# Patient Record
Sex: FEMALE | Race: WHITE | Employment: OTHER | URBAN - METROPOLITAN AREA
[De-identification: names, ages, dates, MRNs, and addresses within clinical notes are randomized per-mention and may not be internally consistent; named-entity substitution may affect disease eponyms.]

---

## 2018-06-19 ENCOUNTER — TRANSFERRED RECORDS (OUTPATIENT)
Dept: HEALTH INFORMATION MANAGEMENT | Facility: CLINIC | Age: 73
End: 2018-06-19

## 2018-06-25 ENCOUNTER — TRANSFERRED RECORDS (OUTPATIENT)
Dept: HEALTH INFORMATION MANAGEMENT | Facility: CLINIC | Age: 73
End: 2018-06-25

## 2018-06-25 ENCOUNTER — MEDICAL CORRESPONDENCE (OUTPATIENT)
Dept: HEALTH INFORMATION MANAGEMENT | Facility: CLINIC | Age: 73
End: 2018-06-25

## 2018-06-26 ENCOUNTER — MEDICAL CORRESPONDENCE (OUTPATIENT)
Dept: HEALTH INFORMATION MANAGEMENT | Facility: CLINIC | Age: 73
End: 2018-06-26

## 2018-06-26 NOTE — TELEPHONE ENCOUNTER
1.  Date of consult: 7/9/18        2.  Reason for consult: Pelvic Mass    3.  Referring provider/facility: Dr Roxanne Pascual / [facility]    4. Scheduled by: Angie / [facility]      5.  Outside records requested from: [facility]    6.  Additional Information:

## 2018-06-28 ENCOUNTER — CARE COORDINATION (OUTPATIENT)
Dept: ONCOLOGY | Facility: CLINIC | Age: 73
End: 2018-06-28

## 2018-06-28 NOTE — PROGRESS NOTES
New Patient Pre-Visit Phone Call:    1) Verify time, date & provider:    2) What to bring:  - Medication list and/or bring medication bottles  - List or notebook - write down all questions to address at visit    3) What to expect:   - Briefly walk Pt through their day  - Duration (2-4 hrs) Bring reading material. Family members welcome.  - Possible labs, EKG, CXR    4) Records/information  - PCP:   - Referring MD information:    5) Road construction  6) Use [employer]ing    Marianela Coates RN

## 2018-07-02 ENCOUNTER — ONCOLOGY VISIT (OUTPATIENT)
Dept: ONCOLOGY | Facility: CLINIC | Age: 73
End: 2018-07-02
Attending: OBSTETRICS & GYNECOLOGY
Payer: COMMERCIAL

## 2018-07-02 ENCOUNTER — RADIANT APPOINTMENT (OUTPATIENT)
Dept: CT IMAGING | Facility: CLINIC | Age: 73
End: 2018-07-02
Attending: OBSTETRICS & GYNECOLOGY
Payer: MEDICARE

## 2018-07-02 VITALS
DIASTOLIC BLOOD PRESSURE: 65 MMHG | BODY MASS INDEX: 26.05 KG/M2 | SYSTOLIC BLOOD PRESSURE: 133 MMHG | HEART RATE: 78 BPM | WEIGHT: 147 LBS | OXYGEN SATURATION: 94 % | HEIGHT: 63 IN | TEMPERATURE: 98.3 F | RESPIRATION RATE: 16 BRPM

## 2018-07-02 DIAGNOSIS — R19.00 PELVIC MASS: ICD-10-CM

## 2018-07-02 DIAGNOSIS — R19.00 PELVIC MASS: Primary | ICD-10-CM

## 2018-07-02 DIAGNOSIS — J90 PLEURAL EFFUSION: ICD-10-CM

## 2018-07-02 DIAGNOSIS — Z01.818 PREOPERATIVE EXAMINATION: ICD-10-CM

## 2018-07-02 LAB
CREAT BLD-MCNC: 0.6 MG/DL (ref 0.52–1.04)
GFR SERPL CREATININE-BSD FRML MDRD: >90 ML/MIN/1.7M2

## 2018-07-02 PROCEDURE — G0463 HOSPITAL OUTPT CLINIC VISIT: HCPCS | Mod: ZF

## 2018-07-02 PROCEDURE — 86900 BLOOD TYPING SEROLOGIC ABO: CPT | Performed by: OBSTETRICS & GYNECOLOGY

## 2018-07-02 PROCEDURE — 86901 BLOOD TYPING SEROLOGIC RH(D): CPT | Performed by: OBSTETRICS & GYNECOLOGY

## 2018-07-02 PROCEDURE — 86850 RBC ANTIBODY SCREEN: CPT | Performed by: OBSTETRICS & GYNECOLOGY

## 2018-07-02 PROCEDURE — 99205 OFFICE O/P NEW HI 60 MIN: CPT | Performed by: OBSTETRICS & GYNECOLOGY

## 2018-07-02 RX ORDER — GABAPENTIN 600 MG/1
TABLET ORAL
Refills: 5 | Status: ON HOLD | COMMUNITY
Start: 2018-06-19 | End: 2018-07-31

## 2018-07-02 RX ORDER — PHENAZOPYRIDINE HYDROCHLORIDE 200 MG/1
200 TABLET, FILM COATED ORAL ONCE
Status: CANCELLED | OUTPATIENT
Start: 2018-07-02 | End: 2018-07-02

## 2018-07-02 RX ORDER — HEPARIN SODIUM 5000 [USP'U]/.5ML
5000 INJECTION, SOLUTION INTRAVENOUS; SUBCUTANEOUS
Status: CANCELLED | OUTPATIENT
Start: 2018-07-02

## 2018-07-02 RX ORDER — INSULIN LISPRO 100 [IU]/ML
INJECTION, SOLUTION SUBCUTANEOUS
Refills: 6 | COMMUNITY
Start: 2018-05-10

## 2018-07-02 RX ORDER — MORPHINE SULFATE 15 MG/1
TABLET, FILM COATED, EXTENDED RELEASE ORAL
Refills: 0 | Status: ON HOLD | COMMUNITY
Start: 2018-06-08 | End: 2018-07-31

## 2018-07-02 RX ORDER — CEFAZOLIN SODIUM 1 G/50ML
1 INJECTION, SOLUTION INTRAVENOUS SEE ADMIN INSTRUCTIONS
Status: CANCELLED | OUTPATIENT
Start: 2018-07-02

## 2018-07-02 RX ORDER — ACETAMINOPHEN 160 MG
TABLET,DISINTEGRATING ORAL
Refills: 3 | COMMUNITY
Start: 2018-05-09

## 2018-07-02 RX ORDER — ASPIRIN 81 MG/1
81 TABLET ORAL 2 TIMES DAILY
COMMUNITY
End: 2019-02-08

## 2018-07-02 RX ORDER — LOSARTAN POTASSIUM 100 MG/1
100 TABLET ORAL AT BEDTIME
COMMUNITY
Start: 2018-06-26

## 2018-07-02 RX ORDER — CARVEDILOL 25 MG/1
25 TABLET ORAL EVERY MORNING
Refills: 1 | Status: ON HOLD | COMMUNITY
Start: 2018-05-09 | End: 2018-07-31

## 2018-07-02 RX ORDER — LEVOTHYROXINE SODIUM 75 UG/1
75 TABLET ORAL
Refills: 0 | COMMUNITY
Start: 2018-05-30 | End: 2020-03-04 | Stop reason: DRUGHIGH

## 2018-07-02 RX ORDER — INSULIN DEGLUDEC 200 U/ML
INJECTION, SOLUTION SUBCUTANEOUS
Refills: 6 | Status: ON HOLD | COMMUNITY
Start: 2018-05-09 | End: 2018-07-31

## 2018-07-02 RX ORDER — OXYCODONE AND ACETAMINOPHEN 5; 325 MG/1; MG/1
1 TABLET ORAL 2 TIMES DAILY
Refills: 0 | Status: ON HOLD | COMMUNITY
Start: 2018-06-15 | End: 2018-07-31

## 2018-07-02 RX ORDER — IOPAMIDOL 755 MG/ML
72 INJECTION, SOLUTION INTRAVASCULAR ONCE
Status: COMPLETED | OUTPATIENT
Start: 2018-07-02 | End: 2018-07-02

## 2018-07-02 RX ORDER — PRAVASTATIN SODIUM 80 MG/1
80 TABLET ORAL AT BEDTIME
COMMUNITY
Start: 2018-06-26

## 2018-07-02 RX ORDER — DULOXETIN HYDROCHLORIDE 60 MG/1
CAPSULE, DELAYED RELEASE ORAL
Refills: 1 | COMMUNITY
Start: 2018-05-10

## 2018-07-02 RX ORDER — CLOBETASOL PROPIONATE 0.5 MG/G
CREAM TOPICAL
Refills: 3 | COMMUNITY
Start: 2018-04-11 | End: 2018-07-19

## 2018-07-02 RX ADMIN — IOPAMIDOL 72 ML: 755 INJECTION, SOLUTION INTRAVASCULAR at 16:27

## 2018-07-02 ASSESSMENT — PAIN SCALES - GENERAL: PAINLEVEL: EXTREME PAIN (9)

## 2018-07-02 NOTE — NURSING NOTE
Pre Op Nurse Teaching Template    Relevant Diagnosis: Pelvic Mass     Teaching Topic: Diagnostic laparotomy, biopsies, BSO, possible omentectomy, possible tumor debulking, possible lymph node dissection     Person(s) involved in teaching :  Patient  & daughter's   Motivation Level:  Asks Questions:    Yes      Eager to Learn:     Yes     Cooperative:          Yes    Receptive (willing. Able to accept information):    Yes      Patient and those who are listed above demonstrates understanding of the following:   Reason for the appointment, diagnosis and treatment plan:   Yes   Knowledge of proper use of medications and conditions for which they are ordered (with special attention to potential side effects or drug interactions): Yes   Which situations necessitate calling provider and whom to contact: Yes         Nutritional needs and diet plan:  Yes      Pain management techniques:     Yes, Pain Scale   Diet:   Yes, [facility] Diet Instructions    Teaching Concerns addressed: Yes    Infection Prevention:  Patient and those who are listed above demonstrate understanding of the following:  Pre-Op CHG Bathing Instructions: Yes  Surgical procedure site care taught:   Yes   Signs and symptoms of infection taught: Yes       Instructional Materials Used/Given:  The [place] Before You Surgery Booklet  Showering or Bathing before Surgery Instructions  Pain Assessment Tool   Home Care after Major Abdominal or Vaginal Surgery  Map  Accommodations Brochure  Phone numbers for [facility] and [facility]  Copy of Surgical Consent    Comments:  YAZMIN Coates RN

## 2018-07-02 NOTE — MR AVS SNAPSHOT
After Visit Summary   7/2/2018    Afshan Cardona    MRN: [id number]           Patient Information     Date Of Birth          1945        Visit Information        Provider Department      7/2/2018 1:00 PM Bakari Galeas MD [facility]        Today's Diagnoses     Pelvic mass    -  1    Pleural effusion        Preoperative examination           Follow-ups after your visit        Additional Services     PAC Visit Referral (For [facility] Only)       PAC assessment with H&P. Patient has heart issues.                  Your next 10 appointments already scheduled     Jul 02, 2018  5:00 PM CDT   CT CHEST W/O & W CONTRAST with UCCT1   [facility] ([facility])    [address] [address] [phone]           Please bring any scans or X-rays taken at other hospitals, if similar tests were done. Also bring a list of your medicines, including vitamins, minerals and over-the-counter drugs. It is safest to leave personal items at home.  Be sure to tell your doctor:   If you have any allergies.   If there s any chance you are pregnant.   If you are breastfeeding.    If you have diabetes as your medication may need to be adjusted for this exam.  You will have contrast for this exam. To prepare:   Do not eat or drink for 2 hours before your exam. If you need to take medicine, you may take it with small sips of water. (We may ask you to take liquid medicine as well.)   The day before your exam, drink extra fluids at least six 8-ounce glasses (unless your doctor tells you to restrict your fluids).  Patients over 70 or patients with diabetes or kidney problems:   If you haven t had a blood test (creatinine test) within the last 30 days, the Cardiologist/Radiologist may require you to get this test prior to your exam.  Please wear loose clothing, such as a sweat suit or jogging clothes. Avoid snaps, zippers and other  metal. We may ask you to undress and put on a hospital gown.  If you have any questions, please call the Imaging Department where you will have your exam.            Jul 03, 2018  7:30 AM CDT   (Arrive by 7:15 AM)   PAC EVALUATION with SLAOME Goss   [facility] ([facility])    [address]  4th [address]  [facility] [address] [phone]            Jul 03, 2018  8:00 AM CDT   (Arrive by 7:45 AM)   PAC RN ASSESSMENT with  Pac Rn   [facility] [place] ([facility])    [address]  4th Floor  [facility] [address] [phone]            Jul 03, 2018  8:30 AM CDT   (Arrive by 8:15 AM)   PAC Anesthesia Consult with  Pac Anesthesiologist   [facility] [place] ([facility])    [address]  4th Floor  [facility] [id number] [phone]            Jul 03, 2018  9:15 AM CDT   [facility] Lab Draw with  [facility] LAB DRAW   [facility] ([facility])    [address]  [address]  [facility] [id number] [phone]            Jul 06, 2018   Procedure with Bakari Galeas MD   [facility] [place] Same Day Surgery (--)    [address] [address]   [phone]              Future tests that were ordered for you today     Open Future Orders        Priority Expected Expires Ordered    CT Chest w/o & w Contrast Routine  7/2/2019 7/2/2018    CBC with platelets differential Routine  7/2/2019 7/2/2018    CMP - Comprehensive Metabolic Panel Routine  7/2/2019 7/2/2018    ABO/Rh Type and Screen Routine  7/2/2019 7/2/2018            Who to contact     If you have questions or need follow up information about today's clinic visit or your schedule please contact [facility] directly at [phone].  Normal or non-critical lab and imaging results will be  "communicated to you by MyChart, letter or phone within 4 business days after the clinic has received the results. If you do not hear from us within 7 days, please contact the clinic through [employer] or phone. If you have a critical or abnormal lab result, we will notify you by phone as soon as possible.  Submit refill requests through [employer] or call your pharmacy and they will forward the refill request to us. Please allow 3 business days for your refill to be completed.          Additional Information About Your Visit        [employer] Information     [employer] lets you send messages to your doctor, view your test results, renew your prescriptions, schedule appointments and more. To sign up, go to [email or web address] . Click on \"Log in\" on the left side of the screen, which will take you to the Welcome page. Then click on \"Sign up Now\" on the right side of the page.     You will be asked to enter the access code listed below, as well as some personal information. Please follow the directions to create your username and password.     Your access code is: [email or web address]  Expires: 2018 10:52 AM     Your access code will  in 90 days. If you need help or a new code, please call your [place] clinic or [phone].        Care EveryWhere ID     This is your Care EveryWhere ID. This could be used by other organizations to access your [place] medical records  [id number]        Your Vitals Were     Pulse Temperature Respirations Height Pulse Oximetry BMI (Body Mass Index)    78 98.3  F (36.8  C) (Oral) 16 1.588 m (5' 2.5\") 94% 26.46 kg/m2       Blood Pressure from Last 3 Encounters:   18 133/65    Weight from Last 3 Encounters:   18 66.7 kg (147 lb)              We Performed the Following     ABO/Rh type and screen     PAC Visit Referral (For [facility] Only)     Denise-Operative Worksheet - Exploratory Laparotomy Total Abdominal Hysterectomy, bilateral Salpingo-Oophorectomy, tumor debulking, omentectomy, " possible intraperitoneal port placement       Information about OPIOIDS     PRESCRIPTION OPIOIDS: WHAT YOU NEED TO KNOW   We gave you an opioid (narcotic) pain medicine. It is important to manage your pain, but opioids are not always the best choice. You should first try all the other options your care team gave you. Take this medicine for as short a time (and as few doses) as possible.     These medicines have risks:    DO NOT drive when on new or higher doses of pain medicine. These medicines can affect your alertness and reaction times, and you could be arrested for driving under the influence (DUI). If you need to use opioids long-term, talk to your care team about driving.    DO NOT operate heave machinery    DO NOT do any other dangerous activities while taking these medicines.     DO NOT drink any alcohol while taking these medicines.      If the opioid prescribed includes acetaminophen, DO NOT take with any other medicines that contain acetaminophen. Read all labels carefully. Look for the word  acetaminophen  or  Tylenol.  Ask your pharmacist if you have questions or are unsure.    You can get addicted to pain medicines, especially if you have a history of addiction (chemical, alcohol or substance dependence). Talk to your care team about ways to reduce this risk.    Store your pills in a secure place, locked if possible. We will not replace any lost or stolen medicine. If you don t finish your medicine, please throw away (dispose) as directed by your pharmacist. The [place] Pollution Control Agency has more information about safe disposal: https://www.pca.[facility].[place].us/living-green/managing-unwanted-medications.     All opioids tend to cause constipation. Drink plenty of water and eat foods that have a lot of fiber, such as fruits, vegetables, prune juice, apple juice and high-fiber cereal. Take a laxative (Miralax, milk of magnesia, Colace, Senna) if you don t move your bowels at least every other day.           Primary Care Provider Office Phone # Fax #    Roxanne Pascual [phone]       [facility]  [address] [facility] [address]        Equal Access to Services     ANJANA KAYE : Kvng clayton rickyo Soyuri, waemilyda luqadaha, qarickta kaalmada crystal, luis damonin hayaafemi corralgillian mantillajw conteh. So [facility] [phone].    ATENCIÓN: Si habla español, tiene a roblero disposición servicios gratuitos de asistencia lingüística. [facility] [phone].    We comply with applicable federal civil rights laws and [place] laws. We do not discriminate on the basis of race, color, national origin, age, disability, sex, sexual orientation, or gender identity.            Thank you!     Thank you for choosing [facility]  for your care. Our goal is always to provide you with excellent care. Hearing back from our patients is one way we can continue to improve our services. Please take a few minutes to complete the written survey that you may receive in the mail after your visit with us. Thank you!             Your Updated Medication List - Protect others around you: Learn how to safely use, store and throw away your medicines at www.disposemymeds.org.          This list is accurate as of 7/2/18  3:47 PM.  Always use your most recent med list.                   Brand Name Dispense Instructions for use Diagnosis    aspirin 81 MG EC tablet      Take 81 mg by mouth        carvedilol 25 MG tablet    COREG     Take 25 mg by mouth 2 times daily        cephalexin 250 MG capsule    KEFLEX     TAKE ONE CAPSULE BY MOUTH ONCE DAILY        CITRA PH PO           clobetasol 0.05 % cream    TEMOVATE     APPLY TO AFFECTED AREA(S) EVERY NIGHT AT BEDTIME , APPLY FOR 1-2 WEEKS THEN 2-3 TIMES PER WEEK AS NEEDED        DULoxetine 60 MG EC capsule    CYMBALTA     TAKE ONE CAPSULE BY MOUTH AT BEDTIME        gabapentin 600 MG tablet    NEURONTIN     TAKE 1&1/2 TABLETS BY MOUTH THREE TIMES DAILY.         HUMALOG MARY KWIKPEN 100 UNIT/ML injection   Generic drug:  insulin lispro      INJECT 1 UNIT PER 10GRAMS OF CARBOHYDRATES WITH CORRECTION 0.5 UNITS PER 50 ABOVE 150 ( UP TO 30 UNITS PER DAY)        levothyroxine 75 MCG tablet    SYNTHROID/LEVOTHROID     TAKE ONE TABLET BY MOUTH ONCE DAILY (PLEASE CALL [phone] FOR AN OFFICE VISIT BEFORE NEXT REFILL.)        losartan 100 MG tablet    COZAAR          morphine 15 MG 12 hr tablet    MS CONTIN     TAKE ONE TABLET BY MOUTH TWICE A DAY *CAUTION: OPIOID. RISK OF OVERDOSE AND ADDICTION.        omeprazole 20 MG CR capsule    priLOSEC     TAKE ONE CAPSULE BY MOUTH ONCE DAILY        oxyCODONE-acetaminophen 5-325 MG per tablet    PERCOCET     Take 1 tablet by mouth 2 times daily        pravastatin 80 MG tablet    PRAVACHOL          TRESIBA FLEXTOUCH 200 UNIT/ML pen   Generic drug:  insulin degludec      INJECT 20UNITS SUBCUTANEOUSLY DAILY AT BEDTIME        vitamin D3 2000 units Caps      TAKE ONE CAPSULE BY MOUTH ONCE DAILY

## 2018-07-02 NOTE — LETTER
2018       RE: Afshan Cardona  [address] [place] [address] [address]  [place] [address]     Dear Colleague,    Thank you for referring your patient, Afshan Cardona, to the [facility]. Please see a copy of my visit note below.                            Consult Notes on Referred Patient    Date: 2018       Dr. Roxanne Psacual MD  [facility]  [address] [place] [place] [address]       RE: Afshan Cardona  : 1945  SRIKANTH: 2018    Dear Dr. Roxanne Pascual:    I had the pleasure of seeing your patient Afshan Cardona here at the Gynecologic Cancer Clinic at the [facility] on 2018.  As you know she is a very pleasant 73 year old woman with a recent diagnosis of pelvic mass.  Given these findings she was subsequently sent to the Gynecologic Cancer Clinic for new patient consultation.   , 6 prior vaginal deliveries, had a hysterectomy about 4 years ago with menopause at age 43, hysterectomy was a vaginal hysterectomy.  On hormone replacement therapy for a couple of years, noticed to have some diarrhea and loose stools all the way back in January.  Had a colonoscopy about a year ago, had about 8 polyps removed at that time.  Scheduled for another colonoscopy, which has not been done yet.  She noticed some early satiety as well as bloating.  She has otherwise normal urinary and bowel function, has gained some weight.             Past Medical History:  1.  Cardiomyopathy.   2.  Insulin-dependent diabetes since 42 years.   3.  TIA, not on anticoagulation.           Past Surgical History:  1.  Vaginal hysterectomy.   2.  Bladder lift.   3.  Arm/wrist surgery.   4.  Surgery on kneecap.           Health Maintenance:  Health Maintenance Due   Topic Date Due     PHQ-2 Q1 YR  1956     TETANUS IMMUNIZATION (SYSTEM ASSIGNED)  1962     LIPID SCREEN Q5 YR FEMALE (SYSTEM ASSIGNED)  1989     ADVANCE DIRECTIVE PLANNING Q5 YRS  1999     FALL RISK  "ASSESSMENT  2009     DEXA SCAN SCREENING (SYSTEM ASSIGNED)  2009     PNEUMOCOCCAL (1 of 2 - PCV13) 2009     No history of abnormal Pap smear.             Current Medications:     has a current medication list which includes the following prescription(s): aspirin, carvedilol, cephalexin, vitamin d3, clobetasol, duloxetine, gabapentin, humalog joel kwikpen, levothyroxine, losartan, morphine, omeprazole, oxycodone-acetaminophen, pravastatin, sodium citrate dihydrate, and tresiba flextouch.       Allergies:     [unfilled]        Social History:     Social History   Substance Use Topics     Smoking status: Former Smoker     Smokeless tobacco: Never Used     Alcohol use Yes       History   Drug Use No           Family History:   The patient's sister had breast cancer in her 40s.  Mother had colon cancer in her 60s, possible ovarian cancer, was treated with cobalt, which would be more likely cervical cancer.  Maternal onset of breast cancer in her 70s.  Brother had leukemia and  at the age of 62.           Physical Exam:     /65  Pulse 78  Temp 98.3  F (36.8  C) (Oral)  Resp 16  Ht 1.588 m (5' 2.5\")  Wt 66.7 kg (147 lb)  SpO2 94%  BMI 26.46 kg/m2  Body mass index is 26.46 kg/(m^2).    General Appearance: healthy and alert, no distress     Musculoskeletal: extremities non tender and without edema    Skin: no lesions or rashes     Neurological: normal gait, no gross defects     Psychiatric: appropriate mood and affect                               ABDOMEN:  Soft, mildly distended and nontender.  There is some pelvic mass which is fixed below the umbilicus.   PELVIC:  Normal external genitalia.  There is some fixed pelvic masses.  Rectovaginal confirms.             Assessment:    Afshan Cardona is a 73 year old woman with a new diagnosis of pelvic mass.     A total of 70 minutes was spent with the patient, 40 minutes of which were spent in counseling the patient and/or treatment " planning.    1.  Complex pelvic mass.   2.  Elevated CA-125 of 660.   3.  History of cardiomyopathy.        I had a long discussion with the patient as well as with her family.  Based on the imaging, this is very suspicious for ovarian malignancy.  She does have some mass effect on the ureter with some nonspecific dilatation of the biliary duct system without any stones or clear signs of obstruction.  Has some ascites around the liver as well as in the pelvis.  Again, with the elevated CA-125, this is, again, most likely ovarian malignancy.  We will have her see my colleagues in Anesthesia for preoperative clearance and then plan to proceed with a diagnostic laparoscopy with biopsy, possible exploratory laparotomy, possible bilateral salpingo-oophorectomy, possible cytoreduction, pelvic and periaortic lymphadenectomy, possible omentectomy on this upcoming Friday if she does get cleared.  Also discussed with her the precision medicine program as well as the [employer] single-cell program.  She will come in for that on the morning of surgery on Friday and be consented for that.  She agrees with the plan, is very appreciative of care.  All questions were answered.       Risks, benefits and alternatives to proceed discussed in detail with the patient. Risks include but are not limited to bleeding, infection, possible injury to surrounding organs including bowel, bladder, ureter, need for second procedure/surgery related to complications from first procedure, postoperative medical complications such as cardiopulmonary events, lymphedema, lymphocyst, thromboembolic events. Consent for surgery, blood transfusion signed.  Will arrange appropriate preoperative blood work, CXR, EKG. Patient also advised on need for postoperative surveillance and/or adjuvant therapy. Questions answered.          Thank you for allowing us to participate in the care of your patient.         Sincerely,    Bakari Galeas MD, MS  Assistant  Professor  Department of Obstetrics and Gynecology   Division of Gynecologic Oncology   [facility]  Phone: [phone]      CC  Patient Care Team:  Roxanne Pascual MD as PCP - General

## 2018-07-02 NOTE — PROGRESS NOTES
Consult Notes on Referred Patient    Date: 2018       Dr. Roxanne Pascual MD  [facility]  [address] [place] [address]       RE: Afshan Cardona  : 1945  SRIKANTH: 2018    Dear Dr. Roxanne Pascual:    I had the pleasure of seeing your patient Afshan Cardona here at the Gynecologic Cancer Clinic at the [facility] on 2018.  As you know she is a very pleasant 73 year old woman with a recent diagnosis of pelvic mass.  Given these findings she was subsequently sent to the Gynecologic Cancer Clinic for new patient consultation.   , 6 prior vaginal deliveries, had a hysterectomy about 4 years ago with menopause at age 43, hysterectomy was a vaginal hysterectomy.  On hormone replacement therapy for a couple of years, noticed to have some diarrhea and loose stools all the way back in January.  Had a colonoscopy about a year ago, had about 8 polyps removed at that time.  Scheduled for another colonoscopy, which has not been done yet.  She noticed some early satiety as well as bloating.  She has otherwise normal urinary and bowel function, has gained some weight.             Past Medical History:  1.  Cardiomyopathy.   2.  Insulin-dependent diabetes since 42 years.   3.  TIA, not on anticoagulation.           Past Surgical History:  1.  Vaginal hysterectomy.   2.  Bladder lift.   3.  Arm/wrist surgery.   4.  Surgery on kneecap.           Health Maintenance:  Health Maintenance Due   Topic Date Due     PHQ-2 Q1 YR  1956     TETANUS IMMUNIZATION (SYSTEM ASSIGNED)  1962     LIPID SCREEN Q5 YR FEMALE (SYSTEM ASSIGNED)  1989     ADVANCE DIRECTIVE PLANNING Q5 YRS  1999     FALL RISK ASSESSMENT  2009     DEXA SCAN SCREENING (SYSTEM ASSIGNED)  2009     PNEUMOCOCCAL (1 of 2 - PCV13) 2009     No history of abnormal Pap smear.             Current Medications:     has a current medication list which includes the  "following prescription(s): aspirin, carvedilol, cephalexin, vitamin d3, clobetasol, duloxetine, gabapentin, humalog joel kwikpen, levothyroxine, losartan, morphine, omeprazole, oxycodone-acetaminophen, pravastatin, sodium citrate dihydrate, and tresiba flextouch.       Allergies:     [unfilled]        Social History:     Social History   Substance Use Topics     Smoking status: Former Smoker     Smokeless tobacco: Never Used     Alcohol use Yes       History   Drug Use No           Family History:   The patient's sister had breast cancer in her 40s.  Mother had colon cancer in her 60s, possible ovarian cancer, was treated with cobalt, which would be more likely cervical cancer.  Maternal onset of breast cancer in her 70s.  Brother had leukemia and  at the age of 62.           Physical Exam:     /65  Pulse 78  Temp 98.3  F (36.8  C) (Oral)  Resp 16  Ht 1.588 m (5' 2.5\")  Wt 66.7 kg (147 lb)  SpO2 94%  BMI 26.46 kg/m2  Body mass index is 26.46 kg/(m^2).    General Appearance: healthy and alert, no distress     Musculoskeletal: extremities non tender and without edema    Skin: no lesions or rashes     Neurological: normal gait, no gross defects     Psychiatric: appropriate mood and affect                               ABDOMEN:  Soft, mildly distended and nontender.  There is some pelvic mass which is fixed below the umbilicus.   PELVIC:  Normal external genitalia.  There is some fixed pelvic masses.  Rectovaginal confirms.             Assessment:    Afshan Cardona is a 73 year old woman with a new diagnosis of pelvic mass.     A total of 70 minutes was spent with the patient, 40 minutes of which were spent in counseling the patient and/or treatment planning.    1.  Complex pelvic mass.   2.  Elevated CA-125 of 660.   3.  History of cardiomyopathy.        I had a long discussion with the patient as well as with her family.  Based on the imaging, this is very suspicious for ovarian malignancy.  She " does have some mass effect on the ureter with some nonspecific dilatation of the biliary duct system without any stones or clear signs of obstruction.  Has some ascites around the liver as well as in the pelvis.  Again, with the elevated CA-125, this is, again, most likely ovarian malignancy.  We will have her see my colleagues in Anesthesia for preoperative clearance and then plan to proceed with a diagnostic laparoscopy with biopsy, possible exploratory laparotomy, possible bilateral salpingo-oophorectomy, possible cytoreduction, pelvic and periaortic lymphadenectomy, possible omentectomy on this upcoming Friday if she does get cleared.  Also discussed with her the precision medicine program as well as the [employer] single-cell program.  She will come in for that on the morning of surgery on Friday and be consented for that.  She agrees with the plan, is very appreciative of care.  All questions were answered.       Risks, benefits and alternatives to proceed discussed in detail with the patient. Risks include but are not limited to bleeding, infection, possible injury to surrounding organs including bowel, bladder, ureter, need for second procedure/surgery related to complications from first procedure, postoperative medical complications such as cardiopulmonary events, lymphedema, lymphocyst, thromboembolic events. Consent for surgery, blood transfusion signed.  Will arrange appropriate preoperative blood work, CXR, EKG. Patient also advised on need for postoperative surveillance and/or adjuvant therapy. Questions answered.          Thank you for allowing us to participate in the care of your patient.         Sincerely,    Bakari Galeas MD, MS    Department of Obstetrics and Gynecology   Division of Gynecologic Oncology   [facility]  Phone: [phone]      CC  Patient Care Team:  Roxanne Pascual MD as PCP - General  ROXANNE PASCUAL

## 2018-07-02 NOTE — NURSING NOTE
"Oncology Rooming Note    July 2, 2018 12:58 PM   Afshan aCrdona is a 73 year old female who presents for:    Chief Complaint   Patient presents with     Oncology Clinic Visit     New patient; Pelvic mass     Initial Vitals: /65  Pulse 78  Temp 98.3  F (36.8  C) (Oral)  Resp 16  Ht 1.588 m (5' 2.5\")  Wt 66.7 kg (147 lb)  SpO2 94%  BMI 26.46 kg/m2 Estimated body mass index is 26.46 kg/(m^2) as calculated from the following:    Height as of this encounter: 1.588 m (5' 2.5\").    Weight as of this encounter: 66.7 kg (147 lb). Body surface area is 1.72 meters squared.  Extreme Pain (9) Comment: abdominal pain   No LMP recorded. Patient has had a hysterectomy.  Allergies reviewed: Yes  Medications reviewed: Yes    Medications: Medication refills not needed today.  Pharmacy name entered into EPIC: Data Unavailable    Clinical concerns: New patient; pelvic mass; pain at 9     6 minutes for nursing intake (face to face time)     Hillary Lyman CMA              "

## 2018-07-02 NOTE — DISCHARGE INSTRUCTIONS

## 2018-07-03 ENCOUNTER — APPOINTMENT (OUTPATIENT)
Dept: SURGERY | Facility: CLINIC | Age: 73
End: 2018-07-03
Payer: MEDICARE

## 2018-07-03 ENCOUNTER — ANESTHESIA EVENT (OUTPATIENT)
Dept: SURGERY | Facility: CLINIC | Age: 73
End: 2018-07-03
Payer: MEDICARE

## 2018-07-03 ENCOUNTER — ALLIED HEALTH/NURSE VISIT (OUTPATIENT)
Dept: SURGERY | Facility: CLINIC | Age: 73
End: 2018-07-03
Payer: MEDICARE

## 2018-07-03 ENCOUNTER — OFFICE VISIT (OUTPATIENT)
Dept: SURGERY | Facility: CLINIC | Age: 73
End: 2018-07-03
Payer: MEDICARE

## 2018-07-03 VITALS
DIASTOLIC BLOOD PRESSURE: 58 MMHG | SYSTOLIC BLOOD PRESSURE: 105 MMHG | RESPIRATION RATE: 18 BRPM | HEART RATE: 74 BPM | WEIGHT: 147.7 LBS | OXYGEN SATURATION: 96 % | BODY MASS INDEX: 27.18 KG/M2 | HEIGHT: 62 IN | TEMPERATURE: 98.1 F

## 2018-07-03 DIAGNOSIS — Z01.818 PREOPERATIVE EXAMINATION: ICD-10-CM

## 2018-07-03 DIAGNOSIS — R73.9 ELEVATED BLOOD SUGAR: ICD-10-CM

## 2018-07-03 DIAGNOSIS — R19.00 PELVIC MASS: ICD-10-CM

## 2018-07-03 DIAGNOSIS — Z01.818 PRE-OPERATIVE GENERAL PHYSICAL EXAMINATION: ICD-10-CM

## 2018-07-03 DIAGNOSIS — I25.10 CORONARY ARTERY DISEASE INVOLVING NATIVE CORONARY ARTERY OF NATIVE HEART WITHOUT ANGINA PECTORIS: Primary | ICD-10-CM

## 2018-07-03 LAB
ALBUMIN SERPL-MCNC: 3.7 G/DL (ref 3.4–5)
ALP SERPL-CCNC: 113 U/L (ref 40–150)
ALT SERPL W P-5'-P-CCNC: 27 U/L (ref 0–50)
ANION GAP SERPL CALCULATED.3IONS-SCNC: 6 MMOL/L (ref 3–14)
AST SERPL W P-5'-P-CCNC: 16 U/L (ref 0–45)
BASOPHILS # BLD AUTO: 0 10E9/L (ref 0–0.2)
BASOPHILS NFR BLD AUTO: 0.4 %
BILIRUB SERPL-MCNC: 0.4 MG/DL (ref 0.2–1.3)
BUN SERPL-MCNC: 15 MG/DL (ref 7–30)
CALCIUM SERPL-MCNC: 9 MG/DL (ref 8.5–10.1)
CHLORIDE SERPL-SCNC: 96 MMOL/L (ref 94–109)
CO2 SERPL-SCNC: 29 MMOL/L (ref 20–32)
CREAT SERPL-MCNC: 0.64 MG/DL (ref 0.52–1.04)
DIFFERENTIAL METHOD BLD: ABNORMAL
EOSINOPHIL # BLD AUTO: 0.2 10E9/L (ref 0–0.7)
EOSINOPHIL NFR BLD AUTO: 2.3 %
ERYTHROCYTE [DISTWIDTH] IN BLOOD BY AUTOMATED COUNT: 12.9 % (ref 10–15)
GFR SERPL CREATININE-BSD FRML MDRD: >90 ML/MIN/1.7M2
GLUCOSE SERPL-MCNC: 309 MG/DL (ref 70–99)
HCT VFR BLD AUTO: 32.3 % (ref 35–47)
HGB BLD-MCNC: 10.7 G/DL (ref 11.7–15.7)
IMM GRANULOCYTES # BLD: 0 10E9/L (ref 0–0.4)
IMM GRANULOCYTES NFR BLD: 0.6 %
LYMPHOCYTES # BLD AUTO: 1.7 10E9/L (ref 0.8–5.3)
LYMPHOCYTES NFR BLD AUTO: 24.5 %
MCH RBC QN AUTO: 30.2 PG (ref 26.5–33)
MCHC RBC AUTO-ENTMCNC: 33.1 G/DL (ref 31.5–36.5)
MCV RBC AUTO: 91 FL (ref 78–100)
MONOCYTES # BLD AUTO: 0.6 10E9/L (ref 0–1.3)
MONOCYTES NFR BLD AUTO: 8.2 %
NEUTROPHILS # BLD AUTO: 4.6 10E9/L (ref 1.6–8.3)
NEUTROPHILS NFR BLD AUTO: 64 %
NRBC # BLD AUTO: 0 10*3/UL
NRBC BLD AUTO-RTO: 0 /100
PLATELET # BLD AUTO: 300 10E9/L (ref 150–450)
POTASSIUM SERPL-SCNC: 4.8 MMOL/L (ref 3.4–5.3)
PROT SERPL-MCNC: 6.6 G/DL (ref 6.8–8.8)
RBC # BLD AUTO: 3.54 10E12/L (ref 3.8–5.2)
SODIUM SERPL-SCNC: 132 MMOL/L (ref 133–144)
WBC # BLD AUTO: 7.1 10E9/L (ref 4–11)

## 2018-07-03 ASSESSMENT — LIFESTYLE VARIABLES: TOBACCO_USE: 1

## 2018-07-03 NOTE — PATIENT INSTRUCTIONS
Preparing for Your Surgery      Name:  Afshan Cardona   MRN:  [id number]   :  1945   Today's Date:  7/3/2018     Arriving for surgery:  Laparoscopy   Surgery date:  2018  Arrival time:  5:30 am  Please come to:       [facility] [address]  [address] [place]  [address]    -   parking is available in front of the hospital from 5:15 am to 8:00 pm    -  Stop at the Information Desk in the lobby    -   Inform the information person that you are here for surgery. An escort to 3c will be provided. If you would not like an escort, please proceed to 3C on the 3rd floor. [phone]     What can I eat or drink?  -  You may have solid food or milk products until 8 hours prior to your surgery. 18 at 11 pm  -  You may have water, apple juice or 7up/Sprite until 2 hours prior to your surgery. Until 5:30 am arrival time     Which medicines can I take?       Stop Aspirin, vitamins and supplements one week prior to surgery.     Hold Ibuprofen and Naproxen for 24 hours prior to surgery.     -  Do NOT take these medications in the morning, the day of surgery:     Short acting insulin                                 Take 80% of usual Tresiba dose the evening before surgery (12 units)       -  Please take these medications the day of surgery:     Carvedilol      Cephalexin      Gabapentin      Levothyroxine      Morphine      Omeprazole     Pravastatin          How do I prepare myself?  -  Take two showers: one the night before surgery; and one the morning of surgery.         Use Scrubcare or Hibiclens to wash from neck down.  You may use your own shampoo and conditioner. No other hair products.   -  Do NOT use lotion, powder, deodorant, or antiperspirant the day of your surgery.  -  Do NOT wear any makeup, fingernail polish or jewelry.  - Do not bring your own medications to the hospital, except for inhalers and eye   drops.  -  Bring your ID and insurance  card.    Questions or Concerns:  -If you have questions or concerns regarding the day of surgery, please call [phone].       -For questions after surgery please call your surgeons office.           AFTER YOUR SURGERY  Breathing exercises   Breathing exercises help you recover faster. Take deep breaths and let the air out slowly. This will:     Help you wake up after surgery.    Help prevent complications like pneumonia.  Preventing complications will help you go home sooner.   We may give you a breathing device (incentive spirometer) to encourage you to breathe deeply.   Nausea and vomiting   You may feel sick to your stomach after surgery; if so, let your nurse know.    Pain control:  After surgery, you may have pain. Our goal is to help you manage your pain. Pain medicine will help you feel comfortable enough to do activities that will help you heal.  These activities may include breathing exercises, walking and physical therapy.   To help your health care team treat your pain we will ask: 1) If you have pain  2) where it is located 3) describe your pain in your words  Methods of pain control include medications given by mouth, vein or by nerve block for some surgeries.  We may give you a pain control pump that will:  1) Deliver the medicine through a tube placed in your vein  2) Control the amount of medicine you receive  3) Allow you to push a button to deliver a dose of pain medicine  Sequential Compression Device (SCD) or Pneumo Boots:  You may need to wear SCD S on your legs or feet. These are wraps connected to a machine that pumps in air and releases it. The repeated pumping helps prevent blood clots from forming.

## 2018-07-03 NOTE — ANESTHESIA PREPROCEDURE EVALUATION
Anesthesia Evaluation     . Pt has had prior anesthetic. Type: General and MAC           ROS/MED HX    ENT/Pulmonary:     (+)SINCERE risk factors hypertension, tobacco use, Past use quit 30 years ago packs/day  asthma , resolved 2 years ago post-op pneumonia: . .    Neurologic:     (+)CVA without deficitsTIA date: 19 years ago     Cardiovascular: Comment: TTE 4/2017 (outside records)  CONCLUSION:  1. Normal left ventricular size with preserved systolic function.  Ejection fraction is 60%.     2. Stage I diastolic dysfunction.     3. Mild to moderate mitral regurgitation.     4. Mild aortic and tricuspid regurgitation.     (+) Dyslipidemia, hypertension--CAD, --. Taking blood thinners : . CHF etiology: Taksumoto syndrome Last EF: 60% date: 4/18/17 . . :. . Previous cardiac testing Echodate:results:date: results: date: results:Cath date: results:          METS/Exercise Tolerance:  3 - Able to walk 1-2 blocks without stopping   Hematologic: Comments: Several clots RLE -one associated with pregnancy    (+) History of blood clots pt is not anticoagulated, History of Transfusion no previous transfusion reaction -      Musculoskeletal:   (+) arthritis, , , other musculoskeletal- hardware in right patella      GI/Hepatic:     (+) GERD Other,       Renal/Genitourinary:  - ROS Renal section negative       Endo:     (+) type I DM, Using insulin - not using insulin pump thyroid problem hypothyroidism, .      Psychiatric:     (+) psychiatric history anxiety and depression      Infectious Disease:  - neg infectious disease ROS       Malignancy:      - no malignancy   Other:    (+) No chance of pregnancy H/O Chronic Pain,H/O chronic opiod use , no other significant disability                    Physical Exam      Airway   Mallampati: I  TM distance: >3 FB    Dental   (+) loose    Cardiovascular   Rhythm and rate: regular and normal      Pulmonary    breath sounds clear to auscultation               PAC Discussion and  Assessment    ASA Classification: 3  Case is suitable for:   Anesthetic techniques and relevant risks discussed: GA  Invasive monitoring and risk discussed:   Types:   Possibility and Risk of blood transfusion discussed:   NPO instructions given:   Additional anesthetic preparation and risks discussed:   Needs early admission to pre-op area:   Other:     PAC Resident/NP Anesthesia Assessment:  73 year old female for diagnostic laparoscopy, possible biopsies, possible BSO, debulking, with Dr. Galeas, on 7/6/18, in treatment of complex pelvic mass. PAC referral  for risk assessment and optimization for anesthesia with comorbid conditions of diabetes, HTN, HLD, CAD, TIA, DVT.  Pre-operative considerations include:  1.) CV: Functional status independent. Exercise tolerance close to 4 METS. CAD (angio 2/2015 [place]) LAD 40-70% stenosis mid section; Left main -no obstruction but heavily calcified; RCA 60% distal stenosis. Hx Takutsubo cardiomyopathy 2/2015 with EF 25-30% recovered to 60% on April 2017 echo with no RWMA. Cards risks also include insulin dependent diabetes and distant smoking history. EKG today NSR.  RCRI = 11% risk of major adverse cardiac event-  Time sensitive surgery as likely cancer: discussed risk with Dr. Galeas who would like a stress test pre-op  Will arrange stress test in [facility] for pt convenience.  2.) Pulmonary: Distant smoking history Quit 1985. No pulmonary dx, sx or meds.   3.)  Heme: DVT RLE x 2, both provoked per pt (one w/ pregnancy) -last one 3 1/2 years ago. No anticoagulation. Hx blood transfusion.  Elevated risk for DVT due to previous clot and likely cancer dx.  CBC, type and screen today  4.) Neuro: Cerebral microinfarct with no residual. TIA -date unclear. Fibromyalgia. BLE neuropathy.   5.) Endo: Insulin dependent diabetes since age 29. Fluctuates with poor control (HGBa1c =8.6%) and episodes of hypoglycemia requiring ER visits (glucose as low as 30 ). On short and  long acting insulin., No oral agents. Hypothyroid on synthroid  Adjust Insulin dosing of long term as per nursing note.  HOLD short acting insulin  DOS  Take synthroid DOS  6.) GI: GERD. Vivas's esophagus on PPI (stopped it 2 weeks ago).   PONV score = 2 (2 or > antiemetic prophylaxis recommended.   Take PPI DOS    Anesthesia; Multiple past surgeries (back x 3)- last GA umbilical hernia-Talmo - no problems. Airway feasible.   Pt discussed with Dr. Chi. See her recommendations below.      **Addendum 7/5/2018 @ 1635:  Cardiac testing completed today and reviewed via Care Everywhere.  Testing + for lateral ischemia.  Patient did follow up her testing with a visit to Dr. Bill Atkins (cardiology in [place], [place]) today.  Discussed with him over the phone.  He notes that there is awareness of the lateral ischemia (in the setting of known history of CAD) and that she is stable.  He doesn't feel that she needs any further evaluation or intervention prior to the procedure that is already scheduled for tomorrow.  He recommends that her procedure be done in a setting where cardiology is available if needed which she is already scheduled at .  He reports that he has already discussed with Dr. Galeas today and given him the approval to proceed as planned. **     Jillian Atkins, DONALD, RN, APRN      Reviewed and Signed by PAC Mid-Level Provider/Resident  Mid-Level Provider/Resident: Ely Yang PA-C  Date: 7/3/18  Time: 10:16 AM    Attending Anesthesiologist Anesthesia Assessment:  I saw the patient and discussed with the CHYNA as above.  Final anesthetic plan and recommendations to be made by the attending anesthesiologist on the day of surgery.     Patient with significant cardiac history but with suspected CA and upcoming surgery scheduled for 7/6. Upon questioning, patient thinks she could do 2 flights of stairs without issue. Dr. Galeas requesting stress test on patient prior to proceeding with  surgery despite suspected CA. Will order for patient to have prior to scheduled surgery. If negative, no issues with proceeding. If positive, decision to be made about proceeding (time-sensitive surgery) vs. Delaying. If proceeding, information from stress would be useful for intra and postoperative management of this patient.     Reviewed and Signed by PAC Anesthesiologist  Anesthesiologist: CRISTIAN  Date:   Time:   Pass/Fail:   Disposition:     PAC Pharmacist Assessment:        Pharmacist:   Date:   Time:      Anesthesia Plan      History & Physical Review  History and physical reviewed and following examination; no interval change.    ASA Status:  3 .    NPO Status:  > 8 hours    Plan for General and ETT with Intravenous induction. Maintenance will be Balanced.    PONV prophylaxis:  Ondansetron (or other 5HT-3)  Additional equipment: Videolaryngoscope, 2nd IV and Arterial Line      Postoperative Care  Postoperative pain management:  IV analgesics.      Consents  Anesthetic plan, risks, benefits and alternatives discussed with:  Patient..          Anesthesia attending addendum    Prior to anesthetic I have examined the patient, reviewed the medical history, and discussed the ssessment and plan with the anesthesia and surgery teams.  73 year old female who  has a past medical history of Vivas's esophagus; Cardiomyopathy (H); Colon polyps; Fibromyalgia; Former smoker; History of DVT (deep vein thrombosis); HLD (hyperlipidemia); HTN (hypertension); Hypothyroid; and TIA (transient ischemic attack). to OR for Procedure(s):  Diagnostic Laparoscopy Possible Biopsies, Possible Exploratory Laparotomy, Possible Bilateral Salpingo-Oophorectomy, Possible Debulking, Omentectomy, Possible Pelvic And Para Aortic Lymphadenectomy - Wound Class: II-Clean Contaminated   - Wound Class: II-Clean Contaminated   - Wound Class: II-Clean Contaminated  NPO status adequate.    Hemoglobin   Date Value Ref Range Status   07/03/2018 10.7 (L)  11.7 - 15.7 g/dL Final     Potassium   Date Value Ref Range Status   07/03/2018 4.8 3.4 - 5.3 mmol/L Final       Plan for GA, standard monitors, large bore IVs, arterial line with FLOTRAC, possible blood transfusion, PONV prophylaxis.  Antibiotics per primary service.  Patient has a loose upper right canine and a chipped upper incissor. Risk of dental injuries and dislodgement of the diseased tooth discussed; increased cardiac risk also discussed with the patient and her family. They all verbalize understanding and wish to proceed with the surgery.    Marah Zaldivar MD Anesthesiology Attending  07/06/18                .

## 2018-07-03 NOTE — H&P
Pre-Operative H & P     CC:  Preoperative exam to assess for increased cardiopulmonary risk while undergoing surgery and anesthesia.    Date of Encounter: 7/3/2018  Primary Care Physician:  Roxanne Pascual  Afshan Cardona is a 73 year old female who presents for pre-operative H & P in preparation for diagnostic laparoscopy, possible biopsies, possible BSO, debulking, with Dr. Galeas, on 7/6/18,  at [facility], in treatment of complex pelvic mass. She has had vulvar irritation, bloating and pelvic pain and imaging revealed the mass.   She has difficult to manage diabetes and cardiac ds that is stable. No vaginal bleeding. She is S/p hysterectomy.    History is obtained from the patient.     Past Medical History  Past Medical History:   Diagnosis Date     Vivas's esophagus      Cardiomyopathy (H)     Taksumoto     Colon polyps      Fibromyalgia      Former smoker      History of DVT (deep vein thrombosis)      HLD (hyperlipidemia)      HTN (hypertension)      Hypothyroid      TIA (transient ischemic attack)          Past Surgical History  Past Surgical History:   Procedure Laterality Date    Umbilical hernia      EGD      Knee surgery      Arm and wrist surgery       BACK SURGERY x 3       bladder lift       HYSTERECTOMY         Hx of Blood transfusions/reactions: yes     Hx of abnormal bleeding or anti-platelet use: on 81 mg ASA    Menstrual history: No LMP recorded. Patient has had a hysterectomy.:    Steroid use in the last year: no    Personal or FH with difficulty with Anesthesia:  no    Prior to Admission Medications  Current Outpatient Prescriptions   Medication Sig Dispense Refill     aspirin 81 MG EC tablet Take 81 mg by mouth every morning        carvedilol (COREG) 25 MG tablet Take 25 mg by mouth every morning   1     cephalexin (KEFLEX) 250 MG capsule TAKE ONE CAPSULE BY MOUTH ONCE DAILY AT BEDTIME  3     Cholecalciferol (VITAMIN D3) 2000 units  CAPS TAKE ONE CAPSULE BY MOUTH ONCE DAILY IN THE MORNING  3     clobetasol (TEMOVATE) 0.05 % cream APPLY TO AFFECTED AREA(S) EVERY NIGHT AT BEDTIME , APPLY FOR 1-2 WEEKS THEN 2-3 TIMES PER WEEK AS NEEDED  3     CRANBERRY EXTRACT PO Take by mouth every morning       DULoxetine (CYMBALTA) 60 MG EC capsule TAKE ONE CAPSULE BY MOUTH AT BEDTIME  1     gabapentin (NEURONTIN) 600 MG tablet 900 MG THREE TIMES DAILY.  5     HUMALOG MARY KWIKPEN 100 UNIT/ML injection INJECT 1 UNIT PER 10GRAMS OF CARBOHYDRATES WITH CORRECTION 0.5 UNITS PER 50 ABOVE 150 ( UP TO 30 UNITS PER DAY)  6     levothyroxine (SYNTHROID/LEVOTHROID) 75 MCG tablet TAKE ONE TABLET BY MOUTH ONCE DAILY  IN THE MORNING     (PLEASE CALL [phone] FOR AN OFFICE VISIT BEFORE NEXT REFILL.)  0     losartan (COZAAR) 100 MG tablet Take 100 mg by mouth At Bedtime        methylcellulose, laxative, (CITRUCEL) POWD Take by mouth every morning       morphine (MS CONTIN) 15 MG 12 hr tablet TAKE ONE TABLET BY MOUTH TWICE A DAY *CAUTION: OPIOID. RISK OF OVERDOSE AND ADDICTION.  0     oxyCODONE-acetaminophen (PERCOCET) 5-325 MG per tablet Take 1 tablet by mouth 2 times daily  0     pravastatin (PRAVACHOL) 80 MG tablet Take 80 mg by mouth At Bedtime        TRESIBA FLEXTOUCH 200 UNIT/ML pen INJECT 16 UNITS SUBCUTANEOUSLY DAILY AT BEDTIME  6       Allergies  Allergies   Allergen Reactions     Azithromycin Diarrhea     Clonazepam      Other reaction(s): *Unknown     Furosemide      Other reaction(s): Unknown Reaction  Low sodium     Hydrochlorothiazide Other (See Comments)     Low Sodium     Lisinopril Cough     Nitrofurantoin      Other reaction(s): Other  Burning around her mouth, pt advised by neurologist to not take macrobid     Pregabalin Other (See Comments)     Other reaction(s): Dizziness  Worse, numbness/tingling/burning pain whole body, hospitalized     Sulfamethoxazole Hives     Buprenorphine Nausea and Vomiting     Severe vomiting       Social History  Social  "History     Social History     Marital status:      Number of children: 7     Social History Main Topics     Smoking status: Former Smoker     Smokeless tobacco: Never Used     Alcohol use Yes     Drug use: No     Sexual activity: Not on file       ROS/MED HX    ENT/Pulmonary:     (+)SINCERE risk factors hypertension, age  tobacco use, Past use quit 30 years ago      Neurologic:     (+)CVA without deficitsTIA date: 19 years ago     Cardiovascular:     (+) Dyslipidemia, hypertension--CAD  Taking blood thinners : on ASA.   CHF etiology: Taksumoto syndrome Last EF: 60% date: 4/18/17  Echo and :Cath date: results see belos          METS/Exercise Tolerance:  3 - Able to walk 1-2 blocks without stopping   Hematologic: Comments: Several clots RLE -one associated with pregnancy    (+) History of blood clots pt is not anticoagulated, History of Transfusion no previous transfusion reaction -      Musculoskeletal:   (+) arthritis, , , other musculoskeletal- hardware in right patella      GI/Hepatic:     (+) GERD Other,       Renal/Genitourinary:  - ROS Renal section negative       Endo:     (+) type I DM, Using insulin - not using insulin pump thyroid problem hypothyroidism, .      Psychiatric:     (+) psychiatric history anxiety and depression      Infectious Disease:  - neg infectious disease ROS       Malignancy:      - no malignancy   Other:    (+) No chance of pregnancy H/O Chronic Pain,H/O chronic opiod use , no other significant disability          The complete review of systems is negative other than noted in the HPI or here.   Temp: 98.1  F (36.7  C) Temp src: Oral BP: 105/58 Pulse: 74   Resp: 18 SpO2: 96 %         147 lbs 11.2 oz  5' 2\"   Body mass index is 27.01 kg/(m^2).       Physical Exam  Constitutional: Awake, alert, cooperative, no apparent distress, and appears stated age.  Eyes: Pupils equal, round and reactive to light, extra ocular muscles intact, sclera clear, conjunctiva normal.  HENT: Normocephalic, " oral pharynx with moist mucus membranes, front loose tooth. No goiter appreciated.   Respiratory: Clear to auscultation bilaterally, no crackles or wheezing.  Cardiovascular: Regular rate and rhythm, normal S1 and S2, and no murmur noted.  Carotids +2, no bruits. No LE edema. Palpable pulses to radial and  PT arteries.   GI: Normal bowel sounds, soft, non-distended,mildly tender BLQ,  no masses palpated, no hepatosplenomegaly.    Lymph/Hematologic: No cervical lymphadenopathy and no supraclavicular lymphadenopathy.  Genitourinary:  deferred  Skin: Warm and dry.  No rashes at anticipated surgical site.   Musculoskeletal: Full ROM of neck. There is no redness, warmth, or swelling of the joints.     Neurologic: Awake, alert, oriented to name, place and time. Cranial nerves II-XII are grossly intact. Gait is normal.   Neuropsychiatric: Calm, cooperative. Normal affect.     Labs: (personally reviewed)  Lab Results   Component Value Date    WBC 7.1 07/03/2018     Lab Results   Component Value Date    RBC 3.54 07/03/2018     Lab Results   Component Value Date    HGB 10.7 07/03/2018     Lab Results   Component Value Date    HCT 32.3 07/03/2018     Lab Results   Component Value Date    MCV 91 07/03/2018     Lab Results   Component Value Date    MCH 30.2 07/03/2018     Lab Results   Component Value Date    MCHC 33.1 07/03/2018     Lab Results   Component Value Date    RDW 12.9 07/03/2018     Lab Results   Component Value Date     07/03/2018     Last Basic Metabolic Panel:  Lab Results   Component Value Date     07/03/2018      Lab Results   Component Value Date    POTASSIUM 4.8 07/03/2018     Lab Results   Component Value Date    CHLORIDE 96 07/03/2018     Lab Results   Component Value Date    KAIN 9.0 07/03/2018     Lab Results   Component Value Date    CO2 29 07/03/2018     Lab Results   Component Value Date    BUN 15 07/03/2018     Lab Results   Component Value Date    CR 0.64 07/03/2018     Lab Results    Component Value Date     07/03/2018       EKG: Personally reviewed but formal cardiology read pending: NSR  Cardiac echo: 4/18/17 NL LV EF 60%. No RWMA. AV sclerosis. Mild-moderate regurgitation. Stage 1 diastolic dysfunction.    Outside [facility] reviewed [facility]: [facility]    ASSESSMENT and PLAN  Afshan Cardona is a 73 year old female scheduled to undergo diagnostic laparoscopy, possible biopsies, possible BSO, debulking, with Dr. Galeas, on 7/6/18, in treatment of complex pelvic mass.     Pre-operative considerations include:  1.) CV: Functional status independent. Exercise tolerance close to 4 METS. CAD (angio 2/2015 [place]) LAD 40-70% stenosis mid section; Left main -no obstruction but heavily calcified; RCA 60% distal stenosis. Hx Takutsubo cardiomyopathy 2/2015 with EF 25-30% recovered to 60% on April 2017 echo with no RWMA. Cards risks also include insulin dependent diabetes and distant smoking history.   EKG today NSR.  RCRI = 11% risk of major adverse cardiac event-  Time sensitive surgery as likely cancer: discussed risk with Dr. Galeas who would like a stress test pre-op  Will arrange stress test in [facility] for pt convenience.    2.) Pulmonary: Distant smoking history Quit 1985. No pulmonary dx, sx or meds.   SINCERE risk low    3.)  Heme: DVT RLE x 2, both provoked per pt (one w/ pregnancy) -last one 3 1/2 years ago. No anticoagulation. Hx blood transfusion. Anemia-HGB today @ 10   Elevated risk for DVT due to previous clot and likely cancer dx.  CBC, type and screen today    4.) Neuro: Cerebral microinfarct with no residual. TIA -date unclear. Fibromyalgia. BLE neuropathy.     5.) Endo: Insulin dependent diabetes since age 29. Fluctuates with poor control (HGBa1c =8.6%) and episodes of hypoglycemia requiring ER visits (glucose as low as 30 ). On short and long acting insulin., No oral agents. Hypothyroid on synthroid  Adjust Insulin dosing of long term as per nursing note.  HOLD short  acting insulin  DOS  Take synthroid DOS    6.) GI: GERD. Vivas's esophagus on PPI (stopped it 2 weeks ago).   PONV score = 2 (2 or > antiemetic prophylaxis recommended.   -Take PPI DOS.     Patient was discussed with Dr Chi. Will await stress test results before determining if pt optimized.   Unable to get stress testing here in the next few days. Cardiology clinic in [place] kindly agreed to see pt this afternoon for consult and scheduling of stress.  They are attempting to reach her with a time.    SALOME Goss  Preoperative Assessment Center  [facility]  Cl[facility] and [facility]  Phone: [phone]  Fax: [phone]          **Addendum 7/5/2018 @ 7625:  Cardiac testing completed today and reviewed via Care Everywhere.  Testing + for lateral ischemia.  Patient did follow up her testing with a visit to Dr. Bill Atkins (cardiology in [place], [place]) today.  Discussed with him over the phone.  He notes that there is awareness of the lateral ischemia (in the setting of known history of CAD) and that she is stable.  He doesn't feel that she needs any further evaluation or intervention prior to the procedure that is already scheduled for tomorrow.  He recommends that her procedure be done in a setting where cardiology is available if needed which she is already scheduled at .  He reports that he has already discussed with Dr. Galeas today and given him the approval to proceed as planned. **     Jillian Atkins, DNP, RN, APRN

## 2018-07-03 NOTE — MR AVS SNAPSHOT
After Visit Summary   7/3/2018    Afshan Cardona    MRN: [id number]           Patient Information     Date Of Birth          1945        Visit Information        Provider Department      7/3/2018 8:00 AM Rn, [facility] Preoperative Assessment Center        Care Instructions    Preparing for Your Surgery      Name:  Afshan Cardona   MRN:  [id number]   :  1945   Today's Date:  7/3/2018     Arriving for surgery:  Laparoscopy   Surgery date:  2018  Arrival time:  5:30 am  Please come to:       [facility] [address]  [address] [place]  [address]    -   parking is available in front of the hospital from 5:15 am to 8:00 pm    -  Stop at the Information Desk in the lobby    -   Inform the information person that you are here for surgery. An escort to 3c will be provided. If you would not like an escort, please proceed to 3C on the 3rd floor. [phone]     What can I eat or drink?  -  You may have solid food or milk products until 8 hours prior to your surgery. 18 at 11 pm  -  You may have water, apple juice or 7up/Sprite until 2 hours prior to your surgery. Until 5:30 am arrival time     Which medicines can I take?       Stop Aspirin, vitamins and supplements one week prior to surgery.     Hold Ibuprofen and Naproxen for 24 hours prior to surgery.     -  Do NOT take these medications in the morning, the day of surgery:     Short acting insulin                                 Take 80% of usual Tresiba dose the evening before surgery (12 units)       -  Please take these medications the day of surgery:     Carvedilol      Cephalexin      Gabapentin      Levothyroxine      Morphine      Omeprazole     Pravastatin          How do I prepare myself?  -  Take two showers: one the night before surgery; and one the morning of surgery.         Use Scrubcare or Hibiclens to wash from neck down.  You may use your own shampoo and  conditioner. No other hair products.   -  Do NOT use lotion, powder, deodorant, or antiperspirant the day of your surgery.  -  Do NOT wear any makeup, fingernail polish or jewelry.  - Do not bring your own medications to the hospital, except for inhalers and eye   drops.  -  Bring your ID and insurance card.    Questions or Concerns:  -If you have questions or concerns regarding the day of surgery, please call [phone].       -For questions after surgery please call your surgeons office.           AFTER YOUR SURGERY  Breathing exercises   Breathing exercises help you recover faster. Take deep breaths and let the air out slowly. This will:     Help you wake up after surgery.    Help prevent complications like pneumonia.  Preventing complications will help you go home sooner.   We may give you a breathing device (incentive spirometer) to encourage you to breathe deeply.   Nausea and vomiting   You may feel sick to your stomach after surgery; if so, let your nurse know.    Pain control:  After surgery, you may have pain. Our goal is to help you manage your pain. Pain medicine will help you feel comfortable enough to do activities that will help you heal.  These activities may include breathing exercises, walking and physical therapy.   To help your health care team treat your pain we will ask: 1) If you have pain  2) where it is located 3) describe your pain in your words  Methods of pain control include medications given by mouth, vein or by nerve block for some surgeries.  We may give you a pain control pump that will:  1) Deliver the medicine through a tube placed in your vein  2) Control the amount of medicine you receive  3) Allow you to push a button to deliver a dose of pain medicine  Sequential Compression Device (SCD) or Pneumo Boots:  You may need to wear SCD S on your legs or feet. These are wraps connected to a machine that pumps in air and releases it. The repeated pumping helps prevent blood clots  from forming.           Follow-ups after your visit        Your next 10 appointments already scheduled     2018  8:30 AM CDT   (Arrive by 8:15 AM)   PAC Anesthesia Consult with  Pac Anesthesiologist   [facility] Preoperative Assessment Center ([facility])    [address]  4th Floor  [facility] [address] [phone]            2018  9:15 AM CDT   [facility] Lab Draw with  [facility] LAB DRAW   [facility] Lab Draw ([facility])    [address]  [address]  [place] [address] [phone]            2018   Procedure with Bakari Galeas MD   [facility], [place], Same Day Surgery (--)    [address] [facility] [address] [phone]              Future tests that were ordered for you today     Open Future Orders        Priority Expected Expires Ordered    CBC with platelets differential Routine  2019    CMP - Comprehensive Metabolic Panel Routine  2019    ABO/Rh Type and Screen Routine  2019            Who to contact     Please call your clinic at [phone] to:    Ask questions about your health    Make or cancel appointments    Discuss your medicines    Learn about your test results    Speak to your doctor            Additional Information About Your Visit        Cymaxhart Information     OnThe[employer] is an electronic gateway that provides easy, online access to your medical records. With OnThe[employer], you can request a clinic appointment, read your test results, renew a prescription or communicate with your care team.     To sign up for [employer] visit the website at [email or web address]   You will be asked to enter the access code listed below, as well as some personal information. Please follow the directions to create your username and password.     Your access code is: [email or web address]  Expires: 2018 10:52 AM     Your access code will  in 90 days. If you need help  or a new code, please contact your [facility] or call [phone] for assistance.        Care EveryWhere ID     This is your Care EveryWhere ID. This could be used by other organizations to access your [facility] medical records  [id number]         Blood Pressure from Last 3 Encounters:   07/03/18 105/58   07/02/18 133/65    Weight from Last 3 Encounters:   07/03/18 67 kg (147 lb 11.2 oz)   07/02/18 66.7 kg (147 lb)              Today, you had the following     No orders found for display       Primary Care Provider Office Phone # Fax #    Roxanne Pascual [phone]       [facility] [address] [address]        Equal Access to Services     ANJANA KAYE : Hadii margarita garcíao Soyuri, waaxda luqadaha, qaybta kaalmada adeegyada, luis hernandez . So [facility] [phone].    ATENCIÓN: Si habla español, tiene a roblero disposición servicios gratuitos de asistencia lingüística. Llame al [phone].    We comply with applicable federal civil rights laws and [place] laws. We do not discriminate on the basis of race, color, national origin, age, disability, sex, sexual orientation, or gender identity.            Thank you!     Thank you for choosing [facility] PREOPERATIVE ASSESSMENT CENTER  for your care. Our goal is always to provide you with excellent care. Hearing back from our patients is one way we can continue to improve our services. Please take a few minutes to complete the written survey that you may receive in the mail after your visit with us. Thank you!             Your Updated Medication List - Protect others around you: Learn how to safely use, store and throw away your medicines at www.disposemymeds.org.          This list is accurate as of 7/3/18  8:20 AM.  Always use your most recent med list.                   Brand Name Dispense Instructions for use Diagnosis    aspirin 81 MG EC tablet      Take 81 mg by  mouth every morning        carvedilol 25 MG tablet    COREG     Take 25 mg by mouth every morning        cephalexin 250 MG capsule    KEFLEX     TAKE ONE CAPSULE BY MOUTH ONCE DAILY AT BEDTIME        CITRUCEL Powd   Generic drug:  methylcellulose (laxative)      Take by mouth every morning        clobetasol 0.05 % cream    TEMOVATE     APPLY TO AFFECTED AREA(S) EVERY NIGHT AT BEDTIME , APPLY FOR 1-2 WEEKS THEN 2-3 TIMES PER WEEK AS NEEDED        CRANBERRY EXTRACT PO      Take by mouth every morning        DULoxetine 60 MG EC capsule    CYMBALTA     TAKE ONE CAPSULE BY MOUTH AT BEDTIME        gabapentin 600 MG tablet    NEURONTIN     900 MG THREE TIMES DAILY.        HUMALOG MARY KWIKPEN 100 UNIT/ML injection   Generic drug:  insulin lispro      INJECT 1 UNIT PER 10GRAMS OF CARBOHYDRATES WITH CORRECTION 0.5 UNITS PER 50 ABOVE 150 ( UP TO 30 UNITS PER DAY)        levothyroxine 75 MCG tablet    SYNTHROID/LEVOTHROID     TAKE ONE TABLET BY MOUTH ONCE DAILY  IN THE MORNING     (PLEASE CALL [phone] FOR AN OFFICE VISIT BEFORE NEXT REFILL.)        losartan 100 MG tablet    COZAAR     Take 100 mg by mouth At Bedtime        morphine 15 MG 12 hr tablet    MS CONTIN     TAKE ONE TABLET BY MOUTH TWICE A DAY *CAUTION: OPIOID. RISK OF OVERDOSE AND ADDICTION.        oxyCODONE-acetaminophen 5-325 MG per tablet    PERCOCET     Take 1 tablet by mouth 2 times daily        pravastatin 80 MG tablet    PRAVACHOL     Take 80 mg by mouth At Bedtime        TRESIBA FLEXTOUCH 200 UNIT/ML pen   Generic drug:  insulin degludec      INJECT 16 UNITS SUBCUTANEOUSLY DAILY AT BEDTIME        vitamin D3 2000 units Caps      TAKE ONE CAPSULE BY MOUTH ONCE DAILY IN THE MORNING

## 2018-07-05 ENCOUNTER — DOCUMENTATION ONLY (OUTPATIENT)
Dept: ONCOLOGY | Facility: CLINIC | Age: 73
End: 2018-07-05

## 2018-07-05 ENCOUNTER — TRANSFERRED RECORDS (OUTPATIENT)
Dept: HEALTH INFORMATION MANAGEMENT | Facility: CLINIC | Age: 73
End: 2018-07-05

## 2018-07-05 NOTE — OR NURSING
Preliminary results of stress test recieved and faxed to [facility] 3C and PAC.

## 2018-07-05 NOTE — OR NURSING
Pt's granddaughter called to say pt had her stress test at [facility] in [place] at 0800. She is meeting with Dr Bill Atkins, Cardiologist  ([facility]) in [place], [place] to read stress test. They will hand carry copies to pre-op tomorrow.

## 2018-07-05 NOTE — OR NURSING
"LM at PAC \"Calling to see if Ely High is there today. If so, please have her call PAN and if not, please ask another provider to cover for her and call PAN so we can discuss this pt's pending results of stress test today and pre-op clearance. Her surgery is tomorrow.\"  "

## 2018-07-05 NOTE — OR NURSING
Pt's granddaughter called and said they just finished Salima's appt at the cardiologist. She said the cardiologist called Dr Galeas and based on that conversation, she will be having surgery tomorrow. She said the cardiologist did not have any paperwork ready to send with her. Blanca Coates and Reny Hartman with Dr Galeas notified.

## 2018-07-05 NOTE — OR NURSING
SALOME Palomo, from PAC returned call and said Ely Carter is not in today but if Dr Bill Atkins (cardilogy) clears pt for surgery, there is no need to call PAC provider back.

## 2018-07-05 NOTE — OR NURSING
Call placed to Reny Hartman, surgery scheduler to let her know I have been unable to reach PAC and that pt had her stress test at 8 am today and will be meeting the cardiologist in [place], [place] at 3 pm for stress test reading. I asked pt's granddaughter, Annie, to call me after cardiology appt  and let me know if the cardiologist said pt is clear for surgery  or not so I can call PAC and Reny Hartman.

## 2018-07-06 ENCOUNTER — TELEPHONE (OUTPATIENT)
Dept: ONCOLOGY | Facility: CLINIC | Age: 73
End: 2018-07-06

## 2018-07-06 ENCOUNTER — HOSPITAL ENCOUNTER (OUTPATIENT)
Facility: CLINIC | Age: 73
Discharge: HOME | End: 2018-07-06
Attending: OBSTETRICS & GYNECOLOGY | Admitting: OBSTETRICS & GYNECOLOGY
Payer: MEDICARE

## 2018-07-06 ENCOUNTER — ANESTHESIA (OUTPATIENT)
Dept: SURGERY | Facility: CLINIC | Age: 73
End: 2018-07-06
Payer: MEDICARE

## 2018-07-06 VITALS
OXYGEN SATURATION: 100 % | SYSTOLIC BLOOD PRESSURE: 148 MMHG | RESPIRATION RATE: 16 BRPM | DIASTOLIC BLOOD PRESSURE: 68 MMHG | HEIGHT: 62 IN | BODY MASS INDEX: 26.49 KG/M2 | WEIGHT: 143.96 LBS | TEMPERATURE: 98.1 F

## 2018-07-06 DIAGNOSIS — Z98.890 S/P LAPAROSCOPIC PROCEDURE: Primary | ICD-10-CM

## 2018-07-06 LAB
GLUCOSE BLDC GLUCOMTR-MCNC: 135 MG/DL (ref 70–99)
GLUCOSE BLDC GLUCOMTR-MCNC: 135 MG/DL (ref 70–99)
GLUCOSE BLDC GLUCOMTR-MCNC: 147 MG/DL (ref 70–99)
GLUCOSE BLDC GLUCOMTR-MCNC: 165 MG/DL (ref 70–99)
GLUCOSE BLDC GLUCOMTR-MCNC: 241 MG/DL (ref 70–99)
HBA1C MFR BLD: 9.4 % (ref 0–5.6)

## 2018-07-06 PROCEDURE — 25000128 H RX IP 250 OP 636: Performed by: ANESTHESIOLOGY

## 2018-07-06 PROCEDURE — 25000128 H RX IP 250 OP 636: Performed by: NURSE ANESTHETIST, CERTIFIED REGISTERED

## 2018-07-06 PROCEDURE — 25000125 ZZHC RX 250: Performed by: NURSE ANESTHETIST, CERTIFIED REGISTERED

## 2018-07-06 PROCEDURE — 25000132 ZZH RX MED GY IP 250 OP 250 PS 637: Mod: GY | Performed by: OBSTETRICS & GYNECOLOGY

## 2018-07-06 PROCEDURE — 36000059 ZZH SURGERY LEVEL 3 EA 15 ADDTL MIN UMMC: Performed by: OBSTETRICS & GYNECOLOGY

## 2018-07-06 PROCEDURE — 36000057 ZZH SURGERY LEVEL 3 1ST 30 MIN - UMMC: Performed by: OBSTETRICS & GYNECOLOGY

## 2018-07-06 PROCEDURE — 88305 TISSUE EXAM BY PATHOLOGIST: CPT | Performed by: OBSTETRICS & GYNECOLOGY

## 2018-07-06 PROCEDURE — 88341 IMHCHEM/IMCYTCHM EA ADD ANTB: CPT | Performed by: OBSTETRICS & GYNECOLOGY

## 2018-07-06 PROCEDURE — 25000131 ZZH RX MED GY IP 250 OP 636 PS 637: Mod: GY | Performed by: NURSE ANESTHETIST, CERTIFIED REGISTERED

## 2018-07-06 PROCEDURE — 27210794 ZZH OR GENERAL SUPPLY STERILE: Performed by: OBSTETRICS & GYNECOLOGY

## 2018-07-06 PROCEDURE — 71000027 ZZH RECOVERY PHASE 2 EACH 15 MINS: Performed by: OBSTETRICS & GYNECOLOGY

## 2018-07-06 PROCEDURE — 37000009 ZZH ANESTHESIA TECHNICAL FEE, EACH ADDTL 15 MIN: Performed by: OBSTETRICS & GYNECOLOGY

## 2018-07-06 PROCEDURE — 25000128 H RX IP 250 OP 636: Performed by: OBSTETRICS & GYNECOLOGY

## 2018-07-06 PROCEDURE — 25000125 ZZHC RX 250: Performed by: OBSTETRICS & GYNECOLOGY

## 2018-07-06 PROCEDURE — 40000275 ZZH STATISTIC RCP TIME EA 10 MIN

## 2018-07-06 PROCEDURE — 00000159 ZZHCL STATISTIC H-SEND OUTS PREP: Performed by: OBSTETRICS & GYNECOLOGY

## 2018-07-06 PROCEDURE — 71000014 ZZH RECOVERY PHASE 1 LEVEL 2 FIRST HR: Performed by: OBSTETRICS & GYNECOLOGY

## 2018-07-06 PROCEDURE — C9399 UNCLASSIFIED DRUGS OR BIOLOG: HCPCS | Performed by: NURSE ANESTHETIST, CERTIFIED REGISTERED

## 2018-07-06 PROCEDURE — 25000131 ZZH RX MED GY IP 250 OP 636 PS 637: Mod: GY | Performed by: ANESTHESIOLOGY

## 2018-07-06 PROCEDURE — 83036 HEMOGLOBIN GLYCOSYLATED A1C: CPT | Performed by: OBSTETRICS & GYNECOLOGY

## 2018-07-06 PROCEDURE — A9270 NON-COVERED ITEM OR SERVICE: HCPCS | Mod: GY | Performed by: OBSTETRICS & GYNECOLOGY

## 2018-07-06 PROCEDURE — 71000015 ZZH RECOVERY PHASE 1 LEVEL 2 EA ADDTL HR: Performed by: OBSTETRICS & GYNECOLOGY

## 2018-07-06 PROCEDURE — 88331 PATH CONSLTJ SURG 1 BLK 1SPC: CPT | Performed by: OBSTETRICS & GYNECOLOGY

## 2018-07-06 PROCEDURE — 40000171 ZZH STATISTIC PRE-PROCEDURE ASSESSMENT III: Performed by: OBSTETRICS & GYNECOLOGY

## 2018-07-06 PROCEDURE — 25000132 ZZH RX MED GY IP 250 OP 250 PS 637: Mod: GY | Performed by: STUDENT IN AN ORGANIZED HEALTH CARE EDUCATION/TRAINING PROGRAM

## 2018-07-06 PROCEDURE — 00000146 ZZHCL STATISTIC GLUCOSE BY METER IP

## 2018-07-06 PROCEDURE — 00000155 ZZHCL STATISTIC H-CELL BLOCK W/STAIN: Performed by: OBSTETRICS & GYNECOLOGY

## 2018-07-06 PROCEDURE — 36415 COLL VENOUS BLD VENIPUNCTURE: CPT | Performed by: OBSTETRICS & GYNECOLOGY

## 2018-07-06 PROCEDURE — 37000008 ZZH ANESTHESIA TECHNICAL FEE, 1ST 30 MIN: Performed by: OBSTETRICS & GYNECOLOGY

## 2018-07-06 PROCEDURE — 88342 IMHCHEM/IMCYTCHM 1ST ANTB: CPT | Performed by: OBSTETRICS & GYNECOLOGY

## 2018-07-06 PROCEDURE — 40000014 ZZH STATISTIC ARTERIAL MONITORING DAILY

## 2018-07-06 PROCEDURE — 88112 CYTOPATH CELL ENHANCE TECH: CPT | Performed by: OBSTETRICS & GYNECOLOGY

## 2018-07-06 PROCEDURE — A9270 NON-COVERED ITEM OR SERVICE: HCPCS | Mod: GY | Performed by: STUDENT IN AN ORGANIZED HEALTH CARE EDUCATION/TRAINING PROGRAM

## 2018-07-06 PROCEDURE — 25000565 ZZH ISOFLURANE, EA 15 MIN: Performed by: OBSTETRICS & GYNECOLOGY

## 2018-07-06 RX ORDER — LABETALOL HYDROCHLORIDE 5 MG/ML
INJECTION, SOLUTION INTRAVENOUS PRN
Status: DISCONTINUED | OUTPATIENT
Start: 2018-07-06 | End: 2018-07-06

## 2018-07-06 RX ORDER — LABETALOL HYDROCHLORIDE 5 MG/ML
10 INJECTION, SOLUTION INTRAVENOUS ONCE
Status: DISCONTINUED | OUTPATIENT
Start: 2018-07-06 | End: 2018-07-06 | Stop reason: HOSPADM

## 2018-07-06 RX ORDER — CEFAZOLIN SODIUM 1 G/3ML
1 INJECTION, POWDER, FOR SOLUTION INTRAMUSCULAR; INTRAVENOUS SEE ADMIN INSTRUCTIONS
Status: DISCONTINUED | OUTPATIENT
Start: 2018-07-06 | End: 2018-07-06 | Stop reason: HOSPADM

## 2018-07-06 RX ORDER — FENTANYL CITRATE 50 UG/ML
25-50 INJECTION, SOLUTION INTRAMUSCULAR; INTRAVENOUS
Status: DISCONTINUED | OUTPATIENT
Start: 2018-07-06 | End: 2018-07-06 | Stop reason: HOSPADM

## 2018-07-06 RX ORDER — SODIUM CHLORIDE, SODIUM LACTATE, POTASSIUM CHLORIDE, CALCIUM CHLORIDE 600; 310; 30; 20 MG/100ML; MG/100ML; MG/100ML; MG/100ML
INJECTION, SOLUTION INTRAVENOUS CONTINUOUS PRN
Status: DISCONTINUED | OUTPATIENT
Start: 2018-07-06 | End: 2018-07-06

## 2018-07-06 RX ORDER — PROPOFOL 10 MG/ML
INJECTION, EMULSION INTRAVENOUS PRN
Status: DISCONTINUED | OUTPATIENT
Start: 2018-07-06 | End: 2018-07-06

## 2018-07-06 RX ORDER — ONDANSETRON 4 MG/1
4 TABLET, ORALLY DISINTEGRATING ORAL EVERY 30 MIN PRN
Status: DISCONTINUED | OUTPATIENT
Start: 2018-07-06 | End: 2018-07-06 | Stop reason: HOSPADM

## 2018-07-06 RX ORDER — AMOXICILLIN 250 MG
1-2 CAPSULE ORAL 2 TIMES DAILY
Qty: 30 TABLET | Refills: 0 | Status: ON HOLD | OUTPATIENT
Start: 2018-07-06 | End: 2019-02-25

## 2018-07-06 RX ORDER — NICOTINE POLACRILEX 4 MG
15-30 LOZENGE BUCCAL
Status: DISCONTINUED | OUTPATIENT
Start: 2018-07-06 | End: 2018-07-06 | Stop reason: HOSPADM

## 2018-07-06 RX ORDER — HYDROMORPHONE HYDROCHLORIDE 1 MG/ML
.3-.5 INJECTION, SOLUTION INTRAMUSCULAR; INTRAVENOUS; SUBCUTANEOUS EVERY 5 MIN PRN
Status: DISCONTINUED | OUTPATIENT
Start: 2018-07-06 | End: 2018-07-06 | Stop reason: HOSPADM

## 2018-07-06 RX ORDER — ONDANSETRON 2 MG/ML
INJECTION INTRAMUSCULAR; INTRAVENOUS PRN
Status: DISCONTINUED | OUTPATIENT
Start: 2018-07-06 | End: 2018-07-06

## 2018-07-06 RX ORDER — NALOXONE HYDROCHLORIDE 0.4 MG/ML
.1-.4 INJECTION, SOLUTION INTRAMUSCULAR; INTRAVENOUS; SUBCUTANEOUS
Status: DISCONTINUED | OUTPATIENT
Start: 2018-07-06 | End: 2018-07-06 | Stop reason: HOSPADM

## 2018-07-06 RX ORDER — CEFAZOLIN SODIUM 2 G/100ML
2 INJECTION, SOLUTION INTRAVENOUS
Status: COMPLETED | OUTPATIENT
Start: 2018-07-06 | End: 2018-07-06

## 2018-07-06 RX ORDER — OXYCODONE HYDROCHLORIDE 5 MG/1
5 TABLET ORAL
Status: COMPLETED | OUTPATIENT
Start: 2018-07-06 | End: 2018-07-06

## 2018-07-06 RX ORDER — FENTANYL CITRATE 50 UG/ML
INJECTION, SOLUTION INTRAMUSCULAR; INTRAVENOUS PRN
Status: DISCONTINUED | OUTPATIENT
Start: 2018-07-06 | End: 2018-07-06

## 2018-07-06 RX ORDER — DEXTROSE MONOHYDRATE 25 G/50ML
25-50 INJECTION, SOLUTION INTRAVENOUS
Status: DISCONTINUED | OUTPATIENT
Start: 2018-07-06 | End: 2018-07-06 | Stop reason: HOSPADM

## 2018-07-06 RX ORDER — SODIUM CHLORIDE, SODIUM LACTATE, POTASSIUM CHLORIDE, CALCIUM CHLORIDE 600; 310; 30; 20 MG/100ML; MG/100ML; MG/100ML; MG/100ML
INJECTION, SOLUTION INTRAVENOUS CONTINUOUS
Status: DISCONTINUED | OUTPATIENT
Start: 2018-07-06 | End: 2018-07-06 | Stop reason: HOSPADM

## 2018-07-06 RX ORDER — ACETAMINOPHEN 325 MG/1
650 TABLET ORAL
Status: DISCONTINUED | OUTPATIENT
Start: 2018-07-06 | End: 2018-07-06 | Stop reason: HOSPADM

## 2018-07-06 RX ORDER — LIDOCAINE 40 MG/G
CREAM TOPICAL
Status: DISCONTINUED | OUTPATIENT
Start: 2018-07-06 | End: 2018-07-06 | Stop reason: HOSPADM

## 2018-07-06 RX ORDER — PHENAZOPYRIDINE HYDROCHLORIDE 200 MG/1
200 TABLET, FILM COATED ORAL ONCE
Status: COMPLETED | OUTPATIENT
Start: 2018-07-06 | End: 2018-07-06

## 2018-07-06 RX ORDER — LIDOCAINE HYDROCHLORIDE 20 MG/ML
INJECTION, SOLUTION INFILTRATION; PERINEURAL PRN
Status: DISCONTINUED | OUTPATIENT
Start: 2018-07-06 | End: 2018-07-06

## 2018-07-06 RX ORDER — HEPARIN SODIUM 5000 [USP'U]/.5ML
5000 INJECTION, SOLUTION INTRAVENOUS; SUBCUTANEOUS
Status: COMPLETED | OUTPATIENT
Start: 2018-07-06 | End: 2018-07-06

## 2018-07-06 RX ORDER — ONDANSETRON 2 MG/ML
4 INJECTION INTRAMUSCULAR; INTRAVENOUS EVERY 30 MIN PRN
Status: DISCONTINUED | OUTPATIENT
Start: 2018-07-06 | End: 2018-07-06 | Stop reason: HOSPADM

## 2018-07-06 RX ADMIN — PROPOFOL 50 MG: 10 INJECTION, EMULSION INTRAVENOUS at 08:40

## 2018-07-06 RX ADMIN — ROCURONIUM BROMIDE 50 MG: 10 INJECTION INTRAVENOUS at 07:45

## 2018-07-06 RX ADMIN — SODIUM CHLORIDE, POTASSIUM CHLORIDE, SODIUM LACTATE AND CALCIUM CHLORIDE: 600; 310; 30; 20 INJECTION, SOLUTION INTRAVENOUS at 07:48

## 2018-07-06 RX ADMIN — HYDROMORPHONE HYDROCHLORIDE 0.5 MG: 1 INJECTION, SOLUTION INTRAMUSCULAR; INTRAVENOUS; SUBCUTANEOUS at 10:01

## 2018-07-06 RX ADMIN — LABETALOL HYDROCHLORIDE 10 MG: 5 INJECTION INTRAVENOUS at 09:50

## 2018-07-06 RX ADMIN — PHENYLEPHRINE HYDROCHLORIDE 100 MCG: 10 INJECTION, SOLUTION INTRAMUSCULAR; INTRAVENOUS; SUBCUTANEOUS at 08:32

## 2018-07-06 RX ADMIN — LABETALOL HYDROCHLORIDE 10 MG: 5 INJECTION INTRAVENOUS at 09:36

## 2018-07-06 RX ADMIN — LABETALOL HYDROCHLORIDE 5 MG: 5 INJECTION INTRAVENOUS at 09:42

## 2018-07-06 RX ADMIN — FENTANYL CITRATE 25 MCG: 50 INJECTION, SOLUTION INTRAMUSCULAR; INTRAVENOUS at 10:26

## 2018-07-06 RX ADMIN — FENTANYL CITRATE 100 MCG: 50 INJECTION, SOLUTION INTRAMUSCULAR; INTRAVENOUS at 08:10

## 2018-07-06 RX ADMIN — ACETAMINOPHEN 650 MG: 325 TABLET, FILM COATED ORAL at 12:46

## 2018-07-06 RX ADMIN — FENTANYL CITRATE 50 MCG: 50 INJECTION, SOLUTION INTRAMUSCULAR; INTRAVENOUS at 07:36

## 2018-07-06 RX ADMIN — HUMAN INSULIN 1 UNITS/HR: 100 INJECTION, SOLUTION SUBCUTANEOUS at 07:10

## 2018-07-06 RX ADMIN — LIDOCAINE HYDROCHLORIDE 100 MG: 20 INJECTION, SOLUTION INFILTRATION; PERINEURAL at 07:45

## 2018-07-06 RX ADMIN — PROPOFOL 150 MG: 10 INJECTION, EMULSION INTRAVENOUS at 07:45

## 2018-07-06 RX ADMIN — OXYCODONE HYDROCHLORIDE 5 MG: 5 TABLET ORAL at 12:46

## 2018-07-06 RX ADMIN — FENTANYL CITRATE 50 MCG: 50 INJECTION, SOLUTION INTRAMUSCULAR; INTRAVENOUS at 07:34

## 2018-07-06 RX ADMIN — SODIUM CHLORIDE, POTASSIUM CHLORIDE, SODIUM LACTATE AND CALCIUM CHLORIDE: 600; 310; 30; 20 INJECTION, SOLUTION INTRAVENOUS at 07:30

## 2018-07-06 RX ADMIN — SUGAMMADEX 150 MG: 100 INJECTION, SOLUTION INTRAVENOUS at 09:18

## 2018-07-06 RX ADMIN — HEPARIN SODIUM 5000 UNITS: 5000 INJECTION, SOLUTION INTRAVENOUS; SUBCUTANEOUS at 07:13

## 2018-07-06 RX ADMIN — CEFAZOLIN SODIUM 2 G: 2 INJECTION, SOLUTION INTRAVENOUS at 07:50

## 2018-07-06 RX ADMIN — LABETALOL HYDROCHLORIDE 10 MG: 5 INJECTION INTRAVENOUS at 10:01

## 2018-07-06 RX ADMIN — ONDANSETRON 4 MG: 2 INJECTION INTRAMUSCULAR; INTRAVENOUS at 09:18

## 2018-07-06 RX ADMIN — PHENAZOPYRIDINE HYDROCHLORIDE 200 MG: 200 TABLET, FILM COATED ORAL at 06:06

## 2018-07-06 RX ADMIN — FENTANYL CITRATE 50 MCG: 50 INJECTION, SOLUTION INTRAMUSCULAR; INTRAVENOUS at 08:40

## 2018-07-06 NOTE — ANESTHESIA CARE TRANSFER NOTE
Patient: Shielia Brendan    Procedure(s):  Diagnostic Laparoscopy, Biopsies - Wound Class: II-Clean Contaminated    Diagnosis: Pelvic Mass   Diagnosis Additional Information: No value filed.    Anesthesia Type:   General     Note:  Airway :Face Mask  Patient transferred to:PACU  Comments: Patient oxygenating and ventilating well on 6LFM.  Patient DEVRIES, following commands, and dilaudid given for mild pain reported by patient.  REport given to RN and VSS.  Handoff Report: Identifed the Patient, Identified the Reponsible Provider, Reviewed the pertinent medical history, Discussed the surgical course, Reviewed Intra-OP anesthesia mangement and issues during anesthesia, Set expectations for post-procedure period and Allowed opportunity for questions and acknowledgement of understanding      Vitals: (Last set prior to Anesthesia Care Transfer)    CRNA VITALS  7/6/2018 0913 - 7/6/2018 1003      7/6/2018             NIBP: 141/67    Pulse: 71    ART BP: 150/59    Ht Rate: 71                Electronically Signed By: REHAN Johnson CRNA  July 6, 2018  10:03 AM

## 2018-07-06 NOTE — PROGRESS NOTES
Dr. Zaldivar called and informed of SBP 150s via marito and -137 via cuff. No labetalol given. Plan: Patient may proceed to discharge as planned.

## 2018-07-06 NOTE — IP AVS SNAPSHOT
Same Day Surgery [address] [address]    Phone:  [phone]                                       After Visit Summary   7/6/2018    Afshan Cardona    MRN: [id number]           After Visit Summary Signature Page     I have received my discharge instructions, and my questions have been answered. I have discussed any challenges I see with this plan with the nurse or doctor.    ..........................................................................................................................................  Patient/Patient Representative Signature      ..........................................................................................................................................  Patient Representative Print Name and Relationship to Patient    ..................................................               ................................................  Date                                            Time    ..........................................................................................................................................  Reviewed by Signature/Title    ...................................................              ..............................................  Date                                                            Time

## 2018-07-06 NOTE — PROGRESS NOTES
Gynecologic Oncology   Postoperative Check Note  7/6/2018    S: Patient reports she is doing well postoperatively and desires discharge to home. Pain IS well controlled with Oral pain medications. Ambulating without pain. Voiding spontaneously. Tolerating crackers and water without nausea or vomiting. Denies chest pain, shortness of breath, dizziness, or other concerns at this time.     O:  Vitals:    07/06/18 1115 07/06/18 1130 07/06/18 1210 07/06/18 1230   BP: (!) 125/94 138/60 132/59 135/79   Resp: 12 16 16 16   Temp:   98.1  F (36.7  C)    TempSrc:   Oral    SpO2: 91% 98% 96% 93%   Weight:       Height:         Gen: In no distress  Cardio: RRR, S1/S2, no murmurs  Resp: CTAB, no wheezing or crackles  Abdomen: soft, appropriately tender  Incision: Laparoscopy sites with mild shadowing, otherwise clean, dry, intact.   Extremities: Non-tender, no edema    A: 73 year old POD#0 s/p diagnostic laparoscopy and biopsy of abdominal wall mass. Doing well and meeting goal for discharge. She has oxycodone at home due to chronic back pain, and does not desire oxycodone prescription. She also states she does not take tylenol because it does not help her pain, she does not want a prescription for tylenol.     Dz: poorly differentiated malignant neoplasm  FEN: Advance as tolerated  Pain: Oxycodone PRN  GI: Senna prescribed to prevent constipation, discussed with patient.   : voiding spontaneously    Dispo: To home    Mary West MD   Resident Physician, PGY3  Obstetrics, Gynecology, and Women's Health

## 2018-07-06 NOTE — IP AVS SNAPSHOT
MRN:[id number]                      After Visit Summary   7/6/2018    Afshan Cardona    MRN: [id number]           Thank you!     Thank you for choosing [facility] for your care. Our goal is always to provide you with excellent care. Hearing back from our patients is one way we can continue to improve our services. Please take a few minutes to complete the written survey that you may receive in the mail after you visit with us. Thank you!        Patient Information     Date Of Birth          1945        About your hospital stay     You were admitted on:  July 6, 2018 You last received care in the:  Same Day Surgery [facility]    You were discharged on:  July 6, 2018       Who to Call     For medical emergencies, please call 911.  For non-urgent questions about your medical care, please call your primary care provider or clinic, None  For questions related to your surgery, please call your surgery clinic        Attending Provider     Provider Bakari Turcios MD Obstetrics & Gynecology, Maternal & Fetal Medicine       Primary Care Provider Fax #    Provider Not In System [phone]      After Care Instructions     Discharge Instructions       Resume pre procedure diet            Discharge Instructions       Your follow-up with Dr. Galeas is scheduled on 7/25 in the Gynecology Cancer Clinic.     Gynecology Cancer Clinic  Cancer Clinic ([facility])  Clinics and Surgery Center  Floor 2  [address] [place] [address]    Appointments: [phone]  Information: [phone]    If you have any other questions please call [phone] or [phone] on nights or weekends.            Discharge Instructions       Call your doctor for any of these danger signs:    If you have a temperature higher than 100.4 degrees Fahrenheit (38 degrees Celsius) when taken by mouth.    If you are having nausea or vomiting.    If you feel pain or burning when you urinate.    If you are unable to urinate.   If your bleeding is heavier than a normal menstrual period or increases.    If you pass any tissue or large blood clots soaking 1 pad per hour for 2 hours in a row, or your vaginal discharge smells bad.    If you have severe pain in your lower abdomen.   If you have chest pain and are coughing and gasping for air.  If you have redness around the incision or foul drainage from the incision.            Ice to affected area       PRN as tolerated            No driving or operating machinery       You may begin driving the day after your procedure.  NO DRIVING WHILE TAKING NARCOTIC MEDICATIONS.            No lifting       No lifting over 15 pounds and no strenuous physical activity for 6 weeks            Shower       Shower on Post-op day  0.                  Your next 10 appointments already scheduled     Jul 25, 2018  8:00 AM CDT   (Arrive by 7:45 AM)   Return Visit with Bakari Galeas MD   [facility] ([facility])    [address] [place] [address] [phone]              Further instructions from your care team       Per Dr. Galeas's resident Dr. West patient to make a follow-up appointment with Dr. Atkins, cardiology to review stress test results.     [facility] [place]  Same-Day Surgery   Adult Discharge Orders & Instructions     For 24 hours after surgery    1. Get plenty of rest.  A responsible adult must stay with you for at least 24 hours after you leave the hospital.   2. Do not drive or use heavy equipment.  If you have weakness or tingling, don't drive or use heavy equipment until this feeling goes away.  3. Do not drink alcohol.  4. Avoid strenuous or risky activities.  Ask for help when climbing stairs.   5. You may feel lightheaded.  IF so, sit for a few minutes before standing.  Have someone help you get up.   6. If you have nausea (feel sick to your  "stomach): Drink only clear liquids such as apple juice, ginger ale, broth or 7-Up.  Rest may also help.  Be sure to drink enough fluids.  Move to a regular diet as you feel able.  7. You may have a slight fever. Call the doctor if your fever is over 100 F (37.7 C) (taken under the tongue) or lasts longer than 24 hours.  8. You may have a dry mouth, a sore throat, muscle aches or trouble sleeping.  These should go away after 24 hours.  9. Do not make important or legal decisions.   Call your doctor for any of the followin.  Signs of infection (fever, growing tenderness at the surgery site, a large amount of drainage or bleeding, severe pain, foul-smelling drainage, redness, swelling).    2. It has been over 8 to 10 hours since surgery and you are still not able to urinate (pass water).    3.  Headache for over 24 hours.    To contact a doctor, call Dr Galeas's office at [phone] or:        [phone] and ask for the resident on call for OB/GYN (answered 24 hours a day)      Emergency Department:    [facility]: [phone]       (TTY for hearing impaired: [phone])        Pending Results     Date and Time Order Name Status Description    2018 0850 Surgical pathology exam Preliminary     2018 0847 Cytology non gyn In process             Admission Information     Date & Time Provider Department Dept. Phone    2018 Bakari Galeas MD Same Day Surgery [facility] [phone]      Your Vitals Were     Blood Pressure Temperature Respirations Height Weight Pulse Oximetry    135/79 98.1  F (36.7  C) (Oral) 16 1.575 m (5' 2\") 65.3 kg (143 lb 15.4 oz) 93%    BMI (Body Mass Index)                   26.33 kg/m2           [employer] Information     [employer] lets you send messages to your doctor, view your test results, renew your prescriptions, schedule appointments and more. To sign up, go to [email or web address] . Click on \"Log in\" on the left side of the screen, which will take " "you to the Welcome page. Then click on \"Sign up Now\" on the right side of the page.     You will be asked to enter the access code listed below, as well as some personal information. Please follow the directions to create your username and password.     Your access code is: [email or web address]  Expires: 2018 10:52 AM     Your access code will  in 90 days. If you need help or a new code, please call your [facility] clinic or [phone].        Care EveryWhere ID     This is your Care EveryWhere ID. This could be used by other organizations to access your [facility] medical records  [id number]        Equal Access to Services     KATHYA KAYE : Kvng Jimenez, lupe motley, sally rojas, luis conteh. So [facility] [phone].    ATENCIÓN: Si habla español, tiene a roblero disposición servicios gratuitos de asistencia lingüística. Llame al [phone].    We comply with applicable federal civil rights laws and [place] laws. We do not discriminate on the basis of race, color, national origin, age, disability, sex, sexual orientation, or gender identity.               Review of your medicines      START taking        Dose / Directions    senna-docusate 8.6-50 MG per tablet   Commonly known as:  SENOKOT-S;PERICOLACE   Used for:  S/P laparoscopic procedure        Dose:  1-2 tablet   Take 1-2 tablets by mouth 2 times daily Take while on oral narcotics to prevent or treat constipation.   Quantity:  30 tablet   Refills:  0         CONTINUE these medicines which have NOT CHANGED        Dose / Directions    aspirin 81 MG EC tablet        Dose:  81 mg   Take 81 mg by mouth every morning   Refills:  0       carvedilol 25 MG tablet   Commonly known as:  COREG        Dose:  25 mg   Take 25 mg by mouth every morning   Refills:  1       cephalexin 250 MG capsule   Commonly known as:  KEFLEX        TAKE ONE CAPSULE BY MOUTH ONCE DAILY AT BEDTIME   Refills:  3       CITRUCEL Powd "   Generic drug:  methylcellulose (laxative)        Take by mouth every morning   Refills:  0       clobetasol 0.05 % cream   Commonly known as:  TEMOVATE        APPLY TO AFFECTED AREA(S) EVERY NIGHT AT BEDTIME , APPLY FOR 1-2 WEEKS THEN 2-3 TIMES PER WEEK AS NEEDED   Refills:  3       CRANBERRY EXTRACT PO        Take by mouth every morning   Refills:  0       DULoxetine 60 MG EC capsule   Commonly known as:  CYMBALTA        TAKE ONE CAPSULE BY MOUTH AT BEDTIME   Refills:  1       gabapentin 600 MG tablet   Commonly known as:  NEURONTIN        900 MG THREE TIMES DAILY.   Refills:  5       HUMALOG MARY KWIKPEN 100 UNIT/ML injection   Generic drug:  insulin lispro        INJECT 1 UNIT PER 10GRAMS OF CARBOHYDRATES WITH CORRECTION 0.5 UNITS PER 50 ABOVE 150 ( UP TO 30 UNITS PER DAY)   Refills:  6       levothyroxine 75 MCG tablet   Commonly known as:  SYNTHROID/LEVOTHROID        TAKE ONE TABLET BY MOUTH ONCE DAILY  IN THE MORNING     (PLEASE CALL [phone] FOR AN OFFICE VISIT BEFORE NEXT REFILL.)   Refills:  0       losartan 100 MG tablet   Commonly known as:  COZAAR        Dose:  100 mg   Take 100 mg by mouth At Bedtime   Refills:  0       morphine 15 MG 12 hr tablet   Commonly known as:  MS CONTIN        TAKE ONE TABLET BY MOUTH TWICE A DAY *CAUTION: OPIOID. RISK OF OVERDOSE AND ADDICTION.   Refills:  0       oxyCODONE-acetaminophen 5-325 MG per tablet   Commonly known as:  PERCOCET        Dose:  1 tablet   Take 1 tablet by mouth 2 times daily   Refills:  0       pravastatin 80 MG tablet   Commonly known as:  PRAVACHOL        Dose:  80 mg   Take 80 mg by mouth At Bedtime   Refills:  0       TRESIBA FLEXTOUCH 200 UNIT/ML pen   Generic drug:  insulin degludec        INJECT 16 UNITS SUBCUTANEOUSLY DAILY AT BEDTIME   Refills:  6       vitamin D3 2000 units Caps        TAKE ONE CAPSULE BY MOUTH ONCE DAILY IN THE MORNING   Refills:  3            Where to get your medicines      These medications were sent to [facility] [place], [place] - [address] [address]     Phone:  [phone]     senna-docusate 8.6-50 MG per tablet                Protect others around you: Learn how to safely use, store and throw away your medicines at www.disposemymeds.org.             Medication List: This is a list of all your medications and when to take them. Check marks below indicate your daily home schedule. Keep this list as a reference.      Medications           Morning Afternoon Evening Bedtime As Needed    aspirin 81 MG EC tablet   Take 81 mg by mouth every morning                                carvedilol 25 MG tablet   Commonly known as:  COREG   Take 25 mg by mouth every morning                                cephalexin 250 MG capsule   Commonly known as:  KEFLEX   TAKE ONE CAPSULE BY MOUTH ONCE DAILY AT BEDTIME                                CITRUCEL Powd   Take by mouth every morning   Generic drug:  methylcellulose (laxative)                                clobetasol 0.05 % cream   Commonly known as:  TEMOVATE   APPLY TO AFFECTED AREA(S) EVERY NIGHT AT BEDTIME , APPLY FOR 1-2 WEEKS THEN 2-3 TIMES PER WEEK AS NEEDED                                CRANBERRY EXTRACT PO   Take by mouth every morning                                DULoxetine 60 MG EC capsule   Commonly known as:  CYMBALTA   TAKE ONE CAPSULE BY MOUTH AT BEDTIME                                gabapentin 600 MG tablet   Commonly known as:  NEURONTIN   900 MG THREE TIMES DAILY.                                HUMALOG MARY KWIKPEN 100 UNIT/ML injection   INJECT 1 UNIT PER 10GRAMS OF CARBOHYDRATES WITH CORRECTION 0.5 UNITS PER 50 ABOVE 150 ( UP TO 30 UNITS PER DAY)   Generic drug:  insulin lispro                                levothyroxine 75 MCG tablet   Commonly known as:  SYNTHROID/LEVOTHROID   TAKE ONE TABLET BY MOUTH ONCE DAILY  IN THE MORNING     (PLEASE CALL [phone] FOR AN OFFICE VISIT BEFORE NEXT REFILL.)                                 losartan 100 MG tablet   Commonly known as:  COZAAR   Take 100 mg by mouth At Bedtime                                morphine 15 MG 12 hr tablet   Commonly known as:  MS CONTIN   TAKE ONE TABLET BY MOUTH TWICE A DAY *CAUTION: OPIOID. RISK OF OVERDOSE AND ADDICTION.                                oxyCODONE-acetaminophen 5-325 MG per tablet   Commonly known as:  PERCOCET   Take 1 tablet by mouth 2 times daily   Last time this was given:  1245  650 mg of tylenol and 5 mg of oxycodone                                pravastatin 80 MG tablet   Commonly known as:  PRAVACHOL   Take 80 mg by mouth At Bedtime                                senna-docusate 8.6-50 MG per tablet   Commonly known as:  SENOKOT-S;PERICOLACE   Take 1-2 tablets by mouth 2 times daily Take while on oral narcotics to prevent or treat constipation.                                TRESIBA FLEXTOUCH 200 UNIT/ML pen   INJECT 16 UNITS SUBCUTANEOUSLY DAILY AT BEDTIME   Generic drug:  insulin degludec                                vitamin D3 2000 units Caps   TAKE ONE CAPSULE BY MOUTH ONCE DAILY IN THE MORNING

## 2018-07-06 NOTE — PROGRESS NOTES
Dr. West called by writer as patient wondering about cardiology workup plan. Plan: Per Dr. West patient to follow up with primary cardiologist in [place], Dr. Atkins. Patient verbalized understanding and instructions added to AVS.

## 2018-07-06 NOTE — OR NURSING
Dr. Zaldivar called by writer to review BPs while in PACU.Dr. Zaldivar informed  via marito and SBP 140s via cuff pressure, HR 70s.  Plan: Labetalol IV for total dose of 10 mg.

## 2018-07-06 NOTE — TELEPHONE ENCOUNTER
RN reached out to Patients cardiology clinic and MD. Dr Galeas is requesting patient have a Angiogram within one week if possible (sooner than later). We will need this for future surgery and chemotherapy planning.     RN was unable to reach MD or his RN. Message was left with RN call back number.     Marianela Coates RN

## 2018-07-06 NOTE — ANESTHESIA PROCEDURE NOTES
Arterial Line Procedure Note  Staff:     Anesthesiologist:  LILLIE STAUFFER  Location: In OR After Induction  Procedure Start/Stop Times:     patient identified, IV checked, site marked, risks and benefits discussed, informed consent, monitors and equipment checked, pre-op evaluation and at physician/surgeon's request      Correct Patient: Yes      Correct Position: Yes      Correct Site: Yes      Correct Procedure: Yes      Correct Laterality:  Yes    Site Marked:  Yes  Line Placement:     Procedure:  Arterial Line    Insertion Site:  Radial    Insertion laterality:  Left    Skin Prep: Chloraprep      Patient Prep: patient draped, mask, sterile gloves, hat and hand hygiene      Local skin infiltration:  None    Ultrasound Guided?: No      Catheter size:  20 gauge, Quick cath    Cath secured with: suture      Dressing:  Tegaderm    Complications:  None obvious    Arterial waveform: Yes      IBP within 10% of NIBP: Yes

## 2018-07-06 NOTE — OP NOTE
[facility]  Operative Note    Patient name: Afshan Cardona  Patient MRN: [id number]  Patient : 1945     SURGERY DATE: 2018     SURGEON: Bakari Galeas MD     ASSISTANTS:   Evelia Goel, Fellow  Mary West MD PGY3  Clint Alanis, MS4     PRE-OPERATIVE DIAGNOSIS  1. Pelvic Mass   2. Positive cardiac stress test  3. Hyperlipidemia  4. Hypertension   5. Hypothyroidism   6. Type 1 DM  7. History of congestive heart failure  8. History of myocardial infarction  9. History of TIA  10. Depression/anxiety     POST-OPERATIVE DIAGNOSIS  1. Same  2. Frozen section consistent with poorly differentiated malignant neoplasm     PROCEDURE: Diagnostic laparoscopy, mass biopsy, pelvic washings     ANESTHESIA: General      EBL: 10 mL  IVF: 300 mL  UOP: 660 mL clear urine at end of the case     DRAINS: None  SPECIMENS:   ID Type Source Tests Collected by Time Destination   1 : pelvic washings Washings Pelvis CYTOLOGY NON GYN Bakari Galeas MD 2018  8:41 AM     2 : Blood Blood, arterial Blood OR DOCUMENTATION ONLY Bakari Galeas MD 2018  8:47 AM     3 : pelvic side wall nodule Tissue Pelvis OR DOCUMENTATION ONLY Bakari Galeas MD 2018  8:51 AM     A : pelvic side wall nodule Tissue Pelvis SURGICAL PATHOLOGY EXAM Bakari Galeas MD 2018  8:50 AM        COMPLICATIONS: None     FINDINGS:   1. Exam under anesthesia revealed surgically absent uterus and intact, smooth vaginal cuff. Large pelvic mass extending to the level of the umbilicus, firm and immobile.   2. Laparoscopy revealed atraumatic entry to the abdomen. Large, immobile complex pelvic mass measuring 15cm, adherent to the pelvic side wall. A 2cm white mass cephalid to the pelvic mass, above the pelvic brim. Biopsies of this mass were sent for frozen which was consistent with poorly differentiated carcinoma.   3. Several 2 cm peritoneal implant at the pelvic peritoneum.   4. Dense adhesions of the descending and sigmoid colon  to the pelvic sidewall, obscuring visualization of the left ovary and fallopian tube. Filmy adhesion of the omentum to the pelvic mass  5. Normal appearing bowel, liver, diaphragm. Hemostatic biopsy site at conclusion of the case.      CONDITION: Extubated, transferred to PACU in stable condition     INDICATIONS: Afshan Cardona is a 73 year old who presented to her primary care provider with pelvic pain and bloating. Ultrasound was obtained and revealed a predominately solid 15.5x8.2x11.3cm pelvic mass. Tumor markers revealed a CA 15 of 660. She was referred to the Gynecologic Oncology Clinic for high suspicion of malignancy. She underwent pre-operative evaluation and due to her cardiac history she underwent stress echocardiogram which revealed inducible hypokinesis in the lateral wall. Due to her positive cardiac stress test, she was counseled that she is not a candidate for debulking surgery. She was recommended to undergo diagnostic laparoscopy with biopsies for diagnosis, with plan for debulking procedure following cardiac stenting. The risks, benefits, and alternatives to the procedure were discussed. The patient agreed to proceed and signed consent was obtained.     PROCEDURE: The patient was taken to the operating room where she was placed in the dorsal lithotomy position with feet in stirrups. General endotracheal anesthesia was administered. An exam under anesthesia was performed. The patient was then prepped and draped in the usual sterile fashion. Attention was then turned to the abdomen where an 11-blade scalpel was used to make a 1cm incision 2cm above the umbilicus. The subcutaneous tissue was dissected down to the fascia using S-retractors. The fascia was grasped with two Kochers and scalpel was used to incise the fascia. An 0-Vicryl stitch was placed at each apex of the fascial incision. The S-retractors were placed through the fascial defect and the peritoneum was identified. The peritoneum was  grasped with two mckenna clamps and elevated. Metzenbaum scissors were used to create a defect in the peritonum. An S-retractor was placed through the defect and a 10mm trochar was guided into the incision. CO2 gas was attached to the needle and opening pressure was less than 5 mmHg, and flow was increased to 20 L/min. Pneumoperitoneum was achieved with good tympany of the abdomen. The 10mm scope was placed in the port and visualized the abdomen which was free of any injury. A 5 mm port was placed in the RLQ and LLQ with similar technique and under visualization. Exam of the abdomen revealed normal liver surface, diaphragm, and bowel. The pelvis contained a 15cm mostly solid, immobile mass arising from the right ovary. It was adherent to the right pelvic sidewall and contained a filmy adhesion to the omentum. There were dense adhesions of the descending colon and sigmoid colon to the left pelvic sidewall, obscuring visualization of the left ovary and fallopian tube. A 2cm peritoneal implant was noted directly cephalid to the pelvic mass above the right pelvic brim. There was an additional peritoneal implant visible, adjacent to the descending colon, and a 3cm violaceous mass attached to the descending colon which did not appear to be invading the colon and may represent diverticula.     Pelvic washings were obtained. An atraumatic grasper was used to grasp the white mass cephalid to the right pelvic brim until sufficient tissue was obtained for diagnosis. Frozen section revealed poorly differentiated malignant neoplasm. The pelvis was irrigated and the site of the tissue biopsy was noted to be hemostatic. At this point, the procedure was completed. The ports were removed under direct visualization and pneumoperitoneum was released. The peritoneum and fascia of the 10 mm port site closed using 0-Vicryl on a UR-6 needle. The skin incisions were closed using 4-0 vicryl and Steristrips.  Good hemostasis was  noted.    Instrument, sponge, and needle counts were correct times 2. The patient was extubated in the operating room and transferred to the PACU in stable condition.    Bakari Galeas MD, MS    Department of Obstetrics and Gynecology   Division of Gynecologic Oncology   [facility]  Phone: [phone]

## 2018-07-06 NOTE — DISCHARGE INSTRUCTIONS
Per Dr. Galeas's resident Dr. West patient to make a follow-up appointment with Dr. Atkins, cardiology to review stress test results.   Gynecology Oncology  Post-operative General care    WOUND CARE INSTRUCTIONS: Keep a dry clean dressing on the wound if there is drainage. The initial bandage may be removed after 24 hours. Once the wound has quit draining you may leave it open to air. If clothing rubs against the wound or causes irritation and the wound is not draining you may cover it with a dry dressing during the daytime. Try to keep the wound dry and avoid ointments on the wound unless directed to do so. If the wound becomes bright red and painful or starts to drain infected material that is not clear, please contact your physician immediately.    1. You may shower 24hrs after surgery  2. No soaking in the tub      DIET: There are no dietary restrictions. You may eat any foods that you can tolerate. It is a good idea to eat a high fiber diet and take in plenty of fluids to prevent constipation. If you do become constipated you may want to take a mild laxative or take ducolax tablets on a daily basis until your bowel habits are regular. Constipation can be very uncomfortable, along with straining, after recent surgery.      ACTIVITY: You are encouraged to cough and deep breath or use your incentive spirometer if you were given one, every 15-30 minutes when awake. This will help prevent respiratory complications and low grade fevers post-operatively if you had a general anesthetic. You may want to hug a pillow when coughing and sneezing to add additional support to the surgical area, if you had abdominal or chest surgery, which will decrease pain during these times.    1. No heavy lifting >15lbs or strenuous exercise for six-eight weeks.  2. You may walk as much as you wish. You are encouraged to increase your activity each day after surgery. Stairs are okay.      MEDICATIONS: Try to take narcotic  medications and anti-inflammatory medications, such as tylenol, ibuprofen, naprosyn, etc., with food. This will minimize stomach upset from the medication. Should you develop nausea and vomiting from the pain medication, or develop a rash, please discontinue the medication and contact your physician. You should not drive, make important decisions, or operate machinery when taking narcotic pain medication.      OTHER: Patients are often constipated after general anesthesia and surgery. The patient should continue to take stool softeners (for example, Senokot-S) for the next six weeks and consume adequate amounts of water. If the patient remains constipated or unable to pass stool, please try one or all of the following measures:    1. Milk of Magnesia 30cc twice a day as needed by mouth  2. Metamucil 2 tablespoons in 12 ounces of fluid  3. Dulcolax oral or suppositories  4. Prunes or prune juice  5. Miralax daily    Call [phone] (or [phone] on nights or weekends) if you have:    2. Temperature greater than 100.4  3. Persistent nausea and vomiting  4. Severe uncontrolled pain  5. Redness, tenderness, or signs of infection (pain, swelling, redness, odor or green/yellow discharge around the site)  6. Difficulty breathing, headache or visual disturbances  7. Hives  8. Persistent dizziness or light-headedness  9. Extreme fatigue  10. Any other questions or concerns you may have after discharge    In an emergency, call [phone] or go to an Emergency Department at a nearby hospital

## 2018-07-06 NOTE — BRIEF OP NOTE
[facility]   Brief Operative Note    Patient name: Afshan Cardona  Patient MRN: [id number]  Patient : 1945    SURGERY DATE: 2018    SURGEON: Bakari Galeas MD    ASSISTANTS:   Evelia Goel, Fellow  Mary West MD PGY3  Clint Alanis, MS4    PRE-OPERATIVE DIAGNOSIS  1. Pelvic Mass     POST-OPERATIVE DIAGNOSIS  1. Same  2. Frozen section consistent with poorly differentiated malignant neoplasm    PROCEDURE: Diagnostic laparoscopy, mass biopsy, pelvic washings    ANESTHESIA: General     EBL: 10 mL  IVF: 300 mL  UOP: 660 mL clear urine at end of the case    DRAINS: None  SPECIMENS:   ID Type Source Tests Collected by Time Destination   1 : pelvic washings Washings Pelvis CYTOLOGY NON GYN Bakari Galeas MD 2018  8:41 AM    2 : Blood Blood, arterial Blood OR DOCUMENTATION ONLY Bakari Galeas MD 2018  8:47 AM    3 : pelvic side wall nodule Tissue Pelvis OR DOCUMENTATION ONLY Bakari Galeas MD 2018  8:51 AM    A : pelvic side wall nodule Tissue Pelvis SURGICAL PATHOLOGY EXAM Bakari Galeas MD 2018  8:50 AM      COMPLICATIONS: None    FINDINGS:   Exam under anesthesia revealed surgically absent uterus and intact, smooth vaginal cuff. Large pelvic mass extending to the level of the umbilicus, firm and immobile.   Laparoscopy revealed atraumatic entry to the abdomen. Large, immobile complex pelvic mass measuring 15cm, adherent to the pelvic side wall. A 2cm white mass cephalid to the pelvic mass, above the pelvic brim. Biopsies of this mass were sent for frozen which was consistent with poorly differentiated malignant neoplasm.   Additional 2-3cm white mass located adjacent to the descending colon. A 3cm violaceous mass attached to the descending colon, appeared to arise from the colon, may represent diverticula.   Dense adhesions of the descending and sigmoid colon to the pelvic sidewall, obscuring visualization of the left ovary and fallopian tube. Filmy adhesion of  the omentum to the pelvic mass  Normal appearing bowel, liver, diaphragm. Hemostatic biopsy site at conclusion of the case.     CONDITION: Extubated, transferred to PACU in stable condition     Mray West MD   Resident Physician, PGY3  Obstetrics, Gynecology, and Women's Health

## 2018-07-06 NOTE — ANESTHESIA POSTPROCEDURE EVALUATION
Patient: Shielia Brendan    Procedure(s):  Diagnostic Laparoscopy, Biopsies - Wound Class: II-Clean Contaminated    Diagnosis:Pelvic Mass   Diagnosis Additional Information: No value filed.    Anesthesia Type:  General    Note:  Anesthesia Post Evaluation    Patient location during evaluation: PACU  Patient participation: Able to fully participate in evaluation  Level of consciousness: awake and alert  Pain management: adequate  Airway patency: patent  Cardiovascular status: hemodynamically stable  Respiratory status: acceptable  Hydration status: acceptable  PONV: none     Anesthetic complications: None          Last vitals:  Vitals:    07/06/18 1015 07/06/18 1025 07/06/18 1030   BP: 149/77 146/68 123/61   Resp: 12 12 12   Temp:  36.3  C (97.4  F)    SpO2: 97% 100% 93%         Electronically Signed By: Marah Zaldivar MD  July 6, 2018  11:17 AM

## 2018-07-09 ENCOUNTER — PRE VISIT (OUTPATIENT)
Dept: ONCOLOGY | Facility: CLINIC | Age: 73
End: 2018-07-09

## 2018-07-09 LAB — COPATH REPORT: NORMAL

## 2018-07-10 ENCOUNTER — TELEPHONE (OUTPATIENT)
Dept: ONCOLOGY | Facility: CLINIC | Age: 73
End: 2018-07-10

## 2018-07-10 LAB — COPATH REPORT: NORMAL

## 2018-07-10 NOTE — TELEPHONE ENCOUNTER
Cardiology MD's office called RN to update on scheduled Angiogram.     Patient is scheduled for this on 7/10/2018    Marianela Coates RN

## 2018-07-12 ENCOUNTER — TELEPHONE (OUTPATIENT)
Dept: ONCOLOGY | Facility: CLINIC | Age: 73
End: 2018-07-12

## 2018-07-12 NOTE — TELEPHONE ENCOUNTER
"Patient reached out to patient. Patient was very sad and upset. Patient states she is very worried about her diagnosis and very scared. She is also not understanding why she needs to see Dr Galeas prior to going back to the OR.     RN explained this to the best of her ability.      Patient was upset and stated her PCP and ObGYN have begged her \"to get the cancer cutt out immmediately\".     RN assured patient that she will be cared for and Dr Galeas will discuss surgery with her at her follow up.     Patient was calmer and was grateful for the RN time.    Patient moved up patient's return visit. Grand daughter updated.     Marianela Coates RN    "

## 2018-07-18 ENCOUNTER — ONCOLOGY VISIT (OUTPATIENT)
Dept: ONCOLOGY | Facility: CLINIC | Age: 73
End: 2018-07-18
Attending: OBSTETRICS & GYNECOLOGY
Payer: COMMERCIAL

## 2018-07-18 DIAGNOSIS — Z01.818 PREOPERATIVE EXAMINATION: Primary | ICD-10-CM

## 2018-07-18 DIAGNOSIS — Z85.43 PERSONAL HISTORY OF OVARIAN CANCER: ICD-10-CM

## 2018-07-18 PROCEDURE — G0463 HOSPITAL OUTPT CLINIC VISIT: HCPCS | Mod: ZF

## 2018-07-18 PROCEDURE — 99215 OFFICE O/P EST HI 40 MIN: CPT | Performed by: OBSTETRICS & GYNECOLOGY

## 2018-07-18 RX ORDER — CEFAZOLIN SODIUM 1 G/50ML
1 INJECTION, SOLUTION INTRAVENOUS SEE ADMIN INSTRUCTIONS
Status: CANCELLED | OUTPATIENT
Start: 2018-07-18 | End: 2019-07-18

## 2018-07-18 ASSESSMENT — PAIN SCALES - GENERAL: PAINLEVEL: EXTREME PAIN (8)

## 2018-07-18 NOTE — MR AVS SNAPSHOT
"              After Visit Summary   7/18/2018    Afshan Cardona    MRN: [id number]           Patient Information     Date Of Birth          1945        Visit Information        Provider Department      7/18/2018 1:00 PM Bakari Galeas MD [facility]        Today's Diagnoses     Preoperative examination    -  1    Personal history of ovarian cancer           Follow-ups after your visit        Additional Services     PAC Visit Referral (For [facility] Only)                 Your next 10 appointments already scheduled     Aug 27, 2018 11:40 AM CDT   (Arrive by 11:25 AM)   Post-Op with Bakari Galeas MD   [facility] ([facility])    [address]  [address]  [facility] [address] [phone]              Who to contact     If you have questions or need follow up information about today's clinic visit or your schedule please contact [facility] directly at [phone].  Normal or non-critical lab and imaging results will be communicated to you by MyChart, letter or phone within 4 business days after the clinic has received the results. If you do not hear from us within 7 days, please contact the clinic through [employer] or phone. If you have a critical or abnormal lab result, we will notify you by phone as soon as possible.  Submit refill requests through [employer] or call your pharmacy and they will forward the refill request to us. Please allow 3 business days for your refill to be completed.          Additional Information About Your Visit        [employer] Information     [employer] lets you send messages to your doctor, view your test results, renew your prescriptions, schedule appointments and more. To sign up, go to [email or web address] . Click on \"Log in\" on the left side of the screen, which will take you to the Welcome page. Then click on \"Sign up Now\" on the right side of the page.     You will be asked to " enter the access code listed below, as well as some personal information. Please follow the directions to create your username and password.     Your access code is: [email or web address]  Expires: 2018 10:52 AM     Your access code will  in 90 days. If you need help or a new code, please call your [place] clinic or [phone].        Care EveryWhere ID     This is your Care EveryWhere ID. This could be used by other organizations to access your [place] medical records  [id number]         Blood Pressure from Last 3 Encounters:   18 (P) 152/71   18 148/68   18 105/58    Weight from Last 3 Encounters:   18 (P) 67.2 kg (148 lb 1.6 oz)   18 65.3 kg (143 lb 15.4 oz)   18 67 kg (147 lb 11.2 oz)              We Performed the Following     PAC Visit Referral (For [facility] Only)     Denise-Operative Worksheet - Exploratory Laparotomy Total Abdominal Hysterectomy, bilateral Salpingo-Oophorectomy, tumor debulking, omentectomy, possible intraperitoneal port placement        Primary Care Provider Fax #    Provider Not In System [phone]                Equal Access to Services     ANJANA KAYE : Hadii margarita clayton hadasho Solotusali, waaxda luqadaha, qaybta kaalmada adeegyada, luis conteh. So [facility] [phone].    ATENCIÓN: Si habla español, tiene a roblero disposición servicios gratuitos de asistencia lingüística. Llame al [phone].    We comply with applicable federal civil rights laws and [place] laws. We do not discriminate on the basis of race, color, national origin, age, disability, sex, sexual orientation, or gender identity.            Thank you!     Thank you for choosing [facility]  for your care. Our goal is always to provide you with excellent care. Hearing back from our patients is one way we can continue to improve our services. Please take a few minutes to complete the written survey that you may receive in the mail after your  visit with us. Thank you!             Your Updated Medication List - Protect others around you: Learn how to safely use, store and throw away your medicines at www.disposemymeds.org.          This list is accurate as of 7/18/18  2:50 PM.  Always use your most recent med list.                   Brand Name Dispense Instructions for use Diagnosis    aspirin 81 MG EC tablet      Take 81 mg by mouth every morning        carvedilol 25 MG tablet    COREG     Take 25 mg by mouth every morning        cephalexin 250 MG capsule    KEFLEX     TAKE ONE CAPSULE BY MOUTH ONCE DAILY AT BEDTIME        CITRUCEL Powd   Generic drug:  methylcellulose (laxative)      Take by mouth every morning        clobetasol 0.05 % cream    TEMOVATE     APPLY TO AFFECTED AREA(S) EVERY NIGHT AT BEDTIME , APPLY FOR 1-2 WEEKS THEN 2-3 TIMES PER WEEK AS NEEDED        CRANBERRY EXTRACT PO      Take by mouth every morning        DULoxetine 60 MG EC capsule    CYMBALTA     TAKE ONE CAPSULE BY MOUTH AT BEDTIME        gabapentin 600 MG tablet    NEURONTIN     900 MG THREE TIMES DAILY.        HUMALOG MARY KWIKPEN 100 UNIT/ML injection   Generic drug:  insulin lispro      INJECT 1 UNIT PER 10GRAMS OF CARBOHYDRATES WITH CORRECTION 0.5 UNITS PER 50 ABOVE 150 ( UP TO 30 UNITS PER DAY)        levothyroxine 75 MCG tablet    SYNTHROID/LEVOTHROID     TAKE ONE TABLET BY MOUTH ONCE DAILY  IN THE MORNING     (PLEASE CALL [phone] FOR AN OFFICE VISIT BEFORE NEXT REFILL.)        losartan 100 MG tablet    COZAAR     Take 100 mg by mouth At Bedtime        morphine 15 MG 12 hr tablet    MS CONTIN     TAKE ONE TABLET BY MOUTH TWICE A DAY *CAUTION: OPIOID. RISK OF OVERDOSE AND ADDICTION.        oxyCODONE-acetaminophen 5-325 MG per tablet    PERCOCET     Take 1 tablet by mouth 2 times daily        pravastatin 80 MG tablet    PRAVACHOL     Take 80 mg by mouth At Bedtime        senna-docusate 8.6-50 MG per tablet    SENOKOT-S;PERICOLACE    30 tablet    Take 1-2 tablets by  mouth 2 times daily Take while on oral narcotics to prevent or treat constipation.    S/P laparoscopic procedure       TRESIBA FLEXTOUCH 200 UNIT/ML pen   Generic drug:  insulin degludec      INJECT 16 UNITS SUBCUTANEOUSLY DAILY AT BEDTIME        vitamin D3 2000 units Caps      TAKE ONE CAPSULE BY MOUTH ONCE DAILY IN THE MORNING

## 2018-07-18 NOTE — LETTER
2018       RE: Afshan Cardona  [address] [place] [address] [address]  [facility] [address]     Dear Colleague,    Thank you for referring your patient, Afshan Cardona, to the [facility]. Please see a copy of my visit note below.                Follow Up Notes on Referred Patient    Date: 2018       Dr. Roxanne Pascual MD  [facility]  [address] [place] [place] [address]       RE: Afshan Cardona  : 1945  SRIKANTH: 2018    Dear Dr. Roxanne Pascual:    Afshan Cardona is a 73 year old woman with a diagnosis of high grade serous ovarian cancer.             Patient presents today for followup.  She has seen recently a cardiologist for an angiogram after she had a positive stress test.  She does have 3-vessel disease which appears to be improved from prior angiogram and there is nothing that could be done to improve revascularization from a cardiology standpoint.  She is otherwise feeling well postoperatively after laparoscopy.  She is eating and drinking well.  No nausea, vomiting, fever or chills.  No B symptoms.         Past Medical History:    Past Medical History:   Diagnosis Date     Vivas's esophagus      Cardiomyopathy (H)     [place]     Colon polyps      Fibromyalgia      Former smoker      History of DVT (deep vein thrombosis)      HLD (hyperlipidemia)      HTN (hypertension)      Hypothyroid      TIA (transient ischemic attack)          Past Surgical History:    Past Surgical History:   Procedure Laterality Date     AS ESOPHAGOSCOPY, DIAGNOSTIC       BACK SURGERY       bladder lift       HYSTERECTOMY       LAPAROSCOPY DIAGNOSTIC (GYN) N/A 2018    Procedure: LAPAROSCOPY DIAGNOSTIC (GYN);  Diagnostic Laparoscopy, Biopsies;  Surgeon: Bakari Galeas MD;  Location:  OR         Health Maintenance Due   Topic Date Due     PHQ-2 Q1 YR  1956     TETANUS IMMUNIZATION (SYSTEM ASSIGNED)  1962     LIPID SCREEN Q5 YR FEMALE (SYSTEM ASSIGNED)   05/19/1989     ADVANCE DIRECTIVE PLANNING Q5 YRS  05/19/1999     FALL RISK ASSESSMENT  05/19/2009     DEXA SCAN SCREENING (SYSTEM ASSIGNED)  05/19/2009     PNEUMOCOCCAL (1 of 2 - PCV13) 05/19/2009       Current Medications:     Current Outpatient Prescriptions   Medication Sig Dispense Refill     aspirin 81 MG EC tablet Take 81 mg by mouth every morning        carvedilol (COREG) 25 MG tablet Take 25 mg by mouth every morning   1     cephalexin (KEFLEX) 250 MG capsule TAKE ONE CAPSULE BY MOUTH ONCE DAILY AT BEDTIME  3     Cholecalciferol (VITAMIN D3) 2000 units CAPS TAKE ONE CAPSULE BY MOUTH ONCE DAILY IN THE MORNING  3     CRANBERRY EXTRACT PO Take by mouth every morning       DULoxetine (CYMBALTA) 60 MG EC capsule TAKE ONE CAPSULE BY MOUTH AT BEDTIME  1     gabapentin (NEURONTIN) 600 MG tablet 900 MG THREE TIMES DAILY.  5     HUMALOG MARY KWIKPEN 100 UNIT/ML injection INJECT 1 UNIT PER 10GRAMS OF CARBOHYDRATES WITH CORRECTION 0.5 UNITS PER 50 ABOVE 150 ( UP TO 30 UNITS PER DAY)  6     levothyroxine (SYNTHROID/LEVOTHROID) 75 MCG tablet TAKE ONE TABLET BY MOUTH ONCE DAILY  IN THE MORNING     (PLEASE CALL [phone] FOR AN OFFICE VISIT BEFORE NEXT REFILL.)  0     losartan (COZAAR) 100 MG tablet Take 100 mg by mouth At Bedtime        methylcellulose, laxative, (CITRUCEL) POWD Take by mouth every morning       morphine (MS CONTIN) 15 MG 12 hr tablet TAKE ONE TABLET BY MOUTH TWICE A DAY *CAUTION: OPIOID. RISK OF OVERDOSE AND ADDICTION.  0     oxyCODONE-acetaminophen (PERCOCET) 5-325 MG per tablet Take 1 tablet by mouth 2 times daily  0     pravastatin (PRAVACHOL) 80 MG tablet Take 80 mg by mouth At Bedtime        TRESIBA FLEXTOUCH 200 UNIT/ML pen INJECT 16 UNITS SUBCUTANEOUSLY DAILY AT BEDTIME  6     clobetasol (TEMOVATE) 0.05 % cream APPLY TO AFFECTED AREA(S) EVERY NIGHT AT BEDTIME , APPLY FOR 1-2 WEEKS THEN 2-3 TIMES PER WEEK AS NEEDED  3     senna-docusate (SENOKOT-S;PERICOLACE) 8.6-50 MG per tablet Take 1-2  tablets by mouth 2 times daily Take while on oral narcotics to prevent or treat constipation. (Patient not taking: Reported on 7/18/2018) 30 tablet 0         Allergies:        Allergies   Allergen Reactions     Azithromycin Diarrhea     Clonazepam      Other reaction(s): *Unknown     Furosemide      Other reaction(s): Unknown Reaction  Low sodium     Hydrochlorothiazide Other (See Comments)     Low Sodium     Lisinopril Cough     Nitrofurantoin      Other reaction(s): Other  Burning around her mouth, pt advised by neurologist to not take macrobid     Pregabalin Other (See Comments)     Other reaction(s): Dizziness  Worse, numbness/tingling/burning pain whole body, hospitalized     Sulfamethoxazole Hives     Buprenorphine Nausea and Vomiting     Severe vomiting        Social History:     Social History   Substance Use Topics     Smoking status: Former Smoker     Smokeless tobacco: Never Used     Alcohol use Yes       History   Drug Use No           Physical Exam:     BP (P) 152/71 (BP Location: Right arm)  Pulse (P) 68  Wt (P) 67.2 kg (148 lb 1.6 oz)  SpO2 (P) 98%  BMI (P) 27.09 kg/m2  Body mass index is 27.09 kg/(m^2) (pended).    General Appearance: healthy and alert, no distress     Neurological: normal gait, no gross defects     Psychiatric: appropriate mood and affect                                   Assessment:    Afshan Cardona is a 73 year old woman with a diagnosis of high grade serous ovarian cancer.     A total of 40 minutes was spent with the patient, 30 minutes of which were spent in counseling the patient and/or treatment planning.      1.  Metastatic high-grade serous ovarian cancer.   2.  Type 1 diabetes.   3.  Cardiac disease.        I had a long discussion with the patient as well as with her family. Her disease appears limited to the pelvis. She does have cardiac disease, but there are no options to further improve her from a cardiac standpoint.  We did discuss again in detail treatment of  ovarian cancer including cytoreductive surgery and systemic chemotherapy.  We will now await another assessment by our colleagues in the Anesthesia Clinic to see whether she could be cleared for surgery, and if they do think that mickey- and intraoperative risk is too high from a cardiac standpoint, then we will proceed with neoadjuvant chemotherapy.  If they do clear her, we would proceed with a cytoreductive surgery.  She is tentatively scheduled for the 24th.  She will see them tomorrow.  We will further discuss then her actual risk and make a definitive decision.  Note, if there is no further measures that could improve her cardiac risk, then this will most likely not change even after neoadjuvant chemotherapy.  Based on the laparoscopy that we have done on 07/06, she does appear to be resectable.  The patient as well as family agrees with the plan.  They do understand her significant cardiac and overall surgical risk, and the patient as well as family would like to proceed with surgery if at all possible.  We will also understand that she might her risk of dying intra- or postoperatively is significantly elevated.  We will again make the definitive number from colleagues in Anesthesia.  They do agree, again, with this plan.  She is very appreciative of her care.  All questions were answered.       Bakari Galeas MD, MS    Department of Obstetrics and Gynecology   Division of Gynecologic Oncology   [facility]  Phone: [phone]      CC  Patient Care Team:  System, Provider Not In as PCP - General (Clinic)  ROMMEL LOWRY

## 2018-07-18 NOTE — NURSING NOTE
"Oncology Rooming Note    July 18, 2018 1:01 PM   Afshan Cardona is a 73 year old female who presents for:    Chief Complaint   Patient presents with     Oncology Clinic Visit     Return     Initial Vitals: BP (P) 152/71 (BP Location: Right arm)  Pulse (P) 68  Wt (P) 67.2 kg (148 lb 1.6 oz)  SpO2 (P) 98%  BMI (P) 27.09 kg/m2 Estimated body mass index is 27.09 kg/(m^2) (pended) as calculated from the following:    Height as of 7/6/18: 1.575 m (5' 2\").    Weight as of this encounter: (P) 67.2 kg (148 lb 1.6 oz). Body surface area is 1.71 meters squared (pended).  Data Unavailable Comment: Data Unavailable   No LMP recorded. Patient has had a hysterectomy.  Allergies reviewed: Yes  Medications reviewed: Yes    Medications: Medication refills not needed today.  Pharmacy name entered into EPIC: Data Unavailable    Clinical concerns: None Dr Glaeas was NOT notified.    12 minutes for nursing intake (face to face time)     Hui Blackwood (Student MA)              "

## 2018-07-19 ENCOUNTER — ANESTHESIA EVENT (OUTPATIENT)
Dept: SURGERY | Facility: CLINIC | Age: 73
DRG: 749 | End: 2018-07-19
Payer: MEDICARE

## 2018-07-19 ENCOUNTER — APPOINTMENT (OUTPATIENT)
Dept: SURGERY | Facility: CLINIC | Age: 73
End: 2018-07-19
Payer: MEDICARE

## 2018-07-19 ENCOUNTER — OFFICE VISIT (OUTPATIENT)
Dept: SURGERY | Facility: CLINIC | Age: 73
End: 2018-07-19
Payer: MEDICARE

## 2018-07-19 ENCOUNTER — ALLIED HEALTH/NURSE VISIT (OUTPATIENT)
Dept: SURGERY | Facility: CLINIC | Age: 73
End: 2018-07-19
Payer: MEDICARE

## 2018-07-19 VITALS
HEIGHT: 62 IN | TEMPERATURE: 97.9 F | OXYGEN SATURATION: 97 % | WEIGHT: 148 LBS | HEART RATE: 71 BPM | SYSTOLIC BLOOD PRESSURE: 93 MMHG | DIASTOLIC BLOOD PRESSURE: 54 MMHG | BODY MASS INDEX: 27.23 KG/M2

## 2018-07-19 DIAGNOSIS — Z01.818 PREOP GENERAL PHYSICAL EXAM: Primary | ICD-10-CM

## 2018-07-19 LAB — GLUCOSE BLDC GLUCOMTR-MCNC: 152 MG/DL (ref 70–99)

## 2018-07-19 RX ORDER — BLOOD SUGAR DIAGNOSTIC
STRIP MISCELLANEOUS
Refills: 3 | COMMUNITY
Start: 2018-07-06

## 2018-07-19 RX ORDER — CITRIC ACID/SODIUM CITRATE 334-500MG
30 SOLUTION, ORAL ORAL
Status: CANCELLED | OUTPATIENT
Start: 2018-07-19 | End: 2038-07-20

## 2018-07-19 ASSESSMENT — LIFESTYLE VARIABLES: TOBACCO_USE: 1

## 2018-07-19 NOTE — MR AVS SNAPSHOT
After Visit Summary   2018    Afshan Cardona    MRN: [id number]           Patient Information     Date Of Birth          1945        Visit Information        Provider Department      2018 2:00 PM Rn, [facility]        Care Instructions    Preparing for Your Surgery      Name:  Afshan Cardona   MRN:  [id number]   :  1945   Today's Date:  2018     Arriving for surgery:  Surgery date:  18  Arrival time:  8:00AM  Please come to:       [facility] [address]  [address] [place]  [address]    -   parking is available in front of the hospital from 5:15 am to 8:00 pm    -  Stop at the Information Desk in the lobby    -   Inform the information person that you are here for surgery. An escort to 3c will be provided. If you would not like an escort, please proceed to 3C on the 3rd floor. [phone]     What can I eat or drink?  -  You may have solid food or milk products until 8 hours prior to your surgery. 2AM  -  You may have water, apple juice or 7up/Sprite until 2 hours prior to your surgery. 8AM    Which medicines can I take?  Stop Aspirin, vitamins and supplements one week prior to surgery, including cranberry  Hold Ibuprofen and Naproxen for 24 hours prior to surgery.   -  Do NOT take these medications in the morning, the day of surgery:  Please do not take cholecalciferol, citrucel powder and humalog insulin the morning of surgery.     -  Please take these medications the day of surgery:  Please take carvedilol(coreg), gabapentin (neurontin), levothyroxine, MS Contin and senna the morning of surgery.  Please take 13 units of Tresiba insulin the evening before surgery.   How do I prepare myself?  -  Take two showers: one the night before surgery; and one the morning of surgery.         Use Scrubcare or Hibiclens to wash from neck down.  You may use your own     shampoo and conditioner.  No other hair products.   -  Do NOT use lotion, powder, deodorant, or antiperspirant the day of your surgery.  -  Do NOT wear any makeup, fingernail polish or jewelry.  - Do not bring your own medications to the hospital, except for inhalers and eye   drops.  -  Bring your ID and insurance card.    Questions or Concerns:  -If you have questions or concerns regarding the day of surgery, please call [phone].     -For questions after surgery please call your surgeons office.           AFTER YOUR SURGERY  Breathing exercises   Breathing exercises help you recover faster. Take deep breaths and let the air out slowly. This will:     Help you wake up after surgery.    Help prevent complications like pneumonia.  Preventing complications will help you go home sooner.   We may give you a breathing device (incentive spirometer) to encourage you to breathe deeply.   Nausea and vomiting   You may feel sick to your stomach after surgery; if so, let your nurse know.    Pain control:  After surgery, you may have pain. Our goal is to help you manage your pain. Pain medicine will help you feel comfortable enough to do activities that will help you heal.  These activities may include breathing exercises, walking and physical therapy.   To help your health care team treat your pain we will ask: 1) If you have pain  2) where it is located 3) describe your pain in your words  Methods of pain control include medications given by mouth, vein or by nerve block for some surgeries.  We may give you a pain control pump that will:  1) Deliver the medicine through a tube placed in your vein  2) Control the amount of medicine you receive  3) Allow you to push a button to deliver a dose of pain medicine  Sequential Compression Device (SCD) or Pneumo Boots:  You may need to wear SCD S on your legs or feet. These are wraps connected to a machine that pumps in air and releases it. The repeated pumping helps prevent blood clots from forming.            Follow-ups after your visit        Your next 10 appointments already scheduled     2018  2:00 PM CDT   (Arrive by 1:45 PM)   PAC RN ASSESSMENT with Jess Pac Rn   [facility] [place] ([facility])    [address] [facility] [address] [phone]            2018  2:20 PM CDT   (Arrive by 2:05 PM)   PAC Anesthesia Consult with Jess Pac Anesthesiologist   [facility] [place] ([facility])    [address] [facility] [address] [phone]            2018   Procedure with Bakari Galeas MD   [facility] [place], Same Day Surgery (--)    [address] [facility] [address] [phone]            Aug 27, 2018 11:40 AM CDT   (Arrive by 11:25 AM)   Post-Op with Bakari Galeas MD   [facility] ([facility])    [address] [place] [address]  [address] [place] [id number] [phone]              Who to contact     Please call your clinic at [phone] to:    Ask questions about your health    Make or cancel appointments    Discuss your medicines    Learn about your test results    Speak to your doctor            Additional Information About Your Visit        [employer] Information     [employer]reamise is an electronic gateway that provides easy, online access to your medical records. With [employer]ise, you can request a clinic appointment, read your test results, renew a prescription or communicate with your care team.     To sign up for Practice Management e-Toolst visit the website at [email or web address]   You will be asked to enter the access code listed below, as well as some personal information. Please follow the directions to create your username and password.     Your access code is: [email or web address]  Expires: 2018 10:52 AM     Your access code will  in 90 days. If you need help or a new code, please contact your  [facility] or call [phone] for assistance.        Care EveryWhere ID     This is your Care EveryWhere ID. This could be used by other organizations to access your [place] medical records  [id number]         Blood Pressure from Last 3 Encounters:   07/19/18 93/54   07/18/18 (P) 152/71   07/06/18 148/68    Weight from Last 3 Encounters:   07/19/18 67.1 kg (148 lb)   07/18/18 (P) 67.2 kg (148 lb 1.6 oz)   07/06/18 65.3 kg (143 lb 15.4 oz)              Today, you had the following     No orders found for display       Primary Care Provider Fax #    Provider Not In System [phone]                Equal Access to Services     ANJANA KAYE : Hadii margarita garcíao Soyuri, waaxda luqadaha, qaybta kaalmada adegillianyada, luis hernandez . So [facility] [phone].    ATENCIÓN: Si habla español, tiene a roblero disposición servicios gratuitos de asistencia lingüística. Llame al [phone].    We comply with applicable federal civil rights laws and [place] laws. We do not discriminate on the basis of race, color, national origin, age, disability, sex, sexual orientation, or gender identity.            Thank you!     Thank you for choosing [facility]  for your care. Our goal is always to provide you with excellent care. Hearing back from our patients is one way we can continue to improve our services. Please take a few minutes to complete the written survey that you may receive in the mail after your visit with us. Thank you!             Your Updated Medication List - Protect others around you: Learn how to safely use, store and throw away your medicines at www.disposemymeds.org.          This list is accurate as of 7/19/18  1:39 PM.  Always use your most recent med list.                   Brand Name Dispense Instructions for use Diagnosis    aspirin 81 MG EC tablet      Take 81 mg by mouth every morning        carvedilol 25 MG tablet     COREG     Take 25 mg by mouth every morning        cephalexin 250 MG capsule    KEFLEX     TAKE ONE CAPSULE BY MOUTH ONCE DAILY AT BEDTIME        CITRUCEL Powd   Generic drug:  methylcellulose (laxative)      Take by mouth every morning        CRANBERRY EXTRACT PO      Take by mouth every morning        DULoxetine 60 MG EC capsule    CYMBALTA     TAKE ONE CAPSULE BY MOUTH AT BEDTIME        gabapentin 600 MG tablet    NEURONTIN     900 MG THREE TIMES DAILY.        HUMALOG MARY KWIKPEN 100 UNIT/ML injection   Generic drug:  insulin lispro      INJECT 1 UNIT PER 10GRAMS OF CARBOHYDRATES WITH CORRECTION 0.5 UNITS PER 50 ABOVE 150 ( UP TO 30 UNITS PER DAY)        levothyroxine 75 MCG tablet    SYNTHROID/LEVOTHROID     TAKE ONE TABLET BY MOUTH ONCE DAILY  IN THE MORNING     (PLEASE CALL [phone] FOR AN OFFICE VISIT BEFORE NEXT REFILL.)        losartan 100 MG tablet    COZAAR     Take 100 mg by mouth At Bedtime        morphine 15 MG 12 hr tablet    MS CONTIN     TAKE ONE TABLET BY MOUTH TWICE A DAY *CAUTION: OPIOID. RISK OF OVERDOSE AND ADDICTION.        ONETOUCH VERIO IQ test strip   Generic drug:  blood glucose monitoring      USE TO TEST BLOOD GLUCOSE 6-8 TIMES PER DAY, BEFORE MEALS, BEDTIME TO DOSE INSULIN, HIGH OR LOW BLOOD GLUCOSE WITH RECHECK        oxyCODONE-acetaminophen 5-325 MG per tablet    PERCOCET     Take 1 tablet by mouth 2 times daily        pravastatin 80 MG tablet    PRAVACHOL     Take 80 mg by mouth At Bedtime        senna-docusate 8.6-50 MG per tablet    SENOKOT-S;PERICOLACE    30 tablet    Take 1-2 tablets by mouth 2 times daily Take while on oral narcotics to prevent or treat constipation.    S/P laparoscopic procedure       TRESIBA FLEXTOUCH 200 UNIT/ML pen   Generic drug:  insulin degludec      INJECT 16 UNITS SUBCUTANEOUSLY DAILY AT BEDTIME        vitamin D3 2000 units Caps      TAKE ONE CAPSULE BY MOUTH ONCE DAILY IN THE MORNING

## 2018-07-19 NOTE — H&P
Pre-Operative H & P     CC:  Preoperative exam to assess for increased cardiopulmonary risk while undergoing surgery and anesthesia.    Date of Encounter: 7/19/2018  Primary Care Physician:  System, Provider Not In    Reason for visit:  Preop general physical exam  Pelvic mass      HPI  Afshan Cardona is a 73 year old female who presents for pre-operative H & P in preparation for Exploratory Laparotomy, Possible Total Abdominal Hysterectomy, Bilateral Salpingo-Oophorectomy, Tumor Debulking, Omentectomy, Possible Pelvic And Para Aortic Lymphadenectomy on 7/24/2018 in treatment of complex pelvic mass at [facility].     History is obtained from the patient.   New diagnosis of high grade serous ovarian cancer.  She had a exploratory laparoscopy 7/6/2018. Angiogram done 7/10/2018 for positive stress test. Medical management recommended.                          Past Medical History  Past Medical History:   Diagnosis Date     Vivas's esophagus      Cardiomyopathy (H)     Taksumoto     Colon polyps      Fibromyalgia      Former smoker      History of DVT (deep vein thrombosis)      HLD (hyperlipidemia)      HTN (hypertension)      Hypothyroid      TIA (transient ischemic attack)        Past Surgical History  Past Surgical History:   Procedure Laterality Date     AS ESOPHAGOSCOPY, DIAGNOSTIC       BACK SURGERY       bladder lift       HYSTERECTOMY       LAPAROSCOPY DIAGNOSTIC (GYN) N/A 7/6/2018    Procedure: LAPAROSCOPY DIAGNOSTIC (GYN);  Diagnostic Laparoscopy, Biopsies;  Surgeon: Bakari Galeas MD;  Location: [facility] OR       Hx of Blood transfusions/reactions: yes, no reactions    Hx of abnormal bleeding or anti-platelet use: asa, on hold for procedure    Menstrual history: No LMP recorded. Patient has had a hysterectomy.:     Steroid use in the last year: none    Personal or FH with difficulty with Anesthesia:  None        Prior to Admission Medications  Current  Outpatient Prescriptions   Medication Sig Dispense Refill     aspirin 81 MG EC tablet Take 81 mg by mouth every morning        carvedilol (COREG) 25 MG tablet Take 25 mg by mouth every morning   1     cephalexin (KEFLEX) 250 MG capsule TAKE ONE CAPSULE BY MOUTH ONCE DAILY AT BEDTIME  3     Cholecalciferol (VITAMIN D3) 2000 units CAPS TAKE ONE CAPSULE BY MOUTH ONCE DAILY IN THE MORNING  3     CRANBERRY EXTRACT PO Take by mouth every morning       DULoxetine (CYMBALTA) 60 MG EC capsule TAKE ONE CAPSULE BY MOUTH AT BEDTIME  1     gabapentin (NEURONTIN) 600 MG tablet 900 MG THREE TIMES DAILY.  5     levothyroxine (SYNTHROID/LEVOTHROID) 75 MCG tablet TAKE ONE TABLET BY MOUTH ONCE DAILY  IN THE MORNING     (PLEASE CALL [phone] FOR AN OFFICE VISIT BEFORE NEXT REFILL.)  0     losartan (COZAAR) 100 MG tablet Take 100 mg by mouth At Bedtime        methylcellulose, laxative, (CITRUCEL) POWD Take by mouth every morning       morphine (MS CONTIN) 15 MG 12 hr tablet TAKE ONE TABLET BY MOUTH TWICE A DAY *CAUTION: OPIOID. RISK OF OVERDOSE AND ADDICTION.  0     oxyCODONE-acetaminophen (PERCOCET) 5-325 MG per tablet Take 1 tablet by mouth 2 times daily  0     pravastatin (PRAVACHOL) 80 MG tablet Take 80 mg by mouth At Bedtime        TRESIBA FLEXTOUCH 200 UNIT/ML pen INJECT 16 UNITS SUBCUTANEOUSLY DAILY AT BEDTIME  6     HUMALOG MARY KWIKPEN 100 UNIT/ML injection INJECT 1 UNIT PER 10GRAMS OF CARBOHYDRATES WITH CORRECTION 0.5 UNITS PER 50 ABOVE 150 ( UP TO 30 UNITS PER DAY)  6     ONETOUCH VERIO IQ test strip USE TO TEST BLOOD GLUCOSE 6-8 TIMES PER DAY, BEFORE MEALS, BEDTIME TO DOSE INSULIN, HIGH OR LOW BLOOD GLUCOSE WITH RECHECK  3     senna-docusate (SENOKOT-S;PERICOLACE) 8.6-50 MG per tablet Take 1-2 tablets by mouth 2 times daily Take while on oral narcotics to prevent or treat constipation. (Patient not taking: Reported on 7/18/2018) 30 tablet 0       Allergies  Allergies   Allergen Reactions     Azithromycin Diarrhea      Clonazepam      Other reaction(s): *Unknown     Furosemide      Other reaction(s): Unknown Reaction  Low sodium     Hydrochlorothiazide Other (See Comments)     Low Sodium     Lisinopril Cough     Nitrofurantoin      Other reaction(s): Other  Burning around her mouth, pt advised by neurologist to not take macrobid     Pregabalin Other (See Comments)     Other reaction(s): Dizziness  Worse, numbness/tingling/burning pain whole body, hospitalized     Sulfamethoxazole Hives     Buprenorphine Nausea and Vomiting     Severe vomiting       Social History  Social History     Social History     Marital status:      Spouse name: N/A     Number of children: N/A     Years of education: N/A     Occupational History     Not on file.     Social History Main Topics     Smoking status: Former Smoker     Packs/day: 0.25     Years: 15.00     Quit date: 1/1/2018     Smokeless tobacco: Never Used     Alcohol use Yes      Comment: social     Drug use: No     Sexual activity: Not on file     Other Topics Concern     Not on file     Social History Narrative       Family History  No family history on file.        Anesthesia Evaluation     . Pt has had prior anesthetic. Type: General and MAC           ROS/MED HX    ENT/Pulmonary:     (+)SINCERE risk factors hypertension, tobacco use, Past use 0.25 PPD for 15 years, quit 30 years ago packs/day  , resolved 2 years ago post-op pneumonia: . .    Neurologic:     (+)without deficitsTIA date: 1999 features: caused by hypoglycemia,     Cardiovascular: Comment: TTE 4/2017 (outside records)  CONCLUSION:  1. Normal left ventricular size with preserved systolic function.  Ejection fraction is 60%.     2. Stage I diastolic dysfunction.     3. Mild to moderate mitral regurgitation.     4. Mild aortic and tricuspid regurgitation.     (+) Dyslipidemia, hypertension--CAD, --. : . CHF etiology: Taksumoto syndrome Last EF: 60% date: 4/18/17 . . :. . Previous cardiac testing Echodate:results:Stress  "Testdate: results: date: results:Cath date: results:          METS/Exercise Tolerance:  3 - Able to walk 1-2 blocks without stopping   Hematologic: Comments: Several clots RLE -one associated with pregnancy    (+) History of blood clots pt is not anticoagulated, History of Transfusion no previous transfusion reaction -      Musculoskeletal:   (+) arthritis, , , other musculoskeletal- hardware in right patella      GI/Hepatic:     (+) GERD Other,       Renal/Genitourinary:  - ROS Renal section negative       Endo:     (+) type I DM, Using insulin - not using insulin pump thyroid problem hypothyroidism, .      Psychiatric:     (+) psychiatric history anxiety and depression      Infectious Disease:  - neg infectious disease ROS       Malignancy:      - no malignancy   Other:    (+) No chance of pregnancy H/O Chronic Pain,H/O chronic opiod use , no other significant disability              Physical Exam  Normal systems: cardiovascular, pulmonary and dental    Airway   Mallampati: II  TM distance: >3 FB  Neck ROM: full    Dental     Cardiovascular   Rhythm and rate: regular and normal      Pulmonary    breath sounds clear to auscultation          The complete review of systems is negative other than noted in the HPI or here.   Temp: 97.9  F (36.6  C)   BP: 93/54 Pulse: 71     SpO2: 97 %         148 lbs 0 oz  5' 2\"   Body mass index is 27.07 kg/(m^2).       Physical Exam  Constitutional: Awake, alert, cooperative, no apparent distress, and appears stated age.  Eyes: Pupils equal, round and reactive to light, extra ocular muscles intact, sclera clear, conjunctiva normal.  HENT: Normocephalic, oral pharynx with moist mucus membranes, good dentition. No goiter appreciated.   Respiratory: Clear to auscultation bilaterally, no crackles or wheezing.  Cardiovascular: Regular rate and rhythm, normal S1 and S2, and no murmur noted.  Carotids +2, no bruits. No edema. Palpable pulses to radial  DP and PT arteries.   GI: Normal bowel " sounds, soft, non-distended, non-tender, no masses palpated, no hepatosplenomegaly.   Lymph/Hematologic: No cervical lymphadenopathy and no supraclavicular lymphadenopathy.  Genitourinary:  Deferred   Skin: Warm and dry.  No rashes at anticipated surgical site.   Musculoskeletal: Full ROM of neck. There is no redness, warmth, or swelling of the joints. Gross motor strength is normal.    Neurologic: Awake, alert, oriented to name, place and time. Cranial nerves II-XII are grossly intact. Gait is normal.   Neuropsychiatric: Calm, cooperative. Normal affect.     Labs: (personally reviewed)  Results for KEYLA FRIAS (MRN [id number]) as of 7/20/2018 07:20    7/3/2018 09:18  WBC: 7.1  Hemoglobin: 10.7 (L)  Hematocrit: 32.3 (L)  Platelet Count: 300  RBC Count: 3.54 (L)  MCV: 91  MCH: 30.2  MCHC: 33.1  RDW: 12.9  Diff Method: Automated Method  % Neutrophils: 64.0  % Lymphocytes: 24.5  % Monocytes: 8.2  % Eosinophils: 2.3  % Basophils: 0.4  % Immature Granulocytes: 0.6  Nucleated RBCs: 0  Absolute Neutrophil: 4.6  Absolute Lymphocytes: 1.7  Absolute Monocytes: 0.6  Absolute Eosinophils: 0.2  Absolute Basophils: 0.0  Abs Immature Granulocytes: 0.0  Absolute Nucleated RBC: 0.0    Results for KEYLA FRIAS (MRN [id number]) as of 7/20/2018 07:20   Ref. Range 7/3/2018 09:18   Sodium Latest Ref Range: 133 - 144 mmol/L 132 (L)   Potassium Latest Ref Range: 3.4 - 5.3 mmol/L 4.8   Chloride Latest Ref Range: 94 - 109 mmol/L 96   Carbon Dioxide Latest Ref Range: 20 - 32 mmol/L 29   Urea Nitrogen Latest Ref Range: 7 - 30 mg/dL 15   Creatinine Latest Ref Range: 0.52 - 1.04 mg/dL 0.64   GFR Estimate Latest Ref Range: >60 mL/min/1.7m2 >90   GFR Estimate If Black Latest Ref Range: >60 mL/min/1.7m2 >90   Calcium Latest Ref Range: 8.5 - 10.1 mg/dL 9.0   Anion Gap Latest Ref Range: 3 - 14 mmol/L 6   Albumin Latest Ref Range: 3.4 - 5.0 g/dL 3.7   Protein Total Latest Ref Range: 6.8 - 8.8 g/dL 6.6 (L)   Bilirubin Total Latest Ref Range:  0.2 - 1.3 mg/dL 0.4   Alkaline Phosphatase Latest Ref Range: 40 - 150 U/L 113   ALT Latest Ref Range: 0 - 50 U/L 27   AST Latest Ref Range: 0 - 45 U/L 16       EKG: Personally reviewed but formal cardiology read pendin/10/2018 SR        Cathed 7/10/2018  CORONARY ANGIOGRAPHY  LEFT MAIN:  The left main is normal.    LAD:  The left anterior descending has diffuse 60% and 50% stenoses proximal, mid and distal, no worse than previous angiogram.  Moderate-size diagonal branch emanates in the distal vessel and it is within normal limits.    CIRCUMFLEX:  The circumflex is nondominant and normal proximal, mid and distal.  Obtuse marginal one is normal.  Obtuse marginal two is normal.    RCA:  The right coronary artery is dominant with moderate calcification and a 60% focal proximal stenosis, 30% distal stenosis, otherwise mild diffuse disease.  PDA is normal.  The right coronary artery appears improved from previous angiogram.    ASSESSMENT:     Moderate two-vessel coronary artery disease, improved from previous angiogram.  Given patient's lack of symptoms of angina, recommend continued medical therapy risk reduction and proceeding with upcoming ovarian surgery with no further cardiac revascularization.  Good medical therapy is recommended with perioperative nitroglycerin.    Stress test 2018  CONCLUSION:  Test subjectively negative and objectively indeterminate for ischemia by EKG   criteria. The patient did develop repolarization changes, but once again these appeared   more consistent with LVH rather than true ischemia. The patient exercised to a low MET   level but achieved a reasonably good rate pressure product and also attained her target   heart rate to the predicted value.  The test should be reasonably predictive with the   understanding that the patient only exercised through stage I of the Fabio protocol. '  Echocardiographic data will be interpreted separately.    Stress echocardiogram  7/5/2018  STRESS-ECHO  ECHO VIEWS: The standard parasternal long and short axis as well as apical two and four chamber views were obtained pre and post exercise. Immediate post images were obtained within 60 seconds.  Color flow and/or Doppler were utilized.     IMAGE QUALITY:  Good.    RESULTS:    Pre-exercise:  Baseline echocardiogram shows normal left ventricular wall motion.  Left ventricular ejection fraction visually estimated at 60%.  Mild mitral insufficiency.      Post-exercise:  Immediate post exercise images show left ventricular chamber size appears slightly decreased.  Ejection fraction does appear to improve.  Lateral wall though appears to become hypokinetic compared to rest images.      CONCLUSION:  1. Please see separately dictated report for complete details of the stress portion of this test.      2.  Baseline echocardiogram shows normal left ventricular ejection fraction of 60% and mild mitral insufficiency.     3.  Immediate post-exercise images show inducible hypokinesis in the lateral wall.      4.  Overall this represents an abnormal stress echocardiogram with lateral wall ischemia.       Outside records reviewed from: care everywhere      ASSESSMENT and PLAN  Afshan Cardona is a 73 year old female scheduled to undergo Exploratory Laparotomy, Possible Total Abdominal Hysterectomy, Bilateral Salpingo-Oophorectomy, Tumor Debulking, Omentectomy, Possible Pelvic And Para Aortic Lymphadenectomy on 7/24/2018 in treatment of complex pelvic mass. She has the following specific operative considerations:   - RCRI : High risk surgery (>5% cardiac complication risk).  6.6% risk of major adverse cardiac event.   - Anesthesia considerations:  Refer to PAC assessment in anesthesia records  - VTE risk: 3%  - SINCERE # of risks 2/8 = low risk  - Post-op delirium risk: high risk due to age.    - Risk of PONV score = 2.  If > 2, anti-emetic intervention recommended.      Pre-operative considerations include:  1.)  CV: Functional status independent. Exercise tolerance close to 4 METS. Positive stress test 7/5/2018, cathed 7/10/2018, results posted above. Hx Takutsubo cardiomyopathy 2/2015 with EF 25-30% recovered to 60% on April 2017 echo with no RWMA and recent testing show EF at 60%. EKG 7/10/2018 SR    2.) Pulmonary: Distant smoking history Quit 1985. No pulmonary dx, sx or meds.   3.)  Heme: DVT RLE x 2, both provoked per pt (one w/ pregnancy) -last one 3 1/2 years ago. No anticoagulation. Hx blood transfusion.  Elevated risk for DVT due to previous clot and likely cancer dx.  4.) Neuro: TIA in 1999, pt claims it was due to hypoglycemia. Fibromyalgia. BLE neuropathy.   5.) Endo: Insulin dependent diabetes since age 29. Fluctuates with poor control (HGBa1c =8.6%) and episodes of hypoglycemia requiring ER visits (glucose as low as 30 ). On short and long acting insulin., No oral agents. Hypothyroid on synthroid  Adjust Insulin dosing of long term as per nursing note.  HOLD short acting insulin  DOS  Take synthroid DOS  6.) GI: GERD, well managed  PONV score = 2 (2 or > antiemetic prophylaxis recommended.      Anesthesia; Multiple past surgeries (back x 3)- last GA umbilical hernia-McLain - no problems. Airway feasible.     I spent 30 minutes with patient, greater than 50% educating on preop meds, counseling on anesthesia and coordinating care for laparoscopy surgery  Pt optimized for surgery. AVS with information on surgery time/arrival time, meds and NPO status given by nursing staff    Patient was discussed with Dr Montelongo.    REHAN Vallecillo CNS  Preoperative Assessment Center  [facility]  Phone: [phone]  Fax: [phone]

## 2018-07-19 NOTE — PROGRESS NOTES
Follow Up Notes on Referred Patient    Date: 2018       Dr. Roxanne Pascual MD  [facility]  [address] [place] [address]       RE: Afshan Cardona  : 1945  SRIKANTH: 2018    Dear Dr. Roxanne Pascual:    Afshan Cardona is a 73 year old woman with a diagnosis of high grade serous ovarian cancer.             Patient presents today for followup.  She has seen recently a cardiologist for an angiogram after she had a positive stress test.  She does have 3-vessel disease which appears to be improved from prior angiogram and there is nothing that could be done to improve revascularization from a cardiology standpoint.  She is otherwise feeling well postoperatively after laparoscopy.  She is eating and drinking well.  No nausea, vomiting, fever or chills.  No B symptoms.         Past Medical History:    Past Medical History:   Diagnosis Date     Vivas's esophagus      Cardiomyopathy (H)     Taksumoto     Colon polyps      Fibromyalgia      Former smoker      History of DVT (deep vein thrombosis)      HLD (hyperlipidemia)      HTN (hypertension)      Hypothyroid      TIA (transient ischemic attack)          Past Surgical History:    Past Surgical History:   Procedure Laterality Date     AS ESOPHAGOSCOPY, DIAGNOSTIC       BACK SURGERY       bladder lift       HYSTERECTOMY       LAPAROSCOPY DIAGNOSTIC (GYN) N/A 2018    Procedure: LAPAROSCOPY DIAGNOSTIC (GYN);  Diagnostic Laparoscopy, Biopsies;  Surgeon: Bakari Galeas MD;  Location: [facility] OR         Health Maintenance Due   Topic Date Due     PHQ-2 Q1 YR  1956     TETANUS IMMUNIZATION (SYSTEM ASSIGNED)  1962     LIPID SCREEN Q5 YR FEMALE (SYSTEM ASSIGNED)  1989     ADVANCE DIRECTIVE PLANNING Q5 YRS  1999     FALL RISK ASSESSMENT  2009     DEXA SCAN SCREENING (SYSTEM ASSIGNED)  2009     PNEUMOCOCCAL (1 of 2 - PCV13) 2009       Current Medications:     Current Outpatient  Prescriptions   Medication Sig Dispense Refill     aspirin 81 MG EC tablet Take 81 mg by mouth every morning        carvedilol (COREG) 25 MG tablet Take 25 mg by mouth every morning   1     cephalexin (KEFLEX) 250 MG capsule TAKE ONE CAPSULE BY MOUTH ONCE DAILY AT BEDTIME  3     Cholecalciferol (VITAMIN D3) 2000 units CAPS TAKE ONE CAPSULE BY MOUTH ONCE DAILY IN THE MORNING  3     CRANBERRY EXTRACT PO Take by mouth every morning       DULoxetine (CYMBALTA) 60 MG EC capsule TAKE ONE CAPSULE BY MOUTH AT BEDTIME  1     gabapentin (NEURONTIN) 600 MG tablet 900 MG THREE TIMES DAILY.  5     HUMALOG MARY KWIKPEN 100 UNIT/ML injection INJECT 1 UNIT PER 10GRAMS OF CARBOHYDRATES WITH CORRECTION 0.5 UNITS PER 50 ABOVE 150 ( UP TO 30 UNITS PER DAY)  6     levothyroxine (SYNTHROID/LEVOTHROID) 75 MCG tablet TAKE ONE TABLET BY MOUTH ONCE DAILY  IN THE MORNING     (PLEASE CALL [phone] FOR AN OFFICE VISIT BEFORE NEXT REFILL.)  0     losartan (COZAAR) 100 MG tablet Take 100 mg by mouth At Bedtime        methylcellulose, laxative, (CITRUCEL) POWD Take by mouth every morning       morphine (MS CONTIN) 15 MG 12 hr tablet TAKE ONE TABLET BY MOUTH TWICE A DAY *CAUTION: OPIOID. RISK OF OVERDOSE AND ADDICTION.  0     oxyCODONE-acetaminophen (PERCOCET) 5-325 MG per tablet Take 1 tablet by mouth 2 times daily  0     pravastatin (PRAVACHOL) 80 MG tablet Take 80 mg by mouth At Bedtime        TRESIBA FLEXTOUCH 200 UNIT/ML pen INJECT 16 UNITS SUBCUTANEOUSLY DAILY AT BEDTIME  6     clobetasol (TEMOVATE) 0.05 % cream APPLY TO AFFECTED AREA(S) EVERY NIGHT AT BEDTIME , APPLY FOR 1-2 WEEKS THEN 2-3 TIMES PER WEEK AS NEEDED  3     senna-docusate (SENOKOT-S;PERICOLACE) 8.6-50 MG per tablet Take 1-2 tablets by mouth 2 times daily Take while on oral narcotics to prevent or treat constipation. (Patient not taking: Reported on 7/18/2018) 30 tablet 0         Allergies:        Allergies   Allergen Reactions     Azithromycin Diarrhea     Clonazepam       Other reaction(s): *Unknown     Furosemide      Other reaction(s): Unknown Reaction  Low sodium     Hydrochlorothiazide Other (See Comments)     Low Sodium     Lisinopril Cough     Nitrofurantoin      Other reaction(s): Other  Burning around her mouth, pt advised by neurologist to not take macrobid     Pregabalin Other (See Comments)     Other reaction(s): Dizziness  Worse, numbness/tingling/burning pain whole body, hospitalized     Sulfamethoxazole Hives     Buprenorphine Nausea and Vomiting     Severe vomiting        Social History:     Social History   Substance Use Topics     Smoking status: Former Smoker     Smokeless tobacco: Never Used     Alcohol use Yes       History   Drug Use No           Physical Exam:     BP (P) 152/71 (BP Location: Right arm)  Pulse (P) 68  Wt (P) 67.2 kg (148 lb 1.6 oz)  SpO2 (P) 98%  BMI (P) 27.09 kg/m2  Body mass index is 27.09 kg/(m^2) (pended).    General Appearance: healthy and alert, no distress     Neurological: normal gait, no gross defects     Psychiatric: appropriate mood and affect                                   Assessment:    Afshan Cardona is a 73 year old woman with a diagnosis of high grade serous ovarian cancer.     A total of 40 minutes was spent with the patient, 30 minutes of which were spent in counseling the patient and/or treatment planning.      1.  Metastatic high-grade serous ovarian cancer.   2.  Type 1 diabetes.   3.  Cardiac disease.        I had a long discussion with the patient as well as with her family. Her disease appears limited to the pelvis. She does have cardiac disease, but there are no options to further improve her from a cardiac standpoint.  We did discuss again in detail treatment of ovarian cancer including cytoreductive surgery and systemic chemotherapy.  We will now await another assessment by our colleagues in the Anesthesia Clinic to see whether she could be cleared for surgery, and if they do think that mickey- and intraoperative  risk is too high from a cardiac standpoint, then we will proceed with neoadjuvant chemotherapy.  If they do clear her, we would proceed with a cytoreductive surgery.  She is tentatively scheduled for the 24th.  She will see them tomorrow.  We will further discuss then her actual risk and make a definitive decision.  Note, if there is no further measures that could improve her cardiac risk, then this will most likely not change even after neoadjuvant chemotherapy.  Based on the laparoscopy that we have done on 07/06, she does appear to be resectable.  The patient as well as family agrees with the plan.  They do understand her significant cardiac and overall surgical risk, and the patient as well as family would like to proceed with surgery if at all possible.  We will also understand that she might her risk of dying intra- or postoperatively is significantly elevated.  We will again make the definitive number from colleagues in Anesthesia.      If we can not resect the disease or she does not tolerate the procedure, we will proceed with neoadjuvant chemotherapy for three cycles and perform a CT scan of the CAP after that ta access for treatment response.  We will use Carboplatin at a dose of AUC of 6 and paclitaxel at a dose of 175mg/m2 every 21 days for a toal of three cycles.    They do agree, again, with this plan.  She is very appreciative of her care.  All questions were answered.       Bakari Galeas MD, MS    Department of Obstetrics and Gynecology   Division of Gynecologic Oncology   [facility]  Phone: [phone]      CC  Patient Care Team:  System, Provider Not In as PCP - General (Clinic)  ROMMEL LOWRY

## 2018-07-19 NOTE — PATIENT INSTRUCTIONS
Preparing for Your Surgery      Name:  Afshan Cardona   MRN:  [id number]   :  1945   Today's Date:  2018     Arriving for surgery:  Surgery date:  18  Arrival time:  8:00AM  Please come to:       [facility] [address]  [address] [place]  [address]    -   parking is available in front of the hospital from 5:15 am to 8:00 pm    -  Stop at the Information Desk in the lobby    -   Inform the information person that you are here for surgery. An escort to 3c will be provided. If you would not like an escort, please proceed to 3C on the 3rd floor. [phone]     What can I eat or drink?  -  You may have solid food or milk products until 8 hours prior to your surgery. 2AM  -  You may have water, apple juice or 7up/Sprite until 2 hours prior to your surgery. 8AM    Which medicines can I take?  Stop Aspirin, vitamins and supplements one week prior to surgery, including cranberry  Hold Ibuprofen and Naproxen for 24 hours prior to surgery.   -  Do NOT take these medications in the morning, the day of surgery:  Please do not take cholecalciferol, citrucel powder and humalog insulin the morning of surgery.     -  Please take these medications the day of surgery:  Please take carvedilol(coreg), gabapentin (neurontin), levothyroxine, MS Contin and senna the morning of surgery.  Please take 13 units of Tresiba insulin the evening before surgery.   How do I prepare myself?  -  Take two showers: one the night before surgery; and one the morning of surgery.         Use Scrubcare or Hibiclens to wash from neck down.  You may use your own     shampoo and conditioner. No other hair products.   -  Do NOT use lotion, powder, deodorant, or antiperspirant the day of your surgery.  -  Do NOT wear any makeup, fingernail polish or jewelry.  - Do not bring your own medications to the hospital, except for inhalers and eye   drops.  -  Bring your ID and insurance  card.    Questions or Concerns:  -If you have questions or concerns regarding the day of surgery, please call [phone].     -For questions after surgery please call your surgeons office.           AFTER YOUR SURGERY  Breathing exercises   Breathing exercises help you recover faster. Take deep breaths and let the air out slowly. This will:     Help you wake up after surgery.    Help prevent complications like pneumonia.  Preventing complications will help you go home sooner.   We may give you a breathing device (incentive spirometer) to encourage you to breathe deeply.   Nausea and vomiting   You may feel sick to your stomach after surgery; if so, let your nurse know.    Pain control:  After surgery, you may have pain. Our goal is to help you manage your pain. Pain medicine will help you feel comfortable enough to do activities that will help you heal.  These activities may include breathing exercises, walking and physical therapy.   To help your health care team treat your pain we will ask: 1) If you have pain  2) where it is located 3) describe your pain in your words  Methods of pain control include medications given by mouth, vein or by nerve block for some surgeries.  We may give you a pain control pump that will:  1) Deliver the medicine through a tube placed in your vein  2) Control the amount of medicine you receive  3) Allow you to push a button to deliver a dose of pain medicine  Sequential Compression Device (SCD) or Pneumo Boots:  You may need to wear SCD S on your legs or feet. These are wraps connected to a machine that pumps in air and releases it. The repeated pumping helps prevent blood clots from forming.

## 2018-07-19 NOTE — ANESTHESIA PREPROCEDURE EVALUATION
Anesthesia Evaluation     . Pt has had prior anesthetic. Type: General and MAC           ROS/MED HX    ENT/Pulmonary:     (+)SINCERE risk factors hypertension, tobacco use, Past use 0.25 PPD for 15 years, quit 30 years ago packs/day  , resolved 2 years ago post-op pneumonia: . .    Neurologic:     (+)without deficitsTIA date: 1999 features: caused by hypoglycemia,     Cardiovascular:     (+) Dyslipidemia, hypertension--CAD, --. : . CHF etiology: Taksumoto syndrome Last EF: 60% date: 4/18/17 . . :. . Previous cardiac testing Echodate:7/5/2015results:Stress Testdate:7/5/2018 results: date:7/5/2018 results:Cath date: 7/10/2018 results:          METS/Exercise Tolerance:  3 - Able to walk 1-2 blocks without stopping   Hematologic: Comments: Several clots RLE -one associated with pregnancy    (+) History of blood clots pt is not anticoagulated, History of Transfusion no previous transfusion reaction -      Musculoskeletal:   (+) arthritis, , , other musculoskeletal- hardware in right patella      GI/Hepatic:     (+) GERD Other,       Renal/Genitourinary:  - ROS Renal section negative       Endo:     (+) type I DM, Last HgA1c: 9.3 date: 7/6/2018 Using insulin - not using insulin pump thyroid problem hypothyroidism, .      Psychiatric:     (+) psychiatric history anxiety and depression      Infectious Disease:  - neg infectious disease ROS       Malignancy:      - no malignancy   Other:    (+) No chance of pregnancy H/O Chronic Pain,H/O chronic opiod use , no other significant disability                    Physical Exam  Normal systems: cardiovascular, pulmonary and dental    Airway   Mallampati: II  TM distance: >3 FB  Neck ROM: full    Dental     Cardiovascular   Rhythm and rate: regular and normal      Pulmonary    breath sounds clear to auscultation    Other findings:   Cathed 7/10/2018    CORONARY ANGIOGRAPHY  LEFT MAIN:  The left main is normal.    LAD:  The left anterior descending has diffuse 60% and 50% stenoses  proximal, mid and distal, no worse than previous angiogram.  Moderate-size diagonal branch emanates in the distal vessel and it is within normal limits.    CIRCUMFLEX:  The circumflex is nondominant and normal proximal, mid and distal.  Obtuse marginal one is normal.  Obtuse marginal two is normal.    RCA:  The right coronary artery is dominant with moderate calcification and a 60% focal proximal stenosis, 30% distal stenosis, otherwise mild diffuse disease.  PDA is normal.  The right coronary artery appears improved from previous angiogram.    ASSESSMENT:     Moderate two-vessel coronary artery disease, improved from previous angiogram.  Given patient's lack of symptoms of angina, recommend continued medical therapy risk reduction and proceeding with upcoming ovarian surgery with no further cardiac revascularization.  Good medical therapy is recommended with perioperative nitroglycerin.      Stress test 7/5/2018    CONCLUSION:  Test subjectively negative and objectively indeterminate for ischemia by EKG   criteria. The patient did develop repolarization changes, but once again these appeared   more consistent with LVH rather than true ischemia. The patient exercised to a low MET   level but achieved a reasonably good rate pressure product and also attained her target   heart rate to the predicted value.  The test should be reasonably predictive with the   understanding that the patient only exercised through stage I of the Fabio protocol. '  Echocardiographic data will be interpreted separately.    Stress echocardiogram 7/5/2018  STRESS-ECHO  ECHO VIEWS: The standard parasternal long and short axis as well as apical two and four chamber views were obtained pre and post exercise. Immediate post images were obtained within 60 seconds.  Color flow and/or Doppler were utilized.     IMAGE QUALITY:  Good.    RESULTS:    Pre-exercise:  Baseline echocardiogram shows normal left ventricular wall motion.  Left ventricular  ejection fraction visually estimated at 60%.  Mild mitral insufficiency.      Post-exercise:  Immediate post exercise images show left ventricular chamber size appears slightly decreased.  Ejection fraction does appear to improve.  Lateral wall though appears to become hypokinetic compared to rest images.      CONCLUSION:  1. Please see separately dictated report for complete details of the stress portion of this test.      2.  Baseline echocardiogram shows normal left ventricular ejection fraction of 60% and mild mitral insufficiency.     3.  Immediate post-exercise images show inducible hypokinesis in the lateral wall.      4.  Overall this represents an abnormal stress echocardiogram with lateral wall ischemia.         Results for KEYLA FRIAS (MRN [id number]) as of 7/20/2018 07:20    7/3/2018 09:18  Sodium: 132 (L)  Potassium: 4.8  Chloride: 96  Carbon Dioxide: 29  Urea Nitrogen: 15  Creatinine: 0.64  GFR Estimate: >90  GFR Estimate If Black: >90  Calcium: 9.0  Anion Gap: 6  Albumin: 3.7  Protein Total: 6.6 (L)  Bilirubin Total: 0.4  Alkaline Phosphatase: 113  ALT: 27  AST: 16    7/6/2018 06:52  Hemoglobin A1C: 9.4 (H)      Results for KEYLA FRIAS (MRN [id number]) as of 7/20/2018 07:20    7/3/2018 09:18  WBC: 7.1  Hemoglobin: 10.7 (L)  Hematocrit: 32.3 (L)  Platelet Count: 300  RBC Count: 3.54 (L)  MCV: 91  MCH: 30.2  MCHC: 33.1  RDW: 12.9  Diff Method: Automated Method  % Neutrophils: 64.0  % Lymphocytes: 24.5  % Monocytes: 8.2  % Eosinophils: 2.3  % Basophils: 0.4  % Immature Granulocytes: 0.6  Nucleated RBCs: 0  Absolute Neutrophil: 4.6  Absolute Lymphocytes: 1.7  Absolute Monocytes: 0.6  Absolute Eosinophils: 0.2  Absolute Basophils: 0.0  Abs Immature Granulocytes: 0.0  Absolute Nucleated RBC: 0.0           PAC Discussion and Assessment    ASA Classification: 3  Case is suitable for: [place]  Anesthetic techniques and relevant risks discussed: GA  Invasive monitoring and risk discussed:  Yes  Types:   Possibility and Risk of blood transfusion discussed: Yes  NPO instructions given:   Additional anesthetic preparation and risks discussed:   Needs early admission to pre-op area:   Other:     PAC Resident/NP Anesthesia Assessment:  Salima Cardona is a 73 year old female for Exploratory Laparotomy, Possible Total Abdominal Hysterectomy, Bilateral Salpingo-Oophorectomy, Tumor Debulking, Omentectomy, Possible Pelvic And Para Aortic Lymphadenectomy in treatment of complex pelvic mass on 7/24/2018. PAC referral  for risk assessment and optimization for anesthesia with comorbid conditions of diabetes, HTN, HLD, CAD, TIA, DVT.    Pre-operative considerations include:  1.) CV: Functional status independent. Exercise tolerance close to 4 METS. Positive stress test 7/5/2018, cathed 7/10/2018, results posted above. Hx Takutsubo cardiomyopathy 2/2015 with EF 25-30% recovered to 60% on April 2017 echo with no RWMA and recent testing show EF at 60%. EKG 7/10/2018 SR    2.) Pulmonary: Distant smoking history Quit 1985. No pulmonary dx, sx or meds.   3.)  Heme: DVT RLE x 2, both provoked per pt (one w/ pregnancy) -last one 3 1/2 years ago. No anticoagulation. Hx blood transfusion.  Elevated risk for DVT due to previous clot and likely cancer dx.  4.) Neuro: TIA in 1999, pt claims it was due to hypoglycemia. Fibromyalgia. BLE neuropathy.   5.) Endo: Insulin dependent diabetes since age 29. Fluctuates with poor control (HGBa1c =8.6%) and episodes of hypoglycemia requiring ER visits (glucose as low as 30 ). On short and long acting insulin., No oral agents. Hypothyroid on synthroid  Adjust Insulin dosing of long term as per nursing note.  HOLD short acting insulin  DOS  Take synthroid DOS  6.) GI: GERD, well managed  PONV score = 2 (2 or > antiemetic prophylaxis recommended.      Anesthesia; Multiple past surgeries (back x 3)- last GA umbilical hernia-[place] - no problems. Airway feasible.     I spent 30 minutes with  patient, greater than 50% educating on preop meds, counseling on anesthesia and coordinating care for laparoscopy surgery      Reviewed and Signed by PAC Mid-Level Provider/Resident  Mid-Level Provider/Resident: REHAN Easton  Date: 7/19/2018  Time: 1300    Attending Anesthesiologist Anesthesia Assessment:  I have examined the patient and reviewed the medical record.  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  EF 25%.  Has recovered to normal EF of 60% on echo 4/2017.  She had stress test 7/5/2018 which was positive for inducible lateral wall ischemia.  Surgery was performed the next day without complication.  4 days later the patient had Cardiac catheterization remarkable for diffuse 60% lesion of LAD and 60% RCA.  Medical management with no PCI intervention was prescribed by her cardiologist.    Endo:  Type 1 DM on insulin.  No recent Hgb A1c but records from hospitalization 2 weeks ago indicate good control.    GI:  History of Barretts esophagus, not on treatment at this time, has intermittent symptomatic GERD    PE:  Thin, pleasant female in NAD.  MPC 2-3, 3 FBTMD, Lung clear, CV  RRR without murmur    No further testing necessary per protocol.  Instructed patient to resume PPI prior to surgery  Note cardiologists note about medical management of moderate coronary artery disease  Consider RI with Vivas's history  Probable need for intra operative insulin infusion  Final plan per attending anesthesiologist the day of surgery.          Reviewed and Signed by PAC Anesthesiologist  Anesthesiologist: Mason Montelongo MD  Date: 7/19/2018  Time:   Pass/Fail:   Disposition:     PAC Pharmacist Assessment:        Pharmacist:   Date:    Time:      Anesthesia Plan      History & Physical Review  History and physical reviewed and following examination; no interval change.    ASA Status:  3 .    NPO Status:  > 8 hours    Plan for General and ETT with Intravenous induction. Maintenance will be Balanced.    PONV prophylaxis:  Ondansetron (or other 5HT-3) and Dexamethasone or Solumedrol  Additional equipment: 2nd IV      Postoperative Care  Postoperative pain management:  Multi-modal analgesia, Peripheral nerve block (Single Shot) and IV analgesics.      Consents  Anesthetic plan, risks, benefits and alternatives discussed with:  Patient.  Use of blood products discussed: Yes.   Use of blood products discussed with Patient.  Consented to blood products.  .                          .

## 2018-07-20 NOTE — NURSING NOTE
Pre Op Nurse Teaching Template    Relevant Diagnosis: Ovarian Cancer     Teaching Topic: Exploratory laparotomy, BSO, omentectomy, debulking, omentectomy, lymph node, possible hysterectomy    Person(s) involved in teaching :  Patient  & other: Family members  Motivation Level:  Asks Questions:    Yes      Eager to Learn:     Yes     Cooperative:          Yes    Receptive (willing. Able to accept information):    Yes      Patient and those who are listed above demonstrates understanding of the following:   Reason for the appointment, diagnosis and treatment plan:   Yes   Knowledge of proper use of medications and conditions for which they are ordered (with special attention to potential side effects or drug interactions): Yes   Which situations necessitate calling provider and whom to contact: Yes         Nutritional needs and diet plan:  Yes      Pain management techniques:     Yes, Pain Scale   Diet:   Yes, [facility] Diet Instructions    Teaching Concerns addressed: Yes    Infection Prevention:  Patient and those who are listed above demonstrate understanding of the following:  Pre-Op CHG Bathing Instructions: Yes  Surgical procedure site care taught:   Yes   Signs and symptoms of infection taught: Yes       Instructional Materials Used/Given:  [facility] Before You Surgery Booklet  Showering or Bathing before Surgery Instructions   Hysterectomy Guidelines  Pain Assessment Tool   Home Care after Major Abdominal or Vaginal Surgery  Map  Accommodations Brochure  Phone numbers for [facility] and [facility]  Copy of Surgical Consent    Comments:  YAZMIN Coates RN

## 2018-07-24 ENCOUNTER — HOSPITAL ENCOUNTER (INPATIENT)
Facility: CLINIC | Age: 73
LOS: 7 days | Discharge: NURSING, REHAB OR LONG-TERM CARE | DRG: 749 | End: 2018-07-31
Attending: OBSTETRICS & GYNECOLOGY | Admitting: OBSTETRICS & GYNECOLOGY
Payer: MEDICARE

## 2018-07-24 ENCOUNTER — ANESTHESIA (OUTPATIENT)
Dept: SURGERY | Facility: CLINIC | Age: 73
DRG: 749 | End: 2018-07-24
Payer: MEDICARE

## 2018-07-24 ENCOUNTER — SURGERY (OUTPATIENT)
Age: 73
End: 2018-07-24
Payer: COMMERCIAL

## 2018-07-24 DIAGNOSIS — C56.9 MALIGNANT NEOPLASM OF OVARY, UNSPECIFIED LATERALITY (H): Primary | ICD-10-CM

## 2018-07-24 DIAGNOSIS — Z85.43 PERSONAL HISTORY OF OVARIAN CANCER: ICD-10-CM

## 2018-07-24 LAB
ANION GAP SERPL CALCULATED.3IONS-SCNC: 7 MMOL/L (ref 3–14)
APTT PPP: 32 SEC (ref 22–37)
B-HCG SERPL-ACNC: 3 IU/L (ref 0–5)
BUN SERPL-MCNC: 16 MG/DL (ref 7–30)
CALCIUM SERPL-MCNC: 9.1 MG/DL (ref 8.5–10.1)
CHLORIDE SERPL-SCNC: 98 MMOL/L (ref 94–109)
CO2 SERPL-SCNC: 29 MMOL/L (ref 20–32)
CREAT SERPL-MCNC: 0.65 MG/DL (ref 0.52–1.04)
ERYTHROCYTE [DISTWIDTH] IN BLOOD BY AUTOMATED COUNT: 13.1 % (ref 10–15)
FIBRINOGEN PPP-MCNC: 283 MG/DL (ref 200–420)
GFR SERPL CREATININE-BSD FRML MDRD: 89 ML/MIN/1.7M2
GLUCOSE BLDC GLUCOMTR-MCNC: 105 MG/DL (ref 70–99)
GLUCOSE BLDC GLUCOMTR-MCNC: 135 MG/DL (ref 70–99)
GLUCOSE BLDC GLUCOMTR-MCNC: 136 MG/DL (ref 70–99)
GLUCOSE BLDC GLUCOMTR-MCNC: 138 MG/DL (ref 70–99)
GLUCOSE BLDC GLUCOMTR-MCNC: 156 MG/DL (ref 70–99)
GLUCOSE BLDC GLUCOMTR-MCNC: 157 MG/DL (ref 70–99)
GLUCOSE BLDC GLUCOMTR-MCNC: 64 MG/DL (ref 70–99)
GLUCOSE BLDC GLUCOMTR-MCNC: 96 MG/DL (ref 70–99)
GLUCOSE SERPL-MCNC: 99 MG/DL (ref 70–99)
HCT VFR BLD AUTO: 30.7 % (ref 35–47)
HGB BLD-MCNC: 10.2 G/DL (ref 11.7–15.7)
HGB BLD-MCNC: 10.3 G/DL (ref 11.7–15.7)
HGB BLD-MCNC: 11.2 G/DL (ref 11.7–15.7)
HGB BLD-MCNC: 9.2 G/DL (ref 11.7–15.7)
INR PPP: 1.14 (ref 0.86–1.14)
MCH RBC QN AUTO: 29.2 PG (ref 26.5–33)
MCHC RBC AUTO-ENTMCNC: 33.2 G/DL (ref 31.5–36.5)
MCV RBC AUTO: 88 FL (ref 78–100)
PLATELET # BLD AUTO: 309 10E9/L (ref 150–450)
PLATELET # BLD AUTO: 365 10E9/L (ref 150–450)
POTASSIUM SERPL-SCNC: 4.4 MMOL/L (ref 3.4–5.3)
RBC # BLD AUTO: 3.49 10E12/L (ref 3.8–5.2)
SODIUM SERPL-SCNC: 134 MMOL/L (ref 133–144)
TROPONIN I SERPL-MCNC: 0.02 UG/L (ref 0–0.04)
WBC # BLD AUTO: 6.7 10E9/L (ref 4–11)

## 2018-07-24 PROCEDURE — 25000128 H RX IP 250 OP 636: Performed by: ANESTHESIOLOGY

## 2018-07-24 PROCEDURE — 0DBN0ZX EXCISION OF SIGMOID COLON, OPEN APPROACH, DIAGNOSTIC: ICD-10-PCS | Performed by: OBSTETRICS & GYNECOLOGY

## 2018-07-24 PROCEDURE — 25000128 H RX IP 250 OP 636: Performed by: NURSE ANESTHETIST, CERTIFIED REGISTERED

## 2018-07-24 PROCEDURE — 36000062 ZZH SURGERY LEVEL 4 1ST 30 MIN - UMMC: Performed by: OBSTETRICS & GYNECOLOGY

## 2018-07-24 PROCEDURE — 00000146 ZZHCL STATISTIC GLUCOSE BY METER IP

## 2018-07-24 PROCEDURE — 37000009 ZZH ANESTHESIA TECHNICAL FEE, EACH ADDTL 15 MIN: Performed by: OBSTETRICS & GYNECOLOGY

## 2018-07-24 PROCEDURE — 40000171 ZZH STATISTIC PRE-PROCEDURE ASSESSMENT III: Performed by: OBSTETRICS & GYNECOLOGY

## 2018-07-24 PROCEDURE — 0WJJ0ZZ INSPECTION OF PELVIC CAVITY, OPEN APPROACH: ICD-10-PCS | Performed by: OBSTETRICS & GYNECOLOGY

## 2018-07-24 PROCEDURE — 85049 AUTOMATED PLATELET COUNT: CPT | Performed by: OBSTETRICS & GYNECOLOGY

## 2018-07-24 PROCEDURE — 27210794 ZZH OR GENERAL SUPPLY STERILE: Performed by: OBSTETRICS & GYNECOLOGY

## 2018-07-24 PROCEDURE — 25000125 ZZHC RX 250: Performed by: NURSE ANESTHETIST, CERTIFIED REGISTERED

## 2018-07-24 PROCEDURE — 25800025 ZZH RX 258: Performed by: ANESTHESIOLOGY

## 2018-07-24 PROCEDURE — 36000064 ZZH SURGERY LEVEL 4 EA 15 ADDTL MIN - UMMC: Performed by: OBSTETRICS & GYNECOLOGY

## 2018-07-24 PROCEDURE — 37000008 ZZH ANESTHESIA TECHNICAL FEE, 1ST 30 MIN: Performed by: OBSTETRICS & GYNECOLOGY

## 2018-07-24 PROCEDURE — 85027 COMPLETE CBC AUTOMATED: CPT | Performed by: NURSE PRACTITIONER

## 2018-07-24 PROCEDURE — 85018 HEMOGLOBIN: CPT | Performed by: OBSTETRICS & GYNECOLOGY

## 2018-07-24 PROCEDURE — 40000014 ZZH STATISTIC ARTERIAL MONITORING DAILY

## 2018-07-24 PROCEDURE — 40000275 ZZH STATISTIC RCP TIME EA 10 MIN

## 2018-07-24 PROCEDURE — 85730 THROMBOPLASTIN TIME PARTIAL: CPT | Performed by: OBSTETRICS & GYNECOLOGY

## 2018-07-24 PROCEDURE — 25000132 ZZH RX MED GY IP 250 OP 250 PS 637: Mod: GY | Performed by: STUDENT IN AN ORGANIZED HEALTH CARE EDUCATION/TRAINING PROGRAM

## 2018-07-24 PROCEDURE — 49000 EXPLORATION OF ABDOMEN: CPT | Mod: GC | Performed by: OBSTETRICS & GYNECOLOGY

## 2018-07-24 PROCEDURE — 12000006 ZZH R&B IMCU INTERMEDIATE UMMC

## 2018-07-24 PROCEDURE — 84484 ASSAY OF TROPONIN QUANT: CPT | Performed by: OBSTETRICS & GYNECOLOGY

## 2018-07-24 PROCEDURE — 36415 COLL VENOUS BLD VENIPUNCTURE: CPT | Performed by: NURSE PRACTITIONER

## 2018-07-24 PROCEDURE — 0DBA0ZX EXCISION OF JEJUNUM, OPEN APPROACH, DIAGNOSTIC: ICD-10-PCS | Performed by: OBSTETRICS & GYNECOLOGY

## 2018-07-24 PROCEDURE — 25000566 ZZH SEVOFLURANE, EA 15 MIN: Performed by: OBSTETRICS & GYNECOLOGY

## 2018-07-24 PROCEDURE — C9290 INJ, BUPIVACAINE LIPOSOME: HCPCS | Performed by: STUDENT IN AN ORGANIZED HEALTH CARE EDUCATION/TRAINING PROGRAM

## 2018-07-24 PROCEDURE — C9399 UNCLASSIFIED DRUGS OR BIOLOG: HCPCS | Performed by: NURSE ANESTHETIST, CERTIFIED REGISTERED

## 2018-07-24 PROCEDURE — 36415 COLL VENOUS BLD VENIPUNCTURE: CPT | Performed by: STUDENT IN AN ORGANIZED HEALTH CARE EDUCATION/TRAINING PROGRAM

## 2018-07-24 PROCEDURE — 25000128 H RX IP 250 OP 636: Performed by: STUDENT IN AN ORGANIZED HEALTH CARE EDUCATION/TRAINING PROGRAM

## 2018-07-24 PROCEDURE — A9270 NON-COVERED ITEM OR SERVICE: HCPCS | Mod: GY | Performed by: STUDENT IN AN ORGANIZED HEALTH CARE EDUCATION/TRAINING PROGRAM

## 2018-07-24 PROCEDURE — P9041 ALBUMIN (HUMAN),5%, 50ML: HCPCS | Performed by: NURSE ANESTHETIST, CERTIFIED REGISTERED

## 2018-07-24 PROCEDURE — 85610 PROTHROMBIN TIME: CPT | Performed by: OBSTETRICS & GYNECOLOGY

## 2018-07-24 PROCEDURE — 71000014 ZZH RECOVERY PHASE 1 LEVEL 2 FIRST HR: Performed by: OBSTETRICS & GYNECOLOGY

## 2018-07-24 PROCEDURE — 25000128 H RX IP 250 OP 636: Performed by: OBSTETRICS & GYNECOLOGY

## 2018-07-24 PROCEDURE — 88305 TISSUE EXAM BY PATHOLOGIST: CPT | Performed by: OBSTETRICS & GYNECOLOGY

## 2018-07-24 PROCEDURE — 25000125 ZZHC RX 250: Performed by: STUDENT IN AN ORGANIZED HEALTH CARE EDUCATION/TRAINING PROGRAM

## 2018-07-24 PROCEDURE — 80048 BASIC METABOLIC PNL TOTAL CA: CPT | Performed by: NURSE PRACTITIONER

## 2018-07-24 PROCEDURE — 71000015 ZZH RECOVERY PHASE 1 LEVEL 2 EA ADDTL HR: Performed by: OBSTETRICS & GYNECOLOGY

## 2018-07-24 PROCEDURE — 85018 HEMOGLOBIN: CPT | Performed by: STUDENT IN AN ORGANIZED HEALTH CARE EDUCATION/TRAINING PROGRAM

## 2018-07-24 PROCEDURE — 25000125 ZZHC RX 250: Performed by: ANESTHESIOLOGY

## 2018-07-24 PROCEDURE — 85384 FIBRINOGEN ACTIVITY: CPT | Performed by: OBSTETRICS & GYNECOLOGY

## 2018-07-24 PROCEDURE — 84702 CHORIONIC GONADOTROPIN TEST: CPT | Performed by: NURSE PRACTITIONER

## 2018-07-24 PROCEDURE — 0DBV0ZZ EXCISION OF MESENTERY, OPEN APPROACH: ICD-10-PCS | Performed by: OBSTETRICS & GYNECOLOGY

## 2018-07-24 RX ORDER — LIDOCAINE 40 MG/G
CREAM TOPICAL
Status: DISCONTINUED | OUTPATIENT
Start: 2018-07-24 | End: 2018-07-24

## 2018-07-24 RX ORDER — SIMETHICONE 80 MG
80 TABLET,CHEWABLE ORAL 4 TIMES DAILY PRN
Status: DISCONTINUED | OUTPATIENT
Start: 2018-07-24 | End: 2018-07-31 | Stop reason: HOSPADM

## 2018-07-24 RX ORDER — PROPOFOL 10 MG/ML
INJECTION, EMULSION INTRAVENOUS PRN
Status: DISCONTINUED | OUTPATIENT
Start: 2018-07-24 | End: 2018-07-24

## 2018-07-24 RX ORDER — BUPIVACAINE HYDROCHLORIDE 2.5 MG/ML
INJECTION, SOLUTION EPIDURAL; INFILTRATION; INTRACAUDAL PRN
Status: DISCONTINUED | OUTPATIENT
Start: 2018-07-24 | End: 2018-07-24

## 2018-07-24 RX ORDER — OXYCODONE HYDROCHLORIDE 5 MG/1
5-10 TABLET ORAL EVERY 4 HOURS PRN
Status: DISCONTINUED | OUTPATIENT
Start: 2018-07-24 | End: 2018-07-25

## 2018-07-24 RX ORDER — NALOXONE HYDROCHLORIDE 0.4 MG/ML
.1-.4 INJECTION, SOLUTION INTRAMUSCULAR; INTRAVENOUS; SUBCUTANEOUS
Status: DISCONTINUED | OUTPATIENT
Start: 2018-07-24 | End: 2018-07-24 | Stop reason: HOSPADM

## 2018-07-24 RX ORDER — NALOXONE HYDROCHLORIDE 0.4 MG/ML
.1-.4 INJECTION, SOLUTION INTRAMUSCULAR; INTRAVENOUS; SUBCUTANEOUS
Status: DISCONTINUED | OUTPATIENT
Start: 2018-07-24 | End: 2018-07-31 | Stop reason: HOSPADM

## 2018-07-24 RX ORDER — FLUMAZENIL 0.1 MG/ML
0.2 INJECTION, SOLUTION INTRAVENOUS
Status: CANCELLED | OUTPATIENT
Start: 2018-07-24

## 2018-07-24 RX ORDER — SODIUM CHLORIDE, SODIUM LACTATE, POTASSIUM CHLORIDE, CALCIUM CHLORIDE 600; 310; 30; 20 MG/100ML; MG/100ML; MG/100ML; MG/100ML
INJECTION, SOLUTION INTRAVENOUS CONTINUOUS
Status: DISCONTINUED | OUTPATIENT
Start: 2018-07-24 | End: 2018-07-24 | Stop reason: HOSPADM

## 2018-07-24 RX ORDER — LEVOTHYROXINE SODIUM 75 UG/1
75 TABLET ORAL
Status: DISCONTINUED | OUTPATIENT
Start: 2018-07-25 | End: 2018-07-31 | Stop reason: HOSPADM

## 2018-07-24 RX ORDER — ONDANSETRON 2 MG/ML
INJECTION INTRAMUSCULAR; INTRAVENOUS PRN
Status: DISCONTINUED | OUTPATIENT
Start: 2018-07-24 | End: 2018-07-24

## 2018-07-24 RX ORDER — LOSARTAN POTASSIUM 50 MG/1
100 TABLET ORAL DAILY
Status: DISCONTINUED | OUTPATIENT
Start: 2018-07-25 | End: 2018-07-25

## 2018-07-24 RX ORDER — MORPHINE SULFATE 15 MG/1
15 TABLET, FILM COATED, EXTENDED RELEASE ORAL EVERY 12 HOURS SCHEDULED
Status: DISCONTINUED | OUTPATIENT
Start: 2018-07-24 | End: 2018-07-25

## 2018-07-24 RX ORDER — AMOXICILLIN 250 MG
2 CAPSULE ORAL 2 TIMES DAILY PRN
Status: DISCONTINUED | OUTPATIENT
Start: 2018-07-24 | End: 2018-07-31 | Stop reason: HOSPADM

## 2018-07-24 RX ORDER — DEXTROSE MONOHYDRATE 25 G/50ML
25 INJECTION, SOLUTION INTRAVENOUS ONCE
Status: COMPLETED | OUTPATIENT
Start: 2018-07-24 | End: 2018-07-24

## 2018-07-24 RX ORDER — FLUMAZENIL 0.1 MG/ML
0.2 INJECTION, SOLUTION INTRAVENOUS
Status: DISCONTINUED | OUTPATIENT
Start: 2018-07-24 | End: 2018-07-24 | Stop reason: HOSPADM

## 2018-07-24 RX ORDER — DULOXETIN HYDROCHLORIDE 60 MG/1
60 CAPSULE, DELAYED RELEASE ORAL AT BEDTIME
Status: DISCONTINUED | OUTPATIENT
Start: 2018-07-24 | End: 2018-07-31 | Stop reason: HOSPADM

## 2018-07-24 RX ORDER — FENTANYL CITRATE 50 UG/ML
25-50 INJECTION, SOLUTION INTRAMUSCULAR; INTRAVENOUS
Status: DISCONTINUED | OUTPATIENT
Start: 2018-07-24 | End: 2018-07-24 | Stop reason: HOSPADM

## 2018-07-24 RX ORDER — SODIUM CHLORIDE 9 MG/ML
INJECTION, SOLUTION INTRAVENOUS CONTINUOUS
Status: DISCONTINUED | OUTPATIENT
Start: 2018-07-24 | End: 2018-07-26

## 2018-07-24 RX ORDER — LOSARTAN POTASSIUM 50 MG/1
100 TABLET ORAL AT BEDTIME
Status: DISCONTINUED | OUTPATIENT
Start: 2018-07-24 | End: 2018-07-24

## 2018-07-24 RX ORDER — LABETALOL HYDROCHLORIDE 5 MG/ML
10 INJECTION, SOLUTION INTRAVENOUS
Status: DISCONTINUED | OUTPATIENT
Start: 2018-07-24 | End: 2018-07-24 | Stop reason: HOSPADM

## 2018-07-24 RX ORDER — AMOXICILLIN 250 MG
2 CAPSULE ORAL 2 TIMES DAILY
Status: DISCONTINUED | OUTPATIENT
Start: 2018-07-24 | End: 2018-07-31 | Stop reason: HOSPADM

## 2018-07-24 RX ORDER — NALOXONE HYDROCHLORIDE 0.4 MG/ML
.1-.4 INJECTION, SOLUTION INTRAMUSCULAR; INTRAVENOUS; SUBCUTANEOUS
Status: CANCELLED | OUTPATIENT
Start: 2018-07-24

## 2018-07-24 RX ORDER — EPHEDRINE SULFATE 50 MG/ML
INJECTION, SOLUTION INTRAMUSCULAR; INTRAVENOUS; SUBCUTANEOUS PRN
Status: DISCONTINUED | OUTPATIENT
Start: 2018-07-24 | End: 2018-07-24

## 2018-07-24 RX ORDER — ALBUMIN HUMAN 5 %
INTRAVENOUS SOLUTION INTRAVENOUS PRN
Status: DISCONTINUED | OUTPATIENT
Start: 2018-07-24 | End: 2018-07-24

## 2018-07-24 RX ORDER — MEPERIDINE HYDROCHLORIDE 50 MG/ML
12.5 INJECTION INTRAMUSCULAR; INTRAVENOUS; SUBCUTANEOUS
Status: DISCONTINUED | OUTPATIENT
Start: 2018-07-24 | End: 2018-07-24 | Stop reason: HOSPADM

## 2018-07-24 RX ORDER — SODIUM CHLORIDE, SODIUM LACTATE, POTASSIUM CHLORIDE, CALCIUM CHLORIDE 600; 310; 30; 20 MG/100ML; MG/100ML; MG/100ML; MG/100ML
INJECTION, SOLUTION INTRAVENOUS CONTINUOUS PRN
Status: DISCONTINUED | OUTPATIENT
Start: 2018-07-24 | End: 2018-07-24

## 2018-07-24 RX ORDER — DIPHENHYDRAMINE HCL 12.5MG/5ML
12.5 LIQUID (ML) ORAL EVERY 6 HOURS PRN
Status: DISCONTINUED | OUTPATIENT
Start: 2018-07-24 | End: 2018-07-31 | Stop reason: HOSPADM

## 2018-07-24 RX ORDER — VASOPRESSIN 20 U/ML
INJECTION PARENTERAL PRN
Status: DISCONTINUED | OUTPATIENT
Start: 2018-07-24 | End: 2018-07-24

## 2018-07-24 RX ORDER — CARVEDILOL 25 MG/1
25 TABLET ORAL EVERY MORNING
Status: DISCONTINUED | OUTPATIENT
Start: 2018-07-25 | End: 2018-07-27

## 2018-07-24 RX ORDER — ONDANSETRON 2 MG/ML
4 INJECTION INTRAMUSCULAR; INTRAVENOUS EVERY 30 MIN PRN
Status: DISCONTINUED | OUTPATIENT
Start: 2018-07-24 | End: 2018-07-24 | Stop reason: HOSPADM

## 2018-07-24 RX ORDER — HYDROMORPHONE HYDROCHLORIDE 1 MG/ML
.3-.5 INJECTION, SOLUTION INTRAMUSCULAR; INTRAVENOUS; SUBCUTANEOUS EVERY 10 MIN PRN
Status: DISCONTINUED | OUTPATIENT
Start: 2018-07-24 | End: 2018-07-24 | Stop reason: HOSPADM

## 2018-07-24 RX ORDER — AMOXICILLIN 250 MG
1 CAPSULE ORAL 2 TIMES DAILY PRN
Status: DISCONTINUED | OUTPATIENT
Start: 2018-07-24 | End: 2018-07-31 | Stop reason: HOSPADM

## 2018-07-24 RX ORDER — DIPHENHYDRAMINE HYDROCHLORIDE 50 MG/ML
12.5 INJECTION INTRAMUSCULAR; INTRAVENOUS EVERY 6 HOURS PRN
Status: DISCONTINUED | OUTPATIENT
Start: 2018-07-24 | End: 2018-07-31 | Stop reason: HOSPADM

## 2018-07-24 RX ORDER — HYDROMORPHONE HCL/0.9% NACL/PF 0.2MG/0.2
0.2 SYRINGE (ML) INTRAVENOUS
Status: DISCONTINUED | OUTPATIENT
Start: 2018-07-24 | End: 2018-07-25

## 2018-07-24 RX ORDER — ACETAMINOPHEN 325 MG/1
650 TABLET ORAL EVERY 6 HOURS
Status: DISCONTINUED | OUTPATIENT
Start: 2018-07-24 | End: 2018-07-31 | Stop reason: HOSPADM

## 2018-07-24 RX ORDER — ONDANSETRON 4 MG/1
4 TABLET, ORALLY DISINTEGRATING ORAL EVERY 8 HOURS PRN
Status: DISCONTINUED | OUTPATIENT
Start: 2018-07-25 | End: 2018-07-31 | Stop reason: HOSPADM

## 2018-07-24 RX ORDER — NICOTINE POLACRILEX 4 MG
15-30 LOZENGE BUCCAL
Status: DISCONTINUED | OUTPATIENT
Start: 2018-07-24 | End: 2018-07-31 | Stop reason: HOSPADM

## 2018-07-24 RX ORDER — ONDANSETRON 4 MG/1
4 TABLET, ORALLY DISINTEGRATING ORAL EVERY 30 MIN PRN
Status: DISCONTINUED | OUTPATIENT
Start: 2018-07-24 | End: 2018-07-24 | Stop reason: HOSPADM

## 2018-07-24 RX ORDER — IBUPROFEN 200 MG
600 TABLET ORAL EVERY 6 HOURS
Status: DISCONTINUED | OUTPATIENT
Start: 2018-07-24 | End: 2018-07-25

## 2018-07-24 RX ORDER — ONDANSETRON 4 MG/1
4 TABLET, ORALLY DISINTEGRATING ORAL EVERY 8 HOURS
Status: DISPENSED | OUTPATIENT
Start: 2018-07-24 | End: 2018-07-25

## 2018-07-24 RX ORDER — BISACODYL 10 MG
10 SUPPOSITORY, RECTAL RECTAL DAILY PRN
Status: DISCONTINUED | OUTPATIENT
Start: 2018-07-24 | End: 2018-07-31 | Stop reason: HOSPADM

## 2018-07-24 RX ORDER — FENTANYL CITRATE 50 UG/ML
25-50 INJECTION, SOLUTION INTRAMUSCULAR; INTRAVENOUS
Status: CANCELLED | OUTPATIENT
Start: 2018-07-24

## 2018-07-24 RX ORDER — DEXTROSE MONOHYDRATE 25 G/50ML
25-50 INJECTION, SOLUTION INTRAVENOUS
Status: DISCONTINUED | OUTPATIENT
Start: 2018-07-24 | End: 2018-07-31 | Stop reason: HOSPADM

## 2018-07-24 RX ORDER — CEFAZOLIN SODIUM 1 G/3ML
1 INJECTION, POWDER, FOR SOLUTION INTRAMUSCULAR; INTRAVENOUS SEE ADMIN INSTRUCTIONS
Status: DISCONTINUED | OUTPATIENT
Start: 2018-07-24 | End: 2018-07-24 | Stop reason: HOSPADM

## 2018-07-24 RX ORDER — CEFAZOLIN SODIUM 2 G/100ML
2 INJECTION, SOLUTION INTRAVENOUS
Status: COMPLETED | OUTPATIENT
Start: 2018-07-24 | End: 2018-07-24

## 2018-07-24 RX ORDER — CITRIC ACID/SODIUM CITRATE 334-500MG
30 SOLUTION, ORAL ORAL
Status: DISCONTINUED | OUTPATIENT
Start: 2018-07-24 | End: 2018-07-24 | Stop reason: HOSPADM

## 2018-07-24 RX ADMIN — SODIUM CHLORIDE: 9 INJECTION, SOLUTION INTRAVENOUS at 14:53

## 2018-07-24 RX ADMIN — Medication 0.5 MG: at 16:52

## 2018-07-24 RX ADMIN — CEPHALEXIN 250 MG: 250 CAPSULE ORAL at 22:02

## 2018-07-24 RX ADMIN — RANITIDINE 150 MG: 150 TABLET, FILM COATED ORAL at 21:19

## 2018-07-24 RX ADMIN — FENTANYL CITRATE 50 MCG: 50 INJECTION, SOLUTION INTRAMUSCULAR; INTRAVENOUS at 09:41

## 2018-07-24 RX ADMIN — OXYCODONE HYDROCHLORIDE 10 MG: 5 TABLET ORAL at 18:19

## 2018-07-24 RX ADMIN — SODIUM CHLORIDE, POTASSIUM CHLORIDE, SODIUM LACTATE AND CALCIUM CHLORIDE: 600; 310; 30; 20 INJECTION, SOLUTION INTRAVENOUS at 14:00

## 2018-07-24 RX ADMIN — CEFAZOLIN 1 G: 1 INJECTION, POWDER, FOR SOLUTION INTRAMUSCULAR; INTRAVENOUS at 12:39

## 2018-07-24 RX ADMIN — ROCURONIUM BROMIDE 20 MG: 10 INJECTION INTRAVENOUS at 10:36

## 2018-07-24 RX ADMIN — Medication 5 MG: at 12:34

## 2018-07-24 RX ADMIN — PHENYLEPHRINE HYDROCHLORIDE 100 MCG: 10 INJECTION, SOLUTION INTRAMUSCULAR; INTRAVENOUS; SUBCUTANEOUS at 12:30

## 2018-07-24 RX ADMIN — INSULIN DEGLUDEC INJECTION 16 UNITS: 100 INJECTION, SOLUTION SUBCUTANEOUS at 22:01

## 2018-07-24 RX ADMIN — ROCURONIUM BROMIDE 20 MG: 10 INJECTION INTRAVENOUS at 11:31

## 2018-07-24 RX ADMIN — SUGAMMADEX 200 MG: 100 INJECTION, SOLUTION INTRAVENOUS at 13:17

## 2018-07-24 RX ADMIN — ALBUMIN HUMAN 250 ML: 50 SOLUTION INTRAVENOUS at 12:53

## 2018-07-24 RX ADMIN — FENTANYL CITRATE 25 MCG: 50 INJECTION INTRAMUSCULAR; INTRAVENOUS at 15:04

## 2018-07-24 RX ADMIN — FENTANYL CITRATE 50 MCG: 50 INJECTION, SOLUTION INTRAMUSCULAR; INTRAVENOUS at 11:00

## 2018-07-24 RX ADMIN — Medication 0.5 MG: at 16:17

## 2018-07-24 RX ADMIN — VASOPRESSIN 0.5 UNITS: 20 INJECTION INTRAVENOUS at 12:42

## 2018-07-24 RX ADMIN — MORPHINE SULFATE 15 MG: 15 TABLET, EXTENDED RELEASE ORAL at 21:59

## 2018-07-24 RX ADMIN — ROCURONIUM BROMIDE 50 MG: 10 INJECTION INTRAVENOUS at 10:29

## 2018-07-24 RX ADMIN — FENTANYL CITRATE 25 MCG: 50 INJECTION INTRAMUSCULAR; INTRAVENOUS at 13:58

## 2018-07-24 RX ADMIN — Medication 0.5 MG: at 15:40

## 2018-07-24 RX ADMIN — PHENYLEPHRINE HYDROCHLORIDE 100 MCG: 10 INJECTION, SOLUTION INTRAMUSCULAR; INTRAVENOUS; SUBCUTANEOUS at 11:46

## 2018-07-24 RX ADMIN — ROCURONIUM BROMIDE 10 MG: 10 INJECTION INTRAVENOUS at 12:04

## 2018-07-24 RX ADMIN — MAGNESIUM HYDROXIDE 15 ML: 400 SUSPENSION ORAL at 21:18

## 2018-07-24 RX ADMIN — ALBUMIN HUMAN 250 ML: 50 SOLUTION INTRAVENOUS at 12:40

## 2018-07-24 RX ADMIN — FENTANYL CITRATE 50 MCG: 50 INJECTION, SOLUTION INTRAMUSCULAR; INTRAVENOUS at 12:04

## 2018-07-24 RX ADMIN — ACETAMINOPHEN 650 MG: 325 TABLET, FILM COATED ORAL at 18:22

## 2018-07-24 RX ADMIN — PHENYLEPHRINE HYDROCHLORIDE 50 MCG: 10 INJECTION, SOLUTION INTRAMUSCULAR; INTRAVENOUS; SUBCUTANEOUS at 12:12

## 2018-07-24 RX ADMIN — ONDANSETRON 4 MG: 4 TABLET, ORALLY DISINTEGRATING ORAL at 21:19

## 2018-07-24 RX ADMIN — PHENYLEPHRINE HYDROCHLORIDE 100 MCG: 10 INJECTION, SOLUTION INTRAMUSCULAR; INTRAVENOUS; SUBCUTANEOUS at 12:19

## 2018-07-24 RX ADMIN — PHENYLEPHRINE HYDROCHLORIDE 100 MCG: 10 INJECTION, SOLUTION INTRAMUSCULAR; INTRAVENOUS; SUBCUTANEOUS at 11:26

## 2018-07-24 RX ADMIN — MIDAZOLAM 2 MG: 1 INJECTION INTRAMUSCULAR; INTRAVENOUS at 09:41

## 2018-07-24 RX ADMIN — PHENYLEPHRINE HYDROCHLORIDE 50 MCG: 10 INJECTION, SOLUTION INTRAMUSCULAR; INTRAVENOUS; SUBCUTANEOUS at 12:08

## 2018-07-24 RX ADMIN — FENTANYL CITRATE 25 MCG: 50 INJECTION INTRAMUSCULAR; INTRAVENOUS at 14:22

## 2018-07-24 RX ADMIN — ONDANSETRON 4 MG: 2 INJECTION INTRAMUSCULAR; INTRAVENOUS at 12:25

## 2018-07-24 RX ADMIN — FENTANYL CITRATE 50 MCG: 50 INJECTION, SOLUTION INTRAMUSCULAR; INTRAVENOUS at 11:58

## 2018-07-24 RX ADMIN — SENNOSIDES AND DOCUSATE SODIUM 2 TABLET: 8.6; 5 TABLET ORAL at 21:19

## 2018-07-24 RX ADMIN — PHENYLEPHRINE HYDROCHLORIDE 100 MCG: 10 INJECTION, SOLUTION INTRAMUSCULAR; INTRAVENOUS; SUBCUTANEOUS at 12:33

## 2018-07-24 RX ADMIN — PHENYLEPHRINE HYDROCHLORIDE 100 MCG: 10 INJECTION, SOLUTION INTRAMUSCULAR; INTRAVENOUS; SUBCUTANEOUS at 12:17

## 2018-07-24 RX ADMIN — DULOXETINE HYDROCHLORIDE 60 MG: 60 CAPSULE, DELAYED RELEASE ORAL at 21:19

## 2018-07-24 RX ADMIN — BUPIVACAINE 20 ML: 13.3 INJECTION, SUSPENSION, LIPOSOMAL INFILTRATION at 09:45

## 2018-07-24 RX ADMIN — SODIUM CHLORIDE, POTASSIUM CHLORIDE, SODIUM LACTATE AND CALCIUM CHLORIDE: 600; 310; 30; 20 INJECTION, SOLUTION INTRAVENOUS at 10:13

## 2018-07-24 RX ADMIN — Medication 0.2 MG: at 21:28

## 2018-07-24 RX ADMIN — BUPIVACAINE HYDROCHLORIDE 20 ML: 2.5 INJECTION, SOLUTION EPIDURAL; INFILTRATION; INTRACAUDAL at 09:45

## 2018-07-24 RX ADMIN — FENTANYL CITRATE 50 MCG: 50 INJECTION, SOLUTION INTRAMUSCULAR; INTRAVENOUS at 10:29

## 2018-07-24 RX ADMIN — CEFAZOLIN SODIUM 2 G: 2 INJECTION, SOLUTION INTRAVENOUS at 10:40

## 2018-07-24 RX ADMIN — FENTANYL CITRATE 25 MCG: 50 INJECTION INTRAMUSCULAR; INTRAVENOUS at 14:54

## 2018-07-24 RX ADMIN — DEXTROSE MONOHYDRATE 25 ML: 500 INJECTION PARENTERAL at 14:21

## 2018-07-24 RX ADMIN — IBUPROFEN 600 MG: 200 TABLET, FILM COATED ORAL at 21:18

## 2018-07-24 RX ADMIN — PROPOFOL 200 MG: 10 INJECTION, EMULSION INTRAVENOUS at 10:29

## 2018-07-24 RX ADMIN — PROPOFOL 30 MG: 10 INJECTION, EMULSION INTRAVENOUS at 10:30

## 2018-07-24 ASSESSMENT — PAIN DESCRIPTION - DESCRIPTORS
DESCRIPTORS: SHARP
DESCRIPTORS: ACHING;CONSTANT;DISCOMFORT
DESCRIPTORS: SHARP
DESCRIPTORS: SHARP
DESCRIPTORS: SORE

## 2018-07-24 NOTE — OR NURSING
Contacted Dr. Zapata regarding patient's blood sugar of 64. Pt symptomatic. Order received for 1/2 amp of d50. Will continue to closely monitor.

## 2018-07-24 NOTE — DISCHARGE SUMMARY
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  Chronic urinary retention  Urinary tract infection      Patient Active Problem List   Diagnosis     Ovarian cancer (H)     Procedures:  - Exploratory Laparotomy, lysis of adhesions, jejunal mesentary biopsy and sigmoid epiploic biopsy  - TAP blocks  - transfusion 2 units pRBC    Prior to Admission Medications:  Prescriptions Prior to Admission   Medication Sig Dispense Refill Last Dose     aspirin 81 MG EC tablet Take 81 mg by mouth every morning    Past Month at Unknown time     carvedilol (COREG) 25 MG tablet Take 25 mg by mouth every morning   1 7/24/2018 at 0600     cephalexin (KEFLEX) 250 MG capsule TAKE ONE CAPSULE BY MOUTH ONCE DAILY AT BEDTIME  3 7/23/2018 at 2100     Cholecalciferol (VITAMIN D3) 2000 units CAPS TAKE ONE CAPSULE BY MOUTH ONCE DAILY IN THE MORNING  3 Past Week at Unknown time     CRANBERRY EXTRACT PO Take by mouth every morning   Past Week at Unknown time     DULoxetine (CYMBALTA) 60 MG EC capsule TAKE ONE CAPSULE BY MOUTH AT BEDTIME  1 7/23/2018 at 2100     gabapentin (NEURONTIN) 600 MG tablet 900 MG THREE TIMES DAILY.  5 7/24/2018 at 0600     HUMALOG MARY KWIKPEN 100 UNIT/ML injection INJECT 1 UNIT PER 10GRAMS OF CARBOHYDRATES WITH CORRECTION 0.5 UNITS PER 50 ABOVE 150 ( UP TO 30 UNITS PER DAY)  6 7/23/2018 at 1900     levothyroxine (SYNTHROID/LEVOTHROID) 75 MCG tablet TAKE ONE TABLET BY MOUTH ONCE DAILY  IN THE MORNING     (PLEASE CALL  [phone] FOR AN OFFICE VISIT BEFORE NEXT REFILL.)  0 7/24/2018 at 0600     losartan (COZAAR) 100 MG tablet Take 100 mg by mouth At Bedtime    7/24/2018 at 0600     methylcellulose, laxative, (CITRUCEL) POWD Take by mouth every morning   7/23/2018 at 1000     morphine (MS CONTIN) 15 MG 12 hr tablet TAKE ONE TABLET BY MOUTH TWICE A DAY *CAUTION: OPIOID. RISK OF OVERDOSE AND ADDICTION.  0 7/24/2018 at 0600     Openbravo IQ test strip USE TO TEST BLOOD GLUCOSE 6-8 TIMES PER DAY, BEFORE MEALS, BEDTIME TO DOSE INSULIN, HIGH OR LOW BLOOD GLUCOSE WITH RECHECK  3 7/24/2018 at 0745     oxyCODONE-acetaminophen (PERCOCET) 5-325 MG per tablet Take 1 tablet by mouth 2 times daily  0 7/24/2018 at 0600     pravastatin (PRAVACHOL) 80 MG tablet Take 80 mg by mouth At Bedtime    7/23/2018 at 2200     senna-docusate (SENOKOT-S;PERICOLACE) 8.6-50 MG per tablet Take 1-2 tablets by mouth 2 times daily Take while on oral narcotics to prevent or treat constipation. 30 tablet 0 7/24/2018 at 0600     TRESIBA FLEXTOUCH 200 UNIT/ML pen INJECT 16 UNITS SUBCUTANEOUSLY DAILY AT BEDTIME  6 7/23/2018 at 2100       Discharge Medications:   Afshan Cardona   Home Medication Instructions STACIE:[id number]    Printed on:07/31/18 1302   Medication Information                      acetaminophen (TYLENOL) 325 MG tablet  Take 2 tablets (650 mg) by mouth every 6 hours as needed for mild pain             aspirin 81 MG EC tablet  Take 81 mg by mouth every morning              carvedilol (COREG) 25 MG tablet  Take 1 tablet (25 mg) by mouth 2 times daily (with meals)             cephalexin (KEFLEX) 250 MG capsule  TAKE ONE CAPSULE BY MOUTH ONCE DAILY AT BEDTIME             Cholecalciferol (VITAMIN D3) 2000 units CAPS  TAKE ONE CAPSULE BY MOUTH ONCE DAILY IN THE MORNING             CRANBERRY EXTRACT PO  Take by mouth every morning             DULoxetine (CYMBALTA) 60 MG EC capsule  TAKE ONE CAPSULE BY MOUTH AT BEDTIME             enoxaparin (LOVENOX) 40  MG/0.4ML injection  Inject 0.4 mLs (40 mg) Subcutaneous every 24 hours             gabapentin (NEURONTIN) 600 MG tablet  Take 1 tablet (600 mg) by mouth 3 times daily             HUMALOG MARY KWIKPEN 100 UNIT/ML injection  INJECT 1 UNIT PER 10GRAMS OF CARBOHYDRATES WITH CORRECTION 0.5 UNITS PER 50 ABOVE 150 ( UP TO 30 UNITS PER DAY)             insulin degludec (TRESIBA) 100 UNIT/ML pen  Inject 8 Units Subcutaneous At Bedtime             levothyroxine (SYNTHROID/LEVOTHROID) 75 MCG tablet  TAKE ONE TABLET BY MOUTH ONCE DAILY  IN THE MORNING     (PLEASE CALL [phone] FOR AN OFFICE VISIT BEFORE NEXT REFILL.)             losartan (COZAAR) 100 MG tablet  Take 100 mg by mouth At Bedtime              methylcellulose, laxative, (CITRUCEL) POWD  Take by mouth every morning             ONETOUCH VERIO IQ test strip  USE TO TEST BLOOD GLUCOSE 6-8 TIMES PER DAY, BEFORE MEALS, BEDTIME TO DOSE INSULIN, HIGH OR LOW BLOOD GLUCOSE WITH RECHECK             oxyCODONE IR (ROXICODONE) 5 MG tablet  Take 0.5-1 tablets (2.5-5 mg) by mouth every 6 hours as needed for severe pain             polyethylene glycol (MIRALAX/GLYCOLAX) Packet  Take 17 g by mouth daily as needed for constipation             pravastatin (PRAVACHOL) 80 MG tablet  Take 80 mg by mouth At Bedtime              senna-docusate (SENOKOT-S;PERICOLACE) 8.6-50 MG per tablet  Take 1-2 tablets by mouth 2 times daily Take while on oral narcotics to prevent or treat constipation.                 Consultations:  None    Brief History of Illness:  Patient returned to the operating room today after a diagnostic laparoscopy on 7/6/18 with pathology result that returned with a diagnosis of high grade serous ovarian cancer. Procedure plan was to perform an exploratory laparotomy with cancer resection which was not performed due to extent of disease.      Hospital Course:  Dz:   - Preoperative diagnosis was high grade serous ovarian cancer.  On exploratory laparotomy a jejunal  mesentary biopsy and a sigmoid epiploic biopsy were taken and sent for permanent. Planned procedure was not performed due to cancer upstaging based on extent of disease found.   Final pathology:  A: Jejunal mesentery, biopsy:   - Granulation tissue with vascular congestion, partially surfaced by   mesothelium   - Negative for malignancy     B: Sigmoid epiploic appendage, biopsy:   - Adipose tissue with fat necrosis and lymphoid aggregates, surfaced by   mesothelium   - Negative for malignancy   Prior to discharge Dr. Galeas discussed plan for neoadjuvant chemotherapy as the next step with possible interval debulking pending response to chemotherapy.  FEN:   - Her diet was slowly advanced on POD#1. She was maintained on IVF until POD#2 due to low urine output and prerenal MARIAH. Her IV fluids were discontinued on POD#3. By discharge, she was tolerating a regular diet without nausea and vomiting and able to maintain her hydration without IVF supplementation.  Pain:   - Her pain was initially controlled on dilaudid.  Once tolerating PO pain meds, she was transitioned to a PO pain regimen. Her home MS Contin was held due to increased confusion in the post-operative period. She was given low dose PRN oxycodone, tylenol and received TAP blocks.  Home gabapentin was restarted on HD#4. Lidocaine patches were added on POD#5. Her pain was well controlled on this and she was discharged home with these medications.  CV:   - She has a history of hypertension, hyperlipidemia, and stress cardiomyopathy. She had a positive stress test and underwent an angiogram prior to surgery that showed improved disease (50-60% occlusion RCA & LAD). Her hypertension medications were resumed on POD#2. She had intermittent severe hypertension that required IV hydralazine on POD#4 & POD#6. Her carvedilol was uptitrated to 25mg BID with improvement in blood pressure. Her vital signs were otherwise stable while in house and she had no other acute  CV issues.  PULM:   - She has no history of pulmonary issues. Preoperative imaging shows 2-4mm perivascular nodules of RML & RUL. She has a remote smoking history. She was initially given O2 supplementation in to maintain her O2 sats in the immediate postop period and was transitioned off of this without difficulty.  By discharge, her O2 sats were greater than 94% on RA.  She was encouraged to use her bedside IS while in house.  She had no acute pulmonary issues while in house.  HEME:   - She has a history of DVT after a . Her preoperative Hgb was 10.2. Intraoperative Hgb was 11.2. Her hgb dropped to 9.0 postoperatively and given her cardiac history she was transfused 1 uPRBC. Repeat hemoglobin was 9.3 and patient was given a second unit of PRBC. Hgb stable at 10.8 at the time of discharge.  She had no acute heme issues while in house.  GI:   - She was made NPO prior to the procedure.  On POD#0, her diet was advanced to clear liquids and then advanced slowly as tolerated.  At the time of discharge, she was tolerating a regular diet without nausea and vomiting.  She will be discharged with a bowel regimen to prevent constipation in the postoperative period.  She had no acute GI issues while in house.  :    - A duffy catheter was placed at the time of the surgery. Once ambulating unassisted with adequate urine output, the duffy catheter was removed.  The patient's creatinine was elevated to 1.01 on POD#1 likely due to under-resuscitation. This improved and creatinine was 0.57 on discharge. After duffy was removed patient able to void spontaneously but felt like she was unable to completely empty her entire bladder, which was also an issue for her prior to surgery. Bladder scan confirmed urinary retention with . On POD#4 required straight cath x2 and replacement of Duffy catheter.  Catheter was removed on POD#5. Timed voids q4 hours were continued for the remainder of her stay.  She was recommended to  continue timed voids and follow-up with her urologist.   ID:   - She received standard perioperative antibiotics without incident and continued on home daily keflex for uroprophylaxis. The patient was febrile to 101.5F on the evening of POD#1, urinalysis showed moderate leukocyte esterase with WBC 16 and she was started on IV ceftriaxone for presumed UTI and treated with 48 hours of ceftriaxone. Urine culture was negative. She remained afebrile the remainder of her hospital stay. On POD#3 she was restarted on her PTA Keflex prophylaxis. Her WBC on discharge was 12.4.  ENDO:   - She has a history of type 1 diabetes with most recent A1C of 9.4. She was continued on home tresiba and a very low sliding scale insulin. On POD#6 she had hypoglycemia of 37 and the Tresiba dose was decreased to 10U qHS. On consultation with pharmacy, Tresiba was decreased to 8U on morning of discharge due to a fasting glucose of 63, which resolved with juice. Her meal time carb-based humalog was restarted on discharge. She also has a history of hypothyroidism and was continued on home synthroid.   PSYCH/NEURO:   - History of depression and fibromyalgia, resumed on cymbalta while in house. On POD#1 she had altered mental status and subjective upper extremity weakness. A stroke code was called, head CT obtained and showed no intracranial bleed or infarct. She was given Narcan with some improvement in orientation. Narcotic pain medication was limited for the remainder of her hospital stay with improvement in sensorium.  PPX:    - She was given SCDs, IS, and lovenox during her hospital course. She tolerated these prophylactic interventions without incident. She will continue lovenox for a total of 28 days.     Discharge Instructions and Follow up:  Ms. Afshan Cardona was discharged from the hospital with follow up with Dr. Galeas  8/27/2018    Discharge Diet: Regular    Discharge Activity: Activity as tolerated  Lifting restricted to 10-20  pounds  No lifting, driving, or strenuous exercise for 6 week(s)  No heavy lifting, pushing, pulling for 6 week(s)  No driving for 6 week(s)  No sex for 6 week(s)  Pelvic rest: abstain from intercourse and do not use tampons for 6 week(s)      Discharge Follow up: 8/27/18 Dr Galeas    Discharge Disposition:  Discharged to TCU    # Discharge Pain Plan:   - During her hospitalization, Afshan experienced pain due to acute post op pain.  The pain plan for discharge was discussed with Afshan and the plan was created in a collaborative fashion.    - Chronic/continued opioids: Patient to take oxycodone 2.5-5mg every 6 hours PRN. Patient to stop taking MS contin 15mg BID  - Tylenol and ibuprofen  - Lidocaine patches  - Home gabapentin 900mg TID for neuropathy    Discharge Staff: MD Marianela Oneill MD  Ob/Gyn PGY-1  July 31, 2018 1:11 PM      Ranjana Arndt MD  Gynecologic Oncology  [facility]

## 2018-07-24 NOTE — ANESTHESIA POSTPROCEDURE EVALUATION
Patient: Shielia Brendan    Procedure(s):  Exploratory Laparotomy, lysis of adhesions, jejunal mesentary biopsy and sigmoid epiploic biopsy.  - Wound Class: II-Clean Contaminated   - Wound Class: II-Clean Contaminated    Diagnosis:Personal History Of Ovarian Cancer   Diagnosis Additional Information: No value filed.    Anesthesia Type:  General, ETT    Note:  Anesthesia Post Evaluation    Patient location during evaluation: PACU  Patient participation: Able to fully participate in evaluation  Level of consciousness: awake and alert  Pain management: adequate  Airway patency: patent  Cardiovascular status: acceptable  Respiratory status: acceptable  Hydration status: acceptable  PONV: none     Anesthetic complications: None          Last vitals:  Vitals:    07/24/18 1430 07/24/18 1440 07/24/18 1450   BP: 150/68 145/62 149/82   Resp: 16 16 16   Temp: 36.4  C (97.5  F)     SpO2: 100% 100% 99%         Electronically Signed By: Emilie Zapata MD  July 24, 2018  2:59 PM

## 2018-07-24 NOTE — IP AVS SNAPSHOT
Afshan Cardona #[id number] (CSN:[id number])  (73 year old F)  (Adm: 18)     [id number]               [address] [facility] BANK: [phone]            Patient Demographics     Patient Name Sex          Age SSN Address Phone    Afshan Cardona Female 1945 (73 year old)  [address]  [facility] [address] [phone] (Home)      Emergency Contact(s)     Name Relation Home Work Mobile    Annie [place] Grandcarleen   [phone]    Mor Cardona [phone]        Admission Information     Attending Provider Admitting Provider Admission Type Admission Date/Time    Bakari Galeas MD Winterhoff, Boris J, MD Elective 18  0745    Discharge Date Hospital Service Auth/Cert Status Service Area     Gyn/Onc Incomplete [facility]    Unit Room/Bed Admission Status       [facility] 7403/7403-01 Admission (Confirmed)       Admission     Complaint    Personal History Of Ovarian Cancer , Ovarian cancer (H)      Hospital Account     Name Acct ID Class Status Primary Coverage    Afshan Cardona [id number] Inpatient Open MEDICARE - MEDICARE FOR HB SUPPLEMENT            Guarantor Account (for Hospital Account #[id number])     Name Relation to Pt Service Area Active? Acct Type    Afshan Cardona Self FCS Yes Personal/Family    Address Phone          [address]  [place] [address] [phone](H)              Coverage Information (for Hospital Account #[id number])     1. MEDICARE/MEDICARE FOR HB SUPPLEMENT     F/O Payor/Plan Precert #    MEDICARE/MEDICARE FOR HB SUPPLEMENT     Subscriber Subscriber #    Afshan Cardona [id number]    Address Phone    [employer] CLAIMS  [address]  [place] [address] [phone]          2. BCBS/BCBS PLATINUM BLUE     F/O Payor/Plan Precert #    BCBS/BCBS PLATINUM BLUE     Subscriber Subscriber #    Afshan Cardona [id number]    Address Phone    [address] [address]  [place] [address] [phone]           "                                            INTERAGENCY TRANSFER FORM - PHYSICIAN ORDERS   7/24/2018                       [address] [facility] BANK: [phone]            Attending Provider: Bakari Galeas MD     Allergies:  Azithromycin, Clonazepam, Furosemide, Hydrochlorothiazide, Lisinopril, Nitrofurantoin, Pregabalin, Sulfamethoxazole, Buprenorphine    Infection:  None   Service:  GYN/ONC    Ht:  1.575 m (5' 2\")   Wt:  72.5 kg (159 lb 14.4 oz)   Admission Wt:  67 kg (147 lb 11.3 oz)    BMI:  29.25 kg/m 2   BSA:  1.78 m 2            ED Clinical Impression     Diagnosis Description Comment Added By Time Added    Personal history of ovarian cancer [Z85.43] Personal history of ovarian cancer [Z85.43]  Hillary Ortiz RN 7/24/2018  8:28 AM    Malignant neoplasm of ovary, unspecified laterality (H) [C56.9] Malignant neoplasm of ovary, unspecified laterality (H) [C56.9]  Juanita Jean-Baptiste APRN CNP 7/31/2018  6:37 AM      Hospital Problems as of 7/31/2018              Priority Class Noted POA    Ovarian cancer (H) Medium  7/24/2018 Yes      Non-Hospital Problems as of 7/31/2018     None      Code Status History     Date Active Date Inactive Code Status Order ID Comments User Context    This patient has a current code status but no historical code status.      Current Code Status     Date Active Code Status Order ID Comments User Context       7/24/2018  6:14 PM Full Code [id number]  Mya Garcia MD Inpatient       Summary of Visit     Reason for your hospital stay       surgery                Medication Review      START taking        Dose / Directions Comments    acetaminophen 325 MG tablet   Commonly known as:  TYLENOL        Dose:  650 mg   Take 2 tablets (650 mg) by mouth every 6 hours as needed for mild pain   Quantity:  100 tablet   Refills:  0        enoxaparin 40 MG/0.4ML injection   Commonly known as:  LOVENOX        Dose:  40 mg   Inject 0.4 mLs (40 mg) Subcutaneous every 24 hours   Quantity:  " 21 Syringe   Refills:  0        insulin degludec 100 UNIT/ML pen   Commonly known as:  TRESIBA   Replaces:  TRESIBA FLEXTOUCH 200 UNIT/ML pen        Dose:  8 Units   Inject 8 Units Subcutaneous At Bedtime   Refills:  0    TCU provider to monitor glucose and adjust doses accordingly (patient was previously on 16 units)       oxyCODONE IR 5 MG tablet   Commonly known as:  ROXICODONE        Dose:  2.5-5 mg   Take 0.5-1 tablets (2.5-5 mg) by mouth every 6 hours as needed for severe pain   Quantity:  25 tablet   Refills:  0        polyethylene glycol Packet   Commonly known as:  MIRALAX/GLYCOLAX        Dose:  17 g   Take 17 g by mouth daily as needed for constipation   Quantity:  7 packet   Refills:  0          CONTINUE these medications which may have CHANGED, or have new prescriptions. If we are uncertain of the size of tablets/capsules you have at home, strength may be listed as something that might have changed.        Dose / Directions Comments    carvedilol 25 MG tablet   Commonly known as:  COREG   This may have changed:  when to take this        Dose:  25 mg   Take 1 tablet (25 mg) by mouth 2 times daily (with meals)   Quantity:  60 tablet   Refills:  0        gabapentin 600 MG tablet   Commonly known as:  NEURONTIN   This may have changed:  See the new instructions.        Dose:  600 mg   Take 1 tablet (600 mg) by mouth 3 times daily   Quantity:  90 tablet   Refills:  0          CONTINUE these medications which have NOT CHANGED        Dose / Directions Comments    aspirin 81 MG EC tablet        Dose:  81 mg   Take 81 mg by mouth every morning   Refills:  0        cephalexin 250 MG capsule   Commonly known as:  KEFLEX        TAKE ONE CAPSULE BY MOUTH ONCE DAILY AT BEDTIME   Refills:  3        CITRUCEL Powd   Generic drug:  methylcellulose (laxative)        Take by mouth every morning   Refills:  0        CRANBERRY EXTRACT PO        Take by mouth every morning   Refills:  0        DULoxetine 60 MG EC capsule    Commonly known as:  CYMBALTA        TAKE ONE CAPSULE BY MOUTH AT BEDTIME   Refills:  1        HUMALOG MARY KWIKPEN 100 UNIT/ML injection   Generic drug:  insulin lispro        INJECT 1 UNIT PER 10GRAMS OF CARBOHYDRATES WITH CORRECTION 0.5 UNITS PER 50 ABOVE 150 ( UP TO 30 UNITS PER DAY)   Refills:  6        levothyroxine 75 MCG tablet   Commonly known as:  SYNTHROID/LEVOTHROID        TAKE ONE TABLET BY MOUTH ONCE DAILY  IN THE MORNING     (PLEASE CALL [phone] FOR AN OFFICE VISIT BEFORE NEXT REFILL.)   Refills:  0        losartan 100 MG tablet   Commonly known as:  COZAAR        Dose:  100 mg   Take 100 mg by mouth At Bedtime   Refills:  0        ONETOUCH VERIO IQ test strip   Generic drug:  blood glucose monitoring        USE TO TEST BLOOD GLUCOSE 6-8 TIMES PER DAY, BEFORE MEALS, BEDTIME TO DOSE INSULIN, HIGH OR LOW BLOOD GLUCOSE WITH RECHECK   Refills:  3        pravastatin 80 MG tablet   Commonly known as:  PRAVACHOL        Dose:  80 mg   Take 80 mg by mouth At Bedtime   Refills:  0        senna-docusate 8.6-50 MG per tablet   Commonly known as:  SENOKOT-S;PERICOLACE   Used for:  S/P laparoscopic procedure        Dose:  1-2 tablet   Take 1-2 tablets by mouth 2 times daily Take while on oral narcotics to prevent or treat constipation.   Quantity:  30 tablet   Refills:  0    While on narcotics       vitamin D3 2000 units Caps        TAKE ONE CAPSULE BY MOUTH ONCE DAILY IN THE MORNING   Refills:  3          STOP taking     morphine 15 MG 12 hr tablet   Commonly known as:  MS CONTIN           oxyCODONE-acetaminophen 5-325 MG per tablet   Commonly known as:  PERCOCET           TRESIBA FLEXTOUCH 200 UNIT/ML pen   Generic drug:  insulin degludec   Replaced by:  insulin degludec 100 UNIT/ML pen                   After Care     Activity - Up with nursing assistance           Advance Diet as Tolerated       Follow this diet upon discharge:       Moderate Consistent CHO Diet. Bedtime snack.       Daily weights        Call Provider for weight gain of more than 2 pounds per day or 5 pounds per week.       Encourage PO fluids           General info for SNF       Length of Stay Estimate: Short Term Care: Estimated # of Days <30  Condition at Discharge: Stable  Level of care:skilled   Rehabilitation Potential: Fair  Admission H&P remains valid and up-to-date: Yes      GENERAL POST-OPERATIVE  PATIENT INSTRUCTIONS  FOLLOW-UP:    Call [phone] if you have:  Temperature greater than 100.4  Persistent nausea and vomiting  Severe uncontrolled pain  Redness, tenderness, or signs of infection (pain, swelling, redness, odor or green/yellow discharge around the site)  Difficulty breathing, headache or visual disturbances  Hives  Persistent dizziness or light-headedness  Extreme fatigue  Any other questions or concerns you may have after discharge    In an emergency, call 911 or go to an Emergency Department at a nearby hospital       WOUND CARE INSTRUCTIONS:  Keep a dry clean dressing on the wound if there is drainage. If you had a bandage initially, it may be removed after 24 hours.  Once the wound has quit draining you may leave it open to air.  If clothing rubs against the wound or causes irritation and the wound is not draining you may cover it with a dry dressing during the daytime.  Try to keep the wound dry and avoid ointments on the wound unless directed to do so.  If the wound becomes bright red and painful or starts to drain infected material that is not clear, please contact your physician immediately.    1.  You may shower 24 hrs after surgery   2.  No soaking in the tub for 4 weeks       DIET:  There are no dietary restrictions.  You may eat any foods that you can tolerate unless instructed otherwise.  It is a good idea to eat a high fiber diet and take in plenty of fluids to prevent constipation.  If you become constipated, please follow the instructions below.    ACTIVITY:  You are encouraged to cough, deep breath and use  your incentive spirometer if you were given one, every 15-30 minutes when awake.  This will help prevent respiratory complications and low grade fevers post-operatively.  You may want to hug a pillow when coughing and sneezing to add additional support to the surgical area, if you had abdominal surgery, which will decrease pain during these times.      1.  No heavy lifting >20lbs or strenuous exercise for six-eight weeks.  No exercise in which you are using core muscles (yoga, pilates, swimming, weight lifting)  2.  You may walk as much as you wish.  You are encouraged to increase your activity each day after surgery.  Stairs are okay.   3.  Nothing per vagina for eight weeks.  No tampons, no intercourse, no douching.  You can expect some light vaginal spotting and discharge for up to six weeks.  If bleeding becomes heavy, please contact the office.     MEDICATIONS:  Try to take narcotic medications and anti-inflammatory medications, such as tylenol, ibuprofen, naprosyn, etc., with food.  This will minimize stomach upset from the medication.  Should you develop nausea and vomiting from the pain medication, or develop a rash, please discontinue the medication and contact your physician.  You should not drive, make important decisions, or operate machinery when taking narcotic pain medication.    OTHER:  Patients are often constipated after general anesthesia and surgery.  The patient should continue to take stool softeners (for example, Senokot-S) for the next six weeks (unless diarrhea develops) and consume adequate amounts of water.  If the patient remains constipated or unable to pass stool, please try one or all of the following measures:  1.  Milk of Magnesia 30cc twice a day as needed by mouth  2.  Metamucil 2 tablespoons in 12 ounces of fluid  3.  Dulcolax oral or suppositories  4.  Prunes or prune juice  5.  Miralax daily      QUESTIONS:  Please feel free to call your physician or the hospital  if you  have any questions, and they will be glad to assist you.      Recent Chemotherapy: N/A patient to start in 1-2 weeks  Use Nursing Home Standing Orders: No       Glucose monitor nursing POCT       Before meals and at bedtime and 0200 am  TCU medical provider to monitor glucose and adjust insulin       Intake and output       Every shift       Mantoux instructions       Give two-step Mantoux (PPD) Per Facility Policy Yes       Wound care (specify)       Site:   Midline abd wound  Instructions:  Clean BID with saline or soap and water. Pat dry and keep open to air. Patient to wear abd binder when oob             Referrals     Occupational Therapy Adult Consult       Evaluate and treat as clinically indicated.    Reason:  Post op weakness       Physical Therapy Adult Consult       Evaluate and treat as clinically indicated.    Reason:  Post op weakness             Follow-Up Appointment Instructions     Follow Up and recommended labs and tests       Follow up with PCP within 1 week regarding diabetes management and HTN.  8/27/18 Dr Galeas. Please call for questions or concerns prior to then [phone]             Your next 10 appointments already scheduled     Aug 27, 2018 11:40 AM CDT   (Arrive by 11:25 AM)   Post-Op with Bakari Galeas MD   [facility]    [address] [place] [address]  [address] [place] [address] [phone]              Statement of Approval     Ordered          07/31/18 0649  I have reviewed and agree with all the recommendations and orders detailed in this document.  EFFECTIVE NOW     Approved and electronically signed by:  Juanita Jean-Baptiste APRN CNP                                                 INTERAGENCY TRANSFER FORM - NURSING   7/24/2018                       [address] [facility]: [phone]            Attending Provider: Bakari Galeas MD     Allergies:  Azithromycin, Clonazepam, Furosemide,  "Hydrochlorothiazide, Lisinopril, Nitrofurantoin, Pregabalin, Sulfamethoxazole, Buprenorphine    Infection:  None   Service:  GYN/ONC    Ht:  1.575 m (5' 2\")   Wt:  72.5 kg (159 lb 14.4 oz)   Admission Wt:  67 kg (147 lb 11.3 oz)    BMI:  29.25 kg/m 2   BSA:  1.78 m 2            Advance Directives        Scanned docmt in ACP Activity?           No scanned doc        Immunizations     None      ASSESSMENT     Discharge Profile Flowsheet     DISCHARGE NEEDS ASSESSMENT     Full except areas not inspected   Buttock, left;Buttock, right;Sacrum;Coccyx 07/31/18 0201    Equipment Currently Used at Home  grab bar 07/26/18 1612   Inspection under devices  Full 07/31/18 0923    Transportation Available  car;family or friend will provide 07/26/18 1612   Not Inspected under devices  Other (abdominal dressing) 07/26/18 1859    GASTROINTESTINAL (ADULT,PEDIATRIC,OB)     Skin WDL  ex 07/31/18 0923    GI WDL  ex 07/31/18 0923   Skin Color/Characteristics  without discoloration 07/31/18 0923    Abdominal Appearance  contour irregular 07/31/18 0923   Skin Temperature  warm 07/31/18 0923    Last Bowel Movement  07/29/18 07/31/18 0923   Skin Moisture  dry 07/31/18 0923    GI Signs/Symptoms  abdominal discomfort 07/31/18 0923   Skin Elasticity  quick return to original state 07/31/18 0923    Passing flatus  yes 07/31/18 0923   Skin Integrity  drain/device(s);incision(s);bruise(s) 07/31/18 0923    COMMUNICATION ASSESSMENT     SAFETY      Patient's communication style  spoken language (English or Bilingual) 07/19/18 1415   Safety WDL  WDL 07/31/18 0923    SKIN     Safety Factors  bed in low position;wheels locked;call light in reach;upper side rails raised x 2;ID band on 07/31/18 0201    Inspection of bony prominences  Full 07/31/18 0923   All Alarms  alarm(s) activated and audible (oximeter) 07/31/18 0201                 Assessment WDL (Within Defined Limits) Definitions           Safety WDL     Effective: 09/28/15    Row Information: " "<b>WDL Definition:</b> Bed in low position, wheels locked; call light in reach; upper side rails up x 2; ID band on<br> <font color=\"gray\"><i>Item=AS safety wdl>>List=AS safety wdl>>Version=F14</i></font>      Skin WDL     Effective: 09/28/15    Row Information: <b>WDL Definition:</b> Warm; dry; intact; elastic; without discoloration; pressure points without redness<br> <font color=\"gray\"><i>Item=AS skin wdl>>List=AS skin wdl>>Version=F14</i></font>      Vitals     Vital Signs Flowsheet     VITAL SIGNS     Sleep  awake with occasional pain 07/31/18 1025    Temp  98.9  F (37.2  C) 07/31/18 0628   ANALGESIA SIDE EFFECTS MONITORING      Temp src  Oral 07/31/18 0628   Side Effects Monitoring: Respiratory Quality  R 07/31/18 1025    Resp  16 07/31/18 1025   Side Effects Monitoring: Respiratory Depth  N 07/31/18 1025    Pulse  81 07/31/18 0628   Side Effects Monitoring: Sedation Level  1 07/31/18 1025    Heart Rate  78 07/30/18 2233   HEIGHT AND WEIGHT      Pulse/Heart Rate Source  Monitor 07/30/18 2233   Height  1.575 m (5' 2\") 07/24/18 0827    BP  179/79 07/31/18 0628   Weight  72.5 kg (159 lb 14.4 oz) 07/30/18 1007    BP Location  Right arm 07/31/18 0628   Weight Method  Standing scale 07/30/18 1007    Patient Position  Sitting 07/24/18 0827   Bed Scale  -- (pt refuse to stand) 07/27/18 0237    OXYGEN THERAPY     BSA (Calculated - sq m)  1.71 07/24/18 0827    SpO2  93 % 07/31/18 0628   BMI (Calculated)  27.07 07/24/18 0827    O2 Device  None (Room air) 07/31/18 0628   POSITIONING      Oxygen Delivery  1 LPM 07/28/18 0201   Body Position  independently positioning 07/31/18 0923    RESPIRATORY MONITORING     Head of Bed (HOB)  HOB flat 07/31/18 0923    Respiratory Monitoring (EtCO2)  34 mmHg 07/26/18 0354   Positioning/Transfer Devices  in use;pillows 07/31/18 0923    Integrated Pulmonary Index (IPI)  10 07/26/18 0354   ECG      PAIN/COMFORT     ECG Rhythm  Normal sinus rhythm 07/27/18 1251    Patient Currently in Pain  " yes 07/31/18 1025   Ectopy  None 07/26/18 1616    Preferred Pain Scale  CAPA (Clinically Aligned Pain Assessment) ([facility] Adults Only) 07/31/18 1025   Lead Monitored  Lead II 07/25/18 0527    Pain Location  Abdomen 07/31/18 1025   DAILY CARE      Pain Orientation  Anterior 07/31/18 1025   Activity Management  up to commode 07/31/18 0923    Pain Descriptors  Aching 07/31/18 1025   Activity Assistance Provided  assistance, stand-by 07/31/18 0923    Pain Management Interventions  analgesia administered 07/31/18 0151   Assistive Device Utilized  walker 07/31/18 0923    Pain Intervention(s)  Medication (See eMAR) 07/31/18 1025   Additional Documentation  Activity Device Assistance (Row) 07/31/18 0208    Response to Interventions  Absence of nonverbal indicators of pain 07/31/18 0552   POINT OF CARE TESTING      CLINICALLY ALIGNED PAIN ASSESSMENT (CAPA) ([facility] ADULTS ONLY)     Puncture Site  fingertip 07/31/18 0720    Comfort  tolerable with discomfort 07/31/18 1025   Bedside Glucose (mg/dl )   65 mg/dl 07/31/18 0720    Change in Pain  getting worse 07/31/18 1025   COMMENTS      Pain Control  partially effective 07/31/18 1025   Comments  Back on 6B 07/25/18 2001    Functioning  pain keeps me from doing most of what I need to do 07/31/18 1025                 Patient Lines/Drains/Airways Status    Active LINES/DRAINS/AIRWAYS     Name: Placement date: Placement time: Site: Days: Last dressing change:    Peripheral IV 07/31/18 Right Lower forearm 07/31/18   0523   Lower forearm   less than 1     Incision/Surgical Site 07/06/18 Bilateral Abdomen 07/06/18   0914    25     Incision/Surgical Site 07/24/18 Abdomen 07/24/18   1246    6             Patient Lines/Drains/Airways Status    Active PICC/CVC     None            Intake/Output Detail Report     Date Intake         Output     Net    Shift P.O. I.V. IV Piggyback Colloid Blood Components Total Urine Emesis/NG output Blood Total        Day 07/30/18 0000 - 07/30/18 0659 100 -- -- -- -- 100 400 -- -- 400 -300    Louise 07/30/18 0700 - 07/30/18 1459 240 -- -- -- -- 240 725 -- -- 725 -485    Noc 07/30/18 1500 - 07/30/18 2359 360 -- -- -- -- 360 750 -- -- 750 -390    Day 07/31/18 0000 - 07/31/18 0659 -- -- -- -- -- -- 800 -- -- 800 -800    Louise 07/31/18 0700 - 07/31/18 1459 480 -- -- -- -- 480 125 -- -- 125 355      Last Void/BM       Most Recent Value    Urine Occurrence 1 at 07/28/2018 0803    Stool Occurrence 1 at 07/29/2018 1747      Case Management/Discharge Planning     Case Management/Discharge Planning Flowsheet     LIVING ENVIRONMENT     Equipment Currently Used at Home  grab bar 07/26/18 1612    Lives With  alone 07/26/18 1612   / CAREGIVER      Living Arrangements  condominium 07/26/18 1612   Filed Complexity Screen Score  3 07/25/18 0808    COPING/STRESS     ABUSE RISK SCREEN      Major Change/Loss/Stressor  surgery/procedure 07/19/18 1414   QUESTION TO PATIENT:  Has a member of your family or a partner(now or in the past) intimidated, hurt, manipulated, or controlled you in any way?  no 07/24/18 0852    DISCHARGE PLANNING     QUESTION TO PATIENT: Do you feel safe going back to the place where you are living?  yes 07/24/18 0852    Transportation Available  car;family or friend will provide 07/26/18 1612   OTHER      FINAL RESOURCES     Are you depressed or being treated for depression?  No 07/24/18 2211                  [address] [facility] [facility]: [phone]            Medication Administration Report for BrendanAfshan johnston as of 07/31/18 1201   Legend:    Given Hold Not Given Due Canceled Entry Other Actions    Time Time (Time) Time  Time-Action       Inactive    Active    Linked        Medications 07/25/18 07/26/18 07/27/18 07/28/18 07/29/18 07/30/18 07/31/18    acetaminophen (TYLENOL) tablet 650 mg  Dose: 650 mg  Freq: EVERY 6 HOURS Route: PO  Start: 07/24/18 1815   Admin Instructions: Do NOT use if patient has an active  opioid/acetaminophen analgesic order for pain  Maximum acetaminophen dose from all sources = 75 mg/kg/day not to exceed 4 grams/day.    Admin. Amount: 2 tablet (2 × 325 mg tablet)  Last Admin: 07/31/18 0754  Dispense Loc: [facility] ADS [facility]C  POC: Post-procedure     0000 (650 mg)-Given       (0605)-Not Given       1209 (650 mg)-Given       1817 (650 mg)-Given        0016 (650 mg)-Given       0604 (650 mg)-Given       1213 (650 mg)-Given       1828 (650 mg)-Given        0141 (650 mg)-Given       0841 (650 mg)-Given       1224 (650 mg)-Given       1822 (650 mg)-Given        0029 (650 mg)-Given       0700 (650 mg)-Given       1252 (650 mg)-Given       1830 (650 mg)-Given        (0156)-Not Given       0223 (650 mg)-Given       0737 (650 mg)-Given       1324 (650 mg)-Given       1946 (650 mg)-Given        0156 (650 mg)-Given       0856 (650 mg)-Given       1317 (650 mg)-Given       1949 (650 mg)-Given        0203 (650 mg)-Given       0754 (650 mg)-Given       [ ] 1400       [ ] 2000           benzocaine-menthol (CEPACOL) 15-3.6 MG lozenge 1-2 lozenge  Dose: 1-2 lozenge  Freq: EVERY 1 HOUR PRN Route: BU  PRN Reason: sore throat  PRN Comment: without fever  Start: 07/24/18 2031   Admin. Amount: 1-2 lozenge  Last Admin: 07/28/18 1317  Dispense Loc: [facility]S [facility]C  POC: Post-procedure       1002 (1 lozenge)-Given       1624 (1 lozenge)-Given        0101 (1 lozenge)-Given       1317 (1 lozenge)-Given              bisacodyl (DULCOLAX) Suppository 10 mg  Dose: 10 mg  Freq: DAILY PRN Route: RE  PRN Reason: constipation  Start: 07/24/18 2031   Admin Instructions: Start POD 1.  MUST HOLD for bowel resection or diarrhea. May discontinue if patient having bowel movement.    Admin. Amount: 1 suppository (1 × 10 mg suppository)  Dispense Loc: [facility]S [facility]C  POC: Post-procedure               carvedilol (COREG) tablet 25 mg  Dose: 25 mg  Freq: 2 TIMES DAILY WITH MEALS Route: PO  Start: 07/30/18 1700   Admin. Amount: 1 tablet (1 × 25 mg  tablet)  Last Admin: 07/31/18 0757  Dispense Loc: [facility] ADS 7C          1640 (25 mg)-Given        0757 (25 mg)-Given [C]       [ ] 1700           cephalexin (KEFLEX) capsule 250 mg  Dose: 250 mg  Freq: AT BEDTIME Route: PO  Indications Comment: UTI ppx  Start: 07/27/18 2200   Admin. Amount: 1 capsule (1 × 250 mg capsule)  Last Admin: 07/30/18 2129  Dispense Loc: [facility] ADS 7C       2204 (250 mg)-Given        2333 (250 mg)-Given        2208 (250 mg)-Given        2129 (250 mg)-Given        [ ] 2200           diphenhydrAMINE (BENADRYL) solution 12.5 mg  Dose: 12.5 mg  Freq: EVERY 6 HOURS PRN Route: PO  PRN Reason: itching  Start: 07/24/18 2031   Admin Instructions: Caution to be used when administering multiple CNS depressing meds within a short time frame.    Admin. Amount: 12.5 mg = 5 mL Conc: 2.5 mg/mL  Dispense Loc: [facility]  Volume: 5 mL  POC: Post-procedure              Or  diphenhydrAMINE (BENADRYL) injection 12.5 mg  Dose: 12.5 mg  Freq: EVERY 6 HOURS PRN Route: IV  PRN Reason: itching  PRN Comment: Only give if patient unable to take PO.  Start: 07/24/18 2031   Admin Instructions: Caution to be used when administering multiple CNS depressing meds within a short time frame.  For ordered doses up to 50 mg, give IV Push undiluted. Give each 25mg over a minimum of 1 minute. Extend in non-emergency    Admin. Amount: 12.5 mg = 0.25 mL Conc: 50 mg/mL  Dispense Loc: [facility]  Infused Over: 1-2 Minutes  Volume: 1 mL  POC: Post-procedure               DULoxetine (CYMBALTA) EC capsule 60 mg  Dose: 60 mg  Freq: AT BEDTIME Route: PO  Start: 07/24/18 2200   Admin. Amount: 1 capsule (1 × 60 mg capsule)  Last Admin: 07/30/18 2129  Dispense Loc: [facility]     2221 (60 mg)-Given        2228 (60 mg)-Given        2204 (60 mg)-Given        2233 (60 mg)-Given        2207 (60 mg)-Given        2129 (60 mg)-Given        [ ] 2200           enoxaparin (LOVENOX) injection 40 mg  Dose: 40 mg  Freq: EVERY 24  HOURS Route: SC  Start: 07/25/18 1300   Admin. Amount: 40 mg = 0.4 mL Conc: 40 mg/0.4 mL  Last Admin: 07/30/18 1203  Dispense Loc: [facility]  Volume: 0.4 mL     1238 (40 mg)-Given        1214 (40 mg)-Given        1224 (40 mg)-Given        1224 (40 mg)-Given        1208 (40 mg)-Given        1203 (40 mg)-Given        [ ] 1300           gabapentin (NEURONTIN) tablet 600 mg  Dose: 600 mg  Freq: 3 TIMES DAILY Route: PO  Start: 07/28/18 1000   Admin. Amount: 1 tablet (1 × 600 mg tablet)  Last Admin: 07/31/18 0757  Dispense Loc: [facility]        1114 (600 mg)-Given       1610 (600 mg)-Given       2021 (600 mg)-Given        0736 (600 mg)-Given       1324 (600 mg)-Given       1947 (600 mg)-Given        0628 (600 mg)-Given [C]              1317 (600 mg)-Given       1949 (600 mg)-Given        0757 (600 mg)-Given       [ ] 1400       [ ] 2000           glucose gel 15-30 g  Dose: 15-30 g  Freq: EVERY 15 MIN PRN Route: PO  PRN Reason: low blood sugar  Start: 07/24/18 2032   Admin Instructions: Give 15 g for BG 51 to 69 mg/dL IF patient is conscious and able to swallow. Give 30 g for BG less than or equal to 50 mg/dL IF patient is conscious and able to swallow. Do NOT give glucose gel via enteral tube.  IF patient has enteral tube: give apple juice 120 mL (4 oz or 15 g of CHO) via enteral tube for BG 51 to 69 mg/dL.  Give apple juice 240 mL (8 oz or 30 g of CHO) via enteral tube for BG less than or equal to 50 mg/dL.    ~Oral gel is preferable for conscious and able to swallow patient.   ~IF gel unavailable or patient refuses may provide apple juice 120 mL (4 oz or 15 g of CHO). Document juice on I and O flowsheet.    Admin. Amount: 15-30 g  Last Admin: 07/30/18 0623  Dispense Loc: [facility]  Volume: 93.75 mL                        0623 (30 g)-Given [C]                  Or  dextrose 50 % injection 25-50 mL  Dose: 25-50 mL  Freq: EVERY 15 MIN PRN Route: IV  PRN Reason: low blood sugar  Start: 07/24/18 2032   Admin  Instructions: Use if have IV access, BG less than 70 mg/dL and meet dose criteria below:  Dose if conscious and alert (or disorientated) and NPO = 25 mL  Dose if unconscious / not alert = 50 mL  Vesicant. For ordered doses up to 25 g, give IV Push undiluted. Give each 5g over 1 minute.    Admin. Amount: 25-50 mL  Last Admin: 07/30/18 0643  Dispense Loc: [facility]  Infused Over: 1-5 Minutes  Volume: 50 mL     0813 (25 mL)-Given [C]           0215 (25 mL)-Given               0643 (50 mL)-Given [C]           Or  glucagon injection 1 mg  Dose: 1 mg  Freq: EVERY 15 MIN PRN Route: SC  PRN Reason: low blood sugar  PRN Comment: May repeat x 1 only  Start: 07/24/18 2032   Admin Instructions: May give SQ or IM. ONLY use glucagon IF patient has NO IV access AND is UNABLE to swallow AND blood glucose is LESS than or EQUAL to 50 mg/dL.  If ordered IV, give IV Push over 1 minute. Reconstitute with 1mL sterile water.    Admin. Amount: 1 mg  Dispense Loc: [facility]                                           ibuprofen (ADVIL/MOTRIN) tablet 600 mg  Dose: 600 mg  Freq: EVERY 6 HOURS PRN Route: PO  PRN Reason: moderate pain  Start: 07/30/18 1600   Admin. Amount: 1 tablet (1 × 600 mg tablet)  Last Admin: 07/31/18 0755  Dispense Loc: [facility]          1640 (600 mg)-Given        0100 (600 mg)-Given       0755 (600 mg)-Given           Injection Device for insulin (NOVOPEN ECHO) 1 each  Dose: 1 Device  Freq: DOES NOT GO TO MAR Route: XX  PRN Reason: other  Start: 07/25/18 0043   Admin. Amount: 1 each  Dispense Loc: [facility]               insulin aspart (NovoPen ECHO/NovoLOG) cartridge  Dose: 0.5-2.5 Units  Freq: AT BEDTIME Route: SC  Start: 07/24/18 2200   Admin Instructions: VERY LOW INSULIN RESISTANCE DOSING    Do Not give Bedtime Correction Insulin if BG less than 200.   For -274 give 0.5 unit.   For  - 349 give 1 units  For  - 424 give 1.5 units  For  - 499 give 2 units  For BG greater  than or equal to  500 give 2.5 units   Notify provider if glucose greater than or equal to 350 mg/dL after administration of correction dose.  If given at mealtime, must be administered 5 min before meal or immediately after.    Admin. Amount: 0.5-2.5 Units  Last Admin: 07/29/18 2213  Dispense Loc: Contact Rx for dose  Volume: 3 mL     (2230)-Not Given        (2239)-Not Given        (2159)-Not Given [C]        (2226)-Not Given [C]        2213 (0.5 Units)-Given [C]        (2132)-Not Given        [ ] 2200           insulin aspart (NovoPen ECHO/NovoLOG) cartridge  Dose: 0.5-2.5 Units  Freq: 3 TIMES DAILY BEFORE MEALS Route: SC  Start: 07/25/18 0730   Admin Instructions: Correction Scale - VERY LOW INSULIN RESISTANCE DOSING     Do Not give Correction Insulin if Pre-Meal BG less [id number].   For Pre-Meal  -214 give 0.5 unit.  For Pre-Meal  -289 give 1 unit  For Pre-Meal  -364 give 1.5 unit.  For Pre-Meal  - 439 give 2 unit.   For Pre-Meal BG greater than or equal to 440 give 2.5 units.   To be given with prandial insulin, and based on pre-meal blood glucose.   Notify provider if glucose greater than or equal to 350 mg/dL after administration of correction dose.  If given at mealtime, must be administered 5 min before meal or immediately after.    Admin. Amount: 0.5-2.5 Units  Last Admin: 07/31/18 1107  Dispense Loc: Contact [facility] for dose  Volume: 3 mL     (0754)-Not Given [C]       (1216)-Not Given       1838 (0.5 Units)-Given        (0848)-Not Given       (1218)-Not Given       (1615)-Not Given        (1307)-Not Given       (1308)-Not Given       1816 (0.5 Units)-Given [C]        (0832)-Not Given [C]       (1226)-Not Given [C]       (1652)-Not Given [C]        0747-Hold [C]       0818 (0.5 Units)-Given [C]       (1213)-Not Given [C]       (1754)-Not Given [C]        0855 (1 Units)-Given [C]       1203 (0.5 Units)-Given [C]       1725 (0.5 Units)-Given        (0758)-Not Given [C]       1107 (0.5  Units)-Given       [ ] 1700             Dose: 10 Units  Freq: AT BEDTIME Route: SC  Start: 07/30/18 2200   End: 07/31/18 1153   Admin Instructions: Therapeutic interchange for Tresiba U-200   (autosubstituted tresiba U-100 on a unit to unit basis 10 units U-200 = 10 units U-100)    Admin. Amount: 10 Units  Last Admin: 07/30/18 2138  Dispense Loc: Contact Rx for dose  Volume: 3 mL          2138 (10 Units)-Given [C]        1153-Med Discontinued       levothyroxine (SYNTHROID/LEVOTHROID) tablet 75 mcg  Dose: 75 mcg  Freq: EVERY MORNING BEFORE BREAKFAST Route: PO  Start: 07/25/18 0730   Admin Instructions: Separate oral administration of iron- or calcium-containing products and levothyroxine by at least 4 hours.    Admin. Amount: 1 tablet (1 × 75 mcg tablet)  Last Admin: 07/31/18 0757  Dispense Loc: [facility]     0753 (75 mcg)-Given        0845 (75 mcg)-Given        0841 (75 mcg)-Given        0833 (75 mcg)-Given        0736 (75 mcg)-Given        0856 (75 mcg)-Given        0757 (75 mcg)-Given           Lidocaine (LIDOCARE) 4 % Patch 1 patch  Dose: 1 patch  Freq: EVERY 24 HOURS 2000 Route: TD  Start: 07/29/18 2230   Admin Instructions: Apply patch(s) to abdomen around incision (NOT ON INCISION). To prevent lidocaine toxicity, patient should be patch free for 12 hrs daily. Patches may be cut to smaller size prior to removing release liner.  NEVER APPLY HEAT OVER PATCH which increases absorption and risk of local anesthetic toxicity. Do not apply over area where liposomal bupivacaine was injected for 96 hours post injection.    Admin. Amount: 1 patch  Last Admin: 07/30/18 1949  Dispense Loc: [facility]  Infused Over: 12 Hours         2259 (1 patch)-Given        1949 (1 patch)-Given [C]        [ ] 2000          And  lidocaine patch REMOVAL  Freq: EVERY 24 HOURS 0800 Route: TD  Start: 07/30/18 0800   Admin Instructions: Remove lidocaine Patch.    Last Admin: 07/30/18 0857  Dispense Loc: [facility]           0857 ( )-Given        0802 ( )-Patch Removed          And  lidocaine patch in PLACE  Freq: EVERY 8 HOURS Route: TD  Start: 07/29/18 2230   Admin Instructions: Chart every shift, confirming that patch is still in place on patient (no barcode scan needed). See patch order for dose information.  NEVER APPLY HEAT OVER PATCH which will increase absorption and may lead to risk of local anesthetic toxicity. Do not apply over area where liposomal bupivacaine injected for 96 hours.    Last Admin: 07/31/18 0701  Dispense Loc: [facility]         2300 ( )-Patch in Place        0556 ( )-Patch in Place       1416 ( )-Patch Removed [C]       2133 ( )-Patch in Place        0701 ( )-Patch in Place       [ ] 1430       [ ] 2230           losartan (COZAAR) tablet 100 mg  Dose: 100 mg  Freq: AT BEDTIME Route: PO  Start: 07/28/18 2200   Admin. Amount: 2 tablet (2 × 50 mg tablet)  Last Admin: 07/30/18 2100  Dispense Loc: [facility]        2233 (100 mg)-Given        2207 (100 mg)-Given        2100 (100 mg)-Given        [ ] 2200           melatonin tablet 1 mg  Dose: 1 mg  Freq: AT BEDTIME PRN Route: PO  PRN Reason: sleep  Start: 07/29/18 2225   Admin. Amount: 1 tablet (1 × 1 mg tablet)  Last Admin: 07/29/18 2259  Dispense Loc: [facility]         2259 (1 mg)-Given             morphine (MS CONTIN) 12 hr tablet 15 mg  Dose: 15 mg  Freq: HOLD Route: PO  PRN Reason: moderate to severe pain  PRN Comment: Hold until MD places a resume order (Was q12h)  Start: 07/25/18 2130   Admin Instructions: DO NOT CRUSH.    Admin. Amount: 1 tablet (1 × 15 mg tablet)  Dispense Loc: [facility]     2129-Rx hold  [C]                 naloxone (NARCAN) injection 0.1-0.4 mg  Dose: 0.1-0.4 mg  Freq: EVERY 2 MIN PRN Route: IV  PRN Reason: opioid reversal  Start: 07/24/18 2031   Admin Instructions: For respiratory rate LESS than or EQUAL to 8.  Partial reversal dose:  0.1 mg titrated q 2 minutes for Analgesia Side Effects Monitoring  Sedation Level of 3 (frequently drowsy, arousable, drifts to sleep during conversation).Full reversal dose:  0.4 mg bolus for Analgesia Side Effects Monitoring Sedation Level of 4 (somnolent, minimal or no response to stimulation).  For ordered doses up to 2mg give IVP. Give each 0.4mg over 15 seconds in emergency situations. For non-emergent situations further dilute in 9mL of NS to facilitate titration of response.    Admin. Amount: 0.1-0.4 mg = 0.25-1 mL Conc: 0.4 mg/mL  Last Admin: 07/25/18 2030  Dispense Loc: [facility]  Volume: 1 mL  POC: Post-procedure     2017 (0.2 mg)-Given       2030 (0.2 mg)-Given                 ondansetron (ZOFRAN-ODT) ODT tab 4 mg  Dose: 4 mg  Freq: EVERY 8 HOURS PRN Route: PO  PRN Reasons: nausea,vomiting  Start: 07/25/18 2045   Admin Instructions: With dry hands, peel back foil backing and gently remove tablet; do not push oral disintegrating tablet through foil backing; administer immediately on tongue and oral disintegrating tablet dissolves in seconds; then swallow with saliva; liquid not required.    Admin. Amount: 1 tablet (1 × 4 mg tablet)  Last Admin: 07/28/18 0220  Dispense Loc: [facility]  POC: Post-procedure        0220 (4 mg)-Given              oxyCODONE IR (ROXICODONE) half-tab 2.5-5 mg  Dose: 2.5-5 mg  Freq: EVERY 6 HOURS PRN Route: PO  PRN Reason: severe pain  Start: 07/30/18 1634   Admin Instructions: Start with the lowest dose.  May adjust dose by  5 mg every 3 hours as needed.  Notify provider to assess for uncontrolled pain or analgesic side effects. Hold while on IV PCA or with regular IV opioid dosing. Maximum total is 60 mg in 24 hours.    Admin. Amount: 1-2 half-tab (1-2 × 2.5 mg half-tab)  Last Admin: 07/31/18 1024  Dispense Loc: [facility]  POC: Post-procedure          2129 (5 mg)-Given        0405 (5 mg)-Given       1024 (5 mg)-Given           polyethylene glycol (MIRALAX/GLYCOLAX) Packet 17 g  Dose: 17 g  Freq: DAILY PRN Route: PO  PRN Reason:  constipation  Start: 07/29/18 1115   Admin Instructions: 1 Packet = 17 grams. Mixed prescribed dose in 8 ounces of water. Follow with 8 oz. of water.    Admin. Amount: 17 g  Last Admin: 07/29/18 1324  Dispense Loc: [facility]         1324 (17 g)-Given             psyllium (METAMUCIL) packet 1 packet  Dose: 1 packet  Freq: EVERY MORNING Route: PO  Start: 07/25/18 0800   Admin Instructions: Autosub for methylcellulose powder    Admin. Amount: 1 packet  Last Admin: 07/29/18 0736  Dispense Loc: [facility]     (0932)-Not Given [C]        (1103)-Not Given        0841 (1 packet)-Given        0833 (1 packet)-Given        0736 (1 packet)-Given        (0857)-Not Given        (0758)-Not Given           ranitidine (ZANTAC) tablet 150 mg  Dose: 150 mg  Freq: EVERY 12 HOURS Route: PO  Start: 07/24/18 1855   Admin. Amount: 1 tablet (1 × 150 mg tablet)  Last Admin: 07/31/18 0757  Dispense Loc: [facility]  POC: Post-procedure     0754 (150 mg)-Given       (1817)-Not Given [C]        0845 (150 mg)-Given       1828 (150 mg)-Given        0841 (150 mg)-Given       1816 (150 mg)-Given        0833 (150 mg)-Given       1830 (150 mg)-Given        0737 (150 mg)-Given       1800 (150 mg)-Given        0856 (150 mg)-Given       1949 (150 mg)-Given               0757 (150 mg)-Given       [ ] 2000           senna-docusate (SENOKOT-S;PERICOLACE) 8.6-50 MG per tablet 1 tablet  Dose: 1 tablet  Freq: 2 TIMES DAILY PRN Route: PO  PRN Reason: constipation  Start: 07/24/18 2031   Admin Instructions: If no bowel movement in 24 hours, increase to 2 tablets PO.  Hold for loose stools.  Preferred agent for constipation related to opioids.    Admin. Amount: 1 tablet  Dispense Loc: [facility]  POC: Post-procedure              Or  senna-docusate (SENOKOT-S;PERICOLACE) 8.6-50 MG per tablet 2 tablet  Dose: 2 tablet  Freq: 2 TIMES DAILY PRN Route: PO  PRN Reason: constipation  Start: 07/24/18 2031   Admin Instructions: Hold for loose  stools.  Preferred agent for constipation related to opioids.    Admin. Amount: 2 tablet  Dispense Loc: [facility]  POC: Post-procedure               senna-docusate (SENOKOT-S;PERICOLACE) 8.6-50 MG per tablet 2 tablet  Dose: 2 tablet  Freq: 2 TIMES DAILY Route: PO  Start: 07/24/18 2045   Admin. Amount: 2 tablet  Last Admin: 07/31/18 0756  Dispense Loc: [facility]     0753 (2 tablet)-Given       (1947)-Not Given        0845 (2 tablet)-Given       1928 (2 tablet)-Given        0841 (2 tablet)-Given       (1947)-Not Given        (0833)-Not Given [C]       2021 (2 tablet)-Given        0736 (2 tablet)-Given       (1947)-Not Given        0856 (2 tablet)-Given       1949 (2 tablet)-Given        0756 (2 tablet)-Given       [ ] 2000           simethicone (MYLICON) chewable tablet 80 mg  Dose: 80 mg  Freq: 4 TIMES DAILY PRN Route: PO  PRN Reason: cramping  PRN Comment: gas  Start: 07/24/18 2031   Admin. Amount: 1 tablet (1 × 80 mg tablet)  Last Admin: 07/31/18 0826  Dispense Loc: [facility]  POC: Post-procedure        0528 (80 mg)-Given       1119 (80 mg)-Given          0826 (80 mg)-Given           sodium chloride (PF) 0.9% PF flush 3 mL  Dose: 3 mL  Freq: EVERY 8 HOURS Route: IK  Start: 07/24/18 2045   Admin Instructions: And Q1H PRN, to lock peripheral IV dormant line.    Admin. Amount: 3 mL  Last Admin: 07/31/18 0408  Dispense Loc: [facility] Floor Stock  Volume: 3 mL  POC: Post-procedure     0414 (3 mL)-Given       1209 (3 mL)-Given       1948 (3 mL)-Given        (0519)-Not Given       1216 (3 mL)-Given       1930 (3 mL)-Given               1225 (3 mL)-Given              2209 (3 mL)-Given        (0610)-Not Given       1227 (3 mL)-Given       2234 (3 mL)-Given        0400 (3 mL)-Given       1209 (3 mL)-Given       1948 (3 mL)-Given        0704 (3 mL)-Given       1204 (3 mL)-Given       1953 (3 mL)-Given        0408 (3 mL)-Given       [ ] 1245       [ ] 2045           sodium chloride (PF) 0.9% PF flush 3 mL  Dose: 3  mL  Freq: EVERY 1 HOUR PRN Route: IK  PRN Reason: line flush  PRN Comment: for peripheral IV flush post IV meds  Start: 18   Admin. Amount: 3 mL  Dispense Loc: [facility] Floor Stock  Volume: 3 mL  POC: Post-procedure              Future Medications  Medications 18       insulin degludec (TRESIBA) 100 UNIT/ML injection 8 Units  Dose: 8 Units  Freq: AT BEDTIME Route: SC  Start: 18   Admin Instructions: Therapeutic interchange for Tresiba U-200   (autosubstituted tresiba U-100 on a unit to unit basis 10 units U-200 = 10 units U-100)    Admin. Amount: 8 Units  Dispense Loc: Contact Rx for dose  Volume: 3 mL           [ ]           Completed Medications  Medications 18         Dose: 10 mg  Freq: ONCE Route: IV  Start: 18 0845   End: 18 0859   Admin Instructions: For ordered doses up to 40 mg, give IV Push undiluted over 1-2 minutes.    Admin. Amount: 10 mg = 1 mL Conc: 10 mg/mL  Last Admin: 18 0857  Dispense Loc: [facility]  Infused Over: 1-2 Minutes  Administrations Remainin  Volume: 2 mL          0857 (10 mg)-Given              Dose: 10 mg  Freq: ONCE Route: IV  Start: 18 0715   End: 18 0739   Admin Instructions: For ordered doses up to 40 mg, give IV Push undiluted over 1-2 minutes.    Admin. Amount: 10 mg = 1 mL Conc: 10 mg/mL  Last Admin: 18 0737  Dispense Loc: [facility]  Infused Over: 1-2 Minutes  Administrations Remainin  Volume: 2 mL         0737 (10 mg)-Given               Dose: 5 mg  Freq: ONCE Route: IV  Start: 18 1400   End: 18 1502   Admin Instructions: For ordered doses up to 40 mg, give IV Push undiluted over 1 minute.    Admin. Amount: 5 mg = 0.25 mL Conc: 20 mg/mL  Last Admin: 18 1501  Dispense Loc: [facility]  Infused Over: 1 Minutes  Administrations Remainin  Volume: 0.25 mL           1501 (5 mg)-Given              Dose: 5 mg  Freq: ONCE Route: IV  Start: 18 1230   End: 18 1238   Admin Instructions: For ordered doses up to 40 mg, give IV Push undiluted over 1 minute.    Admin. Amount: 5 mg = 0.25 mL Conc: 20 mg/mL  Last Admin: 18 1237  Dispense Loc: [facility]  Infused Over: 1 Minutes  Administrations Remainin  Volume: 0.25 mL          1237 (5 mg)-Given           Discontinued Medications  Medications 18         Dose: 12.5 mg  Freq: 2 TIMES DAILY WITH MEALS Route: PO  Start: 18 0800   End: 18 1630   Admin. Amount: 1 tablet (1 × 12.5 mg tablet)  Last Admin: 18 0628  Dispense Loc: [facility]        0833 (12.5 mg)-Given       1830 (12.5 mg)-Given        0737 (12.5 mg)-Given       1756 (12.5 mg)-Given        0628 (12.5 mg)-Given [C]              1630-Med Discontinued          Dose: 10 mg  Freq: ONCE Route: IV  Start: 18   End: 18 0701   Admin Instructions: For ordered doses up to 40 mg, give IV Push undiluted over 1-2 minutes.    Admin. Amount: 10 mg = 1 mL Conc: 10 mg/mL  Infused Over: 1-2 Minutes  Administrations Remainin  Volume: 1 mL          0701-Med Discontinued          Freq: HOLD Route: XX  Start: 18   End: 18 1045   Order specific questions:  Medication(s) to hold: gabapentin  Parameter for hold (doses,days,conditions) : HOLD until provider places a RESUME medication order     Dispense Loc: [facility]           1045-Med Discontinued         Dose: 10 mg  Freq: ONCE Route: IV  Start: 18   End: 18 0844   Admin Instructions: For ordered doses up to 40 mg, give IV Push undiluted over 1 minute.    Admin. Amount: 10 mg = 0.5 mL Conc: 20 mg/mL  Dispense Loc: [facility]  Infused Over: 1 Minutes  Administrations Remainin  Volume: 0.5 mL          0844-Med Discontinued                Dose: 600 mg  Freq:  EVERY 6 HOURS Route: PO  Start: 18 2230   End: 18 1600   Admin. Amount: 1 tablet (1 × 600 mg tablet)  Last Admin: 18 1203  Dispense Loc: [facility]         2302 (600 mg)-Given        0516 (600 mg)-Given       1203 (600 mg)-Given       1600-Med Discontinued          Dose: 600 mg  Freq: EVERY 6 HOURS PRN Route: PO  PRN Reason: moderate pain  Start: 18 07   End: 18 222   Admin. Amount: 1 tablet (1 × 600 mg tablet)  Last Admin: 18 1629  Dispense Loc: [facility]        0144 (600 mg)-Given       1732 (600 mg)-Given        0538 (600 mg)-Given       1629 (600 mg)-Given       2229-Med Discontinued           Dose: 16 Units  Freq: AT BEDTIME Route: SC  Start: 18 2200   End: 18 1026   Admin Instructions: Therapeutic interchange for Tresiba U-200 <br>(autosubstituted tresiba U-100 on a unit to unit basis 16 units U-200 = 16 units U-100)<br>    Admin. Amount: 16 Units  Last Admin: 18 2215  Dispense Loc: Contact Rx for dose  Volume: 3 mL     2221 (16 Units)-Given        2239 (16 Units)-Given        2204 (16 Units)-Given        2234 (16 Units)-Given        2215 (16 Units)-Given        1026-Med Discontinued          Dose: 10 mg  Freq: ONCE Route: PO  Start: 18 1230   End: 18 1227   Admin. Amount: 1 capsule (1 × 10 mg capsule)  Administrations Remainin          1227-Med Discontinued          Dose: 2.5-5 mg  Freq: EVERY 6 HOURS PRN Route: PO  PRN Reason: moderate to severe pain  Start: 18 1000   End: 18 1635   Admin Instructions: Start with the lowest dose.  May adjust dose by  5 mg every 3 hours as needed.  Notify provider to assess for uncontrolled pain or analgesic side effects. Hold while on IV PCA or with regular IV opioid dosing. Maximum total is 60 mg in 24 hours.    Admin. Amount: 1-2 half-tab (1-2 × 2.5 mg half-tab)  Last Admin: 18 1525  Dispense Loc: [facility]  POC: Post-procedure      1009 (5 mg)-Given       1648 (2.5  mg)-Given       2241 (2.5 mg)-Given        0842 (2.5 mg)-Given       1510 (2.5 mg)-Given       2119 (2.5 mg)-Given        0220 (2.5 mg)-Given       0522 (2.5 mg)-Given [C]       1114 (2.5 mg)-Given       1732 (2.5 mg)-Given        0615 (2.5 mg)-Given       0912 (2.5 mg)-Given       1208 (2.5 mg)-Given       1629 (2.5 mg)-Given       1946 (2.5 mg)-Given       2259 (2.5 mg)-Given        0156 (2.5 mg)-Given       0523 (2.5 mg)-Given       0855 (2.5 mg)-Given       1203 (2.5 mg)-Given       1525 (5 mg)-Given       1635-Med Discontinued     Medications 07/25/18 07/26/18 07/27/18 07/28/18 07/29/18 07/30/18 07/31/18               INTERAGENCY TRANSFER FORM - NOTES (H&P, Discharge Summary, Consults, Procedures, Therapies)   7/24/2018                       [address] [facility] BANK: [phone]               History & Physicals      H&P signed by Cooper Pantoja at 7/26/2018 11:37 AM      Author:  Cooper Pantoja Service:  (none) Author Type:  Physician    Filed:  7/26/2018 11:37 AM Date of Service:  7/26/2018 10:24 AM Creation Time:  7/26/2018 11:37 AM    Status:  Signed :  Cooper Pantoja (Physician)     Scan on 7/26/2018 11:37 AM by Kit Provider : [facility] PRE-OP H/P/07/19/2018 1          Revision History        User Key Date/Time User Provider Type Action    > [N/A] 7/26/2018 11:37 AM Kit Provider Physician Sign            H&P by Cecille Gutierrez APRN CNS at 7/19/2018  1:00 PM     Author:  Cecille Gutierrez APRN CNS Service:  (none) Author Type:  Clinical Nurse Specialist    Filed:  7/20/2018  7:23 AM Encounter Date:  7/19/2018 Status:  Signed    :  Cecille Gutierrez APRN CNS (Clinical Nurse Specialist)             Pre-Operative H & P     CC:  Preoperative exam to assess for increased cardiopulmonary risk while undergoing surgery and anesthesia.    Date of Encounter: 7/19/2018  Primary Care Physician:  System, Provider Not In[NM1.1]    Reason for visit:  Preop general physical exam  Pelvic  mass[NM1.2]      ARAM Cardona is a 73 year old female who presents for pre-operative H & P in preparation for[NM1.1] Exploratory Laparotomy, Possible Total Abdominal Hysterectomy, Bilateral Salpingo-Oophorectomy, Tumor Debulking, Omentectomy, Possible Pelvic And Para Aortic Lymphadenectomy on 7/24/2018 in treatment of complex pelvic mass[NM1.2] at[NM1.1] [facility][NM1.2].[NM1.1]     History is obtained from the patient[NM1.2].[NM1.1]   New diagnosis of high grade serous ovarian cancer.  She had a exploratory laparoscopy 7/6/2018. Angiogram done 7/10/2018 for positive stress test. Medical management recommended.[NM1.3]                          Past Medical History[NM1.1]  Past Medical History:   Diagnosis Date     Vivas's esophagus      Cardiomyopathy (H)     Taksumoto     Colon polyps      Fibromyalgia      Former smoker      History of DVT (deep vein thrombosis)      HLD (hyperlipidemia)      HTN (hypertension)      Hypothyroid      TIA (transient ischemic attack)[NM1.4]        Past Surgical History[NM1.1]  Past Surgical History:   Procedure Laterality Date     AS ESOPHAGOSCOPY, DIAGNOSTIC       BACK SURGERY       bladder lift       HYSTERECTOMY       LAPAROSCOPY DIAGNOSTIC (GYN) N/A 7/6/2018    Procedure: LAPAROSCOPY DIAGNOSTIC (GYN);  Diagnostic Laparoscopy, Biopsies;  Surgeon: Bakari Galeas MD;  Location: [facility] OR[NM1.4]       Hx of Blood transfusions/reactions: yes, no reactions    Hx of abnormal bleeding or anti-platelet use: asa, on hold for procedure    Menstrual history:[NM1.1] No LMP recorded. Patient has had a hysterectomy.[NM1.4]:     Steroid use in the last year: none    Personal or FH with difficulty with Anesthesia:  None        Prior to Admission Medications[NM1.1]  Current Outpatient Prescriptions   Medication Sig Dispense Refill     aspirin 81 MG EC tablet Take 81 mg by mouth every morning        carvedilol (COREG) 25 MG tablet Take 25 mg  by mouth every morning   1     cephalexin (KEFLEX) 250 MG capsule TAKE ONE CAPSULE BY MOUTH ONCE DAILY AT BEDTIME  3     Cholecalciferol (VITAMIN D3) 2000 units CAPS TAKE ONE CAPSULE BY MOUTH ONCE DAILY IN THE MORNING  3     CRANBERRY EXTRACT PO Take by mouth every morning       DULoxetine (CYMBALTA) 60 MG EC capsule TAKE ONE CAPSULE BY MOUTH AT BEDTIME  1     gabapentin (NEURONTIN) 600 MG tablet 900 MG THREE TIMES DAILY.  5     levothyroxine (SYNTHROID/LEVOTHROID) 75 MCG tablet TAKE ONE TABLET BY MOUTH ONCE DAILY  IN THE MORNING     (PLEASE CALL [phone] FOR AN OFFICE VISIT BEFORE NEXT REFILL.)  0     losartan (COZAAR) 100 MG tablet Take 100 mg by mouth At Bedtime        methylcellulose, laxative, (CITRUCEL) POWD Take by mouth every morning       morphine (MS CONTIN) 15 MG 12 hr tablet TAKE ONE TABLET BY MOUTH TWICE A DAY *CAUTION: OPIOID. RISK OF OVERDOSE AND ADDICTION.  0     oxyCODONE-acetaminophen (PERCOCET) 5-325 MG per tablet Take 1 tablet by mouth 2 times daily  0     pravastatin (PRAVACHOL) 80 MG tablet Take 80 mg by mouth At Bedtime        TRESIBA FLEXTOUCH 200 UNIT/ML pen INJECT 16 UNITS SUBCUTANEOUSLY DAILY AT BEDTIME  6     HUMALOG MARY KWIKPEN 100 UNIT/ML injection INJECT 1 UNIT PER 10GRAMS OF CARBOHYDRATES WITH CORRECTION 0.5 UNITS PER 50 ABOVE 150 ( UP TO 30 UNITS PER DAY)  6     ONETOUCH VERIO IQ test strip USE TO TEST BLOOD GLUCOSE 6-8 TIMES PER DAY, BEFORE MEALS, BEDTIME TO DOSE INSULIN, HIGH OR LOW BLOOD GLUCOSE WITH RECHECK  3     senna-docusate (SENOKOT-S;PERICOLACE) 8.6-50 MG per tablet Take 1-2 tablets by mouth 2 times daily Take while on oral narcotics to prevent or treat constipation. (Patient not taking: Reported on 7/18/2018) 30 tablet 0[NM1.4]       Allergies[NM1.1]  Allergies   Allergen Reactions     Azithromycin Diarrhea     Clonazepam      Other reaction(s): *Unknown     Furosemide      Other reaction(s): Unknown Reaction  Low sodium     Hydrochlorothiazide Other (See Comments)      Low Sodium     Lisinopril Cough     Nitrofurantoin      Other reaction(s): Other  Burning around her mouth, pt advised by neurologist to not take macrobid     Pregabalin Other (See Comments)     Other reaction(s): Dizziness  Worse, numbness/tingling/burning pain whole body, hospitalized     Sulfamethoxazole Hives     Buprenorphine Nausea and Vomiting     Severe vomiting[NM1.4]       Social History[NM1.1]  Social History     Social History     Marital status:      Spouse name: N/A     Number of children: N/A     Years of education: N/A     Occupational History     Not on file.     Social History Main Topics     Smoking status: Former Smoker     Packs/day: 0.25     Years: 15.00     Quit date: 1/1/2018     Smokeless tobacco: Never Used     Alcohol use Yes      Comment: social     Drug use: No     Sexual activity: Not on file     Other Topics Concern     Not on file     Social History Narrative[NM1.4]       Family History[NM1.1]  No family history on file.[NM1.4]        Anesthesia Evaluation     . Pt has had prior anesthetic. Type: General and MAC           ROS/MED HX    ENT/Pulmonary:     (+)SINCERE risk factors hypertension, tobacco use, Past use 0.25 PPD for 15 years, quit 30 years ago packs/day  , resolved 2 years ago post-op pneumonia: . .    Neurologic:     (+)without deficitsTIA date: 1999 features: caused by hypoglycemia,     Cardiovascular: Comment: TTE 4/2017 (outside records)  CONCLUSION:  1. Normal left ventricular size with preserved systolic function.  Ejection fraction is 60%.     2. Stage I diastolic dysfunction.     3. Mild to moderate mitral regurgitation.     4. Mild aortic and tricuspid regurgitation.     (+) Dyslipidemia, hypertension--CAD, --. : . CHF etiology: Taksumoto syndrome Last EF: 60% date: 4/18/17 . . :. . Previous cardiac testing Echodate:results:Stress Testdate: results: date: results:Cath date: results:          METS/Exercise Tolerance:  3 - Able to walk 1-2 blocks without  "stopping   Hematologic: Comments: Several clots RLE -one associated with pregnancy    (+) History of blood clots pt is not anticoagulated, History of Transfusion no previous transfusion reaction -      Musculoskeletal:   (+) arthritis, , , other musculoskeletal- hardware in right patella      GI/Hepatic:     (+) GERD Other,       Renal/Genitourinary:  - ROS Renal section negative       Endo:     (+) type I DM, Using insulin - not using insulin pump thyroid problem hypothyroidism, .      Psychiatric:     (+) psychiatric history anxiety and depression      Infectious Disease:  - neg infectious disease ROS       Malignancy:      - no malignancy   Other:    (+) No chance of pregnancy H/O Chronic Pain,H/O chronic opiod use , no other significant disability              Physical Exam  Normal systems: cardiovascular, pulmonary and dental    Airway   Mallampati: II  TM distance: >3 FB  Neck ROM: full    Dental     Cardiovascular   Rhythm and rate: regular and normal      Pulmonary    breath sounds clear to auscultation          The complete review of systems is negative other than noted in the HPI or here.[NM1.1]   Temp: 97.9  F (36.6  C)   BP: 93/54 Pulse: 71     SpO2: 97 %         148 lbs 0 oz  5' 2\"   Body mass index is 27.07 kg/(m^2).[NM1.4]       Physical Exam  Constitutional: Awake, alert, cooperative, no apparent distress, and appears stated age.  Eyes: Pupils equal, round and reactive to light, extra ocular muscles intact, sclera clear, conjunctiva normal.  HENT: Normocephalic, oral pharynx with moist mucus membranes, good dentition. No goiter appreciated.   Respiratory: Clear to auscultation bilaterally, no crackles or wheezing.  Cardiovascular: Regular rate and rhythm, normal S1 and S2, and no murmur noted.  Carotids +2, no bruits. No edema. Palpable pulses to radial  DP and PT arteries.   GI: Normal bowel sounds, soft, non-distended, non-tender, no masses palpated, no hepatosplenomegaly.   Lymph/Hematologic: No " cervical lymphadenopathy and no supraclavicular lymphadenopathy.  Genitourinary:  Deferred   Skin: Warm and dry.  No rashes at anticipated surgical site.   Musculoskeletal: Full ROM of neck. There is no redness, warmth, or swelling of the joints. Gross motor strength is normal.    Neurologic: Awake, alert, oriented to name, place and time. Cranial nerves II-XII are grossly intact. Gait is normal.   Neuropsychiatric: Calm, cooperative. Normal affect.     Labs: (personally reviewed)[NM1.1]  Results for KEYLA FRIAS (MRN [id number]) as of 7/20/2018 07:20    7/3/2018 09:18  WBC: 7.1  Hemoglobin: 10.7 (L)  Hematocrit: 32.3 (L)  Platelet Count: 300  RBC Count: 3.54 (L)  MCV: 91  MCH: 30.2  MCHC: 33.1  RDW: 12.9  Diff Method: Automated Method  % Neutrophils: 64.0  % Lymphocytes: 24.5  % Monocytes: 8.2  % Eosinophils: 2.3  % Basophils: 0.4  % Immature Granulocytes: 0.6  Nucleated RBCs: 0  Absolute Neutrophil: 4.6  Absolute Lymphocytes: 1.7  Absolute Monocytes: 0.6  Absolute Eosinophils: 0.2  Absolute Basophils: 0.0  Abs Immature Granulocytes: 0.0  Absolute Nucleated RBC: 0.0    Results for KEYLA FRIAS (MRN [id number]) as of 7/20/2018 07:20   Ref. Range 7/3/2018 09:18   Sodium Latest Ref Range: 133 - 144 mmol/L 132 (L)   Potassium Latest Ref Range: 3.4 - 5.3 mmol/L 4.8   Chloride Latest Ref Range: 94 - 109 mmol/L 96   Carbon Dioxide Latest Ref Range: 20 - 32 mmol/L 29   Urea Nitrogen Latest Ref Range: 7 - 30 mg/dL 15   Creatinine Latest Ref Range: 0.52 - 1.04 mg/dL 0.64   GFR Estimate Latest Ref Range: >60 mL/min/1.7m2 >90   GFR Estimate If Black Latest Ref Range: >60 mL/min/1.7m2 >90   Calcium Latest Ref Range: 8.5 - 10.1 mg/dL 9.0   Anion Gap Latest Ref Range: 3 - 14 mmol/L 6   Albumin Latest Ref Range: 3.4 - 5.0 g/dL 3.7   Protein Total Latest Ref Range: 6.8 - 8.8 g/dL 6.6 (L)   Bilirubin Total Latest Ref Range: 0.2 - 1.3 mg/dL 0.4   Alkaline Phosphatase Latest Ref Range: 40 - 150 U/L 113   ALT Latest Ref  Range: 0 - 50 U/L 27   AST Latest Ref Range: 0 - 45 U/L 16[NM1.5]       EKG: Personally reviewed but formal cardiology read pending:[NM1.1] 7/10/2018 SR        Cathed 7/10/2018  CORONARY ANGIOGRAPHY  LEFT MAIN:  The left main is normal.    LAD:  The left anterior descending has diffuse 60% and 50% stenoses proximal, mid and distal, no worse than previous angiogram.  Moderate-size diagonal branch emanates in the distal vessel and it is within normal limits.    CIRCUMFLEX:  The circumflex is nondominant and normal proximal, mid and distal.  Obtuse marginal one is normal.  Obtuse marginal two is normal.    RCA:  The right coronary artery is dominant with moderate calcification and a 60% focal proximal stenosis, 30% distal stenosis, otherwise mild diffuse disease.  PDA is normal.  The right coronary artery appears improved from previous angiogram.    ASSESSMENT:     Moderate two-vessel coronary artery disease, improved from previous angiogram.  Given patient's lack of symptoms of angina, recommend continued medical therapy risk reduction and proceeding with upcoming ovarian surgery with no further cardiac revascularization.  Good medical therapy is recommended with perioperative nitroglycerin.    Stress test 7/5/2018  CONCLUSION:  Test subjectively negative and objectively indeterminate for ischemia by EKG   criteria. The patient did develop repolarization changes, but once again these appeared   more consistent with LVH rather than true ischemia. The patient exercised to a low MET   level but achieved a reasonably good rate pressure product and also attained her target   heart rate to the predicted value.  The test should be reasonably predictive with the   understanding that the patient only exercised through stage I of the Fabio protocol. '  Echocardiographic data will be interpreted separately.    Stress echocardiogram 7/5/2018  STRESS-ECHO  ECHO VIEWS: The standard parasternal long and short axis as well as apical  two and four chamber views were obtained pre and post exercise. Immediate post images were obtained within 60 seconds.  Color flow and/or Doppler were utilized.     IMAGE QUALITY:  Good.    RESULTS:    Pre-exercise:  Baseline echocardiogram shows normal left ventricular wall motion.  Left ventricular ejection fraction visually estimated at 60%.  Mild mitral insufficiency.      Post-exercise:  Immediate post exercise images show left ventricular chamber size appears slightly decreased.  Ejection fraction does appear to improve.  Lateral wall though appears to become hypokinetic compared to rest images.      CONCLUSION:  1. Please see separately dictated report for complete details of the stress portion of this test.      2.  Baseline echocardiogram shows normal left ventricular ejection fraction of 60% and mild mitral insufficiency.     3.  Immediate post-exercise images show inducible hypokinesis in the lateral wall.      4.  Overall this represents an abnormal stress echocardiogram with lateral wall ischemia.[NM1.2]       Outside records reviewed from:[NM1.1] care everywhere[NM1.2]      ASSESSMENT and PLAN  Afshan Cardona is a 73 year old female scheduled to undergo[NM1.1] Exploratory Laparotomy, Possible Total Abdominal Hysterectomy, Bilateral Salpingo-Oophorectomy, Tumor Debulking, Omentectomy, Possible Pelvic And Para Aortic Lymphadenectomy on 7/24/2018 in treatment of complex pelvic mass[NM1.2].[NM1.1] She[NM1.2] has the following specific operative considerations:   - RCRI :[NM1.1] High risk surgery (>5% cardiac complication risk)[NM1.2].[NM1.1]  6.6%[NM1.2] risk of major adverse cardiac event.   - Anesthesia considerations:  Refer to PAC assessment in anesthesia records  - VTE risk:[NM1.1] 3%[NM1.2]  - SINCERE # of risks[NM1.1] 2[NM1.2]/8 =[NM1.1] low risk[NM1.2]  - Post-op delirium risk:[NM1.1] high risk due to age[NM1.2].    - Risk of PONV score =[NM1.1] 2[NM1.2].  If > 2, anti-emetic intervention  recommended.[NM1.1]      Pre-operative considerations include:  1.) CV: Functional status independent. Exercise tolerance close to 4 METS. Positive stress test 7/5/2018, cathed 7/10/2018, results posted above. Hx Takutsubo cardiomyopathy 2/2015 with EF 25-30% recovered to 60% on April 2017 echo with no RWMA and recent testing show EF at 60%. EKG 7/10/2018 SR    2.) Pulmonary: Distant smoking history Quit 1985. No pulmonary dx, sx or meds.   3.)  Heme: DVT RLE x 2, both provoked per pt (one w/ pregnancy) -last one 3 1/2 years ago. No anticoagulation. Hx blood transfusion.  Elevated risk for DVT due to previous clot and likely cancer dx.  4.) Neuro: TIA in 1999, pt claims it was due to hypoglycemia. Fibromyalgia. BLE neuropathy.   5.) Endo: Insulin dependent diabetes since age 29. Fluctuates with poor control (HGBa1c =8.6%) and episodes of hypoglycemia requiring ER visits (glucose as low as 30 ). On short and long acting insulin., No oral agents. Hypothyroid on synthroid  Adjust Insulin dosing of long term as per nursing note.  HOLD short acting insulin  DOS  Take synthroid DOS  6.) GI: GERD, well managed  PONV score = 2 (2 or > antiemetic prophylaxis recommended.      Anesthesia; Multiple past surgeries (back x 3)- last GA umbilical hernia-[place] - no problems. Airway feasible.     I spent 30 minutes with patient, greater than 50% educating on preop meds, counseling on anesthesia and coordinating care for laparoscopy surgery  Pt optimized for surgery. AVS with information on surgery time/arrival time, meds and NPO status given by nursing staff[NM1.2]    Patient was discussed with[NM1.1] Dr Montelongo[NM1.2].    REHAN Vallecillo  Preoperative Assessment Center  [facility]  [facility]  Phone: [phone]  Fax: [phone][NM1.1]     Revision History        User Key Date/Time User Provider Type Action    > NM1.4 7/20/2018  7:23 AM Cecille Gutierrez APRN CNS  Clinical Nurse Specialist Sign     NM1.5 2018  7:22 AM Cecille Gutierrez APRN CNS Clinical Nurse Specialist      NM1.3 2018  4:23 PM Cecille Gutierrez APRN CNS Clinical Nurse Specialist      NM1.2 2018  4:17 PM Cecille Gutierrez APRN CNS Clinical Nurse Specialist      NM1.1 2018  1:36 PM Cecille Gutierrez APRN CNS Clinical Nurse Specialist                   Discharge Summaries     No notes of this type exist for this encounter.         Consult Notes      Consults by Reggie Matta MD at 2018 10:22 AM     Author:  Reggie Matta MD Service:  Neuro ICU Author Type:  Resident    Filed:  2018  2:21 PM Date of Service:  2018 10:22 AM Creation Time:  2018 10:20 AM    Status:  Cosign Needed :  Reggie Matta MD (Resident)    Cosign Required:  Yes             [facility], [place]      Neurology Stroke Code    Patient Name: Afshan Cardona  : 1945 MRN#: [id number]    STROKE DATA     National Institutes of Health Stroke Scale (at presentation)  NIHSS done at:  time patient seen      Score    Level of consciousness:  (0)   Alert, keenly responsive    LOC questions:  (0)   Answers one question correctly    LOC commands:  (0)   Performs both tasks correctly    Best gaze:  (0)   Normal    Visual:  (0)   No visual loss    Facial palsy:  (0)   Normal symmetrical movements    Motor arm (left):  (0)   No drift    Motor arm (right):  (0)   No drift    Motor leg (left):  (0)   No drift    Motor leg (right):  (0)   No drift    Limb ataxia:  (0)   Absent    Sensory:  (0)   Normal- no sensory loss    Best language:  (0)   Normal- no aphasia    Dysarthria:  (0)   Mild to moderate dysarthria    Extinction and inattention:  (0)   No abnormality        NIHSS Total Score:  0           ASSESSMENT & RECOMMENDATONS     Stroke code activated due to confusion and concern for left sided weakness.  Confusion started around noon[HA1.1] yesterday[HA1.2]. She was neurologically normal prior to this. She is POD #[HA1.1]2[HA1.2] from exploratory laparotomy for biopsy of gynecologic tumors. Exam[HA1.1] today was normal apart form sciatic pain upon raising her right lower extremity. No[HA1.2] focal neurologic deficit.[HA1.1] Her mental status is better. She is alert, oriented to p/p/t. We do not thinks she has stroke based on the examination and we will sign off to the Gyn/Onc team[HA1.2].    If you have any concern regarding encephalopathy please contact the general neurology team[HA1.3]     The patient was discussed with the Fellow, Dr. Weston.  The staff is Dr. Brown.[HA1.1]    Reggie Matta MD[HA1.4]          Revision History        User Key Date/Time User Provider Type Action    > HA1.3 2018  2:21 PM Reggie Matta MD Resident Sign     HA1.2 2018 10:47 AM Reggie Matta MD Resident Sign     HA1.4 2018 10:21 AM Reggie Matta MD Resident      HA1.1 2018 10:20 AM Reggie Matta MD Resident             Consults by Bry Martines MD at 2018  7:20 PM     Author:  Bry Martines MD Service:  Neuro ICU Author Type:  Resident    Filed:  2018  8:04 PM Date of Service:  2018  7:20 PM Creation Time:  2018  7:20 PM    Status:  Cosign Needed :  Bry Martines MD (Resident)    Cosign Required:  Yes             [facility], [place]      Neurology Stroke Code    Patient Name: Afshan Cardona  : 1945 MRN#: [id number]    STROKE DATA     Stroke Code:  Time called:  2018 1901  Time patient seen:[NM1.1]  2018[NM1.2] 1903  Onset of symptoms:[NM1.1]  2018[NM1.2] 1200  Last known normal (pt's baseline):[NM1.1]  2018[NM1.2] 1200  Head CT read by[NM1.1] Dr. Weston[NM1.3] at:[NM1.1]  2018[NM1.4] 1925  Stroke Code de-escalated at[NM1.3]  07/25/2018[NM1.5] 1929 after discussion with Dr. Weston due to symptoms not likely caused by stroke.[NM1.3]      TPA treatment:  Not given due to outside the time window, minor / isolated / quickly resolving stroke symptoms.    National Institutes of Health Stroke Scale (at presentation)  NIHSS done at:  time patient seen      Score    Level of consciousness:  (0)   Alert, keenly responsive    LOC questions:  (1)   Answers one question correctly    LOC commands:  (0)   Performs both tasks correctly    Best gaze:  (0)   Normal    Visual:  (0)   No visual loss    Facial palsy:  (0)   Normal symmetrical movements    Motor arm (left):  (0)   No drift    Motor arm (right):  (0)   No drift    Motor leg (left):  (0)   No drift    Motor leg (right):  (0)   No drift    Limb ataxia:  (0)   Absent    Sensory:  (0)   Normal- no sensory loss    Best language:  (0)   Normal- no aphasia    Dysarthria:  (1)   Mild to moderate dysarthria    Extinction and inattention:  (0)   No abnormality        NIHSS Total Score:  2           ASSESSMENT & RECOMMENDATONS     Stroke code activated due to confusion and concern for left sided weakness. Confusion started around noon today. She was neurologically normal prior to this. She is POD #1 from exploratory laparotomy for biopsy of gynecologic tumors.[NM1.1] Exam with slurred speech and no focal neurologic deficit. She appears mildly encephalopathic. Low suspicion for stroke causing her encephalopathy. Would consider other systemic causes that could contribute to toxic/metabolic encephalopathy including medication effect, infection.[NM1.3]    Recommendations:[NM1.1]   -No further imaging from neurologic standpoint  -Will do follow up exam in the morning[NM1.3]    The patient[NM1.1] will be managed by the gynecologic oncology team and  followed by the Stroke Consult service[NM1.3]    The patient was discussed with the Fellow, [NM1.1] Enrico[NM1.3].  The staff is [NM1.1]  Ezzeddine[NM1.3].    Bry Martines MD[NM1.1]            Revision History        User Key Date/Time User Provider Type Action    > NM1.5 7/25/2018  8:04 PM Bry Martines MD Resident Sign     NM1.4 7/25/2018  8:01 PM Bry Martines MD Resident      NM1.3 7/25/2018  7:49 PM Bry Martines MD Resident      NM1.2 7/25/2018  7:21 PM Bry Martines MD Resident      NM1.1 7/25/2018  7:20 PM Bry Martines MD Resident                      Progress Notes - Physician (Notes for yesterday and today)      Progress Notes by Ranjana Collier MD at 7/31/2018  6:06 AM     Author:  Ranjana Collier MD Service:  Gyn/Onc Author Type:  Physician    Filed:  7/31/2018  9:20 AM Date of Service:  7/31/2018  6:06 AM Creation Time:  7/31/2018 12:27 AM    Status:  Addendum :  Ranjana Collier MD (Physician)         Gynecologic Oncology Progress Note  7/31/2018    24 hour events:  - Hypoglycemia to 37, 30g glucose gel + juice/crackers, Tresiba decreased to 10units qHS  - Hypertensive, received IV hydralazine 5mg x2, increased carvedilol to 25mg BID    S: Patient reports[SB1.1] feeling well this AM[SB1.2]. Pain[SB1.1] well controlled with taking oxycodone q6hr rather than q3hr[SB1.2]. Ambulating, no trouble voiding. Is having flatus. Denies chest pain, SOB, HA.    O:[SB1.1]  Patient Vitals for the past 24 hrs:   BP Temp Temp src Pulse Heart Rate Resp SpO2 Weight   07/31/18 0200 148/49 98.6  F (37  C) Oral 89 - - 96 % -   07/31/18 0100 - - - 75 - - 97 % -   07/30/18 2231 145/67 99.1  F (37.3  C) Oral - 78 16 90 % -   07/30/18 2133 153/65 - - - 80 - - -   07/30/18 2008 175/81 - - - - - - -   07/30/18 1639 182/80 - - - 80 - - -   07/30/18 1500 187/81 98.9  F (37.2  C) Oral - 81 16 95 % -   07/30/18 1335 170/75 98.6  F (37  C) Oral - 79 19 97 % -   07/30/18 1220 181/83 - - - 75 - - -   07/30/18 1207 175/76 97.6  F (36.4  C) Oral - 77 16 99 % -   07/30/18 1006 - - - - - - - 72.5 kg  (159 lb 14.4 oz)   07/30/18 0721 147/49 - - - 76 - 99 % -   07/30/18 0700 189/72 - - - - - - -   07/30/18 0650 195/83 - - - 78 - - -   07/30/18 0646 187/83 - - 77 - - - -   07/30/18 0642 175/75 - - 76 - - - -   07/30/18 0635 190/82 - - 75 - - - -   07/30/18 0618 (!) 204/93 - - 81 - - 93 % -   07/30/18 0615 (!) 206/97 96.2  F (35.7  C) Oral 78 78 16 92 % -[SB1.3]     Exam:   Gen: resting in bed, A&Ox4  Cardio: RRR  Resp: CTAB[SB1.1] anteriorly[SB1.2]  Abdomen: soft,[SB1.1] nontender, mildly distended, mass palpated in LLQ[SB1.2]  Incision: clean, dry, and intact w/ overlying steristrips  Extremities: BLEs non-tender, trace edema      A: 73 year old POD#7 s/p exploratory laparotomy, lysis of adhesions, and sigmoid epiploica biopsy, post-op course complicated by chronic urinary retention, labile blood sugar, and hypertension.    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 Chronic hyponatremia, encouraging improved PO intake  Pain:[SB1.1] S[CR1.1]cheduled tylenol, PRN ibuprofen. Oxycodone 2.5-5mg. Home gabapentin 900mg TID, lidocaine patches. MS Contin 15 BID (held)  Heme: Stable acute blood loss anemia[SB1.1], asymptomatic[CR1.1]  -[SB1.1]  Remote history of DVT but not on anticoagulation prior to surgery[CR1.1]  CV: HLD, statin held.[SB1.1] Poorly controlled[CR1.1] HTN[SB1.1].  Increased[CR1.1] carvedilol to 25mg BID, continue losartan 100mg qHS   - Cardiomyopathy[SB1.1] with baseline EF of 60%.  No issues this admission[CR1.1]  Pulm:[SB1.1] No issues[CR1.1].   GI:[SB1.1] No issues[CR1.1]  :[SB1.1] Chronic u[CR1.1]rinary retention[SB1.1] being well managed with[CR1.1] timed voids.[SB1.1] Will arrange urology f/u as an outpatient[CR1.1]  ID:[SB1.1] Cont home prophylactic keflex for urinary retention[CR1.1]  Endo: DM1[SB1.1] with labile glucose control.[CR1.1]  Tresiba 16U  decreased to 10U last night due to hypoglycemic episode yesterday AM, very low SSI. Hypothyroid - synthroid  Psych/Neuro: Fibromyalgia - cymbalta 60mg qHS.   MSK: PT/OT  PPX: SCDs, IS, lovenox  Dispo: Discharge to TCU today    Mya Garcia MD PGY3  Gyn onc Pager: [phone][SB1.1]    I, Evans Arndt personally examined and evaluated this patient on 07/31/18.  I discussed the patient with the resident and care team, and agree with the assessment and plan of care as documented in the residents note above.    I personally reviewed vital signs, laboratory values and imaging results.    Pt post-op from open and close for ovarian cancer.  Plan for neoadjuvant chemotherapy as an outpatient.  Post-operative cares complicated by labile BG with continued changes in her BG meds as well as poorly controlled HTN with improvement with changes in her medications.  Plan discharge to TCU today.    Ranjana Arndt MD  Gynecologic Oncology  [facility][CR1.1]       Revision History        User Key Date/Time User Provider Type Action    > CR1.1 7/31/2018  9:20 AM Ranjana Collier MD Physician Addend     SB1.3 7/31/2018  6:07 AM Mya Garcia MD Resident Sign     SB1.2 7/31/2018  6:04 AM Mya Garcia MD Resident      SB1.1 7/31/2018 12:27 AM Mya Garcia MD Resident             Progress Notes by Mya Garcia MD at 7/30/2018  5:39 AM     Author:  Mya Garcia MD Service:  Gyn/Onc Author Type:  Resident    Filed:  7/30/2018  5:42 AM Date of Service:  7/30/2018  5:39 AM Creation Time:  7/29/2018 10:17 PM    Status:  Signed :  Mya Garcia MD (Resident)         Gynecologic Oncology Progress Note  7/30/2018    POD#6  Dz: High grade serous ovarian cancer    24 hour events:  - Shepard removed, timed voids q4hr  - Pain @ incision, added lidocaine patch    S: Patient reports[SB1.1] she is not doing well because of pain -- reports gas  pains[SB1.2]. Pain[SB1.1] in abdomen improved where lidocaine patch applied on the right side, now feels left side is more pain relatively[SB1.2]. Ambulating, no trouble voiding. Is having flatus, BM last night.    O:[SB1.1]  Patient Vitals for the past 24 hrs:   BP Temp Temp src Pulse Heart Rate Resp SpO2 Weight   18 2159 161/72 99.2  F (37.3  C) Oral 75 - 14 92 % -   18 1950 143/68 - - - - - - -   18 1751 178/84 - - 70 - - 95 % -   18 1509 141/57 98.8  F (37.1  C) Oral 81 - 14 90 % -   18 1000 - - - - - - - 73 kg (161 lb)   18 0620 162/70 96.7  F (35.9  C) Oral - 85 18 94 % -[SB1.2]     Exam:   Gen: resting in bed, A&Ox[SB1.1]4[SB1.2]  Cardio: RRR  Resp: CTAB throughout posterior fields  Abdomen: soft, mildly tender to palpation[SB1.1] in LLQ[SB1.2]  Incision: clean, dry, and intact w/ overlying steristrips  Extremities: BLEs non-tender,[SB1.1] trace[SB1.2] edema    Weight: 147lb () >161lb ()[SB1.1]    I/O last 3 completed shifts:  In:  [P.O.:]  Out: 1655 [Urine:1655][SB1.2]   Since MN:[SB1.3] 400[SB1.2] urine[SB1.3]    Labs:  Glucose 1[SB1.1]03[SB1.4]-[SB1.1]20[SB1.5]1  BCx no growth x[SB1.1]5[SB1.3]d    A: 73 year old POD#6 s/p exploratory laparotomy, lysis of adhesions, and sigmoid epiploica biopsy, post op course c/b delirium which is now improved, and suspected chronic urinary retention.    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 S/p hysterectomy at 44yo.   FEN: Regular diet. Chronic hyponatremia, encouraging improved PO intake  Pain: S/p TAPS, scheduled tylenol, PRN ibuprofen. Oxycodone 2.5-5mg. Avoiding excess narcotics d/t delirium. Home gabapentin 900mg TID restarted[SB1.1] and lidocaine patches added[SB1.2]. MS Contin 15 BID (held)  Heme: Stable acute blood loss anemia following EBL of 100 and txf of 2u pRBC, Hgb 10.8 ()  -  H/o DVT s/p   CV: HLD, statin held. HTN, home  carvedilol & losartan.  - Cardiomyopathy, EF 60% (4/2017), pre-op stress test positive, s/p angio w/ improved disease (50-60% occlusion RCA & LAD) and quoted 6.6% perioperative cardiac risk. S/p telemetry, trop wnl x3. Weight up 4lg yesterday w/ LE edema, s/p 10g lasix,[SB1.1] follow up weight[SB1.2] today[SB1.1] with possible second lasix dose[SB1.2]  Pulm: 2-4mm perivascular nodules of RML & RUL.   GI: Sched senna-S, fiber  : Urinary retention, duffy replaced POD #3-4, now timed voids  ID: S/p 2D IV ceftriaxone for presumed UTI, urine culture negative. Tm/Tl 101.5 (7/25 @ 1900).  Blood cultures NGx4d. Home ppx keflex restarted.  Endo: DM1, A1C 9.4 - Tresiba 16U qHS, very low SSI. Hypothyroid - synthroid  Psych/Neuro: Fibromyalgia - cymbalta 60mg qHS. AMS likely due to delirium, improved w/ limiting narcotics  MSK: PT/OT  PPX: SCDs, IS, lovenox  Dispo: Meets criteria to discharge medically, awaiting TCU placement[SB1.1]    Mya Garcia[SB1.6] MD PGY3  Gyn onc Pager: [phone][SB1.1]     Revision History        User Key Date/Time User Provider Type Action    > SB1.2 7/30/2018  5:42 AM Mya Garcia MD Resident Sign     SB1.3 7/30/2018  4:03 AM Mya Garcia MD Resident      SB1.4 7/30/2018  3:40 AM Mya Garcia MD Resident      SB1.5 7/30/2018 12:02 AM Mya Garcia MD Resident      SB1.6 7/29/2018 10:37 PM Mya Garcia MD Resident      SB1.1 7/29/2018 10:17 PM Mya Garcia MD Resident                   Procedure Notes     No notes of this type exist for this encounter.      Progress Notes - Therapies (Notes from 07/28/18 through 07/31/18)     No notes of this type exist for this encounter.                                          INTERAGENCY TRANSFER FORM - LAB / IMAGING / EKG / EMG RESULTS   7/24/2018                       UNIT [address] [facility] BANK: [phone]            Unresulted Labs     None         Lab Results - 3 Days      Glucose by meter  [[id number]] (Abnormal)  Resulted: 07/31/18 1046, Result status: Final result    Ordering provider: Bakari Galeas MD  07/31/18 1030 Resulting lab: POINT OF CARE TEST, GLUCOSE    Specimen Information    Type Source Collected On     07/31/18 1030          Components       Value Reference Range Flag Lab   Glucose 172 70 - 99 mg/dL H 170   Comment:  Dr/RN Notified            Glucose by meter [[id number]] (Abnormal)  Resulted: 07/31/18 0857, Result status: Final result    Ordering provider: Bakari Galeas MD  07/31/18 0755 Resulting lab: POINT OF CARE TEST, GLUCOSE    Specimen Information    Type Source Collected On     07/31/18 0755          Components       Value Reference Range Flag Lab   Glucose 112 70 - 99 mg/dL H 170            Glucose by meter [[id number]]  Resulted: 07/31/18 0857, Result status: Final result    Ordering provider: Bakari Galeas MD  07/31/18 0735 Resulting lab: POINT OF CARE TEST, GLUCOSE    Specimen Information    Type Source Collected On     07/31/18 0735          Components       Value Reference Range Flag Lab   Glucose 72 70 - 99 mg/dL  170            Glucose by meter [[id number]] (Abnormal)  Resulted: 07/31/18 0732, Result status: Final result    Ordering provider: Bakari Galeas MD  07/31/18 0710 Resulting lab: POINT OF CARE TEST, GLUCOSE    Specimen Information    Type Source Collected On     07/31/18 0710          Components       Value Reference Range Flag Lab   Glucose 63 70 - 99 mg/dL L 170            Basic metabolic panel [[id number]] (Abnormal)  Resulted: 07/31/18 0624, Result status: Final result    Ordering provider: Juanita Jean-Baptiste APRN CNP  07/30/18 2300 Resulting lab: [facility]    Specimen Information    Type Source Collected On   Blood  07/31/18 0515          Components       Value Reference Range Flag Lab   Sodium 132 133 - 144 mmol/L L 51   Potassium 3.7 3.4 - 5.3 mmol/L  51   Chloride 99 94 - 109 mmol/L  51   Carbon  Dioxide 26 20 - 32 mmol/L  51   Anion Gap 6 3 - 14 mmol/L  51   Glucose 105 70 - 99 mg/dL H 51   Urea Nitrogen 8 7 - 30 mg/dL  51   Creatinine 0.57 0.52 - 1.04 mg/dL  51   GFR Estimate >90 >60 mL/min/1.7m2  51   Comment:  Non  GFR Calc   GFR Estimate If Black >90 >60 mL/min/1.7m2  51   Comment:  African American GFR Calc   Calcium 7.2 8.5 - 10.1 mg/dL L 51            Blood culture [[id number]]  Resulted: 07/31/18 0259, Result status: Final result    Ordering provider: Sushma Henderson MD  07/25/18 1923 Resulting lab: [facility]    Specimen Information    Type Source Collected On   Blood  07/25/18 2000   Comment:  Right Hand          Components       Value Reference Range Flag Lab   Specimen Description Blood Right Hand      Special Requests Received in aerobic bottle only   75   Culture Micro No growth   75            Blood culture [[id number]]  Resulted: 07/31/18 0259, Result status: Final result    Ordering provider: Sushma Henderson MD  07/25/18 1923 Resulting lab: [facility]    Specimen Information    Type Source Collected On   Blood  07/25/18 2011   Comment:  Left Arm          Components       Value Reference Range Flag Lab   Specimen Description Blood Left Arm      Special Requests Received in aerobic bottle only   75   Culture Micro No growth   75            Glucose by meter [[id number]] (Abnormal)  Resulted: 07/31/18 0219, Result status: Final result    Ordering provider: Bakari Galeas MD  07/31/18 0200 Resulting lab: POINT OF CARE TEST, GLUCOSE    Specimen Information    Type Source Collected On     07/31/18 0200          Components       Value Reference Range Flag Lab   Glucose 147 70 - 99 mg/dL H 170   Comment:  /RN Notified            Glucose by meter [[id number]]  Resulted: 07/31/18 0123, Result status: Final result    Ordering provider: Bakari Galeas MD  07/31/18 0110 Resulting lab: POINT OF CARE TEST, GLUCOSE     Specimen Information    Type Source Collected On     07/31/18 0110          Components       Value Reference Range Flag Lab   Glucose 97 70 - 99 mg/dL  170            Basic metabolic panel [[id number]] (Abnormal)  Resulted: 07/30/18 0821, Result status: Final result    Ordering provider: Mya Garcia MD  07/30/18 0702 Resulting lab: [facility]    Specimen Information    Type Source Collected On   Blood  07/30/18 0740          Components       Value Reference Range Flag Lab   Sodium 130 133 - 144 mmol/L L 51   Potassium 3.7 3.4 - 5.3 mmol/L  51   Chloride 96 94 - 109 mmol/L  51   Carbon Dioxide 28 20 - 32 mmol/L  51   Anion Gap 7 3 - 14 mmol/L  51   Glucose 254 70 - 99 mg/dL H 51   Urea Nitrogen 9 7 - 30 mg/dL  51   Creatinine 0.62 0.52 - 1.04 mg/dL  51   GFR Estimate >90 >60 mL/min/1.7m2  51   Comment:  Non  GFR Calc   GFR Estimate If Black >90 >60 mL/min/1.7m2  51   Comment:  African American GFR Calc   Calcium 7.4 8.5 - 10.1 mg/dL L 51            Glucose by meter [[id number]] (Abnormal)  Resulted: 07/29/18 2222, Result status: Final result    Ordering provider: Bakari Galeas MD  07/29/18 2157 Resulting lab: POINT OF CARE TEST, GLUCOSE    Specimen Information    Type Source Collected On     07/29/18 2157          Components       Value Reference Range Flag Lab   Glucose 201 70 - 99 mg/dL H 170   Comment:  /RN Notified            Glucose by meter [[id number]] (Abnormal)  Resulted: 07/29/18 1814, Result status: Final result    Ordering provider: Bakari Galeas MD  07/29/18 1754 Resulting lab: POINT OF CARE TEST, GLUCOSE    Specimen Information    Type Source Collected On     07/29/18 1754          Components       Value Reference Range Flag Lab   Glucose 125 70 - 99 mg/dL H 170            Surgical pathology exam [[id number]]  Resulted: 07/29/18 1747, Result status: Final result    Ordering provider: Bakari Galeas MD  07/24/18 1119 Resulting  lab: [facility]    Specimen Information    Type Source Collected On   Tissue Mesentary 07/24/18 1119   Tissue Large Intestine, Sigmoid 07/24/18 1131          Components       Value Reference Range Flag Lab   Copath Report --      Result:         Patient Name: KEYLA FRIAS  MR#: [id number]  Specimen #: [id number]  Collected: 7/24/2018  Received: 7/24/2018  Reported: 7/29/2018 17:45  Ordering Phy(s): JEANNIE CONDON    For improved result formatting, select 'View Enhanced Report Format' under   Linked Documents section.    SPECIMEN(S):  A: Jejunum mesentery  B: Sigmoid epiploic biopsy    FINAL DIAGNOSIS:  A: Jejunal mesentery, biopsy:  - Granulation tissue with vascular congestion, partially surfaced by   mesothelium  - Negative for malignancy    B: Sigmoid epiploic appendage, biopsy:  - Adipose tissue with fat necrosis and lymphoid aggregates, surfaced by   mesothelium  - Negative for malignancy    I have personally reviewed all specimens and/or slides, including the   listed special stains, and used them  with my medical judgement to determine or confirm the final diagnosis.    Electronically signed out by:    Annabel Cartwright M.D., [facility]    CLINICAL HISTORY:  The patient is a 73 year old woman, with a pelvic mass , evaluated with   laparoscopy and biopsy of a pelvic  nodule on 7/6/18 (high grade serous carcinoma, see report [id number]).   Clinical diagnosis: stage IIIC ovarian  carcinoma. Operative procedure: Exploratory Laparotomy, lysis of   adhesions, jejunal mesentery biopsy and  sigmoid epiploic biopsy.  Operative findings: 15cm pelvic mass adherent to   bladder and right pelvic sidewall,  with involvement of right pelvic sidewall. Mass lesions extend to serosa   of rectum and within 3.5cm of the  anal verge as well as up to descending colon nearly to the level of the   splenic flexure. Single lesion removed  from the small bowel mesentery. Normal appendix. No lesions noted on   bilateral hemidiaphragm  "or liver capsule.  Mild omental nodularity. Intermittent hypotension.  GROSS:  A: The specimen is received in formalin with proper patient   identification, labeled \"jejunal mesentery\".  The  specimen consists of a 1.3 x 0.7 x 0.4 cm red-brown well encapsulated   portion of soft tissue.  The r esection  margin is inked black.  The specimen is entirely submitted in cassette A1.    B: The specimen is received in formalin with proper patient   identification, labeled \"sigmoid epiploic biopsy\".   The specimen consists of a 2.2 x 1.2 x 0.4 cm portion of yellow,   lobulated adipose tissue with a smooth and  glistening serosal surface.  The resection margin is inked black.  Diffuse   areas of white discoloration are on  the surface.  The specimen is sectioned perpendicular to the margin, to   show a focally firm cut surface.  The  specimen is entirely submitted in cassette B1. (Dictated by: Vera Pena   [facility] 7/25/2018 10:13 AM)    MICROSCOPIC:  Microscopic examination is performed.    CPT Codes:  A: 48932-SW9  B: 51098-GV2    TESTING LAB LOCATION:  [facility], [address]   [address]  [phone]    COLLECTION SITE:  Client: General acute hospital  Location: [facility] (B)     Resident  [id number]              Glucose by meter [[id number]] (Abnormal)  Resulted: 07/29/18 1227, Result status: Final result    Ordering provider: Bakari Galeas MD  07/29/18 1213 Resulting lab: POINT OF CARE TEST, GLUCOSE    Specimen Information    Type Source Collected On     07/29/18 1213          Components       Value Reference Range Flag Lab   Glucose 126 70 - 99 mg/dL H 170            Glucose by meter [[id number]] (Abnormal)  Resulted: 07/29/18 0801, Result status: Final result    Ordering provider: Bakari Galeas MD  07/29/18 0709 Resulting lab: POINT OF CARE TEST, GLUCOSE    Specimen Information    Type Source Collected On     " 07/29/18 0741          Components       Value Reference Range Flag Lab   Glucose 171 70 - 99 mg/dL H 170            Glucose by meter [[id number]] (Abnormal)  Resulted: 07/29/18 0548, Result status: Final result    Ordering provider: Bakari Galeas MD  07/29/18 0536 Resulting lab: POINT OF CARE TEST, GLUCOSE    Specimen Information    Type Source Collected On     07/29/18 0536          Components       Value Reference Range Flag Lab   Glucose 207 70 - 99 mg/dL H 170            Glucose by meter [[id number]] (Abnormal)  Resulted: 07/29/18 0239, Result status: Final result    Ordering provider: Bakari Galeas MD  07/29/18 0226 Resulting lab: POINT OF CARE TEST, GLUCOSE    Specimen Information    Type Source Collected On     07/29/18 0226          Components       Value Reference Range Flag Lab   Glucose 154 70 - 99 mg/dL H 170            Glucose by meter [[id number]] (Abnormal)  Resulted: 07/29/18 0239, Result status: Final result    Ordering provider: Bakari Galeas MD  07/29/18 0213 Resulting lab: POINT OF CARE TEST, GLUCOSE    Specimen Information    Type Source Collected On     07/29/18 0213          Components       Value Reference Range Flag Lab   Glucose 56 70 - 99 mg/dL L 170            Glucose by meter [[id number]] (Abnormal)  Resulted: 07/29/18 0239, Result status: Final result    Ordering provider: Bakari Galeas MD  07/29/18 0201 Resulting lab: POINT OF CARE TEST, GLUCOSE    Specimen Information    Type Source Collected On     07/29/18 0201          Components       Value Reference Range Flag Lab   Glucose 57 70 - 99 mg/dL L 170            Glucose by meter [[id number]] (Abnormal)  Resulted: 07/29/18 0239, Result status: Final result    Ordering provider: Bakari Galeas MD  07/29/18 0146 Resulting lab: POINT OF CARE TEST, GLUCOSE    Specimen Information    Type Source Collected On     07/29/18 0146          Components       Value Reference Range Flag Lab   Glucose 60 70 - 99 mg/dL L  170            Glucose by meter [[id number]]  Resulted: 07/28/18 2223, Result status: Final result    Ordering provider: Bakari Galeas MD  07/28/18 2207 Resulting lab: POINT OF CARE TEST, GLUCOSE    Specimen Information    Type Source Collected On     07/28/18 2207          Components       Value Reference Range Flag Lab   Glucose 81 70 - 99 mg/dL  170   Comment:  Dr/RN Notified            Glucose by meter [[id number]] (Abnormal)  Resulted: 07/28/18 1728, Result status: Final result    Ordering provider: Bakari Galeas MD  07/28/18 1650 Resulting lab: POINT OF CARE TEST, GLUCOSE    Specimen Information    Type Source Collected On     07/28/18 1650          Components       Value Reference Range Flag Lab   Glucose 110 70 - 99 mg/dL H 170   Comment:  /RN Notified            Glucose by meter [[id number]] (Abnormal)  Resulted: 07/28/18 1243, Result status: Final result    Ordering provider: Bakari Galeas MD  07/28/18 1225 Resulting lab: POINT OF CARE TEST, GLUCOSE    Specimen Information    Type Source Collected On     07/28/18 1225          Components       Value Reference Range Flag Lab   Glucose 129 70 - 99 mg/dL H 170            Glucose by meter [[id number]]  Resulted: 07/28/18 0853, Result status: Final result    Ordering provider: Bakari Galeas MD  07/28/18 0831 Resulting lab: POINT OF CARE TEST, GLUCOSE    Specimen Information    Type Source Collected On     07/28/18 0831          Components       Value Reference Range Flag Lab   Glucose 93 70 - 99 mg/dL  170            Basic metabolic panel [[id number]] (Abnormal)  Resulted: 07/28/18 0536, Result status: Final result    Ordering provider: Mya Garcia MD  07/27/18 2300 Resulting lab: [facility]    Specimen Information    Type Source Collected On   Blood  07/28/18 0500          Components       Value Reference Range Flag Lab   Sodium 131 133 - 144 mmol/L L 51   Potassium 3.5 3.4 - 5.3 mmol/L  51    Chloride 99 94 - 109 mmol/L  51   Carbon Dioxide 23 20 - 32 mmol/L  51   Anion Gap 8 3 - 14 mmol/L  51   Glucose 144 70 - 99 mg/dL H 51   Urea Nitrogen 8 7 - 30 mg/dL  51   Creatinine 0.49 0.52 - 1.04 mg/dL L 51   GFR Estimate >90 >60 mL/min/1.7m2  51   Comment:  Non  GFR Calc   GFR Estimate If Black >90 >60 mL/min/1.7m2  51   Comment:  African American GFR Calc   Calcium 6.9 8.5 - 10.1 mg/dL L 51            CBC with platelets [[id number]] (Abnormal)  Resulted: 07/28/18 0514, Result status: Final result    Ordering provider: Mya Garcia MD  07/27/18 2300 Resulting lab: [facility]    Specimen Information    Type Source Collected On   Blood  07/28/18 0500          Components       Value Reference Range Flag Lab   WBC 12.4 4.0 - 11.0 10e9/L H 51   RBC Count 3.67 3.8 - 5.2 10e12/L L 51   Hemoglobin 10.8 11.7 - 15.7 g/dL L 51   Hematocrit 32.1 35.0 - 47.0 % L 51   MCV 88 78 - 100 fl  51   MCH 29.4 26.5 - 33.0 pg  51   MCHC 33.6 31.5 - 36.5 g/dL  51   RDW 13.4 10.0 - 15.0 %  51   Platelet Count 292 150 - 450 10e9/L  51            Glucose by meter [[id number]] (Abnormal)  Resulted: 07/28/18 0216, Result status: Final result    Ordering provider: Bakari Galeas MD  07/28/18 0200 Resulting lab: POINT OF CARE TEST, GLUCOSE    Specimen Information    Type Source Collected On     07/28/18 0200          Components       Value Reference Range Flag Lab   Glucose 171 70 - 99 mg/dL H 170            Testing Performed By     Lab - Abbreviation Name Director Address Valid Date Range    51 - Unknown [facility] Unknown [address] [facility] [id number] 12/31/14 1010 - Present    75 - Unknown [facility] Unknown [address] [facility] [address] 01/15/15 1019 - Present    88 - Unknown [facility] Unknown Unknown 10/30/02 0000 - Present    170 - Unknown POINT OF CARE TEST, GLUCOSE Unknown Unknown 10/31/11 1114 -  Present            Encounter-Level Documents:     There are no encounter-level documents.      Order-Level Documents:     There are no order-level documents.

## 2018-07-24 NOTE — OP NOTE
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  - Wound Class: II-Clean Contaminated   - Wound Class: II-Clean Contaminated     Surgeon:                   Surgeon(s) and Role:     * Bakari Galeas MD - Primary    Evelia Goel, Fellow - Assist    Mya Garcia, PGY3 - Assist    Clint Alanis, MS4 - Assist     Anesthesia:               Combined General with Block                        Estimated blood loss:            100 ml  IVF: 2250mL crystalloid + 500 mL albumin  UOP: 575 mL  Drains:           None  Specimens: Sigmoid epiploic biopsy     Findings:   Approx 15cm pelvic mass adherent to bladder and right pelvic sidewall, with involvement of right pelvic sidewall. Mass lesions extend to serosa of rectum and within 3.5cm of the anal verge as well as up to descending colon nearly to the level of the splenic flexure. Single lesion removed from the small bowel mesentery. Normal appendix. No lesions noted on bilateral hemidiaphragm or liver capsule. Mild omental nodularity, no definitive metastatic disease noted.     Complications: Intermittent hypotension  Implants: None.    Indications: Ms. Cardona is a 72yo with high-grade serous ovarian cancer based biopsy from diagnostic laparoscopy on 7/6/18 where she was noted to have likely stage IIIC disease based on a 2 cm mass above the pelvic brim. She had a prior hysterectomy at age 43 for benign disease. Her ovarian cancer was deemed likely resectable, however, it was not done at the time of the diagnostic laparoscopy as she had had a positive stress test preoperatively and needed further workup. Following further workup she was deemed stable from a  cardiac standpoint and thus optimized for surgery. She was recommended to undergo a exploratory laparotomy with possible bilateral salpingo-oophorectomy, pelvic and para-aortic lymph node dissection, omentectomy, cancer debulking, bowel resection, and cystoscopy.  Following discussion of risks, benefits, and alternatives she agreed to proceed.  Written informed consent was signed.    Technique: She was taken to the OR where adequate general anesthesia was achieved, she was intubated, positioned in dorsal lithotomy.  She was prepped and draped in the usual sterile fashion.  Shepard was placed.  Vertical midline incision was made from 4 cm above the umbilicus to the pubic symphysis and carried down to the fascia using cautery.  The fascia was elevated and incised using cautery.  The peritoneum was elevated and entered sharply using Metzenbaum scissors.  The peritoneal incision was elevated and extended cephalad and caudad using cautery.  Diaphragm and liver edge were palpated.  Metastatic disease noted on the descending and sigmoid colon, extending to within 3.5 cm of the anal verge of the rectum. Omentum was inspected and the small bowel was run from the cecum to proximal jejunum with a single implant of the jejunal mesentery that was excised and sent to pathology.  Lysis of adhesions was performed with minimal scarring of omentum and sigmoid epiploica to the large pelvic mass.  A sigmoid epiploic biopsy was taken at the site of an adhesion.  The bowel was packed away with moist laparotomy sponges and the Bookwalter retractor placed. Minimal lysis of adhesions was performed between the bladder and pelvic mass, during which bleeding from a small vessel feeding the tumor was encountered and controlled with 2 surgical clips.  The peritoneum on the right pelvic sidewall was elevated and entered with cautery with the intent to resect a right pelvic sidewall lesion, however this was aborted when the extent of tumor  involvement was noted.  During this time the patient was intermittently hypotensive. At this time the procedure was aborted due to extensive involvement with possible invasion into the rectum requiring a prolonged procedure with large bowel resection and likely end colostomy in a medically complex patient. The Bookwalter retractor and laparotomy sponges were removed, the pelvis irrigated with 2 L of warm saline, with no ongoing bleeding at sites of lysis of adhesions or biopsies.  The small bowel was run again, with no evidence of injury.  The small bowel, large bowel, and omentum were replaced in anatomic position.  The fascia was then closed from the caudal and cranial apices using 0-looped PDS and tied in the middle.  The subcutaneous tissue was irrigated and noted to be hemostatic.  The subcutaneous tissue was closed with a single layer of 2-0 Vicryl.  The skin was closed with 3-0 Monocryl, benzoin and Steri-Strips applied. Sterile ABD was applied. The patient was repositioned into the dorsal supine position, extubated and transferred to PACU in stable condition.    Sponge, instrument, sharp count was correct ×2.      Bakari Galeas MD, MS    Department of Obstetrics and Gynecology   Division of Gynecologic Oncology   [facility]  Phone: [phone]

## 2018-07-24 NOTE — LETTER
Transition Communication Hand-off for Care Transitions to Next Level of Care Provider    Name: Afshan Cardona  : 1945  MRN #: [id number]  Primary Care Provider: Sonja Hathaway     Primary Clinic: [facility] [address] [place] [id number]     Reason for Hospitalization:  Personal History Of Ovarian Cancer   Ovarian cancer (H)  Admit Date/Time: 2018  7:45 AM  Discharge Date: 18  Payor Source: Payor: BCBS / Plan: BCBS PLATINUM BLUE / Product Type: PPO /     Readmission Assessment Measure (MAYRA) Risk Score/category: Elevated         Reason for Communication Hand-off Referral: Other Admitting to TCU    Discharge Plan:    Social Work Services Discharge Note      Patient Name:  Afshan Cardona     Anticipated Discharge Date:  18    Discharge Disposition:   TCU:  [facility]   [address] [place] [address]  Main: [phone], Fax: [phone]  Nurse to Nurse #: [phone]    Following MD:  Per facilities designation     Pre-Admission Screening (PAS) online form has been completed.  The Level of Care (LOC) is:  Determined  Confirmation Code is:  [id number]  Patient/caregiver informed of referral to [facility] for Pre-Admission Screening for skilled nursing facility (SNF) placement and to expect a phone call post discharge from SNF.     Additional Services/Equipment Arranged:  Pt's family will provide discharge transportation today.      Patient / Family response to discharge plan:  Pt and family are understanding and in agreement with this discharge plan.     Concern for non-adherence with plan of care:   Y/N N  Discharge Needs Assessment:  Needs       Most Recent Value    Equipment Currently Used at Home grab bar    Transportation Available car, family or friend will provide          Already enrolled in Tele-monitoring program and name of program:  N/A  Follow-up specialty is recommended: Yes    Follow-up plan:  Future Appointments  Date  Time Provider Department [place]   8/27/2018 11:40 AM Bakari Galeas MD [facility]       Any outstanding tests or procedures:        Referrals     Future Labs/Procedures    Occupational Therapy Adult Consult     Comments:    Evaluate and treat as clinically indicated.    Reason:  Post op weakness    Physical Therapy Adult Consult     Comments:    Evaluate and treat as clinically indicated.    Reason:  Post op weakness          ANGELA Oglesby, LGSW   Surgical Oncology Unit   Phone: [phone]  Pager: [phone]

## 2018-07-24 NOTE — BRIEF OP NOTE
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  - Wound Class: II-Clean Contaminated   - Wound Class: II-Clean Contaminated    Surgeon: Surgeon(s) and Role:     * Bakari Galeas MD - Primary    Evelia Goel, Fellow - Assist    Mya Garcia, PGY3 - Assist    Clint Alanis MS4 - Assist    Anesthesia: Combined General with Block   Estimated blood loss: 100 ml  IVF: 2250mL crystalloid + 500 mL albumin  UOP: 575 mL  Drains: None  Specimens: Sigmoid epiploic biopsy    Findings:   Approx 15cm pelvic mass adherent to bladder and right pelvic sidewall, with involvement of right pelvic sidewall. Mass lesions extend to serosa of rectum and within 3.5cm of the anal verge as well as up to descending colon nearly to the level of the splenic flexure. Single lesion removed from the small bowel mesentery. Normal appendix. No lesions noted on bilateral hemidiaphragm or liver capsule. Mild omental nodularity, no definitive metastatic disease noted.    Complications: Intermittent hypotension  Implants: None.    Mya Garcia MD PGY3

## 2018-07-24 NOTE — PROGRESS NOTES
Gynecologic Oncology Postoperative Check Note  2018    S: She reports some abdominal pain. Has had sips of water with no nausea. No vomiting. Has not been up yet. No dizziness, shortness of breath, chest pain. Some pain but overall well controlled. Waiting for a floor room to become available.     O:  Vitals:    18 1530 18 1600 18 1615 18 1630   BP: 118/66 132/62  131/64   Resp: 16 14  16   Temp:       TempSrc:       SpO2: 99% 97% 96% 93%   Weight:       Height:         Exam:   Gen: alert and oriented, resting in bed  Cardio: RRR, nl s1 and s2, no m/r/g  Resp: lungs CTAB anteriorly, no wheezes or crackles  Abdomen: soft, appropriately tender to palpation. Not distended, no guarding or rebound  Incision: Bandage with mild shadowing at superior aspect midline, boundaries marked   Extremities: BLEs non-tender with SCDs in place    I/O last 3 completed shifts:  In: . [I.V.:]  Out: 675 [Urine:575; Blood:100]     A: 73 year old POD#0 s/p exploratory laparotomy, lysis of adhesions, and sigmoid epiploica biopsy. Doing well postoperatively.    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 S/p hysterectomy at 42yo.   FEN: Regular diet, NS 100cc/hr  Pain: S/p TAPS, scheduled tylenol, PRN ibuprofen, oxycodone, dilaudid. Home gabapentin 900mg TID, MS Contin 15 BID  Heme: Hgb 10.2> > 11.2 intraop. Will check Hgb q6hr overnight with plan to transfuse for Hgb<10. H/o DVT s/p   CV: HLD, statin held. HTN, home carvedilol, losartan to start POD#1. Cardiomyopathy, EF 60% (2017), pre-op stress test positive, s/p angio w/ improved disease (50-60% occlusion RCA & LAD) and quoted 6.6% perioperative cardiac risk. Telemetry while inpatient, awaiting bed placement  Pulm: 2-4mm perivascular nodules of RML & RUL  GI: Sched senna-S, fiber  : Shepard. Cr 0.65  ID: Home Keflex qHS for UTI ppx  Endo: DM1, A1C 9.4 - Tresiba 16U  qHS, low SSI. Hypothyroid - synthroid  Psych/Neuro/MSK: Fibromyalgia - cymbalta 60mg qHS  PPX: SCDs, IS  Dispo: When meeting post-op goals, likely POD#2-3    Patient seen with Dr. Jose Alanis, MS4  [facility]     Patient wee with MS4, edits made as needed.    Mya Garcia MD PGY3  07/24/18 6:34 PM

## 2018-07-24 NOTE — LETTER
Health Information Management Services               Recipient:  Kayli/[facility]        Sender:  Romana GONZALEZ MSIZABELA [phone]        Date: July 30, 2018  Patient Name:  Afshna Cardona  Routing Message:  Please assess for TCU. Ready for discharge tomorrow.  Nursing Station #: [phone]. Thank you!          The documents accompanying this notice contain confidential information belonging to the sender.  This information is intended only for the use of the individual or entity named above.  The authorized recipient of this information is prohibited from disclosing this information to any other party and is required to destroy the information after its stated need has been fulfilled, unless otherwise required by state law.      If you are not the intended recipient, you are hereby notified that any disclosure, copying, distribution or action taken in reliance on the contents of these documents is strictly prohibited. If you have received this document in error, please notify [place] immediately at [phone].  You may return the document via fax ([phone]) or return mail  (Health Information Management, , [address] [place] [address]).

## 2018-07-24 NOTE — IP AVS SNAPSHOT
MRN:[id number]                      After Visit Summary   7/24/2018    Afshan Cardona    MRN: [id number]           Thank you!     Thank you for choosing [facility] for your care. Our goal is always to provide you with excellent care. Hearing back from our patients is one way we can continue to improve our services. Please take a few minutes to complete the written survey that you may receive in the mail after you visit with us. Thank you!        Patient Information     Date Of Birth          1945        Designated Caregiver       Most Recent Value    Caregiver    Will someone help with your care after discharge? no      About your hospital stay     You were admitted on:  July 24, 2018 You last received care in the:  [address] [facility] [facility]    You were discharged on:  July 31, 2018        Reason for your hospital stay       surgery                  Who to Call     For medical emergencies, please call 911.  For non-urgent questions about your medical care, please call your primary care provider or clinic, [phone]  For questions related to your surgery, please call your surgery clinic        Attending Provider     Provider Specialty    Bakari Galesa MD Obstetrics & Gynecology, Maternal & Fetal Medicine       Primary Care Provider Office Phone # Fax #    Sonja Hathaway [phone]      After Care Instructions     Activity - Up with nursing assistance           Advance Diet as Tolerated       Follow this diet upon discharge:       Moderate Consistent CHO Diet. Bedtime snack.            Daily weights       Call Provider for weight gain of more than 2 pounds per day or 5 pounds per week.            Encourage PO fluids           General info for SNF       Length of Stay Estimate: Short Term Care: Estimated # of Days <30  Condition at Discharge: Stable  Level of care:skilled   Rehabilitation Potential: Fair  Admission H&P remains valid and up-to-date: Yes      GENERAL  POST-OPERATIVE  PATIENT INSTRUCTIONS  FOLLOW-UP:    Call [phone] if you have:  Temperature greater than 100.4  Persistent nausea and vomiting  Severe uncontrolled pain  Redness, tenderness, or signs of infection (pain, swelling, redness, odor or green/yellow discharge around the site)  Difficulty breathing, headache or visual disturbances  Hives  Persistent dizziness or light-headedness  Extreme fatigue  Any other questions or concerns you may have after discharge    In an emergency, call 911 or go to an Emergency Department at a nearby hospital       WOUND CARE INSTRUCTIONS:  Keep a dry clean dressing on the wound if there is drainage. If you had a bandage initially, it may be removed after 24 hours.  Once the wound has quit draining you may leave it open to air.  If clothing rubs against the wound or causes irritation and the wound is not draining you may cover it with a dry dressing during the daytime.  Try to keep the wound dry and avoid ointments on the wound unless directed to do so.  If the wound becomes bright red and painful or starts to drain infected material that is not clear, please contact your physician immediately.    1.  You may shower 24 hrs after surgery   2.  No soaking in the tub for 4 weeks       DIET:  There are no dietary restrictions.  You may eat any foods that you can tolerate unless instructed otherwise.  It is a good idea to eat a high fiber diet and take in plenty of fluids to prevent constipation.  If you become constipated, please follow the instructions below.    ACTIVITY:  You are encouraged to cough, deep breath and use your incentive spirometer if you were given one, every 15-30 minutes when awake.  This will help prevent respiratory complications and low grade fevers post-operatively.  You may want to hug a pillow when coughing and sneezing to add additional support to the surgical area, if you had abdominal surgery, which will decrease pain during these times.      1.  No  heavy lifting >20lbs or strenuous exercise for six-eight weeks.  No exercise in which you are using core muscles (yoga, pilates, swimming, weight lifting)  2.  You may walk as much as you wish.  You are encouraged to increase your activity each day after surgery.  Stairs are okay.   3.  Nothing per vagina for eight weeks.  No tampons, no intercourse, no douching.  You can expect some light vaginal spotting and discharge for up to six weeks.  If bleeding becomes heavy, please contact the office.     MEDICATIONS:  Try to take narcotic medications and anti-inflammatory medications, such as tylenol, ibuprofen, naprosyn, etc., with food.  This will minimize stomach upset from the medication.  Should you develop nausea and vomiting from the pain medication, or develop a rash, please discontinue the medication and contact your physician.  You should not drive, make important decisions, or operate machinery when taking narcotic pain medication.    OTHER:  Patients are often constipated after general anesthesia and surgery.  The patient should continue to take stool softeners (for example, Senokot-S) for the next six weeks (unless diarrhea develops) and consume adequate amounts of water.  If the patient remains constipated or unable to pass stool, please try one or all of the following measures:  1.  Milk of Magnesia 30cc twice a day as needed by mouth  2.  Metamucil 2 tablespoons in 12 ounces of fluid  3.  Dulcolax oral or suppositories  4.  Prunes or prune juice  5.  Miralax daily      QUESTIONS:  Please feel free to call your physician or the hospital  if you have any questions, and they will be glad to assist you.      Recent Chemotherapy: N/A patient to start in 1-2 weeks  Use Nursing Home Standing Orders: No            Glucose monitor nursing POCT       Before meals and at bedtime and 0200 am  TCU medical provider to monitor glucose and adjust insulin            Intake and output       Every shift             "Mantoux instructions       Give two-step Mantoux (PPD) Per Facility Policy Yes            Wound care (specify)       Site:   Midline abd wound  Instructions:  Clean BID with saline or soap and water. Pat dry and keep open to air. Patient to wear abd binder when oob                  Follow-up Appointments     Follow Up and recommended labs and tests       Follow up with PCP within 1 week regarding diabetes management and HTN.  8/27/18 Dr Galeas. Please call for questions or concerns prior to then [phone]                  Your next 10 appointments already scheduled     Aug 27, 2018 11:40 AM CDT   (Arrive by 11:25 AM)   Post-Op with Bakari Galeas MD   [facility] ([facility])    [address] [place] [address]  [address]  [facility] [address] [phone]              Additional Services     Occupational Therapy Adult Consult       Evaluate and treat as clinically indicated.    Reason:  Post op weakness            Physical Therapy Adult Consult       Evaluate and treat as clinically indicated.    Reason:  Post op weakness                  Pending Results     No orders found from 7/22/2018 to 7/25/2018.            Statement of Approval     Ordered          07/31/18 0649  I have reviewed and agree with all the recommendations and orders detailed in this document.  EFFECTIVE NOW     Approved and electronically signed by:  Juanita Jean-Baptiste APRN CNP             Admission Information     Date & Time Provider Department Dept. Phone    7/24/2018 Bakari Galeas MD [address] [facility] [phone]      Your Vitals Were     Blood Pressure Pulse Temperature Respirations Height Weight    179/79 (BP Location: Right arm) 81 98.9  F (37.2  C) (Oral) 16 1.575 m (5' 2\") 72.5 kg (159 lb 14.4 oz)    Pulse Oximetry BMI (Body Mass Index)                93% 29.25 kg/m2          MyChart Information     [email or web address] lets you send messages to your doctor, view your test results, renew " "your prescriptions, schedule appointments and more. To sign up, go to [email or web address] . Click on \"Log in\" on the left side of the screen, which will take you to the Welcome page. Then click on \"Sign up Now\" on the right side of the page.     You will be asked to enter the access code listed below, as well as some personal information. Please follow the directions to create your username and password.     Your access code is: [email or web address]  Expires: 2018 10:52 AM     Your access code will  in 90 days. If you need help or a new code, please call your [facility] clinic or [phone].        Care EveryWhere ID     This is your Care EveryWhere ID. This could be used by other organizations to access your [facility] medical records  [id number]        Equal Access to Services     ANJANA KAYE : Kvng Jimenez, lupe motley, sally rojas, luis hernandez . So [facility] [phone].    ATENCIÓN: Si habla español, tiene a roblero disposición servicios gratuitos de asistencia lingüística. Llame al [phone].    We comply with applicable federal civil rights laws and [place] laws. We do not discriminate on the basis of race, color, national origin, age, disability, sex, sexual orientation, or gender identity.               Review of your medicines      START taking        Dose / Directions    acetaminophen 325 MG tablet   Commonly known as:  TYLENOL        Dose:  650 mg   Take 2 tablets (650 mg) by mouth every 6 hours as needed for mild pain   Quantity:  100 tablet   Refills:  0       enoxaparin 40 MG/0.4ML injection   Commonly known as:  LOVENOX        Dose:  40 mg   Inject 0.4 mLs (40 mg) Subcutaneous every 24 hours   Quantity:  21 Syringe   Refills:  0       insulin degludec 100 UNIT/ML pen   Commonly known as:  TRESIBA   Replaces:  TRESIBA FLEXTOUCH 200 UNIT/ML pen        Dose:  8 Units   Inject 8 Units Subcutaneous At Bedtime   Refills:  0       oxyCODONE " IR 5 MG tablet   Commonly known as:  ROXICODONE        Dose:  2.5-5 mg   Take 0.5-1 tablets (2.5-5 mg) by mouth every 6 hours as needed for severe pain   Quantity:  25 tablet   Refills:  0       polyethylene glycol Packet   Commonly known as:  MIRALAX/GLYCOLAX        Dose:  17 g   Take 17 g by mouth daily as needed for constipation   Quantity:  7 packet   Refills:  0         CONTINUE these medicines which may have CHANGED, or have new prescriptions. If we are uncertain of the size of tablets/capsules you have at home, strength may be listed as something that might have changed.        Dose / Directions    carvedilol 25 MG tablet   Commonly known as:  COREG   This may have changed:  when to take this        Dose:  25 mg   Take 1 tablet (25 mg) by mouth 2 times daily (with meals)   Quantity:  60 tablet   Refills:  0       gabapentin 600 MG tablet   Commonly known as:  NEURONTIN   This may have changed:  See the new instructions.        Dose:  600 mg   Take 1 tablet (600 mg) by mouth 3 times daily   Quantity:  90 tablet   Refills:  0         CONTINUE these medicines which have NOT CHANGED        Dose / Directions    aspirin 81 MG EC tablet        Dose:  81 mg   Take 81 mg by mouth every morning   Refills:  0       cephalexin 250 MG capsule   Commonly known as:  KEFLEX        TAKE ONE CAPSULE BY MOUTH ONCE DAILY AT BEDTIME   Refills:  3       CITRUCEL Powd   Generic drug:  methylcellulose (laxative)        Take by mouth every morning   Refills:  0       CRANBERRY EXTRACT PO        Take by mouth every morning   Refills:  0       DULoxetine 60 MG EC capsule   Commonly known as:  CYMBALTA        TAKE ONE CAPSULE BY MOUTH AT BEDTIME   Refills:  1       HUMALOG MARY KWIKPEN 100 UNIT/ML injection   Generic drug:  insulin lispro        INJECT 1 UNIT PER 10GRAMS OF CARBOHYDRATES WITH CORRECTION 0.5 UNITS PER 50 ABOVE 150 ( UP TO 30 UNITS PER DAY)   Refills:  6       levothyroxine 75 MCG tablet   Commonly known as:   SYNTHROID/LEVOTHROID        TAKE ONE TABLET BY MOUTH ONCE DAILY  IN THE MORNING     (PLEASE CALL [phone] FOR AN OFFICE VISIT BEFORE NEXT REFILL.)   Refills:  0       losartan 100 MG tablet   Commonly known as:  COZAAR        Dose:  100 mg   Take 100 mg by mouth At Bedtime   Refills:  0       ONETOUCH VERIO IQ test strip   Generic drug:  blood glucose monitoring        USE TO TEST BLOOD GLUCOSE 6-8 TIMES PER DAY, BEFORE MEALS, BEDTIME TO DOSE INSULIN, HIGH OR LOW BLOOD GLUCOSE WITH RECHECK   Refills:  3       pravastatin 80 MG tablet   Commonly known as:  PRAVACHOL        Dose:  80 mg   Take 80 mg by mouth At Bedtime   Refills:  0       senna-docusate 8.6-50 MG per tablet   Commonly known as:  SENOKOT-S;PERICOLACE   Used for:  S/P laparoscopic procedure        Dose:  1-2 tablet   Take 1-2 tablets by mouth 2 times daily Take while on oral narcotics to prevent or treat constipation.   Quantity:  30 tablet   Refills:  0       vitamin D3 2000 units Caps        TAKE ONE CAPSULE BY MOUTH ONCE DAILY IN THE MORNING   Refills:  3         STOP taking     morphine 15 MG 12 hr tablet   Commonly known as:  MS CONTIN           oxyCODONE-acetaminophen 5-325 MG per tablet   Commonly known as:  PERCOCET           TRESIBA FLEXTOUCH 200 UNIT/ML pen   Generic drug:  insulin degludec   Replaced by:  insulin degludec 100 UNIT/ML pen                Where to get your medicines      Some of these will need a paper prescription and others can be bought over the counter. Ask your nurse if you have questions.     Bring a paper prescription for each of these medications     oxyCODONE IR 5 MG tablet       You don't need a prescription for these medications     acetaminophen 325 MG tablet    carvedilol 25 MG tablet    enoxaparin 40 MG/0.4ML injection    insulin degludec 100 UNIT/ML pen    polyethylene glycol Packet                Protect others around you: Learn how to safely use, store and throw away your medicines at  www.disposemymeds.org.        Information about OPIOIDS     PRESCRIPTION OPIOIDS: WHAT YOU NEED TO KNOW   We gave you an opioid (narcotic) pain medicine. It is important to manage your pain, but opioids are not always the best choice. You should first try all the other options your care team gave you. Take this medicine for as short a time (and as few doses) as possible.     These medicines have risks:    DO NOT drive when on new or higher doses of pain medicine. These medicines can affect your alertness and reaction times, and you could be arrested for driving under the influence (DUI). If you need to use opioids long-term, talk to your care team about driving.    DO NOT operate heave machinery    DO NOT do any other dangerous activities while taking these medicines.     DO NOT drink any alcohol while taking these medicines.      If the opioid prescribed includes acetaminophen, DO NOT take with any other medicines that contain acetaminophen. Read all labels carefully. Look for the word  acetaminophen  or  Tylenol.  Ask your pharmacist if you have questions or are unsure.    You can get addicted to pain medicines, especially if you have a history of addiction (chemical, alcohol or substance dependence). Talk to your care team about ways to reduce this risk.    Store your pills in a secure place, locked if possible. We will not replace any lost or stolen medicine. If you don t finish your medicine, please throw away (dispose) as directed by your pharmacist. The [place] Pollution Control Agency has more information about safe disposal: https://www.pca.[facility].mn.us/living-green/managing-unwanted-medications.     All opioids tend to cause constipation. Drink plenty of water and eat foods that have a lot of fiber, such as fruits, vegetables, prune juice, apple juice and high-fiber cereal. Take a laxative (Miralax, milk of magnesia, Colace, Senna) if you don t move your bowels at least every other day.               Medication List: This is a list of all your medications and when to take them. Check marks below indicate your daily home schedule. Keep this list as a reference.      Medications           Morning Afternoon Evening Bedtime As Needed    acetaminophen 325 MG tablet   Commonly known as:  TYLENOL   Take 2 tablets (650 mg) by mouth every 6 hours as needed for mild pain   Last time this was given:  650 mg on 7/31/2018  7:54 AM                                aspirin 81 MG EC tablet   Take 81 mg by mouth every morning                                carvedilol 25 MG tablet   Commonly known as:  COREG   Take 1 tablet (25 mg) by mouth 2 times daily (with meals)   Last time this was given:  25 mg on 7/31/2018  7:57 AM                                cephalexin 250 MG capsule   Commonly known as:  KEFLEX   TAKE ONE CAPSULE BY MOUTH ONCE DAILY AT BEDTIME   Last time this was given:  250 mg on 7/30/2018  9:29 PM                                CITRUCEL Powd   Take by mouth every morning   Generic drug:  methylcellulose (laxative)                                CRANBERRY EXTRACT PO   Take by mouth every morning                                DULoxetine 60 MG EC capsule   Commonly known as:  CYMBALTA   TAKE ONE CAPSULE BY MOUTH AT BEDTIME   Last time this was given:  60 mg on 7/30/2018  9:29 PM                                enoxaparin 40 MG/0.4ML injection   Commonly known as:  LOVENOX   Inject 0.4 mLs (40 mg) Subcutaneous every 24 hours   Last time this was given:  40 mg on 7/30/2018 12:03 PM                                gabapentin 600 MG tablet   Commonly known as:  NEURONTIN   Take 1 tablet (600 mg) by mouth 3 times daily   Last time this was given:  600 mg on 7/31/2018  7:57 AM                                HUMALOG MARY KWIKPEN 100 UNIT/ML injection   INJECT 1 UNIT PER 10GRAMS OF CARBOHYDRATES WITH CORRECTION 0.5 UNITS PER 50 ABOVE 150 ( UP TO 30 UNITS PER DAY)   Generic drug:  insulin lispro                                 insulin degludec 100 UNIT/ML pen   Commonly known as:  TRESIBA   Inject 8 Units Subcutaneous At Bedtime   Last time this was given:  10 Units on 7/30/2018  9:38 PM                                levothyroxine 75 MCG tablet   Commonly known as:  SYNTHROID/LEVOTHROID   TAKE ONE TABLET BY MOUTH ONCE DAILY  IN THE MORNING     (PLEASE CALL [phone] FOR AN OFFICE VISIT BEFORE NEXT REFILL.)   Last time this was given:  75 mcg on 7/31/2018  7:57 AM                                losartan 100 MG tablet   Commonly known as:  COZAAR   Take 100 mg by mouth At Bedtime   Last time this was given:  100 mg on 7/30/2018  9:00 PM                                ONETOUCH VERIO IQ test strip   USE TO TEST BLOOD GLUCOSE 6-8 TIMES PER DAY, BEFORE MEALS, BEDTIME TO DOSE INSULIN, HIGH OR LOW BLOOD GLUCOSE WITH RECHECK   Generic drug:  blood glucose monitoring                                oxyCODONE IR 5 MG tablet   Commonly known as:  ROXICODONE   Take 0.5-1 tablets (2.5-5 mg) by mouth every 6 hours as needed for severe pain   Last time this was given:  5 mg on 7/31/2018 10:24 AM                                polyethylene glycol Packet   Commonly known as:  MIRALAX/GLYCOLAX   Take 17 g by mouth daily as needed for constipation   Last time this was given:  17 g on 7/29/2018  1:24 PM                                pravastatin 80 MG tablet   Commonly known as:  PRAVACHOL   Take 80 mg by mouth At Bedtime                                senna-docusate 8.6-50 MG per tablet   Commonly known as:  SENOKOT-S;PERICOLACE   Take 1-2 tablets by mouth 2 times daily Take while on oral narcotics to prevent or treat constipation.   Last time this was given:  2 tablets on 7/31/2018  7:56 AM                                vitamin D3 2000 units Caps   TAKE ONE CAPSULE BY MOUTH ONCE DAILY IN THE MORNING

## 2018-07-24 NOTE — PROGRESS NOTES
SPIRITUAL HEALTH SERVICES  [facility]   PRE-SURGERY VISIT    Had pre-surgery visit with pt.  Provided spiritual support, prayer.     Fatoumata Tate  Chaplain Resident  Pager [phone]

## 2018-07-24 NOTE — IP AVS SNAPSHOT
[address] [address]    Phone:  [phone]                                       After Visit Summary   7/24/2018    Afshan Cardona    MRN: [id number]           After Visit Summary Signature Page     I have received my discharge instructions, and my questions have been answered. I have discussed any challenges I see with this plan with the nurse or doctor.    ..........................................................................................................................................  Patient/Patient Representative Signature      ..........................................................................................................................................  Patient Representative Print Name and Relationship to Patient    ..................................................               ................................................  Date                                            Time    ..........................................................................................................................................  Reviewed by Signature/Title    ...................................................              ..............................................  Date                                                            Time

## 2018-07-24 NOTE — ANESTHESIA CARE TRANSFER NOTE
Patient: Shielia Brendan    Procedure(s):  Exploratory Laparotomy, lysis of adhesions, jejunal mesentary biopsy and sigmoid epiploic biopsy.  - Wound Class: II-Clean Contaminated   - Wound Class: II-Clean Contaminated    Diagnosis: Personal History Of Ovarian Cancer   Diagnosis Additional Information: No value filed.    Anesthesia Type:   General, ETT     Note:  Airway :Face Mask  Patient transferred to:PACU  Comments: VSS. Ventilating well.Handoff Report: Identifed the Patient, Identified the Reponsible Provider, Reviewed the pertinent medical history, Discussed the surgical course, Reviewed Intra-OP anesthesia mangement and issues during anesthesia, Set expectations for post-procedure period and Allowed opportunity for questions and acknowledgement of understanding      Vitals: (Last set prior to Anesthesia Care Transfer)    CRNA VITALS  7/24/2018 1255 - 7/24/2018 1335      7/24/2018             NIBP: 168/72    Ht Rate: 64                Electronically Signed By: REHAN Stack CRNA  July 24, 2018  1:35 PM

## 2018-07-25 ENCOUNTER — APPOINTMENT (OUTPATIENT)
Dept: CT IMAGING | Facility: CLINIC | Age: 73
DRG: 749 | End: 2018-07-25
Attending: OBSTETRICS & GYNECOLOGY
Payer: MEDICARE

## 2018-07-25 LAB
ABO + RH BLD: NORMAL
ABO + RH BLD: NORMAL
ALBUMIN SERPL-MCNC: 2.7 G/DL (ref 3.4–5)
ALBUMIN UR-MCNC: 10 MG/DL
ANION GAP SERPL CALCULATED.3IONS-SCNC: 7 MMOL/L (ref 3–14)
ANION GAP SERPL CALCULATED.3IONS-SCNC: 9 MMOL/L (ref 3–14)
APPEARANCE UR: ABNORMAL
BACTERIA #/AREA URNS HPF: ABNORMAL /HPF
BILIRUB UR QL STRIP: NEGATIVE
BLD GP AB SCN SERPL QL: NORMAL
BLD PROD TYP BPU: NORMAL
BLOOD BANK CMNT PATIENT-IMP: NORMAL
BUN SERPL-MCNC: 19 MG/DL (ref 7–30)
BUN SERPL-MCNC: 21 MG/DL (ref 7–30)
CALCIUM SERPL-MCNC: 7.1 MG/DL (ref 8.5–10.1)
CALCIUM SERPL-MCNC: 7.4 MG/DL (ref 8.5–10.1)
CHLORIDE SERPL-SCNC: 100 MMOL/L (ref 94–109)
CHLORIDE SERPL-SCNC: 103 MMOL/L (ref 94–109)
CO2 SERPL-SCNC: 22 MMOL/L (ref 20–32)
CO2 SERPL-SCNC: 25 MMOL/L (ref 20–32)
COLOR UR AUTO: YELLOW
CREAT SERPL-MCNC: 1.01 MG/DL (ref 0.52–1.04)
CREAT SERPL-MCNC: 1.01 MG/DL (ref 0.52–1.04)
ERYTHROCYTE [DISTWIDTH] IN BLOOD BY AUTOMATED COUNT: 13.8 % (ref 10–15)
ERYTHROCYTE [DISTWIDTH] IN BLOOD BY AUTOMATED COUNT: 14.1 % (ref 10–15)
GFR SERPL CREATININE-BSD FRML MDRD: 54 ML/MIN/1.7M2
GFR SERPL CREATININE-BSD FRML MDRD: 54 ML/MIN/1.7M2
GLUCOSE BLDC GLUCOMTR-MCNC: 133 MG/DL (ref 70–99)
GLUCOSE BLDC GLUCOMTR-MCNC: 140 MG/DL (ref 70–99)
GLUCOSE BLDC GLUCOMTR-MCNC: 142 MG/DL (ref 70–99)
GLUCOSE BLDC GLUCOMTR-MCNC: 149 MG/DL (ref 70–99)
GLUCOSE BLDC GLUCOMTR-MCNC: 167 MG/DL (ref 70–99)
GLUCOSE BLDC GLUCOMTR-MCNC: 184 MG/DL (ref 70–99)
GLUCOSE BLDC GLUCOMTR-MCNC: 64 MG/DL (ref 70–99)
GLUCOSE BLDC GLUCOMTR-MCNC: 65 MG/DL (ref 70–99)
GLUCOSE SERPL-MCNC: 141 MG/DL (ref 70–99)
GLUCOSE SERPL-MCNC: 84 MG/DL (ref 70–99)
GLUCOSE UR STRIP-MCNC: NEGATIVE MG/DL
HCT VFR BLD AUTO: 28.2 % (ref 35–47)
HCT VFR BLD AUTO: 33.4 % (ref 35–47)
HGB BLD-MCNC: 11 G/DL (ref 11.7–15.7)
HGB BLD-MCNC: 11.1 G/DL (ref 11.7–15.7)
HGB BLD-MCNC: 9 G/DL (ref 11.7–15.7)
HGB BLD-MCNC: 9.3 G/DL (ref 11.7–15.7)
HGB UR QL STRIP: ABNORMAL
HYALINE CASTS #/AREA URNS LPF: 1 /LPF (ref 0–2)
KETONES UR STRIP-MCNC: NEGATIVE MG/DL
LACTATE BLD-SCNC: 0.7 MMOL/L (ref 0.7–2)
LEUKOCYTE ESTERASE UR QL STRIP: ABNORMAL
MCH RBC QN AUTO: 29.2 PG (ref 26.5–33)
MCH RBC QN AUTO: 29.8 PG (ref 26.5–33)
MCHC RBC AUTO-ENTMCNC: 33 G/DL (ref 31.5–36.5)
MCHC RBC AUTO-ENTMCNC: 33.2 G/DL (ref 31.5–36.5)
MCV RBC AUTO: 88 FL (ref 78–100)
MCV RBC AUTO: 90 FL (ref 78–100)
MUCOUS THREADS #/AREA URNS LPF: PRESENT /LPF
NITRATE UR QL: NEGATIVE
NUM BPU REQUESTED: 2
PH UR STRIP: 5 PH (ref 5–7)
PLATELET # BLD AUTO: 272 10E9/L (ref 150–450)
PLATELET # BLD AUTO: 274 10E9/L (ref 150–450)
POTASSIUM SERPL-SCNC: 4.8 MMOL/L (ref 3.4–5.3)
POTASSIUM SERPL-SCNC: 5.2 MMOL/L (ref 3.4–5.3)
RBC # BLD AUTO: 3.19 10E12/L (ref 3.8–5.2)
RBC # BLD AUTO: 3.72 10E12/L (ref 3.8–5.2)
RBC #/AREA URNS AUTO: 8 /HPF (ref 0–2)
SODIUM SERPL-SCNC: 132 MMOL/L (ref 133–144)
SODIUM SERPL-SCNC: 135 MMOL/L (ref 133–144)
SOURCE: ABNORMAL
SP GR UR STRIP: 1.02 (ref 1–1.03)
SPECIMEN EXP DATE BLD: NORMAL
TRANS CELLS #/AREA URNS HPF: <1 /HPF (ref 0–1)
TROPONIN I SERPL-MCNC: <0.015 UG/L (ref 0–0.04)
UROBILINOGEN UR STRIP-MCNC: NORMAL MG/DL (ref 0–2)
WBC # BLD AUTO: 16 10E9/L (ref 4–11)
WBC # BLD AUTO: 9 10E9/L (ref 4–11)
WBC #/AREA URNS AUTO: 14 /HPF (ref 0–5)

## 2018-07-25 PROCEDURE — 00000146 ZZHCL STATISTIC GLUCOSE BY METER IP

## 2018-07-25 PROCEDURE — P9016 RBC LEUKOCYTES REDUCED: HCPCS | Performed by: OBSTETRICS & GYNECOLOGY

## 2018-07-25 PROCEDURE — 25000125 ZZHC RX 250: Performed by: STUDENT IN AN ORGANIZED HEALTH CARE EDUCATION/TRAINING PROGRAM

## 2018-07-25 PROCEDURE — 99024 POSTOP FOLLOW-UP VISIT: CPT | Mod: GC | Performed by: OBSTETRICS & GYNECOLOGY

## 2018-07-25 PROCEDURE — 85027 COMPLETE CBC AUTOMATED: CPT | Performed by: STUDENT IN AN ORGANIZED HEALTH CARE EDUCATION/TRAINING PROGRAM

## 2018-07-25 PROCEDURE — 80048 BASIC METABOLIC PNL TOTAL CA: CPT | Performed by: STUDENT IN AN ORGANIZED HEALTH CARE EDUCATION/TRAINING PROGRAM

## 2018-07-25 PROCEDURE — 93010 ELECTROCARDIOGRAM REPORT: CPT | Performed by: INTERNAL MEDICINE

## 2018-07-25 PROCEDURE — 81001 URINALYSIS AUTO W/SCOPE: CPT | Performed by: OBSTETRICS & GYNECOLOGY

## 2018-07-25 PROCEDURE — A9270 NON-COVERED ITEM OR SERVICE: HCPCS | Mod: GY

## 2018-07-25 PROCEDURE — 93005 ELECTROCARDIOGRAM TRACING: CPT

## 2018-07-25 PROCEDURE — 82040 ASSAY OF SERUM ALBUMIN: CPT | Performed by: STUDENT IN AN ORGANIZED HEALTH CARE EDUCATION/TRAINING PROGRAM

## 2018-07-25 PROCEDURE — 36415 COLL VENOUS BLD VENIPUNCTURE: CPT | Performed by: STUDENT IN AN ORGANIZED HEALTH CARE EDUCATION/TRAINING PROGRAM

## 2018-07-25 PROCEDURE — 84484 ASSAY OF TROPONIN QUANT: CPT | Performed by: OBSTETRICS & GYNECOLOGY

## 2018-07-25 PROCEDURE — 85027 COMPLETE CBC AUTOMATED: CPT | Performed by: OBSTETRICS & GYNECOLOGY

## 2018-07-25 PROCEDURE — 25000132 ZZH RX MED GY IP 250 OP 250 PS 637: Mod: GY | Performed by: STUDENT IN AN ORGANIZED HEALTH CARE EDUCATION/TRAINING PROGRAM

## 2018-07-25 PROCEDURE — 25000128 H RX IP 250 OP 636: Performed by: NURSE PRACTITIONER

## 2018-07-25 PROCEDURE — 25000128 H RX IP 250 OP 636: Performed by: STUDENT IN AN ORGANIZED HEALTH CARE EDUCATION/TRAINING PROGRAM

## 2018-07-25 PROCEDURE — 36415 COLL VENOUS BLD VENIPUNCTURE: CPT | Performed by: OBSTETRICS & GYNECOLOGY

## 2018-07-25 PROCEDURE — 36415 COLL VENOUS BLD VENIPUNCTURE: CPT | Performed by: NURSE PRACTITIONER

## 2018-07-25 PROCEDURE — 40000740 ZZH STATISTIC STROKE CODE W/ ACCESS

## 2018-07-25 PROCEDURE — 84484 ASSAY OF TROPONIN QUANT: CPT | Performed by: STUDENT IN AN ORGANIZED HEALTH CARE EDUCATION/TRAINING PROGRAM

## 2018-07-25 PROCEDURE — 80048 BASIC METABOLIC PNL TOTAL CA: CPT | Performed by: OBSTETRICS & GYNECOLOGY

## 2018-07-25 PROCEDURE — A9270 NON-COVERED ITEM OR SERVICE: HCPCS | Mod: GY | Performed by: STUDENT IN AN ORGANIZED HEALTH CARE EDUCATION/TRAINING PROGRAM

## 2018-07-25 PROCEDURE — 83605 ASSAY OF LACTIC ACID: CPT | Performed by: OBSTETRICS & GYNECOLOGY

## 2018-07-25 PROCEDURE — 87040 BLOOD CULTURE FOR BACTERIA: CPT | Performed by: OBSTETRICS & GYNECOLOGY

## 2018-07-25 PROCEDURE — 12000006 ZZH R&B IMCU INTERMEDIATE UMMC

## 2018-07-25 PROCEDURE — 99231 SBSQ HOSP IP/OBS SF/LOW 25: CPT | Performed by: NURSE PRACTITIONER

## 2018-07-25 PROCEDURE — 84484 ASSAY OF TROPONIN QUANT: CPT | Performed by: NURSE PRACTITIONER

## 2018-07-25 PROCEDURE — 70496 CT ANGIOGRAPHY HEAD: CPT

## 2018-07-25 PROCEDURE — 87086 URINE CULTURE/COLONY COUNT: CPT | Performed by: OBSTETRICS & GYNECOLOGY

## 2018-07-25 PROCEDURE — 85018 HEMOGLOBIN: CPT | Performed by: OBSTETRICS & GYNECOLOGY

## 2018-07-25 PROCEDURE — 25800025 ZZH RX 258: Performed by: STUDENT IN AN ORGANIZED HEALTH CARE EDUCATION/TRAINING PROGRAM

## 2018-07-25 PROCEDURE — 25000132 ZZH RX MED GY IP 250 OP 250 PS 637: Mod: GY

## 2018-07-25 PROCEDURE — 25000128 H RX IP 250 OP 636: Performed by: OBSTETRICS & GYNECOLOGY

## 2018-07-25 PROCEDURE — 25000131 ZZH RX MED GY IP 250 OP 636 PS 637: Mod: GY | Performed by: OBSTETRICS & GYNECOLOGY

## 2018-07-25 RX ORDER — GABAPENTIN 300 MG/1
900 CAPSULE ORAL 3 TIMES DAILY
Status: DISCONTINUED | OUTPATIENT
Start: 2018-07-25 | End: 2018-07-26

## 2018-07-25 RX ORDER — CEFTRIAXONE 1 G/1
1 INJECTION, POWDER, FOR SOLUTION INTRAMUSCULAR; INTRAVENOUS EVERY 24 HOURS
Status: DISCONTINUED | OUTPATIENT
Start: 2018-07-25 | End: 2018-07-27

## 2018-07-25 RX ORDER — OXYCODONE HYDROCHLORIDE 5 MG/1
5-10 TABLET ORAL
Status: DISCONTINUED | OUTPATIENT
Start: 2018-07-25 | End: 2018-07-26

## 2018-07-25 RX ORDER — MORPHINE SULFATE 15 MG/1
15 TABLET, FILM COATED, EXTENDED RELEASE ORAL
Status: DISCONTINUED | OUTPATIENT
Start: 2018-07-25 | End: 2018-07-31 | Stop reason: HOSPADM

## 2018-07-25 RX ORDER — HYDROMORPHONE HCL/0.9% NACL/PF 0.2MG/0.2
0.2 SYRINGE (ML) INTRAVENOUS
Status: DISCONTINUED | OUTPATIENT
Start: 2018-07-25 | End: 2018-07-27

## 2018-07-25 RX ORDER — IOPAMIDOL 755 MG/ML
75 INJECTION, SOLUTION INTRAVASCULAR ONCE
Status: COMPLETED | OUTPATIENT
Start: 2018-07-25 | End: 2018-07-25

## 2018-07-25 RX ORDER — LOSARTAN POTASSIUM 100 MG/1
100 TABLET ORAL
Status: DISCONTINUED | OUTPATIENT
Start: 2018-07-25 | End: 2018-07-26

## 2018-07-25 RX ADMIN — CEFTRIAXONE 1 G: 1 INJECTION, POWDER, FOR SOLUTION INTRAMUSCULAR; INTRAVENOUS at 22:21

## 2018-07-25 RX ADMIN — LEVOTHYROXINE SODIUM 75 MCG: 75 TABLET ORAL at 07:53

## 2018-07-25 RX ADMIN — IOPAMIDOL 75 ML: 755 INJECTION, SOLUTION INTRAVENOUS at 19:21

## 2018-07-25 RX ADMIN — ACETAMINOPHEN 650 MG: 325 TABLET, FILM COATED ORAL at 00:00

## 2018-07-25 RX ADMIN — NALOXONE HYDROCHLORIDE 0.2 MG: 0.4 INJECTION, SOLUTION INTRAMUSCULAR; INTRAVENOUS; SUBCUTANEOUS at 20:30

## 2018-07-25 RX ADMIN — OXYCODONE HYDROCHLORIDE 10 MG: 5 TABLET ORAL at 07:52

## 2018-07-25 RX ADMIN — SODIUM CHLORIDE 500 ML: 9 INJECTION, SOLUTION INTRAVENOUS at 06:05

## 2018-07-25 RX ADMIN — DEXTROSE MONOHYDRATE 25 ML: 500 INJECTION PARENTERAL at 08:13

## 2018-07-25 RX ADMIN — SODIUM CHLORIDE: 9 INJECTION, SOLUTION INTRAVENOUS at 12:17

## 2018-07-25 RX ADMIN — RANITIDINE 150 MG: 150 TABLET, FILM COATED ORAL at 07:54

## 2018-07-25 RX ADMIN — MAGNESIUM HYDROXIDE 15 ML: 400 SUSPENSION ORAL at 08:02

## 2018-07-25 RX ADMIN — OXYCODONE HYDROCHLORIDE 10 MG: 5 TABLET ORAL at 12:15

## 2018-07-25 RX ADMIN — ACETAMINOPHEN 650 MG: 325 TABLET, FILM COATED ORAL at 18:17

## 2018-07-25 RX ADMIN — ACETAMINOPHEN 650 MG: 325 TABLET, FILM COATED ORAL at 12:09

## 2018-07-25 RX ADMIN — MORPHINE SULFATE 15 MG: 15 TABLET, EXTENDED RELEASE ORAL at 07:53

## 2018-07-25 RX ADMIN — ENOXAPARIN SODIUM 40 MG: 100 INJECTION SUBCUTANEOUS at 12:38

## 2018-07-25 RX ADMIN — INSULIN ASPART 0.5 UNITS: 100 INJECTION, SOLUTION INTRAVENOUS; SUBCUTANEOUS at 18:38

## 2018-07-25 RX ADMIN — DULOXETINE HYDROCHLORIDE 60 MG: 60 CAPSULE, DELAYED RELEASE ORAL at 22:21

## 2018-07-25 RX ADMIN — CARVEDILOL 25 MG: 25 TABLET, FILM COATED ORAL at 07:53

## 2018-07-25 RX ADMIN — ONDANSETRON 4 MG: 4 TABLET, ORALLY DISINTEGRATING ORAL at 12:09

## 2018-07-25 RX ADMIN — GABAPENTIN 900 MG: 300 CAPSULE ORAL at 13:36

## 2018-07-25 RX ADMIN — IBUPROFEN 600 MG: 200 TABLET, FILM COATED ORAL at 02:47

## 2018-07-25 RX ADMIN — SODIUM CHLORIDE 250 ML: 9 INJECTION, SOLUTION INTRAVENOUS at 05:00

## 2018-07-25 RX ADMIN — INSULIN DEGLUDEC INJECTION 16 UNITS: 100 INJECTION, SOLUTION SUBCUTANEOUS at 22:21

## 2018-07-25 RX ADMIN — NALOXONE HYDROCHLORIDE 0.2 MG: 0.4 INJECTION, SOLUTION INTRAMUSCULAR; INTRAVENOUS; SUBCUTANEOUS at 20:17

## 2018-07-25 RX ADMIN — SENNOSIDES AND DOCUSATE SODIUM 2 TABLET: 8.6; 5 TABLET ORAL at 07:53

## 2018-07-25 ASSESSMENT — ACTIVITIES OF DAILY LIVING (ADL)
ADLS_ACUITY_SCORE: 8
ADLS_ACUITY_SCORE: 9
ADLS_ACUITY_SCORE: 8

## 2018-07-25 ASSESSMENT — PAIN DESCRIPTION - DESCRIPTORS: DESCRIPTORS: ACHING;CONSTANT

## 2018-07-25 NOTE — PLAN OF CARE
Problem: Patient Care Overview  Goal: Plan of Care/Patient Progress Review  Outcome: Improving  Shift: Pt arrived from PACU around 2000, s/p exploratory laparatomy, jejunal mesentry biopsy and sigmoid biopsy  VS: Temp: 97.5  F (36.4  C) Temp src: Oral BP: 111/69   Heart Rate: 94 Resp: 16 SpO2: 98 % O2 Device: Nasal cannula Oxygen Delivery: 2 LPM  Pain: c/o mid abdominal incision pain, controlled with IV dilaudid, scheduled MS contin and ibuprofen  Neuro: A&Ox4, able to call appropriately  Cardiac:   Tele NSR with HR in 90's  Respiratory: SPO2 >96% at 2L, CAPNO WNL  GI/Diet/Appetite: On CHO diet, pt only drank water since coming up from PACU. Given scheduled zofran, denies nausea.  : Adequate UO in duffy bag  LDA's: 2x PIVs, with NS infusing at 100cc/hr.  Skin: Intact except surgical incision. Abdominal incision dressing CDI with binder in place.  Activity: Refused to sit up on side of the bad due to pain/lethargy but turned side to side independently. Please continue to encourage with mobility.  Tests/Procedures:   Pertinent Labs/Lab Collection: Hgb recheck value is 9.2     Plan: Continue with post op cares and update MD with any changes.

## 2018-07-25 NOTE — PLAN OF CARE
Problem: Patient Care Overview  Goal: Plan of Care/Patient Progress Review  Neuro: A&Ox4 .   Cardiac: SR. VSS.   Respiratory: Sating 94-96% on 2 LPM by NC. Encouraged her to use IS.   GI/: duffy patent draining yellow clear urine.   Diet/appetite: tolerated oat meal , poor appetite.   Activity:  Assist of one to turn and reposition, up to chair.   Pain: At acceptable level on current regimen.   Skin: No new deficits noted. Incision covered with dressing , has bridgett drainage . No sign or symptom of infection noted.   LDA's: PIV infusing and is patent.   Plan: Continue with POC. Notify primary team with changes.

## 2018-07-25 NOTE — PROGRESS NOTES
Assumed cares [id number].    Neuro: A&Ox4 ex some lethargy/sleepiness   Cardiac: SR 80s. VSS.   Respiratory: Sating 95% on 2L. CAPNO on until 8pm  GI/: Adequate urine output started this AM, about 60ml/hr per duffy. Duffy flushed per provider request, duffy patent and clear. Continue to monitor. BM 7/23, passing flatus. Bowel meds given this AM.  Diet/appetite: Tolerating consist CHO diet. Eating well.  Activity:  Assist of 2, pt dangled feet at edge of bed and was able to stand and walk to supply cart in the room. Pt wobbly and unstable, requires 2 assist for equipment and safety.   Pain: Pt requested Oxycodone along with scheduled morning morphine per her usual pain med routine.  Skin: Midline dressing marked and unchanged. Bilat lap sites WNL  LDA's: PIV x2 WNL, infusing NS 100ml/hr.    Plan: Second unit of blood finished this AM at 9am, spoke with provider for hgb recheck today, VSS and no S&S of transfusion reaction. Pt hypoglycemic at 65 this AM, given 25ml glucose and BG increased to 183, continue to monitor. Continue with POC. Notify primary team with changes.

## 2018-07-25 NOTE — PROVIDER NOTIFICATION
Gyb/onc team paged to notify that BG 65. Recheck after 20 mins and applejuice was 64. Pt given 25ml dextrose per hypoglycemia protocol.

## 2018-07-25 NOTE — PLAN OF CARE
Problem: Patient Care Overview  Goal: Plan of Care/Patient Progress Review  Outcome: Improving  D/I: Patient on 6B IMC.  Neuro: A+Ox4, no deficits noted.   CV: NSR 90s. Afebrile. BP stable.   Pulm: Lungs are clear and diminished. On Capnography. On 2L NC satting 99%.   GI: No BM, is passing gas.   : Shepard draining minial UO. MD notified of this and 750cc boluses given with no significant results as of yet.   Gtts: NS at 100cc/hr.   Skin: Mid-Abdominal incision, covered in dressing with drainage marked. No change.   MD wants HGB above 10, 1 unit of RBCs given during the night.   See flow sheets for further interventions and assessments.  A: Stable  P: Continue to monitor pt closely. Notify MD of significant changes.

## 2018-07-25 NOTE — PROVIDER NOTIFICATION
Time of notification: 6:51 PM  Provider notified: Dr Sushma Henderson   Patient status: Pt is still very drowsy, unable to stay awake during conversation, sleeping between cares, disoriented to time, place and situation  Temp:  [97.5  F (36.4  C)-99.8  F (37.7  C)] 98.9  F (37.2  C)  Pulse:  [79] 79  Heart Rate:  [78-95] 78  Resp:  [13-16] 16  BP: ()/(52-72) 110/72  SpO2:  [95 %-99 %] 95 %  Orders received: Labs ordered. MD will come see pt this evening

## 2018-07-25 NOTE — PROGRESS NOTES
[facility] PAIN [facility]  SUBJECTIVE  Interval history: Patient reports pain well controlled with current analgesics (see below) and nerve block.  Has not been OOB yet.  Currently rating pain 0/10 at rest and 4/10 with activity.  Patient tolerating moderate consistent CHO diet,  denies nausea.  Denies any weakness, paresthesias, circumoral numbness, metallic taste or tinnitus.  Urinary catheter in place     Clinically Aligned Pain Assessment (CAPA):  Comfort (How is your pain?): Comfortably manageable  Change in Pain (Since your last medication/intervention?): About the same  Pain Control (How are your pain treatments working?):  Partially effective pain control    Medications related to Pain Management (Future)    Start     Dose/Rate Route Frequency Ordered Stop    07/25/18 1100  gabapentin (NEURONTIN) capsule 900 mg      900 mg Oral 3 TIMES DAILY 07/25/18 1056      07/25/18 0800  methylcellulose (laxative) POWD 1.05 teaspoonful      1 teaspoonful Oral EVERY MORNING 07/24/18 2032 07/24/18 2200  DULoxetine (CYMBALTA) EC capsule 60 mg      60 mg Oral AT BEDTIME 07/24/18 2032 07/24/18 2045  morphine (MS CONTIN) 12 hr tablet 15 mg      15 mg Oral EVERY 12 HOURS SCHEDULED 07/24/18 2032 07/24/18 2045  senna-docusate (SENOKOT-S;PERICOLACE) 8.6-50 MG per tablet 2 tablet      2 tablet Oral 2 TIMES DAILY 07/24/18 2032 07/24/18 2031  senna-docusate (SENOKOT-S;PERICOLACE) 8.6-50 MG per tablet 1 tablet      1 tablet Oral 2 TIMES DAILY PRN 07/24/18 2031 07/24/18 2031  senna-docusate (SENOKOT-S;PERICOLACE) 8.6-50 MG per tablet 2 tablet      2 tablet Oral 2 TIMES DAILY PRN 07/24/18 2031 07/24/18 2031  simethicone (MYLICON) chewable tablet 80 mg      80 mg Oral 4 TIMES DAILY PRN 07/24/18 2031 07/24/18 2031  diphenhydrAMINE (BENADRYL) solution 12.5 mg      12.5 mg Oral EVERY 6 HOURS PRN 07/24/18 2031 07/24/18 2031  diphenhydrAMINE (BENADRYL) injection 12.5 mg      12.5 mg  over 1-2  "Minutes Intravenous EVERY 6 HOURS PRN 07/24/18 2031 07/24/18 2031  bisacodyl (DULCOLAX) Suppository 10 mg      10 mg Rectal DAILY PRN 07/24/18 2031 07/24/18 2031  HYDROmorphone (DILAUDID) injection 0.2 mg      0.2 mg Intravenous EVERY 2 HOURS PRN 07/24/18 2031 07/24/18 1815  acetaminophen (TYLENOL) tablet 650 mg      650 mg Oral EVERY 6 HOURS 07/24/18 1814 07/24/18 1814  oxyCODONE IR (ROXICODONE) tablet 5-10 mg      5-10 mg Oral EVERY 4 HOURS PRN 07/24/18 1814            OBJECTIVE:    /65 (BP Location: Left arm)  Temp 98.8  F (37.1  C) (Oral)  Resp 16  Ht 1.575 m (5' 2\")  Wt 68.7 kg (151 lb 8 oz)  SpO2 97%  BMI 27.71 kg/m2  Exam:  General: alert and no distress  Neuro: Strength 5/5 BLE    ASSESSMENT/PLAN:    Afshan Cardona is a 73 year old female with history of ovarian cancer, now POD #1 s/p  LAPAROTOMY EXPLORATORY  COMBINED HYSTERECTOMY TOTAL ABDOMINAL, SALPINGO-OOPHORECTOMY, NODE DISSECTION, TUMOR DEBULKING with single shot injection bilateral transversus abdominis plane (TAP) nerve block.  Total bupivacaine 0.25% 20 mL and liposomal (long-acting) bupivacaine (Exparel) 1.3% 20 mL administered 7/24/18 for postop pain control.  No weakness or paresthesias.  No evidence of adverse side effects associated with nerve block injections.  Pt acheiving adequate pain control, with nerve block, IV and oral analgesics.  Anticipate 48-72 hours of pain control with long-acting local anesthetic. Additionally, pt will continue to require multimodal analgesia for visceral/muscle/musculoskeletal pain not controlled with long-acting local anesthetic.      - NO other local anesthetic use within 96 hours of liposomal bupivacaine (Exparel), unless approved by [facility]  - patient received verbal and written instructions about liposomal bupivacaine and counseling about pharmacologic and nonpharmacologic measures for acute postoperative pain management  - please call [facility] if questions or " concerns    REHAN [facility]  [facility] ([facility])  7/25/2018 1:39 PM    [facility] Contact Info (for in-house use only):  Job code ID: [id number]   [facility] [id number]  [facility] [id number]  [place] phone: dial [phone], enter jobcode ID, then enter call-back number.    Text: Use [employer] An[employer] on the Intranet <Paging/Directory> tab and enter Jobcode ID.   If no call back at any time, contact the hospital  and ask for [facility] attending or backup

## 2018-07-26 ENCOUNTER — APPOINTMENT (OUTPATIENT)
Dept: PHYSICAL THERAPY | Facility: CLINIC | Age: 73
DRG: 749 | End: 2018-07-26
Attending: NURSE PRACTITIONER
Payer: MEDICARE

## 2018-07-26 ENCOUNTER — APPOINTMENT (OUTPATIENT)
Dept: OCCUPATIONAL THERAPY | Facility: CLINIC | Age: 73
DRG: 749 | End: 2018-07-26
Attending: OBSTETRICS & GYNECOLOGY
Payer: MEDICARE

## 2018-07-26 LAB
ANION GAP SERPL CALCULATED.3IONS-SCNC: 7 MMOL/L (ref 3–14)
BACTERIA SPEC CULT: NO GROWTH
BLD PROD TYP BPU: NORMAL
BLD PROD TYP BPU: NORMAL
BLD UNIT ID BPU: 0
BLD UNIT ID BPU: 0
BLOOD PRODUCT CODE: NORMAL
BLOOD PRODUCT CODE: NORMAL
BPU ID: NORMAL
BPU ID: NORMAL
BUN SERPL-MCNC: 18 MG/DL (ref 7–30)
CALCIUM SERPL-MCNC: 7.3 MG/DL (ref 8.5–10.1)
CHLORIDE SERPL-SCNC: 103 MMOL/L (ref 94–109)
CO2 SERPL-SCNC: 23 MMOL/L (ref 20–32)
CREAT SERPL-MCNC: 0.84 MG/DL (ref 0.52–1.04)
ERYTHROCYTE [DISTWIDTH] IN BLOOD BY AUTOMATED COUNT: 14.1 % (ref 10–15)
GFR SERPL CREATININE-BSD FRML MDRD: 66 ML/MIN/1.7M2
GLUCOSE BLDC GLUCOMTR-MCNC: 100 MG/DL (ref 70–99)
GLUCOSE BLDC GLUCOMTR-MCNC: 106 MG/DL (ref 70–99)
GLUCOSE BLDC GLUCOMTR-MCNC: 127 MG/DL (ref 70–99)
GLUCOSE BLDC GLUCOMTR-MCNC: 133 MG/DL (ref 70–99)
GLUCOSE BLDC GLUCOMTR-MCNC: 172 MG/DL (ref 70–99)
GLUCOSE BLDC GLUCOMTR-MCNC: 72 MG/DL (ref 70–99)
GLUCOSE BLDC GLUCOMTR-MCNC: 98 MG/DL (ref 70–99)
GLUCOSE SERPL-MCNC: 81 MG/DL (ref 70–99)
HCT VFR BLD AUTO: 33.9 % (ref 35–47)
HGB BLD-MCNC: 11.1 G/DL (ref 11.7–15.7)
INTERPRETATION ECG - MUSE: NORMAL
Lab: NORMAL
MCH RBC QN AUTO: 29.3 PG (ref 26.5–33)
MCHC RBC AUTO-ENTMCNC: 32.7 G/DL (ref 31.5–36.5)
MCV RBC AUTO: 89 FL (ref 78–100)
PLATELET # BLD AUTO: 253 10E9/L (ref 150–450)
POTASSIUM SERPL-SCNC: 4.5 MMOL/L (ref 3.4–5.3)
RBC # BLD AUTO: 3.79 10E12/L (ref 3.8–5.2)
SODIUM SERPL-SCNC: 133 MMOL/L (ref 133–144)
SPECIMEN SOURCE: NORMAL
TRANSFUSION STATUS PATIENT QL: NORMAL
WBC # BLD AUTO: 15.8 10E9/L (ref 4–11)

## 2018-07-26 PROCEDURE — A9270 NON-COVERED ITEM OR SERVICE: HCPCS | Mod: GY | Performed by: STUDENT IN AN ORGANIZED HEALTH CARE EDUCATION/TRAINING PROGRAM

## 2018-07-26 PROCEDURE — 00000146 ZZHCL STATISTIC GLUCOSE BY METER IP

## 2018-07-26 PROCEDURE — 99024 POSTOP FOLLOW-UP VISIT: CPT | Mod: GC | Performed by: OBSTETRICS & GYNECOLOGY

## 2018-07-26 PROCEDURE — 85027 COMPLETE CBC AUTOMATED: CPT | Performed by: STUDENT IN AN ORGANIZED HEALTH CARE EDUCATION/TRAINING PROGRAM

## 2018-07-26 PROCEDURE — 40000193 ZZH STATISTIC PT WARD VISIT

## 2018-07-26 PROCEDURE — 97530 THERAPEUTIC ACTIVITIES: CPT | Mod: GP

## 2018-07-26 PROCEDURE — 36415 COLL VENOUS BLD VENIPUNCTURE: CPT | Performed by: STUDENT IN AN ORGANIZED HEALTH CARE EDUCATION/TRAINING PROGRAM

## 2018-07-26 PROCEDURE — 97535 SELF CARE MNGMENT TRAINING: CPT | Mod: GO | Performed by: OCCUPATIONAL THERAPIST

## 2018-07-26 PROCEDURE — 25000132 ZZH RX MED GY IP 250 OP 250 PS 637: Mod: GY | Performed by: STUDENT IN AN ORGANIZED HEALTH CARE EDUCATION/TRAINING PROGRAM

## 2018-07-26 PROCEDURE — 25000128 H RX IP 250 OP 636: Performed by: OBSTETRICS & GYNECOLOGY

## 2018-07-26 PROCEDURE — 40000133 ZZH STATISTIC OT WARD VISIT: Performed by: OCCUPATIONAL THERAPIST

## 2018-07-26 PROCEDURE — 97162 PT EVAL MOD COMPLEX 30 MIN: CPT | Mod: GP

## 2018-07-26 PROCEDURE — 80048 BASIC METABOLIC PNL TOTAL CA: CPT | Performed by: STUDENT IN AN ORGANIZED HEALTH CARE EDUCATION/TRAINING PROGRAM

## 2018-07-26 PROCEDURE — 12000006 ZZH R&B IMCU INTERMEDIATE UMMC

## 2018-07-26 PROCEDURE — 97165 OT EVAL LOW COMPLEX 30 MIN: CPT | Mod: GO | Performed by: OCCUPATIONAL THERAPIST

## 2018-07-26 PROCEDURE — 25000128 H RX IP 250 OP 636: Performed by: NURSE PRACTITIONER

## 2018-07-26 RX ORDER — GABAPENTIN 300 MG/1
900 CAPSULE ORAL
Status: DISCONTINUED | OUTPATIENT
Start: 2018-07-26 | End: 2018-07-28

## 2018-07-26 RX ORDER — LOSARTAN POTASSIUM 50 MG/1
100 TABLET ORAL DAILY
Status: DISCONTINUED | OUTPATIENT
Start: 2018-07-26 | End: 2018-07-26

## 2018-07-26 RX ORDER — LOSARTAN POTASSIUM 50 MG/1
100 TABLET ORAL AT BEDTIME
Status: DISCONTINUED | OUTPATIENT
Start: 2018-07-27 | End: 2018-07-27

## 2018-07-26 RX ADMIN — RANITIDINE 150 MG: 150 TABLET, FILM COATED ORAL at 08:45

## 2018-07-26 RX ADMIN — CARVEDILOL 25 MG: 25 TABLET, FILM COATED ORAL at 08:45

## 2018-07-26 RX ADMIN — ACETAMINOPHEN 650 MG: 325 TABLET, FILM COATED ORAL at 12:13

## 2018-07-26 RX ADMIN — SODIUM CHLORIDE: 9 INJECTION, SOLUTION INTRAVENOUS at 00:22

## 2018-07-26 RX ADMIN — DULOXETINE HYDROCHLORIDE 60 MG: 60 CAPSULE, DELAYED RELEASE ORAL at 22:28

## 2018-07-26 RX ADMIN — SENNOSIDES AND DOCUSATE SODIUM 2 TABLET: 8.6; 5 TABLET ORAL at 19:28

## 2018-07-26 RX ADMIN — LOSARTAN POTASSIUM 100 MG: 50 TABLET ORAL at 08:45

## 2018-07-26 RX ADMIN — RANITIDINE 150 MG: 150 TABLET, FILM COATED ORAL at 18:28

## 2018-07-26 RX ADMIN — CEFTRIAXONE 1 G: 1 INJECTION, POWDER, FOR SOLUTION INTRAMUSCULAR; INTRAVENOUS at 22:36

## 2018-07-26 RX ADMIN — ACETAMINOPHEN 650 MG: 325 TABLET, FILM COATED ORAL at 18:28

## 2018-07-26 RX ADMIN — ACETAMINOPHEN 650 MG: 325 TABLET, FILM COATED ORAL at 06:04

## 2018-07-26 RX ADMIN — Medication 2.5 MG: at 22:41

## 2018-07-26 RX ADMIN — LEVOTHYROXINE SODIUM 75 MCG: 75 TABLET ORAL at 08:45

## 2018-07-26 RX ADMIN — ACETAMINOPHEN 650 MG: 325 TABLET, FILM COATED ORAL at 00:16

## 2018-07-26 RX ADMIN — INSULIN DEGLUDEC INJECTION 16 UNITS: 100 INJECTION, SOLUTION SUBCUTANEOUS at 22:39

## 2018-07-26 RX ADMIN — ENOXAPARIN SODIUM 40 MG: 100 INJECTION SUBCUTANEOUS at 12:14

## 2018-07-26 RX ADMIN — Medication 2.5 MG: at 16:48

## 2018-07-26 RX ADMIN — Medication 5 MG: at 10:09

## 2018-07-26 RX ADMIN — SENNOSIDES AND DOCUSATE SODIUM 2 TABLET: 8.6; 5 TABLET ORAL at 08:45

## 2018-07-26 ASSESSMENT — PAIN DESCRIPTION - DESCRIPTORS
DESCRIPTORS: ACHING
DESCRIPTORS: ACHING;CONSTANT
DESCRIPTORS: ACHING
DESCRIPTORS: ACHING

## 2018-07-26 ASSESSMENT — ACTIVITIES OF DAILY LIVING (ADL)
ADLS_ACUITY_SCORE: 9
PREVIOUS_RESPONSIBILITIES: MEAL PREP;HOUSEKEEPING;LAUNDRY;SHOPPING;MEDICATION MANAGEMENT;FINANCES;DRIVING
ADLS_ACUITY_SCORE: 9

## 2018-07-26 NOTE — PROGRESS NOTES
Progress Note    Called by RN regarding concern for altered mental status and lethargy. Per RN last documented alert and oriented at 1200.     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    S: Upon arrival to the room patient is lethargic, sleeping between question. Disoriented to time, place and situation.  Oriented to self.  Patient denies any current pain.  Intermittently following commands.    Vitals:    07/25/18 1914 07/25/18 1927 07/25/18 1939 07/25/18 2031   BP: 120/55 122/54 122/78 130/61   BP Location:   Left arm Left arm   Pulse:       Resp: 22 20 16 22   Temp:   99.6  F (37.6  C)    TempSrc:   Oral    SpO2: 97% 98% 95% 95%   Weight:       Height:         GEN: lethargic, AxO x1  CV: RRR  Pulm: CTAB  Abdomen: midline incision, nontender to palpation  Ext: weakness bilaterally L>R,  improved with second examiner - with heavy encouragement can move all extremities appropriately, SCDs in place,     Results for orders placed or performed during the hospital encounter of 07/24/18   CTA Head Neck with Contrast    Narrative    CTA HEAD NECK WITH CONTRAST 7/25/2018 7:35 PM    Head CT without contrast  CT angiogram of the neck   CT angiogram of the base of the brain with contrast  Reconstruction by the Radiologist on the 3D workstation    Provided History:  stroke;     Comparison:  None available.      Technique:  HEAD CT:  Using multidetector thin collimation helical acquisition  technique, axial, coronal and sagittal CT images from the skull base  to the vertex were obtained without intravenous contrast.   HEAD and NECK CTA: During rapid bolus intravenous injection of  nonionic contrast material, axial images were obtained using thin  collimation multidetector helical technique from the base of the skull  through the [place]. This CT angiogram data was reconstructed  at thin intervals with mild overlap. Images were sent to the [employer]  workstation, and  3D reconstructions were obtained. The axial source  images, multiplanar reformations, 3D reconstructions in both maximum  intensity projection display and volume rendered models were reviewed,  with reconstructions performed by the technologist and the  radiologist.    Contrast: 75ml isovue 370    Findings:  Head CT:  No intracranial hemorrhage, mass effect, midline shift, or abnormal  extra-axial fluid collection. Mild generalized parenchymal volume loss  and leukoaraiosis. Gray-white matter differentiation of the cerebral  hemispheres is preserved. No hydrocephalus. Basal cisterns are patent.    Visualized paranasal sinuses are clear. Small right mastoid effusion.    Head CTA:  Widely patent major intracranial arteries. No aneurysm or stenosis.  Calcifications of the cavernous internal carotid arteries.     Neck CTA:  Moderate short segment narrowing of the left vertebral artery origin.  Mild atherosclerotic calcification at the carotid bifurcations without  significant stenosis. Right vertebral artery is widely patent.    Nonspecific focal region of demineralization of the dorsal left aspect  of the C2 vertebral body with preservation of the adjacent cortex and  no evidence of associated bony expansion. Multilevel cervical  spondylosis and degenerative spondylolisthesis. Soft tissues of the  neck are unremarkable. Partially visualized right greater than left  pleural effusions are increased compared to 7/2/2018.      Impression    Impression:    1. Head CT: No acute intracranial pathology. Mild chronic small vessel  ischemic disease and generalized parenchymal volume loss.  2. Head CTA: Widely patent major intracranial arteries. No aneurysm or  stenosis.  3. Neck CTA: Moderate short segment stenosis of the origin of the left  vertebral artery. Patent carotid arteries and left vertebral artery  without significant stenosis.  4. New bilateral pleural effusions.  5. Nonspecific lucent lesion in the dorsal aspect of  the C2 vertebral  body.    I have personally reviewed the examination and initial interpretation  and I agree with the findings.    MARCO MINA MD   Basic metabolic panel   Result Value Ref Range    Sodium 134 133 - 144 mmol/L    Potassium 4.4 3.4 - 5.3 mmol/L    Chloride 98 94 - 109 mmol/L    Carbon Dioxide 29 20 - 32 mmol/L    Anion Gap 7 3 - 14 mmol/L    Glucose 99 70 - 99 mg/dL    Urea Nitrogen 16 7 - 30 mg/dL    Creatinine 0.65 0.52 - 1.04 mg/dL    GFR Estimate 89 >60 mL/min/1.7m2    GFR Estimate If Black >90 >60 mL/min/1.7m2    Calcium 9.1 8.5 - 10.1 mg/dL   CBC with platelets   Result Value Ref Range    WBC 6.7 4.0 - 11.0 10e9/L    RBC Count 3.49 (L) 3.8 - 5.2 10e12/L    Hemoglobin 10.2 (L) 11.7 - 15.7 g/dL    Hematocrit 30.7 (L) 35.0 - 47.0 %    MCV 88 78 - 100 fl    MCH 29.2 26.5 - 33.0 pg    MCHC 33.2 31.5 - 36.5 g/dL    RDW 13.1 10.0 - 15.0 %    Platelet Count 309 150 - 450 10e9/L   HCG quantitative pregnancy   Result Value Ref Range    HCG Quantitative Serum 3 0 - 5 IU/L   Glucose by meter   Result Value Ref Range    Glucose 105 (H) 70 - 99 mg/dL   Glucose by meter   Result Value Ref Range    Glucose 96 70 - 99 mg/dL   Fibrinogen activity   Result Value Ref Range    Fibrinogen 283 200 - 420 mg/dL   Hemoglobin   Result Value Ref Range    Hemoglobin 11.2 (L) 11.7 - 15.7 g/dL   INR   Result Value Ref Range    INR 1.14 0.86 - 1.14   Platelet count   Result Value Ref Range    Platelet Count 365 150 - 450 10e9/L   Partial thromboplastin time   Result Value Ref Range    PTT 32 22 - 37 sec   Troponin I   Result Value Ref Range    Troponin I ES 0.017 0.000 - 0.045 ug/L   Glucose by meter   Result Value Ref Range    Glucose 64 (L) 70 - 99 mg/dL   Glucose by meter   Result Value Ref Range    Glucose 138 (H) 70 - 99 mg/dL   Glucose by meter   Result Value Ref Range    Glucose 135 (H) 70 - 99 mg/dL   Hemoglobin   Result Value Ref Range    Hemoglobin 10.3 (L) 11.7 - 15.7 g/dL   Glucose by meter   Result Value  Ref Range    Glucose 157 (H) 70 - 99 mg/dL   Glucose by meter   Result Value Ref Range    Glucose 156 (H) 70 - 99 mg/dL   Hemoglobin   Result Value Ref Range    Hemoglobin 9.2 (L) 11.7 - 15.7 g/dL   Hemoglobin   Result Value Ref Range    Hemoglobin 9.0 (L) 11.7 - 15.7 g/dL   Glucose by meter   Result Value Ref Range    Glucose 136 (H) 70 - 99 mg/dL   Basic metabolic panel   Result Value Ref Range    Sodium 135 133 - 144 mmol/L    Potassium 4.8 3.4 - 5.3 mmol/L    Chloride 103 94 - 109 mmol/L    Carbon Dioxide 25 20 - 32 mmol/L    Anion Gap 7 3 - 14 mmol/L    Glucose 84 70 - 99 mg/dL    Urea Nitrogen 19 7 - 30 mg/dL    Creatinine 1.01 0.52 - 1.04 mg/dL    GFR Estimate 54 (L) >60 mL/min/1.7m2    GFR Estimate If Black 65 >60 mL/min/1.7m2    Calcium 7.4 (L) 8.5 - 10.1 mg/dL   CBC with platelets   Result Value Ref Range    WBC 9.0 4.0 - 11.0 10e9/L    RBC Count 3.19 (L) 3.8 - 5.2 10e12/L    Hemoglobin 9.3 (L) 11.7 - 15.7 g/dL    Hematocrit 28.2 (L) 35.0 - 47.0 %    MCV 88 78 - 100 fl    MCH 29.2 26.5 - 33.0 pg    MCHC 33.0 31.5 - 36.5 g/dL    RDW 13.8 10.0 - 15.0 %    Platelet Count 272 150 - 450 10e9/L   Troponin I   Result Value Ref Range    Troponin I ES <0.015 0.000 - 0.045 ug/L   Albumin level   Result Value Ref Range    Albumin 2.7 (L) 3.4 - 5.0 g/dL   Troponin I   Result Value Ref Range    Troponin I ES <0.015 0.000 - 0.045 ug/L   Glucose by meter   Result Value Ref Range    Glucose 65 (L) 70 - 99 mg/dL   Glucose by meter   Result Value Ref Range    Glucose 64 (L) 70 - 99 mg/dL   Glucose by meter   Result Value Ref Range    Glucose 184 (H) 70 - 99 mg/dL   Glucose by meter   Result Value Ref Range    Glucose 149 (H) 70 - 99 mg/dL   Glucose by meter   Result Value Ref Range    Glucose 133 (H) 70 - 99 mg/dL   Glucose by meter   Result Value Ref Range    Glucose 167 (H) 70 - 99 mg/dL   Glucose by meter   Result Value Ref Range    Glucose 142 (H) 70 - 99 mg/dL   UA with Microscopic reflex to Culture   Result Value Ref  Range    Color Urine Yellow     Appearance Urine Slightly Cloudy     Glucose Urine Negative NEG^Negative mg/dL    Bilirubin Urine Negative NEG^Negative    Ketones Urine Negative NEG^Negative mg/dL    Specific Gravity Urine 1.021 1.003 - 1.035    Blood Urine Small (A) NEG^Negative    pH Urine 5.0 5.0 - 7.0 pH    Protein Albumin Urine 10 (A) NEG^Negative mg/dL    Urobilinogen mg/dL Normal 0.0 - 2.0 mg/dL    Nitrite Urine Negative NEG^Negative    Leukocyte Esterase Urine Moderate (A) NEG^Negative    Source Catheterized Urine     WBC Urine 14 (H) 0 - 5 /HPF    RBC Urine 8 (H) 0 - 2 /HPF    Bacteria Urine Few (A) NEG^Negative /HPF    Transitional Epi <1 0 - 1 /HPF    Mucous Urine Present (A) NEG^Negative /LPF    Hyaline Casts 1 0 - 2 /LPF   Lactic acid whole blood   Result Value Ref Range    Lactic Acid 0.7 0.7 - 2.0 mmol/L   Hemoglobin   Result Value Ref Range    Hemoglobin 11.0 (L) 11.7 - 15.7 g/dL   Basic metabolic panel   Result Value Ref Range    Sodium 132 (L) 133 - 144 mmol/L    Potassium 5.2 3.4 - 5.3 mmol/L    Chloride 100 94 - 109 mmol/L    Carbon Dioxide 22 20 - 32 mmol/L    Anion Gap 9 3 - 14 mmol/L    Glucose 141 (H) 70 - 99 mg/dL    Urea Nitrogen 21 7 - 30 mg/dL    Creatinine 1.01 0.52 - 1.04 mg/dL    GFR Estimate 54 (L) >60 mL/min/1.7m2    GFR Estimate If Black 65 >60 mL/min/1.7m2    Calcium 7.1 (L) 8.5 - 10.1 mg/dL   CBC with platelets   Result Value Ref Range    WBC 16.0 (H) 4.0 - 11.0 10e9/L    RBC Count 3.72 (L) 3.8 - 5.2 10e12/L    Hemoglobin 11.1 (L) 11.7 - 15.7 g/dL    Hematocrit 33.4 (L) 35.0 - 47.0 %    MCV 90 78 - 100 fl    MCH 29.8 26.5 - 33.0 pg    MCHC 33.2 31.5 - 36.5 g/dL    RDW 14.1 10.0 - 15.0 %    Platelet Count 274 150 - 450 10e9/L   EKG 12-lead, complete   Result Value Ref Range    Interpretation ECG Click View Image link to view waveform and result        A/P:  Afshan Cardona is a 72 yo POD#1 sp XL, AURELIANO  and 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  Concern  for acute mental status changes in the setting of recent surgery and known cardiomyopathy.  Differential includes stroke, acute cardiac event, electrolyte abnormality, infection, medication associated.    Stroke code called - Neuro team called to evaluate in the setting of weakness and altered mental status. Improved L sided weakness and movement upon arrival.  Still very confused.   - CTA head with and without contrast - negative. Per Stroke team low suspicion for stroke at this time, no need for MRI.  Stroke team with follow up with patient in AM.     Medications: Last oxycodone 10 mg given at 1215.  Home narcotic use: MsContin 15 mg daily, oxycodone 5 mg daily   - Noted when patient feel asleep between questions that RR would drop to 13-15, when aroused 15-17.    - Empiric narcan given 0.2 mg x 2 approximately 10 minutes apart. Significant response with second dose, patient more alert and irritated. RR increased to 20.     Fever 101.5 at the time of evaluation.  Repeat approximately 30 minutes later temp down to 99.6.  Otherwise VSS.   - Blood cultures and UA collected   - UA dirty with moderate, catheter specimen - will empirically treat with ceftriaxone 1g Q4h given recent temperature.   - Increase IVF to 125 ml/hr   - Follow up urine culture.    Cardiac history: Telemetry reviewed and no evidence of any cardiac events.  Other vital signs stable.   - EKG normal sinus rhythm     STAT labs collected and notable for:   - Creatinine 1.01 (baseline 0.65)- stable since this morning. Known low urine output s/p 750 ml bolus, 2 U PRBCs, and NS continuous at 100cc per hour.   - Hgb stable and appropriate at 11 s/p 2 U PRBCs today.   - BMP wnl   - WBC increased to 16     Summary: At this point given response to Narcan altered mental status likely related to recent increased narcotic use in the setting of surgery.  Will hold off on narcotics overnight.  Given recent temperature, elevated WBC, and UA suspicious for  infection will empirically treat with IV ceftriaxone and follow up urine culture.  Do not suspect acute cardiac event or stroke give negative CT head and stable telemetry/EKG.  Will continue to monitor closely.    Sushma Henderson MD  OB/GYN PGY3      I personally examined the patient and agree with the resident's assessment and plan. 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 Pt was noted to have lethargy with altered mental status this evening. Appeared drowsy and lethargic. Has been waking up intermittently. Denied any chest pain/SOB.     -  Head CT negative for intracranial bleed. Low suspicion for embolic event. Neurology team de-escalated the stroke and recommended neuro-check every 2 hours. Pt was moving all four extremities at the time of my assessment. No motor or sensory deficits noted.     - 12 lead EKG normal. No arrhythmia.       - No evidence of hypoglycemia or severe hyponatremia.          - Likely opioid overdose.Breathing shallow , RR 13-14. Spo2>92%. Received 10 mg oxycodon at noon.  Narcan 0.2 mg   x2 administered. Pt appeared more awake after her second dose, now breathing normally, interacting appropriately .      - Pt spiked temp of 101.5 , Sepsis work-up done. Lactate normal. WBC trended up to 16. UA suggestive of UTI. Will administer IV ceftriaxone 1 gm daily pending urine c/s. Marginal urine output although improved since morning. Creatinine stable 1.01. Likely prerenal. Will increased the IV fluid rate to 125 ml/hr . Will replace duffy for now for close monitoring of urine output with a plan to discontinue it once urine output improves.     Will continue to monitor her on telemetry floor. Family was updated.     Dr Galeas was informed about the patient's status. Agreed with the management.

## 2018-07-26 NOTE — PROGRESS NOTES
Gynecologic Oncology Progress Note  7/26/2018    Dz: High grade serous ovarian cancer    24 hour events:  - Transfused 2 U pRBCs  - Altered mental status, Stroke code called  - CTA head negative for stroke  - Narcan given with improvement in symptoms  - Tlast/max 101.5 at 1900  - UA mod leuk esterase, replaced duffy and started on empiric IV ceftriaxone    S: Denies pain. Positive flatus, no BM. Reports she had a small amount of food yesterday with no nausea/vomiting. Was able to ambulate without issues. Has some confusion that has been unchanged throughout the night.    O:  Patient Vitals for the past 24 hrs:   BP Temp Temp src Pulse Heart Rate Resp SpO2 Weight   07/26/18 0500 - - - - - - - 69.9 kg (154 lb)   07/26/18 0345 136/61 98.2  F (36.8  C) Oral - 89 12 96 % -   07/26/18 0000 132/56 99  F (37.2  C) Oral - 87 18 95 % -   07/25/18 2031 130/61 - - - 85 22 95 % -   07/25/18 1939 122/78 99.6  F (37.6  C) Oral - 89 16 95 % -   07/25/18 1927 122/54 - - - 88 20 98 % -   07/25/18 1914 120/55 - - - 86 22 97 % -   07/25/18 1901 128/58 101.5  F (38.6  C) Oral - 85 18 96 % -   07/25/18 1900 - - - - - 18 - -   07/25/18 1859 - - - - - 20 - -   07/25/18 1554 110/72 98.9  F (37.2  C) Oral 79 78 16 95 % -   07/25/18 1244 132/65 98.8  F (37.1  C) Oral - 78 16 97 % -   07/25/18 1059 110/70 98.9  F (37.2  C) Oral - 78 16 96 % -   07/25/18 0915 108/58 - - - 78 - - -   07/25/18 0900 114/56 - - - 79 - - -   07/25/18 0852 107/67 98.6  F (37  C) Oral - 78 15 - -   07/25/18 0748 122/55 98.4  F (36.9  C) Oral - 81 - 97 % -   07/25/18 0700 123/64 - - - 83 - 96 % -   07/25/18 0659 123/64 98.4  F (36.9  C) Axillary - 83 - 97 % -   07/25/18 0648 116/56 98.1  F (36.7  C) Oral - 81 - 97 % -   07/25/18 0645 116/56 - - - 82 - 98 % -       Exam:   Gen: resting in bed, A&Ox4 intermittently - initially thought she was at sister's house but self corrected to hospital  Cardio: RRR  Resp: Crackles in b/l bases, otherwise clear throughout  Abdomen:  soft, mildly tender to palpation. Not distended, no guarding or rebound  Incision: clean, dry, and intact  Neuro: alert and oriented to self, intermittently to time and place, pupils small, equal, and reactive to light, facial movements symmetric, hand strength, extremity movement symmetric   Extremities: BLEs non-tender with SCDs in place, trace edema     I/O last 3 completed shifts:  In: 3711.67 [P.O.:240; I.V.:3211.67]  Out: 847 [Urine:847]   Since  out for urine    Labs:  Troponin <0.015  Lactate 0.7  UA: moderate LE, small blood, few bacteria, WBC 14  Hgb 11.1  WBC 16 > 15.8  Cr 1.01 > 0.84    Glucose:      A: 73 year old POD#2 s/p exploratory laparotomy, lysis of adhesions, and sigmoid epiploica biopsy, post op course c/b altered mental status due to narcotic use with possible urinary tract infection.    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 S/p hysterectomy at 42yo.   FEN: Regular diet, NS 125cc/hr.   Pain: S/p TAPS, scheduled tylenol.  Currently holding all narcotics in setting of AMS and response to narcan indicating likely reaction to increased narcotics in setting of recent surgery.   - Ibuprofen stopped given MARIAH and low UOP   - Home gabapentin 900mg TID, MS Contin 15 BID (held)   - Can consider restarting NSAIDs given improved UOP and Cr improved  Heme: Hgb 10.2> > 11.2 intraop> 10.2 > 9.0 > 1UPRBCs > 9.3> 1UpRBCs> 11.1  -  H/o DVT s/p   CV: HLD, statin held. HTN, home carvedilol, restart home losartan today.  - Cardiomyopathy, EF 60% (2017), pre-op stress test positive, s/p angio w/ improved disease (50-60% occlusion RCA & LAD) and quoted 6.6% perioperative cardiac risk. Telemetry while inpatient. EKG during AMS NSR.  Pulm: 2-4mm perivascular nodules of RML & RUL  GI: Sched senna-S, fiber  : Shepard in place, plan to remove this AM. MARIAH w/ Cr bump now improved. Likely under rescuitated on POD#1  ID: UA  with moderate leuk esterase, Tm/Tl 101.5 (7/25 @ 1900). D#2 IV ceftriaxone for presumed UTI - if afebrile after dose tonight transition to PO regimen tmr. UCx and BCx pending  Endo: DM1, A1C 9.4 - Tresiba 16U qHS, very low SSI. Hypothyroid - synthroid  Psych/Neuro/MSK: Fibromyalgia - cymbalta 60mg qHS  PPX: SCDs, IS, lovenox  Dispo: When meeting post-op goals and afebrile on oral antibiotics, likely POD#3-4    Mya Garcia MD PGY3    Provider Disclosure:   I agree with above History, Review of Systems, Physical exam and Plan. I have reviewed the content of the documentation and have edited it as needed. I have seen and personally performed the services documented here and the documentation accurately represents those services and the decisions I have made.     Electronically signed by:   Bakari Galeas MD   Gynecologic Oncology   [facility]

## 2018-07-26 NOTE — PLAN OF CARE
"Problem: Patient Care Overview  Goal: Plan of Care/Patient Progress Review  Outcome: No Change    A: A&O to self, intermittently able to state location/time but is forgetful and has illogical statements such as \"It is 1897 today, we must go home\", \"I must go to [employer] but I do not think that I will be able to anymore I am just so tired\" pt able to be redirected and was able to say she had surgery, that she is on [facility], and who the president is. Pt sleeping in between cares, expressing \"I am always like this after surgery, I am so tired\". Neuro's unchanged overnight, pt able to follow commands, pupils remain unequal but brisk & reactive. VSS, 2L NC capno in place with no alarms sating >92%. SR. Low grade fever, Tmax 99.0. Complains of abdominal pain and knee tenderness, scheduled tylenol given no adjunct to non-pharm methods of pain management. Denies nausea. MOD Cho diet, poor appetite d/t lethargic & reduced alertness. NS @ 125 mL/h for MIVF. Midline covered, marked drainage, refusing abdominal binder to be closed fully. Shepard in place, see flow sheet for output.No BM overnight, BS active x4. Independently repositioning. Able to use call light.     Spoke with team during rounds at 0600 about AMS and illogical statements. Concerns remain possible UTI. Continue with plan of care.     I/O this shift:  In: 997.5 [P.O.:60; I.V.:937.5]  Out: 475 [Urine:475]    Temp:  [98.1  F (36.7  C)-101.5  F (38.6  C)] 98.2  F (36.8  C)  Pulse:  [79] 79  Heart Rate:  [78-89] 89  Resp:  [12-22] 12  BP: (107-136)/(54-78) 136/61  SpO2:  [95 %-98 %] 96 %     R: Continue with POC. Notify primary team with changes.    Problem: Diabetes Comorbidity  Goal: Diabetes  Patient comorbidity will be monitored for signs and symptoms of hyperglycemia or hypoglycemia. Problems will be absent, minimized or managed by discharge/transition of care.  Outcome: No Change  BG monitored q4h, remaining 100-130.       "

## 2018-07-26 NOTE — CONSULTS
[facility], [place]      Neurology Stroke Code    Patient Name: Afshan Cardona  : 1945 MRN#: [id number]    STROKE DATA     Stroke Code:  Time called:  2018  Time patient seen:  2018 1903  Onset of symptoms:  2018 1200  Last known normal (pt's baseline):  2018 1200  Head CT read by Dr. Weston at:  2018 192  Stroke Code de-escalated at 2018 192 after discussion with Dr. Weston due to symptoms not likely caused by stroke.      TPA treatment:  Not given due to outside the time window, minor / isolated / quickly resolving stroke symptoms.    National Institutes of Health Stroke Scale (at presentation)  NIHSS done at:  time patient seen      Score    Level of consciousness:  (0)   Alert, keenly responsive    LOC questions:  (1)   Answers one question correctly    LOC commands:  (0)   Performs both tasks correctly    Best gaze:  (0)   Normal    Visual:  (0)   No visual loss    Facial palsy:  (0)   Normal symmetrical movements    Motor arm (left):  (0)   No drift    Motor arm (right):  (0)   No drift    Motor leg (left):  (0)   No drift    Motor leg (right):  (0)   No drift    Limb ataxia:  (0)   Absent    Sensory:  (0)   Normal- no sensory loss    Best language:  (0)   Normal- no aphasia    Dysarthria:  (1)   Mild to moderate dysarthria    Extinction and inattention:  (0)   No abnormality        NIHSS Total Score:  2           ASSESSMENT & RECOMMENDATONS     Stroke code activated due to confusion and concern for left sided weakness. Confusion started around noon today. She was neurologically normal prior to this. She is POD #1 from exploratory laparotomy for biopsy of gynecologic tumors. Exam with slurred speech and no focal neurologic deficit. She appears mildly encephalopathic. Low suspicion for stroke causing her encephalopathy. Would consider other systemic causes that could contribute to toxic/metabolic encephalopathy including  medication effect, infection.    Recommendations:   -No further imaging from neurologic standpoint  -Will do follow up exam in the morning    The patient will be managed by the gynecologic oncology team and  followed by the Stroke Consult service    The patient was discussed with the Fellow, Dr. Weston.  The staff is Dr. Brown.    Bry Martines MD

## 2018-07-26 NOTE — PROVIDER NOTIFICATION
"Time of notification: 2:48 PM  Provider notified: Primary provider notified  Patient status: pt is more confused after pain medication . Family sister/grand-daughter said that pt gets confused for about \" 3-4 \" days after surgery. Pt is getting re-orienting frequenting.   Temp:  [97.8  F (36.6  C)-101.5  F (38.6  C)] 97.8  F (36.6  C)  Pulse:  [79] 79  Heart Rate:  [78-90] 85  Resp:  [12-22] 16  BP: (110-159)/(54-78) 136/66  SpO2:  [93 %-98 %] 94 %  Orders received: none at this time. Will continue to monitor.   "

## 2018-07-26 NOTE — PROGRESS NOTES
07/26/18 1000   Quick Adds   Type of Visit Initial Occupational Therapy Evaluation   Living Environment   Lives With alone   Living Arrangements condominium   Home Accessibility no concerns   Number of Stairs to Enter Home 0   Number of Stairs Within Home 0   Transportation Available car;family or friend will provide   Living Environment Comment Pt endorses driving at baseline   Self-Care   Usual Activity Tolerance fair   Current Activity Tolerance poor   Regular Exercise yes   Activity/Exercise Type strength training;other (see comments)  (stretching for back and knee)   Exercise Amount/Frequency (3x/week)   Equipment Currently Used at Home grab bar   Activity/Exercise/Self-Care Comment PT at baseline with sciatica symptoms    Functional Level Prior   Ambulation 0-->independent   Transferring 0-->independent   Toileting 0-->independent   Bathing 0-->independent   Dressing 0-->independent   Eating 0-->independent   Communication 0-->understands/communicates without difficulty   Swallowing 0-->swallows foods/liquids without difficulty   Cognition 0 - no cognition issues reported   Fall history within last six months no   General Information   Referring Physician Marianela Blum   Patient/Family Goals Statement return to I ADL's go kayaking with family at cabin.    Additional Occupational Profile Info/Pertinent History of Current Problem 73 year old POD#2 s/p exploratory laparotomy, lysis of adhesions, and sigmoid epiploica biopsy, post op course c/b altered mental status due to narcotic use with possible urinary tract infection. stroke code called last night with negative results.    Precautions/Limitations fall precautions;abdominal precautions   General Observations pt motivated however with significant pain with movement appearing anxious at times.    Cognitive Status Examination   Orientation person;place  (2015)   Level of Consciousness alert   Able to Follow Commands WNL/WFL   Personal Safety (Cognitive)  WNL/WFL   Memory impaired   Attention Distractible during evaluation   Cognitive Comment further testing required, pt with intermittent pain confounding testing. pt able to demo understanding of POC and make reasonable assertions regarding her hospital stay as well as recall of increased confusion last night.    Visual Perception   Visual Perception No deficits were identified   Visual Acuity intact   Visual Field diminished by approx 25 % in all directions.    Sensory Examination   Sensory Quick Adds (protective sensation in tact in all 4 extremities)   Range of Motion (ROM)   ROM Quick Adds No deficits were identified   ROM Comment B UE's WFL   Strength   Manual Muscle Testing Quick Adds Other   Strength Comments B uE's grossly 3+ to 4/5 and symmetrical   Hand Strength   Hand Strength Comments mild deficits in B hands, symmetrical.    Coordination   Coordination Comments mild coordination deificts in B hands.    Transfer Skill: Bed to Chair/Chair to Bed   Level of [place]: Bed to Chair moderate assist (50% patients effort)   Transfer Skill: Sit to Stand   Level of [place]: Sit/Stand minimum assist (75% patients effort)   Lower Body Dressing   Level of [place]: Dress Lower Body maximum assist (25% patients effort)   Instrumental Activities of Daily Living (IADL)   Previous Responsibilities meal prep;housekeeping;laundry;shopping;medication management;finances;driving   IADL Comments Pt endorses driving I   Activities of Daily Living Analysis   Impairments Contributing to Impaired Activities of Daily Living balance impaired;cognition impaired;coordination impaired;fear and anxiety;flexibility decreased;strength decreased   General Therapy Interventions   Planned Therapy Interventions ADL retraining;IADL retraining;bed mobility training;cognition;fine motor coordination training;strengthening;transfer training;home program guidelines;progressive activity/exercise;risk factor education   Clinical  "Impression   Criteria for Skilled Therapeutic Interventions Met yes, treatment indicated   OT Diagnosis decreased ADL I   Assessment of Occupational Performance 5 or more Performance Deficits   Identified Performance Deficits dressing, bathing, G/H, toileting, leisure.    Clinical Decision Making (Complexity) Low complexity   Therapy Frequency 5 times/wk   Predicted Duration of Therapy Intervention (days/wks) 3 weeks   Anticipated Discharge Disposition Transitional Care Facility   Risks and Benefits of Treatment have been explained. Yes   Patient, Family & other staff in agreement with plan of care Yes   Clinical Impression Comments Pt presenst to OT with post surgical precautions, pain, deconditioning and cognitive deficits leading to decreased ADL I. Pt to benefit from skilled OT intervention to address the above problem list. See neville halloe for treatement provided today.    [facility] [employer] \"6 Clicks\"   2016, Trustees of [facility], under license to [employer].  All rights reserved.   6 Clicks Short Forms Daily Activity Inpatient Short Form   [facility] AM-PAC  \"6 Clicks\" Daily Activity Inpatient Short Form   1. Putting on and taking off regular lower body clothing? 2 - A Lot   2. Bathing (including washing, rinsing, drying)? 2 - A Lot   3. Toileting, which includes using toilet, bedpan or urinal? 2 - A Lot   4. Putting on and taking off regular upper body clothing? 3 - A Little   5. Taking care of personal grooming such as brushing teeth? 3 - A Little   6. Eating meals? 3 - A Little   Daily Activity Raw Score (Score out of 24.Lower scores equate to lower levels of function) 15   Total Evaluation Time   Total Evaluation Time (Minutes) 5     "

## 2018-07-26 NOTE — PLAN OF CARE
"Problem: Patient Care Overview  Goal: Plan of Care/Patient Progress Review  Discharge Planner OT   Patient plan for discharge: TCU  Current status: pt with severe pain with mobility in both abdomen and B LE's related to sciatica. Pt motivated for therapy and with insight to yesterdays confusion, mildly confused today. Mod assist supine to EOB  Barriers to return to prior living situation: pain, deconditioning, post surgical precautions.   Recommendations for discharge: TCU  Rationale for recommendations: Pt currently with pain and deconditioning preventing participation in ADL's. Pt endorsing taking pain medications at baseline including Morphine and Oxycodone and driving under the influence of these medications. Pt advised not to drive under these medications and to consult MD team, this writer will also contact medical team in this hospital regarding these statements. \"I have passed out driving and not remembered how I got home\".       Entered by: Brian Chávez 07/26/2018 10:26 AM           "

## 2018-07-26 NOTE — PROGRESS NOTES
Arrival to Stroke Code: 1901    Stroke Team escalate/de-escalate interventions: Stroke escalated to stat head CT, no evidence of stroke seen,      Dr Martines de-escalated the stroke at 1927. He stated to check neuro checks every 2 hrs x 2.    ED/Bedside Nurse providing handoff: Amarilys Henry (RN)    Time left for CT: 1914    Time Returned to ED/Unit: Returned to 6B at 1939.    ED/Bedside Nurse given handoff to & Time: Report given to Consuelo Henry (CURLY)

## 2018-07-26 NOTE — PLAN OF CARE
Problem: Patient Care Overview  Goal: Plan of Care/Patient Progress Review  Outcome: No Change  Neuro: A&Ox4.   Cardiac: SR. VSS.   Respiratory: Sating mid 90s on 2L O2.  GI/: Adequate urine output. No BM this shift.   Diet/appetite: Mod CHO diet. Poor appetite. Was very lethargic making it difficult to eat.   Activity:  Assist of 2. Did not get out of bed this evening.  Pain: At acceptable level on current regimen. Scheduled Tylenol given for c/o of abdominal incisional pain.  Skin: No new deficits noted. Midline incision, drainage marked, no change. ABD binder in place.   LDA's: L PIV SL. R PIV infusing NS @ 125mL/hr.    Plan: Code stroke called this evening around 1900. Pt was increasingly lethargic and confused with weakness in all extremities and pinpoint pupils. CT came back negative. Neuro checks q2h x2 ordered. New duffy placed for possible UTI and pt started on Ceftriaxone. Continue with POC. Notify primary team with changes. Will continue to monitor.

## 2018-07-26 NOTE — CONSULTS
[facility], [place]      Neurology Stroke Code    Patient Name: Afshan Cardona  : 1945 MRN#: [id number]    STROKE DATA     National Institutes of Health Stroke Scale (at presentation)  NIHSS done at:  time patient seen      Score    Level of consciousness:  (0)   Alert, keenly responsive    LOC questions:  (0)   Answers one question correctly    LOC commands:  (0)   Performs both tasks correctly    Best gaze:  (0)   Normal    Visual:  (0)   No visual loss    Facial palsy:  (0)   Normal symmetrical movements    Motor arm (left):  (0)   No drift    Motor arm (right):  (0)   No drift    Motor leg (left):  (0)   No drift    Motor leg (right):  (0)   No drift    Limb ataxia:  (0)   Absent    Sensory:  (0)   Normal- no sensory loss    Best language:  (0)   Normal- no aphasia    Dysarthria:  (0)   Mild to moderate dysarthria    Extinction and inattention:  (0)   No abnormality        NIHSS Total Score:  0           ASSESSMENT & RECOMMENDATONS     Stroke code activated due to confusion and concern for left sided weakness. Confusion started around noon yesterday. She was neurologically normal prior to this. She is POD #2 from exploratory laparotomy for biopsy of gynecologic tumors. Exam today was normal apart form sciatic pain upon raising her right lower extremity. No focal neurologic deficit. Her mental status is better. She is alert, oriented to p/p/t. We do not thinks she has stroke based on the examination and we will sign off to the Gyn/Onc team.    If you have any concern regarding encephalopathy please contact the general neurology team     The patient was discussed with the Fellow, Dr. Weston.  The staff is Dr. Brown.    Reggie Matta MD

## 2018-07-26 NOTE — PLAN OF CARE
Problem: Patient Care Overview  Goal: Plan of Care/Patient Progress Review    6B / Discharge Planner PT   Patient plan for discharge: not discussed today  Current status: PT evaluation completed. Patient disoriented to time, place, and situation throughout session, forgetful and requires frequent reorientation. Patient completes sit<>supine with mod Ax2, sits EOB with CGA/min A, became tearful and endorses increased incisional pain as well as diffuse body aches & pains, declined attempting sit<>stand this afternoon.   Barriers to return to prior living situation: medical needs, AMS, pain, level of assist  Recommendations for discharge: TCU  Rationale for recommendations: Patient is well below baseline level of function, limited by significant pain and AMS, will require continued rehab to increase safety and independence with functional mobility.       Entered by: Gen Nunez 07/26/2018 6:26 PM

## 2018-07-26 NOTE — PROGRESS NOTES
Social Work: Assessment with Discharge Plan    Patient Name:  Afshan Cardona  :  1945  Age:  73 year old  MRN:  [id number]  Risk/Complexity Score:  Filed Complexity Screen Score: 3  Completed assessment with:  Pt, pt's daughter India, and India's SO Chacoh.     Presenting Information   Reason for Referral:  Discharge plan  Date of Intake:  2018  Referral Source:  Chart Review  Decision Maker:  Patient  Alternate Decision Maker:  Pt states she put all her children as health care agents and POA. Pt and family state that granddaughter Annie Park (P: [phone]) should be the primary family contact for medical updates and discharge plan.  Health Care Directive:  Pt states her PCP office and [facility] have copies of her HCD.   Living Situation:  Pt lives alone in a town home in [place], [place]. No stairs to enter or within home. Pt has multiple family members and friends that live near.  Previous Functional Status:  Independent  Patient and family understanding of hospitalization:  Pt and family have a good understanding of pt's medical issues. Pt says her head is a little fuzzy, but she's glad her family are available to help gather and interpret info.  Cultural/Language/Spiritual Considerations:  72 yo  female, English speaking, identifies as Evangelical.  Adjustment to Illness:  Pt seems to be coping well.    Physical Health  Reason for Admission:    1. Personal history of ovarian cancer      Services Needed/Recommended:  TCU    Mental Health/Chemical Dependency  Diagnosis:  none  Support/Services in Place:  n/a  Services Needed/Recommended:  n/a    Support System  Significant relationship at present time:  Pt is  and has several adult children and multiple grandchildren. Daughter India is currently visiting pt, then daughters Rachel and Fiorella and granddaughter Annie will be visiting pt tomorrow.  Family of origin is available for support:  Pt's daughter India stated  that pt's family and friends have all agreed that pt will need more help at discharge from rehab, and they will all collaborate to provide as much support as pt needs.  Other support available:  none  Gaps in support system:  none  Patient is caregiver to:  None     Provider Information   Primary Care Physician:  Sonja Hathaway   [phone]   Clinic:  [facility] [address] [facility] / [place] *      :  none    Financial   Income Source:  Social Security  Financial Concerns:  none  Insurance:    Payor/Plan Subscriber Name Rel Member # Group #   MEDICARE - MEDICARE F* KEYLA FRIAS  [id number]       ATTN CLAIMS, [address]   BCBS - BCBS PLATINUM * KEYLA FRIAS  [id number]      [address] [address]       Discharge Plan   Patient and family discharge goal:  TCU  Provided education on discharge plan:  YES  Patient agreeable to discharge plan:  YES  A list of Medicare Certified Facilities was provided to the patient and/or family to encourage patient choice. Patient's choices for facility are: Referrals faxed to the following facilities. Listed in order of preference.  1. [facility] in [place] (P: [phone], F: [phone]): Faxed referral to [place] in admissions.  2. [facility] in [place] (P: [phone], F: [phone]): Faxed referral to [facility] in admissions. No Friday or weekend admissions.   Will NH provide Skilled rehabilitation or complex medical:  YES  General information regarding anticipated insurance coverage and possible out of pocket cost was discussed. Patient and patient's family are aware patient may incur the cost of transportation to the facility, pending insurance payment: YES  Barriers to discharge:  Medical clearancce    Discharge Recommendations   Anticipated Disposition:  Per MD, possible DC 1-2 days. Referrals are out to TCUs, no facility has accepted yet.  Transportation Needs:  TBD  Name of Transportation Company and Phone:   N/A    ANGELA Ball, [facility]  6B Intermediate Care Unit  Phone: [phone]  Pager: [phone]

## 2018-07-27 ENCOUNTER — APPOINTMENT (OUTPATIENT)
Dept: PHYSICAL THERAPY | Facility: CLINIC | Age: 73
DRG: 749 | End: 2018-07-27
Attending: OBSTETRICS & GYNECOLOGY
Payer: MEDICARE

## 2018-07-27 LAB
ANION GAP SERPL CALCULATED.3IONS-SCNC: 8 MMOL/L (ref 3–14)
BUN SERPL-MCNC: 14 MG/DL (ref 7–30)
CALCIUM SERPL-MCNC: 7.2 MG/DL (ref 8.5–10.1)
CHLORIDE SERPL-SCNC: 100 MMOL/L (ref 94–109)
CO2 SERPL-SCNC: 24 MMOL/L (ref 20–32)
CREAT SERPL-MCNC: 0.6 MG/DL (ref 0.52–1.04)
ERYTHROCYTE [DISTWIDTH] IN BLOOD BY AUTOMATED COUNT: 13.6 % (ref 10–15)
GFR SERPL CREATININE-BSD FRML MDRD: >90 ML/MIN/1.7M2
GLUCOSE BLDC GLUCOMTR-MCNC: 105 MG/DL (ref 70–99)
GLUCOSE BLDC GLUCOMTR-MCNC: 161 MG/DL (ref 70–99)
GLUCOSE BLDC GLUCOMTR-MCNC: 172 MG/DL (ref 70–99)
GLUCOSE BLDC GLUCOMTR-MCNC: 181 MG/DL (ref 70–99)
GLUCOSE BLDC GLUCOMTR-MCNC: 64 MG/DL (ref 70–99)
GLUCOSE BLDC GLUCOMTR-MCNC: 70 MG/DL (ref 70–99)
GLUCOSE SERPL-MCNC: 159 MG/DL (ref 70–99)
HCT VFR BLD AUTO: 32.4 % (ref 35–47)
HGB BLD-MCNC: 10.8 G/DL (ref 11.7–15.7)
MCH RBC QN AUTO: 29.3 PG (ref 26.5–33)
MCHC RBC AUTO-ENTMCNC: 33.3 G/DL (ref 31.5–36.5)
MCV RBC AUTO: 88 FL (ref 78–100)
PLATELET # BLD AUTO: 271 10E9/L (ref 150–450)
POTASSIUM SERPL-SCNC: 4 MMOL/L (ref 3.4–5.3)
RBC # BLD AUTO: 3.68 10E12/L (ref 3.8–5.2)
SODIUM SERPL-SCNC: 132 MMOL/L (ref 133–144)
T4 FREE SERPL-MCNC: 1.1 NG/DL (ref 0.76–1.46)
TSH SERPL DL<=0.005 MIU/L-ACNC: 6.77 MU/L (ref 0.4–4)
WBC # BLD AUTO: 15.3 10E9/L (ref 4–11)

## 2018-07-27 PROCEDURE — 25000132 ZZH RX MED GY IP 250 OP 250 PS 637: Mod: GY | Performed by: STUDENT IN AN ORGANIZED HEALTH CARE EDUCATION/TRAINING PROGRAM

## 2018-07-27 PROCEDURE — A9270 NON-COVERED ITEM OR SERVICE: HCPCS | Mod: GY | Performed by: STUDENT IN AN ORGANIZED HEALTH CARE EDUCATION/TRAINING PROGRAM

## 2018-07-27 PROCEDURE — 25000128 H RX IP 250 OP 636: Performed by: NURSE PRACTITIONER

## 2018-07-27 PROCEDURE — 84439 ASSAY OF FREE THYROXINE: CPT | Performed by: OBSTETRICS & GYNECOLOGY

## 2018-07-27 PROCEDURE — 99024 POSTOP FOLLOW-UP VISIT: CPT | Mod: GC | Performed by: OBSTETRICS & GYNECOLOGY

## 2018-07-27 PROCEDURE — 12000001 ZZH R&B MED SURG/OB UMMC

## 2018-07-27 PROCEDURE — 40000193 ZZH STATISTIC PT WARD VISIT

## 2018-07-27 PROCEDURE — 36415 COLL VENOUS BLD VENIPUNCTURE: CPT | Performed by: STUDENT IN AN ORGANIZED HEALTH CARE EDUCATION/TRAINING PROGRAM

## 2018-07-27 PROCEDURE — 97116 GAIT TRAINING THERAPY: CPT | Mod: GP

## 2018-07-27 PROCEDURE — 80048 BASIC METABOLIC PNL TOTAL CA: CPT | Performed by: OBSTETRICS & GYNECOLOGY

## 2018-07-27 PROCEDURE — 84443 ASSAY THYROID STIM HORMONE: CPT | Performed by: OBSTETRICS & GYNECOLOGY

## 2018-07-27 PROCEDURE — 85027 COMPLETE CBC AUTOMATED: CPT | Performed by: STUDENT IN AN ORGANIZED HEALTH CARE EDUCATION/TRAINING PROGRAM

## 2018-07-27 PROCEDURE — 36415 COLL VENOUS BLD VENIPUNCTURE: CPT | Performed by: OBSTETRICS & GYNECOLOGY

## 2018-07-27 PROCEDURE — 97530 THERAPEUTIC ACTIVITIES: CPT | Mod: GP

## 2018-07-27 PROCEDURE — 25000128 H RX IP 250 OP 636: Performed by: STUDENT IN AN ORGANIZED HEALTH CARE EDUCATION/TRAINING PROGRAM

## 2018-07-27 PROCEDURE — 00000146 ZZHCL STATISTIC GLUCOSE BY METER IP

## 2018-07-27 RX ORDER — CIPROFLOXACIN 500 MG/1
500 TABLET, FILM COATED ORAL EVERY 12 HOURS SCHEDULED
Status: DISCONTINUED | OUTPATIENT
Start: 2018-07-27 | End: 2018-07-27

## 2018-07-27 RX ORDER — CARVEDILOL 12.5 MG/1
12.5 TABLET ORAL 2 TIMES DAILY WITH MEALS
Status: DISCONTINUED | OUTPATIENT
Start: 2018-07-28 | End: 2018-07-30

## 2018-07-27 RX ORDER — IBUPROFEN 600 MG/1
600 TABLET, FILM COATED ORAL EVERY 6 HOURS PRN
Status: DISCONTINUED | OUTPATIENT
Start: 2018-07-27 | End: 2018-07-29

## 2018-07-27 RX ORDER — LOSARTAN POTASSIUM 50 MG/1
100 TABLET ORAL AT BEDTIME
Status: DISCONTINUED | OUTPATIENT
Start: 2018-07-28 | End: 2018-07-31 | Stop reason: HOSPADM

## 2018-07-27 RX ORDER — HYDRALAZINE HYDROCHLORIDE 20 MG/ML
10 INJECTION INTRAMUSCULAR; INTRAVENOUS ONCE
Status: COMPLETED | OUTPATIENT
Start: 2018-07-27 | End: 2018-07-28

## 2018-07-27 RX ORDER — LOSARTAN POTASSIUM 50 MG/1
100 TABLET ORAL ONCE
Status: COMPLETED | OUTPATIENT
Start: 2018-07-27 | End: 2018-07-27

## 2018-07-27 RX ORDER — ONDANSETRON 2 MG/ML
4 INJECTION INTRAMUSCULAR; INTRAVENOUS ONCE
Status: COMPLETED | OUTPATIENT
Start: 2018-07-27 | End: 2018-07-27

## 2018-07-27 RX ADMIN — BENZOCAINE AND MENTHOL 1 LOZENGE: 15; 3.6 LOZENGE ORAL at 10:02

## 2018-07-27 RX ADMIN — ACETAMINOPHEN 650 MG: 325 TABLET, FILM COATED ORAL at 08:41

## 2018-07-27 RX ADMIN — BENZOCAINE AND MENTHOL 1 LOZENGE: 15; 3.6 LOZENGE ORAL at 16:24

## 2018-07-27 RX ADMIN — ENOXAPARIN SODIUM 40 MG: 100 INJECTION SUBCUTANEOUS at 12:24

## 2018-07-27 RX ADMIN — INSULIN ASPART 0.5 UNITS: 100 INJECTION, SOLUTION INTRAVENOUS; SUBCUTANEOUS at 18:16

## 2018-07-27 RX ADMIN — CARVEDILOL 25 MG: 25 TABLET, FILM COATED ORAL at 08:42

## 2018-07-27 RX ADMIN — ACETAMINOPHEN 650 MG: 325 TABLET, FILM COATED ORAL at 01:41

## 2018-07-27 RX ADMIN — RANITIDINE 150 MG: 150 TABLET, FILM COATED ORAL at 08:41

## 2018-07-27 RX ADMIN — CEPHALEXIN 250 MG: 250 CAPSULE ORAL at 22:04

## 2018-07-27 RX ADMIN — DULOXETINE HYDROCHLORIDE 60 MG: 60 CAPSULE, DELAYED RELEASE ORAL at 22:04

## 2018-07-27 RX ADMIN — RANITIDINE 150 MG: 150 TABLET, FILM COATED ORAL at 18:16

## 2018-07-27 RX ADMIN — LEVOTHYROXINE SODIUM 75 MCG: 75 TABLET ORAL at 08:41

## 2018-07-27 RX ADMIN — Medication 2.5 MG: at 08:42

## 2018-07-27 RX ADMIN — LOSARTAN POTASSIUM 100 MG: 50 TABLET ORAL at 13:59

## 2018-07-27 RX ADMIN — Medication 2.5 MG: at 15:10

## 2018-07-27 RX ADMIN — SENNOSIDES AND DOCUSATE SODIUM 2 TABLET: 8.6; 5 TABLET ORAL at 08:41

## 2018-07-27 RX ADMIN — ONDANSETRON 4 MG: 2 INJECTION INTRAMUSCULAR; INTRAVENOUS at 18:34

## 2018-07-27 RX ADMIN — INSULIN DEGLUDEC INJECTION 16 UNITS: 100 INJECTION, SOLUTION SUBCUTANEOUS at 22:04

## 2018-07-27 RX ADMIN — ACETAMINOPHEN 650 MG: 325 TABLET, FILM COATED ORAL at 18:22

## 2018-07-27 RX ADMIN — PSYLLIUM HUSK 1 PACKET: 3.4 POWDER ORAL at 08:41

## 2018-07-27 RX ADMIN — Medication 2.5 MG: at 21:19

## 2018-07-27 RX ADMIN — ACETAMINOPHEN 650 MG: 325 TABLET, FILM COATED ORAL at 12:24

## 2018-07-27 ASSESSMENT — PAIN DESCRIPTION - DESCRIPTORS
DESCRIPTORS: ACHING
DESCRIPTORS: SORE
DESCRIPTORS: ACHING;SORE
DESCRIPTORS: ACHING

## 2018-07-27 ASSESSMENT — ACTIVITIES OF DAILY LIVING (ADL)
ADLS_ACUITY_SCORE: 9

## 2018-07-27 NOTE — PROGRESS NOTES
Progress Note    Paged to discuss mental status with family    Spoke with one of patient's daughters who expressed concern her family has about their mother's continued confusion. States that she continues to be confused and intermittently does not know where she is.  She notes that patient has a history of increased confusion and AMS after surgery in the past however it usually clears after a few days.  She also has a history of TIA.  Her daughter also expressed concern for opioid addiction. Family has noticed increasing confusion over the last year however they think it may be related to her opioid use.  Reports patient gets opioid prescriptions from multiple physicians for musculoskeletal pain and has been to the ED with withdrawal symptoms in the past.  Family reports they took her purse so she would not have access to her prescriptions while hospitalized. I discussed our evaluation for stroke on POD#0 and how the waxing and waning nature of her symptoms is more consistent with delirium. Furthermore she has no focal deficits. We are limiting her opioids while inpatient but will watch for signs of withdrawal.     I discussed that from a post-operative standpoint she is doing well. We will continue to monitor.    She would like to talk to our  about TCU and transportation when she moves up to  today.    Marianela Blum MD  Ob/Gyn PGY-1  July 27, 2018 10:27 AM

## 2018-07-27 NOTE — PLAN OF CARE
Problem: Patient Care Overview  Goal: Plan of Care/Patient Progress Review  Outcome: Improving  Pt alert, disoriented to situation at times, forgetful. BP 150s/70s. Restarted on cozaar when arrived from 6B @ 1430. Pain managed with oxy q6h and scheduled Tylenol. Midline incision c/d/i, ELLIOTT w/ steri strips. Carb consistent diet, denies n/v. BGs w/ meals and HOS. BM x2, formed. Passing gas. Voiding w/ adequate output. Ambulated in halls, Ax1. Cont to mon mental status. Family at bedside. Cont POC.     Addendum: Pt became nauseous @ 18:30. Zofran given x1 w/ adequate relief.

## 2018-07-27 NOTE — PROGRESS NOTES
"Gynecologic Oncology Progress Note  7/27/2018    Dz: High grade serous ovarian cancer    24 hour events:  - Removed duffy catheter  - Urine culture with no growth  - Restarted home losartan  - Patient voiding small amounts but does not feel like she is emptying, straight cath x1 this AM for 350cc    S: Feeling okay, though feels full and would like to go to the bathroom. Pain okay, worse with movement. + flatus & BM. Ambulating without issue. Reports no issues eating. States she \"just got here\" and \"how did my stuff get in here.\"  O:  Patient Vitals for the past 24 hrs:   BP Temp Temp src Heart Rate Resp SpO2 Weight   07/27/18 0500 164/72 - - - - - -   07/27/18 0400 (P) 168/75 (P) 98.3  F (36.8  C) - 86 - - -   07/27/18 0236 - - - - - - 68.5 kg (151 lb)   07/27/18 0000 152/72 99  F (37.2  C) Oral 86 16 97 % -   07/26/18 1926 151/68 99.1  F (37.3  C) Oral 84 16 97 % -   07/26/18 1800 - - - 85 - 96 % -   07/26/18 1600 132/58 - - 77 16 96 % -   07/26/18 1558 132/58 99.2  F (37.3  C) Oral - 16 - -   07/26/18 1200 - - - - - 94 % -   07/26/18 1119 136/66 97.8  F (36.6  C) Axillary 85 16 93 % -   07/26/18 0842 159/71 99.5  F (37.5  C) Oral 90 16 94 % -     Exam:   Gen: resting in bed, A&Ox4 intermittently - not to place or situation at times  Cardio: RRR  Resp: CTAB throughout posterior fields  Abdomen: soft, mildly tender to palpation. Not distended, no guarding or rebound  Incision: clean, dry, and intact w/ overlying steristrips  Extremities: BLEs non-tender    I/O last 3 completed shifts:  In: 997.5 [P.O.:60; I.V.:937.5]  Out: 1225 [Urine:1225]   Since  (straight cath)    Labs:  BMP pending  Glucose   UCx no growth to date  BCx no growth to date    A: 73 year old POD#3 s/p exploratory laparotomy, lysis of adhesions, and sigmoid epiploica biopsy, post op course c/b altered mental status due to narcotic use with possible urinary tract infection.    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 S/p hysterectomy at 44yo.   FEN: Regular diet  Pain: S/p TAPS, scheduled tylenol.  Restarted oxycodone 2.5-5mg. Avoiding large doses in setting of AMS and response to narcan indicating likely reaction to increased narcotics in setting of recent surgery.   - Home gabapentin 900mg TID, MS Contin 15 BID (held)   - Can consider restarting NSAIDs given improved UOP and Cr  Heme: Stable acute blood loss anemia following EBL of 100 and txf of 2u pRBC, Hgb 11.1 ()  -  H/o DVT s/p   CV: HLD, statin held. HTN, home carvedilol & losartan  - Cardiomyopathy, EF 60% (2017), pre-op stress test positive, s/p angio w/ improved disease (50-60% occlusion RCA & LAD) and quoted 6.6% perioperative cardiac risk. Telemetry while inpatient. EKG during AMS NSR.  Pulm: 2-4mm perivascular nodules of RML & RUL.   GI: Sched senna-S, fiber  : Shepard removed POD#2, MARIAH w/ Cr bump now improved. Likely under rescuitated on POD#1.  ID: UA with moderate leuk esterase, Tm/Tl 101.5 ( @ 1900). S/p 2D IV ceftriaxone for presumed UTI. Urine culture negative. Blood cultures NGx2d. Will restart ppx keflex  Endo: DM1, A1C 9.4 - Tresiba 16U qHS, very low SSI. Hypothyroid - synthroid  Psych/Neuro: Fibromyalgia - cymbalta 60mg at bedtime. AMS likely due to delirium - limiting narcotics. F/u BMP this AM to ensure no contributing electrolyte abnormalities  MSK: PT/OT consulted  PPX: SCDs, IS, lovenox  Dispo: When meeting post-op goals and afebrile, possibly tomorrow. PT recommends TCU    Mya Garcia MD PGY3    Provider Disclosure:   I agree with above History, Review of Systems, Physical exam and Plan. I have reviewed the content of the documentation and have edited it as needed. I have seen and personally performed the services documented here and the documentation accurately represents those services and the decisions I have made.     Electronically signed by:   Bakari BERG  Gudelia CHRISTIANSEN   Gynecologic Oncology   [facility]

## 2018-07-27 NOTE — PLAN OF CARE
Problem: Patient Care Overview  Goal: Plan of Care/Patient Progress Review  Neuro: A&Ox4 at times. Intermittently confused.  Cardiac: SR. VSS.   Respiratory: Sating 90s on 1L O2  GI/: Low urine output since duffy removal. BM X1  Diet/appetite: Tolerating mod cho diet. Fair appetite  Activity:  Assist of 1-2.  Pain: At acceptable level on current regimen.   Skin: No new deficits noted.  LDA's: PIV SL.     Plan: Continue with POC. Notify primary team with changes.    Problem: Pain, Acute (Adult)  Goal: Identify Related Risk Factors and Signs and Symptoms  Related risk factors and signs and symptoms are identified upon initiation of Human Response Clinical Practice Guideline (CPG).   Pain managed with PRN Oxycodone and scheduled Tylenol.    Problem: Diabetes Comorbidity  Goal: Diabetes  Patient comorbidity will be monitored for signs and symptoms of hyperglycemia or hypoglycemia. Problems will be absent, minimized or managed by discharge/transition of care.   BS covered with sliding scale insulin per orders.

## 2018-07-27 NOTE — PLAN OF CARE
Problem: Patient Care Overview  Goal: Plan of Care/Patient Progress Review  Neuro: Pt, alert disoriented to place. Kept giving RN her home address. No focal deficits noted.    Cardiac: SR. VSS. Tmax 99.4   Respiratory: Sating mid 90's on room air.  GI/: Bladder scanned for 380, straight cathed for 350cc, bm x1  Diet/appetite: Tolerating MOD carb diet.  Activity:  Assist of 1-2 to bedside commode.  Pain: At acceptable level on current regimen.   Skin: No new deficits noted. Midline incision with steri strips.   LDA's: PIV    Plan: Continue with POC. Notify primary team with changes.

## 2018-07-27 NOTE — PLAN OF CARE
Problem: Patient Care Overview  Goal: Plan of Care/Patient Progress Review    6B / Discharge Planner PT   Patient plan for discharge: not discussed today  Current status: Patient observed to have less confusion and increased tolerance for therapy today, pain manageable.Supine>sit with mod A, sit<>stand with min A, ambulated 50' + 20' with FWW + CGA/min A. VSS on RA.  Barriers to return to prior living situation: medical needs, AMS, pain, abdominal precautions, level of assist  Recommendations for discharge: TCU  Rationale for recommendations: Patient is well below baseline level of function, limited by pain, confusion, abdominal precautions, and deconditioning. Pt will require continued rehab to increase safety and independence with functional mobility.

## 2018-07-27 NOTE — PROGRESS NOTES
TRANSFER NOTE  -------------------------------------------------------------  Transferred to:   Via: wheelchair  Reason for transfer: Patient no longer appropriate for 6B- improved/worsened patient condition  Family: Aware of transfer    Belongings: Packed and sent with patient  Chart: Sent with patient to new unit  Medications: Meds sent to new unit with patient    Patient status: stable, no acute concerns      Shonna Benitez, RN  07/27/18  1:35 PM

## 2018-07-27 NOTE — PLAN OF CARE
Problem: Patient Care Overview  Goal: Plan of Care/Patient Progress Review  Neuro: A&O with confusion.    Cardiac: SR. VSS.   Respiratory: was on RA most of the day , but needed 1-2 LPM while taking a nap. Denied sob or difficult of breathing.   GI/: Had one urine out put with incontinent episode. Bladder scanned her this evening for 565 , Md aware and ordered straight cath. Pt wants to try again before straight cath. Upcoming nurse aware.   Diet/appetite: had 50% of her supper . No insulin given today. BG  , prn treated with apple juice /pudding.    Activity:  Worked with PT , premedicated for pain and was up on a chair.   Pain: At acceptable level on current regimen.   Skin: No new deficits noted. Wound dressing C/D/I .   LDA's: PIV sl'd.   Plan: Continue with POC. Notify primary team with changes.

## 2018-07-27 NOTE — PROGRESS NOTES
07/26/18 1600   Quick Adds   Type of Visit Initial PT Evaluation   Living Environment   Lives With alone   Living Arrangements condominium   Home Accessibility no concerns   Number of Stairs to Enter Home 0   Number of Stairs Within Home 0   Transportation Available car;family or friend will provide   Living Environment Comment Patient lives alone in [place], [place]. Drives at baseline. Has multiple supportive friends and family that live nearby   Self-Care   Usual Activity Tolerance fair   Current Activity Tolerance poor   Regular Exercise yes   Activity/Exercise Type strength training;other (see comments)  (stretching for back and knee)   Exercise Amount/Frequency 3-5 times/wk   Equipment Currently Used at Home grab bar   Activity/Exercise/Self-Care Comment Patient has history of sciatica as well as R knee crush injury ~3 years ago, re-fractured knee a few months ago.   Functional Level Prior   Ambulation 0-->independent   Transferring 0-->independent   Toileting 0-->independent   Bathing 0-->independent   Dressing 0-->independent   Eating 0-->independent   Communication 0-->understands/communicates without difficulty   Swallowing 0-->swallows foods/liquids without difficulty   Cognition 0 - no cognition issues reported   Fall history within last six months no   Which of the above functional risks had a recent onset or change? ambulation;transferring   Prior Functional Level Comment Patient denies recent falls, says she is IND with all functional mobility and ADLs.   General Information   Onset of Illness/Injury or Date of Surgery - Date 07/24/18   Referring Physician Juanita Jean-Baptiste APRN CNP   Patient/Family Goals Statement to return home   Pertinent History of Current Problem (include personal factors and/or comorbidities that impact the POC) 73 year old with stage IIIC vs IVB high grade serous ovarian cancer, POD2 s/p exploratory laparotomy, lysis of adhesions, and sigmoid epiploica biopsy, post op course  "c/b altered mental status due to narcotic use with possible urinary tract infection.   Precautions/Limitations abdominal precautions;fall precautions   General Info Comments Activity: ambulate with assist   Cognitive Status Examination   Orientation (disorientation and confusion throughout session)   Level of Consciousness alert;confused   Pain Assessment   Patient Currently in Pain Yes, see Vital Sign flowsheet   Range of Motion (ROM)   ROM Comment BLE WFL   Strength   Strength Comments Unable to assess 2/2 significant pain, appears to have generalized weakness and deconditioning   Bed Mobility   Bed Mobility Comments supine>sit with mod A   Transfer Skills   Transfer Comments not assessed   Gait   Gait Comments not assessed   Balance   Balance Comments requiring up to min A to maintain sitting balance   Sensory Examination   Sensory Perception Comments sciatic nerve pain   General Therapy Interventions   Planned Therapy Interventions balance training;bed mobility training;gait training;strengthening;transfer training;risk factor education;home program guidelines;progressive activity/exercise   Clinical Impression   Criteria for Skilled Therapeutic Intervention yes, treatment indicated   PT Diagnosis impaired functional mobility   Influenced by the following impairments decreased strength, balance, and activity tolerance; pain; AMS   Functional limitations due to impairments bed mobility, transfers, gait   Clinical Presentation Evolving/Changing   Clinical Presentation Rationale clinical judgement   Clinical Decision Making (Complexity) Moderate complexity   Therapy Frequency` (6x/day)   Predicted Duration of Therapy Intervention (days/wks) 1-2 weeks   Anticipated Discharge Disposition Transitional Care Facility   Risk & Benefits of therapy have been explained Yes   Patient, Family & other staff in agreement with plan of care Yes   [facility] AM-PAC TM \"6 Clicks\"   2016, Trustees of [facility], under " "license to [employer].  All rights reserved.   6 Clicks Short Forms Basic Mobility Inpatient Short Form   [facility] AM-PAC  \"6 Clicks\" V.2 Basic Mobility Inpatient Short Form   1. Turning from your back to your side while in a flat bed without using bedrails? 3 - A Little   2. Moving from lying on your back to sitting on the side of a flat bed without using bedrails? 2 - A Lot   3. Moving to and from a bed to a chair (including a wheelchair)? 2 - A Lot   4. Standing up from a chair using your arms (e.g., wheelchair, or bedside chair)? 2 - A Lot   5. To walk in hospital room? 2 - A Lot   6. Climbing 3-5 steps with a railing? 1 - Total   Basic Mobility Raw Score (Score out of 24.Lower scores equate to lower levels of function) 12   Total Evaluation Time   Total Evaluation Time (Minutes) 5     "

## 2018-07-27 NOTE — PROGRESS NOTES
Social Work Services Progress Note    Hospital Day: 3  Date of Initial Social Work Evaluation:  7/26/18  Collaborated with:  Pt's granddtr (Annie) & other family (Rachel, Fiorella & Jone), GynOnc Team, TCU's (see below), 6B JACQUELYN (Andra), Chart Review    Data:  Pt was transferred to Unit 7C this afternoon. SW was notified upon Pt's arrival that family is requesting to speak with SW right away re: placement.    Intervention:  JACQUELYN coordinated with 6B JACQUELYN (Andra) re: where TCU referrals were at. Per Andra[facility]    [facility]: [phone], Fax: [phone]): Referral was faxed on 7/26; VM left with no return call as of this time.  2) [facility] (Main: [phone], Fax: [phone]): Referral faxed 7/26; Per [facility] in Admissions, they do not accept Pt's on Fridays or the weekends.    JACQUELYN met with Pt's family per their request and provided the above update. Family extremely anxious about not knowing when/where Pt will discharge. They also had concerns about transportation. JACQUELYN explained options for transportation including family vs. W/C vs. Stretcher transport; SW explained the cost associated with these options as well including that Medicare does not cover the cost of transportation. Family said that they will plan to transport her as private paying for transport is not an option.     Family also said that they were concerned that Pt would be discharged over the weekend, only to have to return on Monday for a f/u appt with Dr. Galeas. They said it would be hard on the Pt to have to travel such a far distance to only have the make that trip again a day or two later. JACQUELYN updated GynOnc Team ([place]) re: this concern.    JACQUELYN explained that Pt would need to have a safe place to discharge and be considered medically stable before discharge plans can be set. Family continues to be upset that SW cannot provide a date/time of discharge because a TCU has not been  found as of this time.    SW provided family with Medicare.gov listing of TCU's near [place], [place] per their preference in order to obtain other TCU options. Family indicated that they would be agreeable to:    -[facility] (Main: [phone], Fax: [phone]): SW faxed referral via Epic. JACQUELYN spoke with Alejandra in Admissions and was informed that they do not accept weekend admissions, but that they potentially have beds available on Monday. She agreed to review referral then.  -[facility] (Main: [phone], Fax: [phone]): JACQUELYN spoke with Mckenzie in Admissions who informed SW that they do not have any available beds and do not anticipate openings. No referral sent.    SW updated Pt's granddtr (Annie) who has been elected as spokesperson for the family.    Assessment:  TCU referral initiated, no accepting facility at this time.    Plan:    Anticipated Disposition:  Facility:  TCU (TBD)    Barriers to d/c plan:  Medical stability, bed availability/acceptance    Follow Up:  SW to continue to follow and assist with discharge plan as needed.    ANGELA Oglesby, ATA  7C Surgical Oncology Unit   Phone: [phone]  Pager: [phone]

## 2018-07-28 ENCOUNTER — APPOINTMENT (OUTPATIENT)
Dept: OCCUPATIONAL THERAPY | Facility: CLINIC | Age: 73
DRG: 749 | End: 2018-07-28
Attending: OBSTETRICS & GYNECOLOGY
Payer: MEDICARE

## 2018-07-28 ENCOUNTER — APPOINTMENT (OUTPATIENT)
Dept: PHYSICAL THERAPY | Facility: CLINIC | Age: 73
DRG: 749 | End: 2018-07-28
Attending: OBSTETRICS & GYNECOLOGY
Payer: MEDICARE

## 2018-07-28 LAB
ANION GAP SERPL CALCULATED.3IONS-SCNC: 8 MMOL/L (ref 3–14)
BUN SERPL-MCNC: 8 MG/DL (ref 7–30)
CALCIUM SERPL-MCNC: 6.9 MG/DL (ref 8.5–10.1)
CHLORIDE SERPL-SCNC: 99 MMOL/L (ref 94–109)
CO2 SERPL-SCNC: 23 MMOL/L (ref 20–32)
CREAT SERPL-MCNC: 0.49 MG/DL (ref 0.52–1.04)
ERYTHROCYTE [DISTWIDTH] IN BLOOD BY AUTOMATED COUNT: 13.4 % (ref 10–15)
GFR SERPL CREATININE-BSD FRML MDRD: >90 ML/MIN/1.7M2
GLUCOSE BLDC GLUCOMTR-MCNC: 110 MG/DL (ref 70–99)
GLUCOSE BLDC GLUCOMTR-MCNC: 129 MG/DL (ref 70–99)
GLUCOSE BLDC GLUCOMTR-MCNC: 171 MG/DL (ref 70–99)
GLUCOSE BLDC GLUCOMTR-MCNC: 81 MG/DL (ref 70–99)
GLUCOSE BLDC GLUCOMTR-MCNC: 93 MG/DL (ref 70–99)
GLUCOSE SERPL-MCNC: 144 MG/DL (ref 70–99)
HCT VFR BLD AUTO: 32.1 % (ref 35–47)
HGB BLD-MCNC: 10.8 G/DL (ref 11.7–15.7)
MCH RBC QN AUTO: 29.4 PG (ref 26.5–33)
MCHC RBC AUTO-ENTMCNC: 33.6 G/DL (ref 31.5–36.5)
MCV RBC AUTO: 88 FL (ref 78–100)
PLATELET # BLD AUTO: 292 10E9/L (ref 150–450)
POTASSIUM SERPL-SCNC: 3.5 MMOL/L (ref 3.4–5.3)
RBC # BLD AUTO: 3.67 10E12/L (ref 3.8–5.2)
SODIUM SERPL-SCNC: 131 MMOL/L (ref 133–144)
WBC # BLD AUTO: 12.4 10E9/L (ref 4–11)

## 2018-07-28 PROCEDURE — 25000132 ZZH RX MED GY IP 250 OP 250 PS 637: Mod: GY | Performed by: STUDENT IN AN ORGANIZED HEALTH CARE EDUCATION/TRAINING PROGRAM

## 2018-07-28 PROCEDURE — 80048 BASIC METABOLIC PNL TOTAL CA: CPT | Performed by: STUDENT IN AN ORGANIZED HEALTH CARE EDUCATION/TRAINING PROGRAM

## 2018-07-28 PROCEDURE — 12000001 ZZH R&B MED SURG/OB UMMC

## 2018-07-28 PROCEDURE — 25000125 ZZHC RX 250: Performed by: STUDENT IN AN ORGANIZED HEALTH CARE EDUCATION/TRAINING PROGRAM

## 2018-07-28 PROCEDURE — A9270 NON-COVERED ITEM OR SERVICE: HCPCS | Mod: GY | Performed by: NURSE PRACTITIONER

## 2018-07-28 PROCEDURE — 85027 COMPLETE CBC AUTOMATED: CPT | Performed by: STUDENT IN AN ORGANIZED HEALTH CARE EDUCATION/TRAINING PROGRAM

## 2018-07-28 PROCEDURE — 97530 THERAPEUTIC ACTIVITIES: CPT | Mod: GP

## 2018-07-28 PROCEDURE — 00000146 ZZHCL STATISTIC GLUCOSE BY METER IP

## 2018-07-28 PROCEDURE — A9270 NON-COVERED ITEM OR SERVICE: HCPCS | Mod: GY | Performed by: STUDENT IN AN ORGANIZED HEALTH CARE EDUCATION/TRAINING PROGRAM

## 2018-07-28 PROCEDURE — 99024 POSTOP FOLLOW-UP VISIT: CPT | Mod: GC | Performed by: OBSTETRICS & GYNECOLOGY

## 2018-07-28 PROCEDURE — 40000133 ZZH STATISTIC OT WARD VISIT

## 2018-07-28 PROCEDURE — 97116 GAIT TRAINING THERAPY: CPT | Mod: GP

## 2018-07-28 PROCEDURE — 25000132 ZZH RX MED GY IP 250 OP 250 PS 637: Mod: GY | Performed by: NURSE PRACTITIONER

## 2018-07-28 PROCEDURE — 40000193 ZZH STATISTIC PT WARD VISIT

## 2018-07-28 PROCEDURE — 25000128 H RX IP 250 OP 636: Performed by: NURSE PRACTITIONER

## 2018-07-28 PROCEDURE — 25000128 H RX IP 250 OP 636: Performed by: STUDENT IN AN ORGANIZED HEALTH CARE EDUCATION/TRAINING PROGRAM

## 2018-07-28 PROCEDURE — 97535 SELF CARE MNGMENT TRAINING: CPT | Mod: GO

## 2018-07-28 RX ORDER — GABAPENTIN 600 MG/1
600 TABLET ORAL 3 TIMES DAILY
Status: DISCONTINUED | OUTPATIENT
Start: 2018-07-28 | End: 2018-07-31 | Stop reason: HOSPADM

## 2018-07-28 RX ADMIN — Medication 2.5 MG: at 05:22

## 2018-07-28 RX ADMIN — HYDRALAZINE HYDROCHLORIDE 10 MG: 20 INJECTION INTRAMUSCULAR; INTRAVENOUS at 00:29

## 2018-07-28 RX ADMIN — CARVEDILOL 12.5 MG: 12.5 TABLET, FILM COATED ORAL at 08:33

## 2018-07-28 RX ADMIN — Medication 2.5 MG: at 02:20

## 2018-07-28 RX ADMIN — IBUPROFEN 600 MG: 600 TABLET ORAL at 01:44

## 2018-07-28 RX ADMIN — ACETAMINOPHEN 650 MG: 325 TABLET, FILM COATED ORAL at 18:30

## 2018-07-28 RX ADMIN — Medication 2.5 MG: at 17:32

## 2018-07-28 RX ADMIN — GABAPENTIN 600 MG: 600 TABLET, FILM COATED ORAL at 16:10

## 2018-07-28 RX ADMIN — ACETAMINOPHEN 650 MG: 325 TABLET, FILM COATED ORAL at 12:52

## 2018-07-28 RX ADMIN — BENZOCAINE AND MENTHOL 1 LOZENGE: 15; 3.6 LOZENGE ORAL at 13:17

## 2018-07-28 RX ADMIN — INSULIN DEGLUDEC INJECTION 16 UNITS: 100 INJECTION, SOLUTION SUBCUTANEOUS at 22:34

## 2018-07-28 RX ADMIN — IBUPROFEN 600 MG: 600 TABLET ORAL at 17:32

## 2018-07-28 RX ADMIN — DULOXETINE HYDROCHLORIDE 60 MG: 60 CAPSULE, DELAYED RELEASE ORAL at 22:33

## 2018-07-28 RX ADMIN — RANITIDINE 150 MG: 150 TABLET, FILM COATED ORAL at 18:30

## 2018-07-28 RX ADMIN — CEPHALEXIN 250 MG: 250 CAPSULE ORAL at 23:33

## 2018-07-28 RX ADMIN — ENOXAPARIN SODIUM 40 MG: 100 INJECTION SUBCUTANEOUS at 12:24

## 2018-07-28 RX ADMIN — Medication 2.5 MG: at 11:14

## 2018-07-28 RX ADMIN — RANITIDINE 150 MG: 150 TABLET, FILM COATED ORAL at 08:33

## 2018-07-28 RX ADMIN — PSYLLIUM HUSK 1 PACKET: 3.4 POWDER ORAL at 08:33

## 2018-07-28 RX ADMIN — LOSARTAN POTASSIUM 100 MG: 50 TABLET ORAL at 22:33

## 2018-07-28 RX ADMIN — SENNOSIDES AND DOCUSATE SODIUM 2 TABLET: 8.6; 5 TABLET ORAL at 20:21

## 2018-07-28 RX ADMIN — ACETAMINOPHEN 650 MG: 325 TABLET, FILM COATED ORAL at 07:00

## 2018-07-28 RX ADMIN — BENZOCAINE AND MENTHOL 1 LOZENGE: 15; 3.6 LOZENGE ORAL at 01:01

## 2018-07-28 RX ADMIN — SIMETHICONE CHEW TAB 80 MG 80 MG: 80 TABLET ORAL at 11:19

## 2018-07-28 RX ADMIN — GABAPENTIN 600 MG: 600 TABLET, FILM COATED ORAL at 20:21

## 2018-07-28 RX ADMIN — ONDANSETRON 4 MG: 4 TABLET, ORALLY DISINTEGRATING ORAL at 02:20

## 2018-07-28 RX ADMIN — ACETAMINOPHEN 650 MG: 325 TABLET, FILM COATED ORAL at 00:29

## 2018-07-28 RX ADMIN — LEVOTHYROXINE SODIUM 75 MCG: 75 TABLET ORAL at 08:33

## 2018-07-28 RX ADMIN — SIMETHICONE CHEW TAB 80 MG 80 MG: 80 TABLET ORAL at 05:28

## 2018-07-28 RX ADMIN — GABAPENTIN 600 MG: 600 TABLET, FILM COATED ORAL at 11:14

## 2018-07-28 RX ADMIN — CARVEDILOL 12.5 MG: 12.5 TABLET, FILM COATED ORAL at 18:30

## 2018-07-28 ASSESSMENT — PAIN DESCRIPTION - DESCRIPTORS
DESCRIPTORS: SORE;PRESSURE
DESCRIPTORS: ACHING
DESCRIPTORS: ACHING;SORE
DESCRIPTORS: SORE;ACHING
DESCRIPTORS: ACHING;SORE
DESCRIPTORS: DISCOMFORT

## 2018-07-28 ASSESSMENT — ACTIVITIES OF DAILY LIVING (ADL)
ADLS_ACUITY_SCORE: 9

## 2018-07-28 NOTE — PROGRESS NOTES
Gynecologic Oncology Progress Note  7/28/2018    Dz: High grade serous ovarian cancer    24 hour events:  - Emesis 175cc, resolved w/ IV zofran  - Hypertensive, improved s/p IV hydralazine x10mg    S: Feeling pain in her back and cramping abdominal pain with some bloating that is causing discomfort. Interested in trying the simethicone. + flatus & BM. Ambulating without issue. Tolerating regular diet.    O:  Patient Vitals for the past 24 hrs:   BP Temp Temp src Pulse Heart Rate Resp SpO2   07/28/18 0201 157/70 98.7  F (37.1  C) Oral - 88 18 98 %   07/28/18 0050 154/63 - - - - - -   07/28/18 0040 156/57 - - - - - -   07/28/18 0035 150/56 - - - - - -   07/28/18 0030 (!) 196/93 - - - - - -   07/28/18 0028 192/86 - - 84 - - -   07/27/18 2209 165/72 - - 82 - - 92 %   07/27/18 2200 175/66 98  F (36.7  C) Axillary - 81 16 90 %   07/27/18 1926 159/62 97.6  F (36.4  C) Axillary - 83 15 93 %   07/27/18 1516 158/68 - - - - - 93 %   07/27/18 1509 166/73 98.9  F (37.2  C) Oral 79 - 18 91 %   07/27/18 1321 155/59 98.1  F (36.7  C) Oral 76 - 20 95 %   07/27/18 1133 132/89 98.6  F (37  C) Oral - 76 16 95 %   07/27/18 0751 161/59 99.2  F (37.3  C) Oral - 90 16 90 %     Exam:   Gen: resting in bed, A&Ox4 intermittently - improved from yesterday  Cardio: RRR  Resp: CTAB throughout posterior fields  Abdomen: soft, mildly tender to palpation. Mildly distended, no guarding or rebound  Incision: clean, dry, and intact w/ overlying steristrips  Extremities: BLEs non-tender    I/O last 3 completed shifts:  In: 240 [P.O.:240]  Out: 928 [Urine:753; Emesis/NG output:175]   Since MN 275mL    Labs:  Na 131  Cr 0.49  WBC 12.4  Hgb 10.8  Glucose 159-181  UCx no growth (final)  BCx no growth x2d    A: 73 year old POD#4 s/p exploratory laparotomy, lysis of adhesions, and sigmoid epiploica biopsy, post op course c/b delirium.    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 S/p  hysterectomy at 44yo.   FEN: Regular diet, Hyponatremia, Na 131, consider starting NS IVF to rectify hyponatremia  Pain: S/p TAPS, scheduled tylenol. Oxycodone 2.5-5mg. Avoiding excess narcotics d/t delirium. Ibuprofen restarted. Home gabapentin 900mg TID, MS Contin 15 BID (held)  Heme: Stable acute blood loss anemia following EBL of 100 and txf of 2u pRBC, Hgb 10.8 ()  -  H/o DVT s/p   CV: HLD, statin held. HTN, home carvedilol & losartan  - Cardiomyopathy, EF 60% (2017), pre-op stress test positive, s/p angio w/ improved disease (50-60% occlusion RCA & LAD) and quoted 6.6% perioperative cardiac risk. S/p telemetry.  Pulm: 2-4mm perivascular nodules of RML & RUL.   GI: Sched senna-S, fiber  : Shepard removed POD#2, prerenal MARIAH resolved  ID: UA with moderate leuk esterase, Tm/Tl 101.5 ( @ 1900). S/p 2D IV ceftriaxone for presumed UTI. Urine culture negative. Blood cultures NGx2d. Home ppx keflex  Endo: DM1, A1C 9.4 - Tresiba 16U qHS, very low SSI. Hypothyroid - synthroid  Psych/Neuro: Fibromyalgia - cymbalta 60mg at bedtime. AMS likely due to delirium - limiting narcotics  MSK: PT/OT consulted  PPX: SCDs, IS, lovenox  Dispo: Meets criteria to discharge medically, awaiting TCU placement    Mya Garcia MD PGY3    Provider Disclosure:   I agree with above History, Review of Systems, Physical exam and Plan. I have reviewed the content of the documentation and have edited it as needed. I have seen and personally performed the services documented here and the documentation accurately represents those services and the decisions I have made.     Electronically signed by:   Bakari Galeas MD   Gynecologic Oncology   [facility]

## 2018-07-28 NOTE — PROGRESS NOTES
Social Work Services Progress Note    Hospital Day: 5  Date of Initial Social Work Evaluation:  7/26/18  Collaborated with:  YAZMIN    Data:  Recommendation is for TCU at discharge. Pt and family want her to go to a facility near her home. Referrals have been made to [facility] and [facility]. Neither facility can take on the weekend, due to no weekend admits. Pt has a clinic appointment with gyn/onc on Monday 7/30/18 to discuss chemo treatment plan.       Intervention:  Chart review and called [facility] regarding bed availability for Sunday or Monday.   JACQUELYN received call from MD who states that they are wondering if Pt can discharge to a local TCU tomorrow and then attend her clinic appt on Monday. They are concerned that her clinic visit wont be covered on same day as hospital [place].   JACQUELYN spoke with Pt and family about this, and they are open to going to [facility] (as this was suggested by Dr. Galeas today) but they do not want to go to any other local facility. JACQUELYN checked with  TCU admissions and found that they do not have a bed available today or tomorrow. SW did send referral for possible bed next week.   Family would rather have her go to TCU near home and then reschedule her clinical appt for a later date.     SW relayed this info to team. They feel it is important for her to be seen in clinic and get a plan for chemo ASAP. They also understand that at this time, Pt does not have a TCU bed anywhere, so the clinic visit will likely need to be rescheduled anyway. Team will plan to call family to discuss.       Assessment:  SW continues to follow for support and discharge planning    Plan:    Anticipated Disposition:  TCU    Barriers to d/c plan:  Bed availability, no weekend admissions, clinical appointment on Monday.    Follow Up:  JACQUELYN continues to follow

## 2018-07-28 NOTE — PROVIDER NOTIFICATION
Notified Resident at 23:30 PM regarding changes in vital signs.      Spoke with: Mya Garcia    Orders were obtained; give one time does of hydralazine     Comments: Pt /66 and 165/72- hydralazine given. Last /70.

## 2018-07-28 NOTE — PLAN OF CARE
"Problem: Patient Care Overview  Goal: Plan of Care/Patient Progress Review  OT 7C: Despite strong encouragement and education on importance of OOB activity to facilitate post-op recovery, pt strongly declining participation in therapy. Pt reporting \"need to rest\" despite resting for last 2 hours per RN report. Will check back in PM as able or reschedule for tomorrow.      "

## 2018-07-28 NOTE — PROVIDER NOTIFICATION
Notified Resident at 0800 AM regarding changes in vital signs.  BP elevated, 178/83. AM BP meds not given yet.     Spoke with: Rachel gyn/onc resident    Orders were not obatained. Will put in prn hydralazine if BP recheck does not improve.     Comments: Rechecked BP @ 930. /63. Will cont to monitor.       Addendum: BP elevated again, 169/70s. Resident does not want prn med until systolic >180. Will straight cath pt for retention. Will cont to monitor. Coreg due at 1800.

## 2018-07-28 NOTE — PLAN OF CARE
Problem: Patient Care Overview  Goal: Plan of Care/Patient Progress Review  Outcome: Improving  Pt A/O x4. BP elevated, 178/70s max. 140s systolic after Coreg given w/ AM meds. Team aware, does not want prn meds until sys BP >180. OVSS. Forgetful at times, using call light appropriately. Pain managed with prn oxy and Advil, scheduled Tylenol. Midline incision c/d/i, ELLIOTT w/ steri strips. Reg diet, reports mild nausea. Declines zofran. Poor appetite. Simethecone for gas pain. Passing gas, no BM today. Marginal UO, will cont to monitor/ Up w/ walker, SBA. Worked with OT/PT. Possible TCU placement Monday. Cont POC.     Addendum: Pt bladder scanned for 435. Straight cath'd for 200ml. Will cont to monitor retention.

## 2018-07-28 NOTE — PLAN OF CARE
Problem: Patient Care Overview  Goal: Plan of Care/Patient Progress Review  OT 7C:   Discharge Planner OT   Patient plan for discharge: TCU  Current status: Pt tolerating ~75 feet x 2 functional mobility SBA with FWW. Pt recognize abdominal precautions with conversation but unable to explicitly list. Reinforced education related to LB dressing and use of figure four technique. Pt able to don shorts SBA with VCs technique. Pt easily distracted but redirectable.  Barriers to return to prior living situation: cognition, surgical precautions, decreased IND, lives alone   Recommendations for discharge: TCU  Rationale for recommendations: Progress safety and IND with I/ADLs and functional mobility to promote safe return to home       Entered by: Consuelo Iyer 07/28/2018 1:27 PM

## 2018-07-28 NOTE — PLAN OF CARE
Problem: Patient Care Overview  Goal: Plan of Care/Patient Progress Review  PT -   Discharge Planner PT   Patient plan for discharge: Unsure  Current status: Pt amb to complete bed mobility today with CGA-Min A for repositioning. Pt amb 75' X 2 with FWW and CGA. Seated rest breaks between amb. Recommend continued amb 4 X daily with FWW and assist X 1.   Barriers to return to prior living situation: Medical status, decreased endurance, muscle weakness, abdominal precautions  Recommendations for discharge: Per plan established by the PhysicalTherapist, the recommendation for discharge location is TCU  Rationale for recommendations: Pt would benefit from continued skilled therapy in order to progress endurance and functional mobility.  Entered by: Consuelo Harris 07/28/2018 9:13 AM

## 2018-07-28 NOTE — PLAN OF CARE
Problem: Patient Care Overview  Goal: Plan of Care/Patient Progress Review  Outcome: Therapy, progress toward functional goals is gradual  HTN see provider notification. OVSS. Pain managed with PRN oxycodone and ibuprofen. Pt disoriented to time; bed alarm on for safety. Up with Ax1/SBA and walker. Voiding and +BM and gas. Abdominal incision open to air. C/o of nausea and zofran given x1 with relief. Emotional support provided. Continue with POC.     Addendum: simethicone given this AM for gas pain.

## 2018-07-29 LAB
COPATH REPORT: NORMAL
GLUCOSE BLDC GLUCOMTR-MCNC: 125 MG/DL (ref 70–99)
GLUCOSE BLDC GLUCOMTR-MCNC: 126 MG/DL (ref 70–99)
GLUCOSE BLDC GLUCOMTR-MCNC: 154 MG/DL (ref 70–99)
GLUCOSE BLDC GLUCOMTR-MCNC: 171 MG/DL (ref 70–99)
GLUCOSE BLDC GLUCOMTR-MCNC: 201 MG/DL (ref 70–99)
GLUCOSE BLDC GLUCOMTR-MCNC: 207 MG/DL (ref 70–99)
GLUCOSE BLDC GLUCOMTR-MCNC: 56 MG/DL (ref 70–99)
GLUCOSE BLDC GLUCOMTR-MCNC: 57 MG/DL (ref 70–99)
GLUCOSE BLDC GLUCOMTR-MCNC: 60 MG/DL (ref 70–99)

## 2018-07-29 PROCEDURE — 25000132 ZZH RX MED GY IP 250 OP 250 PS 637: Mod: GY | Performed by: STUDENT IN AN ORGANIZED HEALTH CARE EDUCATION/TRAINING PROGRAM

## 2018-07-29 PROCEDURE — A9270 NON-COVERED ITEM OR SERVICE: HCPCS | Mod: GY | Performed by: STUDENT IN AN ORGANIZED HEALTH CARE EDUCATION/TRAINING PROGRAM

## 2018-07-29 PROCEDURE — 00000146 ZZHCL STATISTIC GLUCOSE BY METER IP

## 2018-07-29 PROCEDURE — 25000128 H RX IP 250 OP 636: Performed by: NURSE PRACTITIONER

## 2018-07-29 PROCEDURE — A9270 NON-COVERED ITEM OR SERVICE: HCPCS | Mod: GY | Performed by: NURSE PRACTITIONER

## 2018-07-29 PROCEDURE — 25000132 ZZH RX MED GY IP 250 OP 250 PS 637: Mod: GY | Performed by: OBSTETRICS & GYNECOLOGY

## 2018-07-29 PROCEDURE — 25000132 ZZH RX MED GY IP 250 OP 250 PS 637: Mod: GY | Performed by: NURSE PRACTITIONER

## 2018-07-29 PROCEDURE — 25800025 ZZH RX 258: Performed by: STUDENT IN AN ORGANIZED HEALTH CARE EDUCATION/TRAINING PROGRAM

## 2018-07-29 PROCEDURE — A9270 NON-COVERED ITEM OR SERVICE: HCPCS | Mod: GY | Performed by: OBSTETRICS & GYNECOLOGY

## 2018-07-29 PROCEDURE — 25000128 H RX IP 250 OP 636: Performed by: OBSTETRICS & GYNECOLOGY

## 2018-07-29 PROCEDURE — 12000001 ZZH R&B MED SURG/OB UMMC

## 2018-07-29 RX ORDER — FUROSEMIDE 10 MG/ML
10 INJECTION INTRAMUSCULAR; INTRAVENOUS ONCE
Status: COMPLETED | OUTPATIENT
Start: 2018-07-29 | End: 2018-07-29

## 2018-07-29 RX ORDER — POLYETHYLENE GLYCOL 3350 17 G/17G
17 POWDER, FOR SOLUTION ORAL DAILY PRN
Status: DISCONTINUED | OUTPATIENT
Start: 2018-07-29 | End: 2018-07-31 | Stop reason: HOSPADM

## 2018-07-29 RX ORDER — IBUPROFEN 600 MG/1
600 TABLET, FILM COATED ORAL EVERY 6 HOURS
Status: DISCONTINUED | OUTPATIENT
Start: 2018-07-29 | End: 2018-07-30

## 2018-07-29 RX ORDER — LIDOCAINE 4 G/G
1 PATCH TOPICAL
Status: DISCONTINUED | OUTPATIENT
Start: 2018-07-29 | End: 2018-07-31 | Stop reason: HOSPADM

## 2018-07-29 RX ADMIN — Medication 2.5 MG: at 22:59

## 2018-07-29 RX ADMIN — GABAPENTIN 600 MG: 600 TABLET, FILM COATED ORAL at 19:47

## 2018-07-29 RX ADMIN — IBUPROFEN 600 MG: 600 TABLET ORAL at 16:29

## 2018-07-29 RX ADMIN — DEXTROSE MONOHYDRATE 25 ML: 500 INJECTION PARENTERAL at 02:15

## 2018-07-29 RX ADMIN — PSYLLIUM HUSK 1 PACKET: 3.4 POWDER ORAL at 07:36

## 2018-07-29 RX ADMIN — Medication 2.5 MG: at 12:08

## 2018-07-29 RX ADMIN — CARVEDILOL 12.5 MG: 12.5 TABLET, FILM COATED ORAL at 07:37

## 2018-07-29 RX ADMIN — LIDOCAINE 1 PATCH: 560 PATCH PERCUTANEOUS; TOPICAL; TRANSDERMAL at 22:59

## 2018-07-29 RX ADMIN — INSULIN ASPART 0.5 UNITS: 100 INJECTION, SOLUTION INTRAVENOUS; SUBCUTANEOUS at 08:18

## 2018-07-29 RX ADMIN — RANITIDINE 150 MG: 150 TABLET, FILM COATED ORAL at 07:37

## 2018-07-29 RX ADMIN — ACETAMINOPHEN 650 MG: 325 TABLET, FILM COATED ORAL at 13:24

## 2018-07-29 RX ADMIN — LEVOTHYROXINE SODIUM 75 MCG: 75 TABLET ORAL at 07:36

## 2018-07-29 RX ADMIN — CEPHALEXIN 250 MG: 250 CAPSULE ORAL at 22:08

## 2018-07-29 RX ADMIN — Medication 2.5 MG: at 09:12

## 2018-07-29 RX ADMIN — FUROSEMIDE 10 MG: 10 INJECTION, SOLUTION INTRAVENOUS at 07:37

## 2018-07-29 RX ADMIN — Medication 2.5 MG: at 19:46

## 2018-07-29 RX ADMIN — ENOXAPARIN SODIUM 40 MG: 100 INJECTION SUBCUTANEOUS at 12:08

## 2018-07-29 RX ADMIN — Medication 1 MG: at 22:59

## 2018-07-29 RX ADMIN — ACETAMINOPHEN 650 MG: 325 TABLET, FILM COATED ORAL at 07:37

## 2018-07-29 RX ADMIN — LOSARTAN POTASSIUM 100 MG: 50 TABLET ORAL at 22:07

## 2018-07-29 RX ADMIN — CARVEDILOL 12.5 MG: 12.5 TABLET, FILM COATED ORAL at 17:56

## 2018-07-29 RX ADMIN — DULOXETINE HYDROCHLORIDE 60 MG: 60 CAPSULE, DELAYED RELEASE ORAL at 22:07

## 2018-07-29 RX ADMIN — ACETAMINOPHEN 650 MG: 325 TABLET, FILM COATED ORAL at 19:46

## 2018-07-29 RX ADMIN — POLYETHYLENE GLYCOL 3350 17 G: 17 POWDER, FOR SOLUTION ORAL at 13:24

## 2018-07-29 RX ADMIN — Medication 2.5 MG: at 06:15

## 2018-07-29 RX ADMIN — Medication 2.5 MG: at 16:29

## 2018-07-29 RX ADMIN — RANITIDINE 150 MG: 150 TABLET, FILM COATED ORAL at 18:00

## 2018-07-29 RX ADMIN — ACETAMINOPHEN 650 MG: 325 TABLET, FILM COATED ORAL at 02:23

## 2018-07-29 RX ADMIN — INSULIN DEGLUDEC INJECTION 16 UNITS: 100 INJECTION, SOLUTION SUBCUTANEOUS at 22:15

## 2018-07-29 RX ADMIN — IBUPROFEN 600 MG: 600 TABLET ORAL at 23:02

## 2018-07-29 RX ADMIN — GABAPENTIN 600 MG: 600 TABLET, FILM COATED ORAL at 07:36

## 2018-07-29 RX ADMIN — IBUPROFEN 600 MG: 600 TABLET ORAL at 05:38

## 2018-07-29 RX ADMIN — GABAPENTIN 600 MG: 600 TABLET, FILM COATED ORAL at 13:24

## 2018-07-29 RX ADMIN — SENNOSIDES AND DOCUSATE SODIUM 2 TABLET: 8.6; 5 TABLET ORAL at 07:36

## 2018-07-29 ASSESSMENT — PAIN DESCRIPTION - DESCRIPTORS
DESCRIPTORS: ACHING;SORE
DESCRIPTORS: ACHING;SHARP
DESCRIPTORS: SORE
DESCRIPTORS: ACHING;SORE

## 2018-07-29 ASSESSMENT — ACTIVITIES OF DAILY LIVING (ADL)
ADLS_ACUITY_SCORE: 9
ADLS_ACUITY_SCORE: 8
ADLS_ACUITY_SCORE: 9
ADLS_ACUITY_SCORE: 9

## 2018-07-29 NOTE — PLAN OF CARE
Problem: Patient Care Overview  Goal: Plan of Care/Patient Progress Review  Outcome: Improving  Pt A/O x4, using call light appropriately. BP elevated 162s/70s. Team aware. OVSS. Pain managed with oxy and tylenol. Midline incision c/d/i, ELLIOTT. Steri strips. BGs checked, 0.5 unit insulin given this AM. 171 and 126. Reg diet, appetite improving. Passing gas, no BM. Abd slightly distended. Miralax given in afternoon. Shepard patent w/ adequate UO. Lasix given this AM. Wt up sicne admission. Up w/ walker, SBA. Steady on feet now. Family at bedside. PIV SL. Pt will go to TCU tomorrow. Cont POC.

## 2018-07-29 NOTE — PROGRESS NOTES
Social Work Services Progress Note    Hospital Day: 6  Date of Initial Social Work Evaluation:  7/26/18  Collaborated with: Resident, JACQUELYN staff from yesterday    Data:  Per chart review, pt is a 73 year old POD#4 s/p exploratory laparotomy, lysis of adhesions, and sigmoid epiploica biopsy, post op course c/b delirium.     Intervention:  Pt is being recommended to DC to a TCU. Her family would like for her to be placed at a TCU near their home area of [place]. Referrals were made on 7/26. Pt also has an oncology apt in the clinic tomorrow at 1500. Care team would like to know if it is possible to DC pt from hospital, have her attend her clinic apt and than be admitted to a STR facility after her apt.     STR facilities do not have admissions dept on the weekend. SW will connect with them in the am to discuss the possibility of this happening and update the care team. It was mentioned, the pt would not be starting chemo for a week or two after DC.     Assessment:  SW to continue assisting the pt with DC plans and will connect with pt's preferred facilities on Monday.    Plan:    Anticipated Disposition:  Facility:  TCU of choice    Barriers to d/c plan:  Bed availabitly    Follow Up:  SW to check referral status on Monday and discuss with care team    DEDE Leonard  [facility] Weekend   4A, 4C, 4E, 5A and 5B; pager [phone]  6A, 6B, 6C and 6D; pager [phone]  7A, 7B, 7C, 7D and 5C; pager [phone]

## 2018-07-29 NOTE — PLAN OF CARE
Problem: Patient Care Overview  Goal: Plan of Care/Patient Progress Review  Outcome: Therapy, progress toward functional goals is gradual  VSS. Pain managed with scheduled tylenol. Up with SBA and walker; ambulated to bathroom. Was unable to void; bladder scanned from [address]- MD notified and ordered to place duffy. Duffy with adequate UO. Passing gas but no BM. On a regular diet but decreased appetite. BG check at 0200 was 61, 56, 56, apple juice with gram-crackers with peanut butter given with no change in BG; dextrose 50% 25ml given and BG increased to 154. Continue to encourage out of bed activity.

## 2018-07-29 NOTE — PROGRESS NOTES
Gynecologic Oncology Progress Note  7/28/2018    Dz: High grade serous ovarian cancer    24 hour events:  - urinary retention, straight cath x1, replaced Shepard catheter  - gabapentin restarted  - SW looking into possible TCU placement for today  - Blood sugars     S: Patient reports feeling well this morning.  She denies any abdominal pain.  She reports having a headache overnight due to her low blood sugars but reports it improved once getting some treatment for that.  She is not passing gas overnight.  She is answering questions appropriately and is oriented.    O:  Patient Vitals for the past 24 hrs:   BP Temp Temp src Pulse Heart Rate Resp SpO2 Weight   07/28/18 2209 153/61 99.2  F (37.3  C) - - 76 14 92 % -   07/28/18 1832 151/62 - - - 83 - 94 % -   07/28/18 1445 169/75 99.1  F (37.3  C) Oral 81 - 16 93 % -   07/28/18 1251 - - - - - - - 73.1 kg (161 lb 3.2 oz)   07/28/18 0930 147/63 - - - - - - -   07/28/18 0723 178/83 98.7  F (37.1  C) Oral - 82 19 94 % -     Exam:   Gen: resting in bed, A&Ox3  Cardio: RRR  Resp: CTAB throughout posterior fields  Abdomen: soft, mildly tender to palpation.   : Shepard in place draining clear urine  Incision: clean, dry, and intact w/ overlying steristrips  Extremities: BLEs non-tender        Intake/Output Summary (Last 24 hours) at 07/29/18 0604  Last data filed at 07/29/18 0500   Gross per 24 hour   Intake              820 ml   Output             1190 ml   Net             -370 ml       Labs:  Glucose   BCx no growth x3d    A: 73 year old POD#5 s/p exploratory laparotomy, lysis of adhesions, and sigmoid epiploica biopsy, post op course c/b delirium and suspected chronic urinary retention.    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 S/p hysterectomy at 44yo.   FEN: Regular diet, Hyponatremia, Na 131, encouraging improved PO intake  Pain: S/p TAPS, scheduled tylenol. Oxycodone 2.5-5mg. Avoiding  excess narcotics d/t delirium. Ibuprofen restarted. Home gabapentin 600mg TID restarted. MS Contin 15 BID (held)  Heme: Stable acute blood loss anemia following EBL of 100 and txf of 2u pRBC, Hgb 10.8 ()  -  H/o DVT s/p   CV: HLD, statin held. HTN, home carvedilol & losartan  - Cardiomyopathy, EF 60% (2017), pre-op stress test positive, s/p angio w/ improved disease (50-60% occlusion RCA & LAD) and quoted 6.6% perioperative cardiac risk. S/p telemetry.  Pulm: 2-4mm perivascular nodules of RML & RUL.   GI: Sched senna-S, fiber,   : s/p Shepard, urinary retention with straight cath x1, Shepard replaced on POD #3, prerenal MARIAH resolved  ID: UA with mod leuk esterase, urine culture negative. Tm/Tl 101.5 ( @ 1900). S/p 2D IV ceftriaxone for presumed UTI. Blood cultures NGx3d. Home ppx keflex restarted.  Endo: DM1, A1C 9.4 - Tresiba 16U qHS, very low SSI. Hypothyroid - synthroid  Psych/Neuro: Fibromyalgia - cymbalta 60mg at bedtime. AMS likely due to delirium - limiting narcotics  MSK: PT/OT consulted  PPX: SCDs, IS, lovenox  Dispo: Meets criteria to discharge medically, awaiting TCU placement    Amy Schumer, MD  Obstetrics and Gynecology, PGY-2  Gyn onc Pager: [phone]

## 2018-07-30 ENCOUNTER — APPOINTMENT (OUTPATIENT)
Dept: OCCUPATIONAL THERAPY | Facility: CLINIC | Age: 73
DRG: 749 | End: 2018-07-30
Attending: OBSTETRICS & GYNECOLOGY
Payer: MEDICARE

## 2018-07-30 ENCOUNTER — APPOINTMENT (OUTPATIENT)
Dept: PHYSICAL THERAPY | Facility: CLINIC | Age: 73
DRG: 749 | End: 2018-07-30
Attending: OBSTETRICS & GYNECOLOGY
Payer: MEDICARE

## 2018-07-30 LAB
ANION GAP SERPL CALCULATED.3IONS-SCNC: 7 MMOL/L (ref 3–14)
BUN SERPL-MCNC: 9 MG/DL (ref 7–30)
CALCIUM SERPL-MCNC: 7.4 MG/DL (ref 8.5–10.1)
CHLORIDE SERPL-SCNC: 96 MMOL/L (ref 94–109)
CO2 SERPL-SCNC: 28 MMOL/L (ref 20–32)
CREAT SERPL-MCNC: 0.62 MG/DL (ref 0.52–1.04)
GFR SERPL CREATININE-BSD FRML MDRD: >90 ML/MIN/1.7M2
GLUCOSE SERPL-MCNC: 254 MG/DL (ref 70–99)
POTASSIUM SERPL-SCNC: 3.7 MMOL/L (ref 3.4–5.3)
SODIUM SERPL-SCNC: 130 MMOL/L (ref 133–144)

## 2018-07-30 PROCEDURE — A9270 NON-COVERED ITEM OR SERVICE: HCPCS | Mod: GY | Performed by: STUDENT IN AN ORGANIZED HEALTH CARE EDUCATION/TRAINING PROGRAM

## 2018-07-30 PROCEDURE — 97116 GAIT TRAINING THERAPY: CPT | Mod: GP | Performed by: PHYSICAL THERAPIST

## 2018-07-30 PROCEDURE — 25000132 ZZH RX MED GY IP 250 OP 250 PS 637: Mod: GY | Performed by: OBSTETRICS & GYNECOLOGY

## 2018-07-30 PROCEDURE — 25000132 ZZH RX MED GY IP 250 OP 250 PS 637: Mod: GY | Performed by: STUDENT IN AN ORGANIZED HEALTH CARE EDUCATION/TRAINING PROGRAM

## 2018-07-30 PROCEDURE — 97110 THERAPEUTIC EXERCISES: CPT | Mod: GP | Performed by: PHYSICAL THERAPIST

## 2018-07-30 PROCEDURE — 25800025 ZZH RX 258: Performed by: STUDENT IN AN ORGANIZED HEALTH CARE EDUCATION/TRAINING PROGRAM

## 2018-07-30 PROCEDURE — 97535 SELF CARE MNGMENT TRAINING: CPT | Mod: GO

## 2018-07-30 PROCEDURE — 25000128 H RX IP 250 OP 636: Performed by: NURSE PRACTITIONER

## 2018-07-30 PROCEDURE — 25000128 H RX IP 250 OP 636: Performed by: STUDENT IN AN ORGANIZED HEALTH CARE EDUCATION/TRAINING PROGRAM

## 2018-07-30 PROCEDURE — A9270 NON-COVERED ITEM OR SERVICE: HCPCS | Mod: GY | Performed by: OBSTETRICS & GYNECOLOGY

## 2018-07-30 PROCEDURE — 36415 COLL VENOUS BLD VENIPUNCTURE: CPT | Performed by: STUDENT IN AN ORGANIZED HEALTH CARE EDUCATION/TRAINING PROGRAM

## 2018-07-30 PROCEDURE — A9270 NON-COVERED ITEM OR SERVICE: HCPCS | Mod: GY | Performed by: NURSE PRACTITIONER

## 2018-07-30 PROCEDURE — 12000001 ZZH R&B MED SURG/OB UMMC

## 2018-07-30 PROCEDURE — 40000133 ZZH STATISTIC OT WARD VISIT

## 2018-07-30 PROCEDURE — 80048 BASIC METABOLIC PNL TOTAL CA: CPT | Performed by: STUDENT IN AN ORGANIZED HEALTH CARE EDUCATION/TRAINING PROGRAM

## 2018-07-30 PROCEDURE — 25000132 ZZH RX MED GY IP 250 OP 250 PS 637: Mod: GY | Performed by: NURSE PRACTITIONER

## 2018-07-30 PROCEDURE — 40000193 ZZH STATISTIC PT WARD VISIT: Performed by: PHYSICAL THERAPIST

## 2018-07-30 PROCEDURE — 97530 THERAPEUTIC ACTIVITIES: CPT | Mod: GP | Performed by: PHYSICAL THERAPIST

## 2018-07-30 RX ORDER — NIFEDIPINE 10 MG/1
10 CAPSULE ORAL ONCE
Status: DISCONTINUED | OUTPATIENT
Start: 2018-07-30 | End: 2018-07-30

## 2018-07-30 RX ORDER — HYDRALAZINE HYDROCHLORIDE 20 MG/ML
5 INJECTION INTRAMUSCULAR; INTRAVENOUS ONCE
Status: COMPLETED | OUTPATIENT
Start: 2018-07-30 | End: 2018-07-30

## 2018-07-30 RX ORDER — FUROSEMIDE 10 MG/ML
10 INJECTION INTRAMUSCULAR; INTRAVENOUS ONCE
Status: DISCONTINUED | OUTPATIENT
Start: 2018-07-30 | End: 2018-07-30

## 2018-07-30 RX ORDER — IBUPROFEN 600 MG/1
600 TABLET, FILM COATED ORAL EVERY 6 HOURS PRN
Status: DISCONTINUED | OUTPATIENT
Start: 2018-07-30 | End: 2018-07-31 | Stop reason: HOSPADM

## 2018-07-30 RX ORDER — HYDRALAZINE HYDROCHLORIDE 20 MG/ML
10 INJECTION INTRAMUSCULAR; INTRAVENOUS ONCE
Status: DISCONTINUED | OUTPATIENT
Start: 2018-07-30 | End: 2018-07-30

## 2018-07-30 RX ORDER — CARVEDILOL 25 MG/1
25 TABLET ORAL 2 TIMES DAILY WITH MEALS
Status: DISCONTINUED | OUTPATIENT
Start: 2018-07-30 | End: 2018-07-31 | Stop reason: HOSPADM

## 2018-07-30 RX ORDER — FUROSEMIDE 10 MG/ML
10 INJECTION INTRAMUSCULAR; INTRAVENOUS ONCE
Status: COMPLETED | OUTPATIENT
Start: 2018-07-30 | End: 2018-07-30

## 2018-07-30 RX ADMIN — Medication 2.5 MG: at 12:03

## 2018-07-30 RX ADMIN — LOSARTAN POTASSIUM 100 MG: 50 TABLET ORAL at 21:00

## 2018-07-30 RX ADMIN — IBUPROFEN 600 MG: 600 TABLET ORAL at 05:16

## 2018-07-30 RX ADMIN — HYDRALAZINE HYDROCHLORIDE 5 MG: 20 INJECTION INTRAMUSCULAR; INTRAVENOUS at 12:37

## 2018-07-30 RX ADMIN — SENNOSIDES AND DOCUSATE SODIUM 2 TABLET: 8.6; 5 TABLET ORAL at 19:49

## 2018-07-30 RX ADMIN — GABAPENTIN 600 MG: 600 TABLET, FILM COATED ORAL at 06:28

## 2018-07-30 RX ADMIN — Medication 5 MG: at 15:25

## 2018-07-30 RX ADMIN — CARVEDILOL 25 MG: 25 TABLET, FILM COATED ORAL at 16:40

## 2018-07-30 RX ADMIN — RANITIDINE 150 MG: 150 TABLET, FILM COATED ORAL at 19:49

## 2018-07-30 RX ADMIN — CEPHALEXIN 250 MG: 250 CAPSULE ORAL at 21:29

## 2018-07-30 RX ADMIN — RANITIDINE 150 MG: 150 TABLET, FILM COATED ORAL at 08:56

## 2018-07-30 RX ADMIN — GABAPENTIN 600 MG: 600 TABLET, FILM COATED ORAL at 13:17

## 2018-07-30 RX ADMIN — INSULIN ASPART 0.5 UNITS: 100 INJECTION, SOLUTION INTRAVENOUS; SUBCUTANEOUS at 12:03

## 2018-07-30 RX ADMIN — DEXTROSE 30 G: 15 GEL ORAL at 06:23

## 2018-07-30 RX ADMIN — FUROSEMIDE 10 MG: 10 INJECTION, SOLUTION INTRAVENOUS at 08:57

## 2018-07-30 RX ADMIN — ACETAMINOPHEN 650 MG: 325 TABLET, FILM COATED ORAL at 19:49

## 2018-07-30 RX ADMIN — Medication 5 MG: at 21:29

## 2018-07-30 RX ADMIN — ENOXAPARIN SODIUM 40 MG: 100 INJECTION SUBCUTANEOUS at 12:03

## 2018-07-30 RX ADMIN — IBUPROFEN 600 MG: 600 TABLET ORAL at 12:03

## 2018-07-30 RX ADMIN — Medication 2.5 MG: at 08:55

## 2018-07-30 RX ADMIN — CARVEDILOL 12.5 MG: 12.5 TABLET, FILM COATED ORAL at 06:28

## 2018-07-30 RX ADMIN — Medication 2.5 MG: at 01:56

## 2018-07-30 RX ADMIN — HYDRALAZINE HYDROCHLORIDE 5 MG: 20 INJECTION INTRAMUSCULAR; INTRAVENOUS at 15:01

## 2018-07-30 RX ADMIN — LIDOCAINE 1 PATCH: 560 PATCH PERCUTANEOUS; TOPICAL; TRANSDERMAL at 19:49

## 2018-07-30 RX ADMIN — ACETAMINOPHEN 650 MG: 325 TABLET, FILM COATED ORAL at 01:56

## 2018-07-30 RX ADMIN — INSULIN ASPART 1 UNITS: 100 INJECTION, SOLUTION INTRAVENOUS; SUBCUTANEOUS at 08:55

## 2018-07-30 RX ADMIN — ACETAMINOPHEN 650 MG: 325 TABLET, FILM COATED ORAL at 08:56

## 2018-07-30 RX ADMIN — ACETAMINOPHEN 650 MG: 325 TABLET, FILM COATED ORAL at 13:17

## 2018-07-30 RX ADMIN — SENNOSIDES AND DOCUSATE SODIUM 2 TABLET: 8.6; 5 TABLET ORAL at 08:56

## 2018-07-30 RX ADMIN — INSULIN ASPART 0.5 UNITS: 100 INJECTION, SOLUTION INTRAVENOUS; SUBCUTANEOUS at 17:25

## 2018-07-30 RX ADMIN — IBUPROFEN 600 MG: 600 TABLET ORAL at 16:40

## 2018-07-30 RX ADMIN — DEXTROSE MONOHYDRATE 50 ML: 500 INJECTION PARENTERAL at 06:43

## 2018-07-30 RX ADMIN — LEVOTHYROXINE SODIUM 75 MCG: 75 TABLET ORAL at 08:56

## 2018-07-30 RX ADMIN — DULOXETINE HYDROCHLORIDE 60 MG: 60 CAPSULE, DELAYED RELEASE ORAL at 21:29

## 2018-07-30 RX ADMIN — Medication 2.5 MG: at 05:23

## 2018-07-30 RX ADMIN — GABAPENTIN 600 MG: 600 TABLET, FILM COATED ORAL at 19:49

## 2018-07-30 ASSESSMENT — PAIN DESCRIPTION - DESCRIPTORS
DESCRIPTORS: SHARP
DESCRIPTORS: SHARP;SHOOTING
DESCRIPTORS: ACHING
DESCRIPTORS: ACHING;SHARP

## 2018-07-30 ASSESSMENT — ACTIVITIES OF DAILY LIVING (ADL)
ADLS_ACUITY_SCORE: 8

## 2018-07-30 NOTE — PLAN OF CARE
Problem: Patient Care Overview  Goal: Plan of Care/Patient Progress Review  Discharge Planner PT   Patient plan for discharge: TCU  Current status: SBA and verbal cues for supine to/from sidelying to/from sitting in order to maintain abd precautions; gait with WW on level, 100 ft x 2 with SBA  Barriers to return to prior living situation: needs assist for mobility at this time; medical condition; pain  Recommendations for discharge: TCU  Rationale for recommendations: will benefit from continued skilled PT intervention to address mobility deficits, improve strength & endurance       Entered by: Ely Sánchez 07/30/2018 10:25 AM

## 2018-07-30 NOTE — PLAN OF CARE
Problem: Patient Care Overview  Goal: Plan of Care/Patient Progress Review  Outcome: Improving  Pt A/O x4. BP remains elevated. Team aware, see other note. BPs down to 140s/150s in AM. 181 systolic in afternoon, 5mg hydralazine given. Will cont to monitor. OVSS. Denies chest pain, SOB. Pain managed with oxy, Tylenol, Advil. Midline incision c/d/i, ELLIOTT w/ staples. BGs monitored. sliding scale insulin given, 251 and 205. Up to commode, SBA w/ walker. Voiding adequately. Mild incontinence, pt aware and uses call light to get to commode. Mod carb diet. Passing gas, no BM. Denies n/v. Will go to TCU when BGs and high BPs resolve. Cont POC.

## 2018-07-30 NOTE — PROGRESS NOTES
Social Work Services Progress Note    Hospital Day: 6  Date of Initial Social Work Evaluation:  7/30/18  Collaborated with:  Patient, Pt's granddaughter (Annie), GynOnc Team, TCU's (see below), Chart Review    Data:  SW involved for TCU placement at discharge. Per medical team, Pt will be stable for discharge tomorrow.     Intervention:  SW f/u with TCU's below for placement:    [facility] (Main: [phone], Fax: [phone]): SW spoke with Yola in Admissions today who informed SW that she didn't receive referral which, per chart review, had been previously faxed. SW re-faxed referra through [employer] this AM. SW received call back from Yola asking for updated nursing notes, therapy notes and MAR; SW faxed right away. SW left VM this afternoon asking for updated; awaiting return call. SW received return call from Breanna informing SW that pt has been accepted for admission to TCU tomorrow.   2) [facility] (Main: [phone], Fax: [phone]): JACQUELYN spoke with Kayli in Admissions who informed SW that she had not had a chance to review referral yet. She asked that SW fax referral again; SW re-faxed updated referral through [employer]. SW left  for Admissions this afternoon to f/u; awaiting return call.   3) [facility] (Main: [phone], Fax: [phone]): JACQUELYN spoke with Alejandra in Admissions re: referral sent Friday. She informed SW that she will review referral for admission tomorrow. SW received return call from Alejandra informing SW that pt has been accepted for admission to TCU tomorrow. She informed SW that they would ask that Pt arrive on or before 3PM.     JACQUELYN met with Pt and spoke with Pt's granddaughter (Annie) re: the above. Pt prefers to discharge to [facility] and family is in agreement with this. Family continues to plan to provide discharge transportation, and would tentatively plan to leave for TCU tomorrow at 2PM.    In preparation for  discharge planning, SW completed PAS. Confirmation #: [id number]    Assessment:  Pt was pleasant and open to SW visit today. She reports family is a good source of support. She continues to be hopeful that a TCU in [place] could accept her, but is understanding that discharge plan would be to [facility] if [facility] or [facility] cannot take her.    Plan:    Anticipated Disposition:    TCU: [facility]  [address] [place] [address]  Main: [phone], Fax: [phone]  Nurse to Nurse #: [phone]    Barriers to d/c plan:  Medical stability    Follow Up:  SW to continue to follow and assist with discharge plan.    ANGELA Oglesby, LGSW   Surgical Oncology Unit   Phone: [phone]  Pager: [phone]

## 2018-07-30 NOTE — PLAN OF CARE
"Problem: Patient Care Overview  Goal: Plan of Care/Patient Progress Review  Outcome: Therapy, progress toward functional goals is gradual  VSS. Pain managed with scheduled tylenol, ibuprofen, lido patches and PRN oxycodone. Up with SBA and walker; ambulated to bathroom. Voiding frequently -small amounts.  On a regular diet. +gas and had BM on previous shift. Continue to encourage out of bed activity. Possible discharge to TCU when accepted to facility.     Addendum: Hypertensive- MDs notified and stated to give AM carvedilol early; along with gabapentin for pt leg nerve related pain. Pt also clammy and \"did not feel right\" this AM; blood sugar checked- BG 42; oral gel given, apple juice (240cc) and gram crackers with peanut butter- BG recheck was 37 and Dextrose 50% given and recheck 195. MD notified.   "

## 2018-07-30 NOTE — PROGRESS NOTES
Gynecologic Oncology Progress Note  7/30/2018    POD#6  Dz: High grade serous ovarian cancer    24 hour events:  - Shepard removed, timed voids q4hr  - Pain @ incision, added lidocaine patch    S: Patient reports she is not doing well because of pain -- reports gas pains. Pain in abdomen improved where lidocaine patch applied on the right side, now feels left side is more pain relatively. Ambulating, no trouble voiding. Is having flatus, BM last night.    O:  Patient Vitals for the past 24 hrs:   BP Temp Temp src Pulse Heart Rate Resp SpO2 Weight   07/29/18 2159 161/72 99.2  F (37.3  C) Oral 75 - 14 92 % -   07/29/18 1950 143/68 - - - - - - -   07/29/18 1751 178/84 - - 70 - - 95 % -   07/29/18 1509 141/57 98.8  F (37.1  C) Oral 81 - 14 90 % -   07/29/18 1000 - - - - - - - 73 kg (161 lb)   07/29/18 0620 162/70 96.7  F (35.9  C) Oral - 85 18 94 % -     Exam:   Gen: resting in bed, A&Ox4  Cardio: RRR  Resp: CTAB throughout posterior fields  Abdomen: soft, mildly tender to palpation in LLQ  Incision: clean, dry, and intact w/ overlying steristrips  Extremities: BLEs non-tender, trace edema    Weight: 147lb (7/24) >161lb (7/29)    I/O last 3 completed shifts:  In: 2000 [P.O.:2000]  Out: 1655 [Urine:1655]   Since MN: 400 urine    Labs:  Glucose 103-201  BCx no growth x5d    A: 73 year old POD#6 s/p exploratory laparotomy, lysis of adhesions, and sigmoid epiploica biopsy, post op course c/b delirium which is now improved, and suspected chronic urinary retention.    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 S/p hysterectomy at 44yo.   FEN: Regular diet. Chronic hyponatremia, encouraging improved PO intake  Pain: S/p TAPS, scheduled tylenol, PRN ibuprofen. Oxycodone 2.5-5mg. Avoiding excess narcotics d/t delirium. Home gabapentin 900mg TID restarted and lidocaine patches added. MS Contin 15 BID (held)  Heme: Stable acute blood loss anemia following EBL of 100  and txf of 2u pRBC, Hgb 10.8 ()  -  H/o DVT s/p   CV: HLD, statin held. HTN, home carvedilol & losartan.  - Cardiomyopathy, EF 60% (2017), pre-op stress test positive, s/p angio w/ improved disease (50-60% occlusion RCA & LAD) and quoted 6.6% perioperative cardiac risk. S/p telemetry, trop wnl x3. Weight up 4lg yesterday w/ LE edema, s/p 10g lasix, follow up weight today with possible second lasix dose  Pulm: 2-4mm perivascular nodules of RML & RUL.   GI: Sched senna-S, fiber  : Urinary retention, duffy replaced POD #3-4, now timed voids  ID: S/p 2D IV ceftriaxone for presumed UTI, urine culture negative. Tm/Tl 101.5 ( @ 1900).  Blood cultures NGx4d. Home ppx keflex restarted.  Endo: DM1, A1C 9.4 - Tresiba 16U qHS, very low SSI. Hypothyroid - synthroid  Psych/Neuro: Fibromyalgia - cymbalta 60mg qHS. AMS likely due to delirium, improved w/ limiting narcotics  MSK: PT/OT  PPX: SCDs, IS, lovenox  Dispo: Meets criteria to discharge medically, awaiting TCU placement    Mya Garcia MD PGY3  Gyn onc Pager: [phone]

## 2018-07-30 NOTE — PLAN OF CARE
Problem: Patient Care Overview  Goal: Plan of Care/Patient Progress Review  OT/7C - Discharge Planner OT   Patient plan for discharge: rehab  Current status: Pt unable to verbalize post surgical abdominal precautions independently. Reviewed precautions and provided handout to reinforce learning.  SBA for bed mobility supine to sitting EOB using log roll technique.  Mod I to don/doff clothing prior to shower demonstrating use of compensatory figure four method. Min A required for seated shower (to wash back); educated on long handled sponge for increased IND within abdominal precautions. Pt able to stand at sink for oral cares and facial grooming with SBA, no LOB noted. SpO2 96% on RA, /81 () RN aware and reports pt has been hypertensive all day.  Barriers to return to prior living situation: post surgical abdominal precautions, cognition, pt lives alone at baseline  Recommendations for discharge: TCU  Rationale for recommendations: to increase ADL/IADL IND       Entered by: Leah Lopez 07/30/2018 3:26 PM

## 2018-07-30 NOTE — PROVIDER NOTIFICATION
Notified Resident at 1221 PM regarding changes in vital signs.  /83. HR 75. OVSS. Denies chest pain, headache, blurry vision.    Spoke with: India, gyn/onc resident    Orders were obtained. Resident will put in prn BP med order. Will cont to monitor.         Addendum: 5 mg hydralazine given, recheck was 170/75. 2nd order of hydralazine placed and given.

## 2018-07-30 NOTE — PLAN OF CARE
Problem: Patient Care Overview  Goal: Plan of Care/Patient Progress Review  Outcome: Improving  VSS. Up with SBA. A&O x4. Tolerating small amounts of CHO diet. BG checks 125 and 201. Incision with steri-strips intact. Shepard d/c'd at 1800. Pt voided 150 mL at 2200. Had a large incont. BM this afternoon. Pt has been c/o abdominal pain, and did not think that her current dose of pain medications was enough. MD paged and changed ibuprofen to scheduled and added lidoderm patch. Pt has been taking 2.5 mg of oxycodone every 3 hrs. Possible discharge tomorrow. Pt's granddaughter would like a call from RN or SW as early as possible tomorrow AM to get an update on discharge plan. Continue with POC.

## 2018-07-31 ENCOUNTER — APPOINTMENT (OUTPATIENT)
Dept: PHYSICAL THERAPY | Facility: CLINIC | Age: 73
DRG: 749 | End: 2018-07-31
Attending: OBSTETRICS & GYNECOLOGY
Payer: MEDICARE

## 2018-07-31 VITALS
RESPIRATION RATE: 16 BRPM | HEART RATE: 81 BPM | HEIGHT: 62 IN | SYSTOLIC BLOOD PRESSURE: 179 MMHG | WEIGHT: 159.9 LBS | BODY MASS INDEX: 29.43 KG/M2 | TEMPERATURE: 98.9 F | OXYGEN SATURATION: 93 % | DIASTOLIC BLOOD PRESSURE: 79 MMHG

## 2018-07-31 LAB
ANION GAP SERPL CALCULATED.3IONS-SCNC: 6 MMOL/L (ref 3–14)
BACTERIA SPEC CULT: NO GROWTH
BACTERIA SPEC CULT: NO GROWTH
BUN SERPL-MCNC: 8 MG/DL (ref 7–30)
CALCIUM SERPL-MCNC: 7.2 MG/DL (ref 8.5–10.1)
CHLORIDE SERPL-SCNC: 99 MMOL/L (ref 94–109)
CO2 SERPL-SCNC: 26 MMOL/L (ref 20–32)
CREAT SERPL-MCNC: 0.57 MG/DL (ref 0.52–1.04)
GFR SERPL CREATININE-BSD FRML MDRD: >90 ML/MIN/1.7M2
GLUCOSE BLDC GLUCOMTR-MCNC: 112 MG/DL (ref 70–99)
GLUCOSE BLDC GLUCOMTR-MCNC: 147 MG/DL (ref 70–99)
GLUCOSE BLDC GLUCOMTR-MCNC: 172 MG/DL (ref 70–99)
GLUCOSE BLDC GLUCOMTR-MCNC: 63 MG/DL (ref 70–99)
GLUCOSE BLDC GLUCOMTR-MCNC: 72 MG/DL (ref 70–99)
GLUCOSE BLDC GLUCOMTR-MCNC: 97 MG/DL (ref 70–99)
GLUCOSE SERPL-MCNC: 105 MG/DL (ref 70–99)
Lab: NORMAL
Lab: NORMAL
POTASSIUM SERPL-SCNC: 3.7 MMOL/L (ref 3.4–5.3)
SODIUM SERPL-SCNC: 132 MMOL/L (ref 133–144)
SPECIMEN SOURCE: NORMAL
SPECIMEN SOURCE: NORMAL

## 2018-07-31 PROCEDURE — 25000132 ZZH RX MED GY IP 250 OP 250 PS 637: Mod: GY | Performed by: OBSTETRICS & GYNECOLOGY

## 2018-07-31 PROCEDURE — 40000193 ZZH STATISTIC PT WARD VISIT: Performed by: PHYSICAL THERAPIST

## 2018-07-31 PROCEDURE — 97116 GAIT TRAINING THERAPY: CPT | Mod: GP | Performed by: PHYSICAL THERAPIST

## 2018-07-31 PROCEDURE — 25000132 ZZH RX MED GY IP 250 OP 250 PS 637: Mod: GY | Performed by: NURSE PRACTITIONER

## 2018-07-31 PROCEDURE — 25000125 ZZHC RX 250: Performed by: STUDENT IN AN ORGANIZED HEALTH CARE EDUCATION/TRAINING PROGRAM

## 2018-07-31 PROCEDURE — 40000556 ZZH STATISTIC PERIPHERAL IV START W US GUIDANCE

## 2018-07-31 PROCEDURE — 97110 THERAPEUTIC EXERCISES: CPT | Mod: GP | Performed by: PHYSICAL THERAPIST

## 2018-07-31 PROCEDURE — 00000146 ZZHCL STATISTIC GLUCOSE BY METER IP

## 2018-07-31 PROCEDURE — 25000132 ZZH RX MED GY IP 250 OP 250 PS 637: Mod: GY | Performed by: STUDENT IN AN ORGANIZED HEALTH CARE EDUCATION/TRAINING PROGRAM

## 2018-07-31 PROCEDURE — A9270 NON-COVERED ITEM OR SERVICE: HCPCS | Mod: GY | Performed by: OBSTETRICS & GYNECOLOGY

## 2018-07-31 PROCEDURE — 36415 COLL VENOUS BLD VENIPUNCTURE: CPT | Performed by: NURSE PRACTITIONER

## 2018-07-31 PROCEDURE — A9270 NON-COVERED ITEM OR SERVICE: HCPCS | Mod: GY | Performed by: STUDENT IN AN ORGANIZED HEALTH CARE EDUCATION/TRAINING PROGRAM

## 2018-07-31 PROCEDURE — 99024 POSTOP FOLLOW-UP VISIT: CPT | Mod: GC | Performed by: OBSTETRICS & GYNECOLOGY

## 2018-07-31 PROCEDURE — 25000125 ZZHC RX 250

## 2018-07-31 PROCEDURE — A9270 NON-COVERED ITEM OR SERVICE: HCPCS | Mod: GY | Performed by: NURSE PRACTITIONER

## 2018-07-31 PROCEDURE — 80048 BASIC METABOLIC PNL TOTAL CA: CPT | Performed by: NURSE PRACTITIONER

## 2018-07-31 RX ORDER — ACETAMINOPHEN 325 MG/1
650 TABLET ORAL EVERY 6 HOURS PRN
Qty: 100 TABLET | DISCHARGE
Start: 2018-07-31 | End: 2019-01-09

## 2018-07-31 RX ORDER — GABAPENTIN 600 MG/1
600 TABLET ORAL 3 TIMES DAILY
Qty: 90 TABLET | COMMUNITY
Start: 2018-07-31

## 2018-07-31 RX ORDER — OXYCODONE HYDROCHLORIDE 5 MG/1
2.5-5 TABLET ORAL EVERY 6 HOURS PRN
Qty: 25 TABLET | Refills: 0 | Status: ON HOLD | OUTPATIENT
Start: 2018-07-31 | End: 2019-02-25

## 2018-07-31 RX ORDER — POLYETHYLENE GLYCOL 3350 17 G/17G
17 POWDER, FOR SOLUTION ORAL DAILY PRN
Qty: 7 PACKET | DISCHARGE
Start: 2018-07-31 | End: 2019-02-08

## 2018-07-31 RX ORDER — CARVEDILOL 25 MG/1
25 TABLET ORAL 2 TIMES DAILY WITH MEALS
Qty: 60 TABLET | DISCHARGE
Start: 2018-07-31

## 2018-07-31 RX ADMIN — ACETAMINOPHEN 650 MG: 325 TABLET, FILM COATED ORAL at 02:03

## 2018-07-31 RX ADMIN — Medication 5 MG: at 10:24

## 2018-07-31 RX ADMIN — Medication 5 MG: at 04:05

## 2018-07-31 RX ADMIN — INSULIN ASPART 0.5 UNITS: 100 INJECTION, SOLUTION INTRAVENOUS; SUBCUTANEOUS at 11:07

## 2018-07-31 RX ADMIN — CARVEDILOL 25 MG: 25 TABLET, FILM COATED ORAL at 07:57

## 2018-07-31 RX ADMIN — SENNOSIDES AND DOCUSATE SODIUM 2 TABLET: 8.6; 5 TABLET ORAL at 07:56

## 2018-07-31 RX ADMIN — RANITIDINE 150 MG: 150 TABLET, FILM COATED ORAL at 07:57

## 2018-07-31 RX ADMIN — IBUPROFEN 600 MG: 600 TABLET ORAL at 07:55

## 2018-07-31 RX ADMIN — SIMETHICONE CHEW TAB 80 MG 80 MG: 80 TABLET ORAL at 08:26

## 2018-07-31 RX ADMIN — IBUPROFEN 600 MG: 600 TABLET ORAL at 01:00

## 2018-07-31 RX ADMIN — ONDANSETRON 4 MG: 4 TABLET, ORALLY DISINTEGRATING ORAL at 12:31

## 2018-07-31 RX ADMIN — ACETAMINOPHEN 650 MG: 325 TABLET, FILM COATED ORAL at 07:54

## 2018-07-31 RX ADMIN — GABAPENTIN 600 MG: 600 TABLET, FILM COATED ORAL at 07:57

## 2018-07-31 RX ADMIN — LEVOTHYROXINE SODIUM 75 MCG: 75 TABLET ORAL at 07:57

## 2018-07-31 ASSESSMENT — ACTIVITIES OF DAILY LIVING (ADL)
ADLS_ACUITY_SCORE: 8

## 2018-07-31 ASSESSMENT — PAIN DESCRIPTION - DESCRIPTORS
DESCRIPTORS: ACHING;POUNDING
DESCRIPTORS: ACHING
DESCRIPTORS: ACHING

## 2018-07-31 NOTE — PLAN OF CARE
Problem: Patient Care Overview  Goal: Plan of Care/Patient Progress Review  PT-7C: pt seen for PT visit prior to discharge to TCU. Able to progress to gait training without device - tolerated 100 ft x 2 with close SBA; continues to require verbal cues for abd precautions with supine to/from sit.     Physical Therapy Discharge Summary    Reason for therapy discharge:    Discharged to transitional care facility.    Progress towards therapy goal(s). [facility] for goal details.  Goals partially met.  Barriers to achieving goals:   discharge from facility.    Therapy recommendation(s):    Continued therapy is recommended.  Rationale/Recommendations:  will benefit from continued skilled PT intervention at TCU to progress mobility skills, improve strength & activity tolerance for safe return home.

## 2018-07-31 NOTE — PROGRESS NOTES
Social Work Services Discharge Note      Patient Name:  Afshan Cardona     Anticipated Discharge Date:  7/31/18    Discharge Disposition:   TCU:  [facility]   [address] [place] [address] [place] [address]  Main: [phone], Fax: [phone]  Nurse to Nurse #: [phone]    Following MD:  Per facilities designation     Pre-Admission Screening (PAS) online form has been completed.  The Level of Care (LOC) is:  Determined  Confirmation Code is:  [id number]  Patient/caregiver informed of referral to [facility] for Pre-Admission Screening for skilled nursing facility (SNF) placement and to expect a phone call post discharge from SNF.     Additional Services/Equipment Arranged:  Pt's family will provide discharge transportation today.      Patient / Family response to discharge plan:  Pt and family are understanding and in agreement with this discharge plan.     Persons notified of above discharge plan:  Pt, Pt's granddaughter (Annie), Charge RN (Enedina), Bedside RN (Laney), NST (Johs), GynOnc Team, [facility] (Breanna)    Staff Discharge Instructions:  SW faxed discharge orders and signed hard scripts for any controlled substances.  Please print a packet and send with patient.     CTS Handoff completed:  YES    Medicare Notice of Rights provided to the patient/family:  YES    ANGELA Oglesby, ATA   Surgical Oncology Unit   Phone: [phone]  Pager: [phone]

## 2018-07-31 NOTE — PLAN OF CARE
Problem: Patient Care Overview  Goal: Plan of Care/Patient Progress Review  Outcome: No Change  Pt resting between cares. AVSS. Lungs clear. Abd dist, soft, +bs, +gas. No stools tonite. Up to BSC for good uv. Sats on RA = 94-96%. IS enc and pt performed well=1750ml. Pt c/o HA earlier. Ibuprofen given and  Oxycodone x1. BS check=97 Snack given per pt request. Fohpbth=296. PIV restarted, saline locked. Cont to monitor BS's. Maintain pain control.

## 2018-07-31 NOTE — PLAN OF CARE
Problem: Patient Care Overview  Goal: Plan of Care/Patient Progress Review  Occupational Therapy Discharge Summary    Reason for therapy discharge:    Discharged to transitional care facility.    Progress towards therapy goal(s). See goals on Care Plan in [facility] electronic health record for goal details.  Goals partially met.  Barriers to achieving goals:   discharge from facility.    Therapy recommendation(s):    Continued therapy is recommended.  Rationale/Recommendations:  to increase ADL/IADL IND prior to return home.

## 2018-07-31 NOTE — PLAN OF CARE
"Problem: Patient Care Overview  Goal: Plan of Care/Patient Progress Review  Outcome: No Change  /65  Pulse 77  Temp 98.9  F (37.2  C) (Oral)  Resp 16  Ht 1.575 m (5' 2\")  Wt 72.5 kg (159 lb 14.4 oz)  SpO2 95%  BMI 29.25 kg/m2    High blood pressure throughout the night, follow trends on flowsheet. Pain managed with oxy, ibuprofen, tylenol, and a lidoderm patch. Denies nausea and tolerated dinner. BGs lower than previous days. Bedtime sugar was 140 however MD still wanted me to give longer acting insulin. PIV saline locked. Abd inc ELLIOTT. Voiding spont. No BM. Walking in the halls. SBA. Mod carb diet. Hopefully discharge to a TCU tomorrow.       "

## 2018-07-31 NOTE — PROGRESS NOTES
Gynecologic Oncology Progress Note  7/31/2018    24 hour events:  - Hypoglycemia to 37, 30g glucose gel + juice/crackers, Tresiba decreased to 10units qHS  - Hypertensive, received IV hydralazine 5mg x2, increased carvedilol to 25mg BID    S: Patient reports feeling well this AM. Pain well controlled with taking oxycodone q6hr rather than q3hr. Ambulating, no trouble voiding. Is having flatus. Denies chest pain, SOB, HA.    O:  Patient Vitals for the past 24 hrs:   BP Temp Temp src Pulse Heart Rate Resp SpO2 Weight   07/31/18 0200 148/49 98.6  F (37  C) Oral 89 - - 96 % -   07/31/18 0100 - - - 75 - - 97 % -   07/30/18 2231 145/67 99.1  F (37.3  C) Oral - 78 16 90 % -   07/30/18 2133 153/65 - - - 80 - - -   07/30/18 2008 175/81 - - - - - - -   07/30/18 1639 182/80 - - - 80 - - -   07/30/18 1500 187/81 98.9  F (37.2  C) Oral - 81 16 95 % -   07/30/18 1335 170/75 98.6  F (37  C) Oral - 79 19 97 % -   07/30/18 1220 181/83 - - - 75 - - -   07/30/18 1207 175/76 97.6  F (36.4  C) Oral - 77 16 99 % -   07/30/18 1006 - - - - - - - 72.5 kg (159 lb 14.4 oz)   07/30/18 0721 147/49 - - - 76 - 99 % -   07/30/18 0700 189/72 - - - - - - -   07/30/18 0650 195/83 - - - 78 - - -   07/30/18 0646 187/83 - - 77 - - - -   07/30/18 0642 175/75 - - 76 - - - -   07/30/18 0635 190/82 - - 75 - - - -   07/30/18 0618 (!) 204/93 - - 81 - - 93 % -   07/30/18 0615 (!) 206/97 96.2  F (35.7  C) Oral 78 78 16 92 % -     Exam:   Gen: resting in bed, A&Ox4  Cardio: RRR  Resp: CTAB anteriorly  Abdomen: soft, nontender, mildly distended, mass palpated in LLQ  Incision: clean, dry, and intact w/ overlying steristrips  Extremities: BLEs non-tender, trace edema      A: 73 year old POD#7 s/p exploratory laparotomy, lysis of adhesions, and sigmoid epiploica biopsy, post-op course complicated by chronic urinary retention, labile blood sugar, and hypertension.    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 Plan for neoadjuvant chemotherapy as an outpatient  FEN: Regular diet. Chronic hyponatremia, encouraging improved PO intake  Pain: Scheduled tylenol, PRN ibuprofen. Oxycodone 2.5-5mg. Home gabapentin 900mg TID, lidocaine patches. MS Contin 15 BID (held)  Heme: Stable acute blood loss anemia, asymptomatic  -  Remote history of DVT but not on anticoagulation prior to surgery  CV: HLD, statin held. Poorly controlled HTN.  Increased carvedilol to 25mg BID, continue losartan 100mg qHS   - Cardiomyopathy with baseline EF of 60%.  No issues this admission  Pulm: No issues.   GI: No issues  : Chronic urinary retention being well managed with timed voids. Will arrange urology f/u as an outpatient  ID: Cont home prophylactic keflex for urinary retention  Endo: DM1 with labile glucose control.  Tresiba 16U decreased to 10U last night due to hypoglycemic episode yesterday AM, very low SSI. Hypothyroid - synthroid  Psych/Neuro: Fibromyalgia - cymbalta 60mg qHS.   MSK: PT/OT  PPX: SCDs, IS, lovenox  Dispo: Discharge to TCU today    Mya Garcia MD PGY3  Gyn onc Pager: [phone]    I, Evans Arndt personally examined and evaluated this patient on 07/31/18.  I discussed the patient with the resident and care team, and agree with the assessment and plan of care as documented in the residents note above.    I personally reviewed vital signs, laboratory values and imaging results.    Pt post-op from open and close for ovarian cancer.  Plan for neoadjuvant chemotherapy as an outpatient.  Post-operative cares complicated by labile BG with continued changes in her BG meds as well as poorly controlled HTN with improvement with changes in her medications.  Plan discharge to TCU today.    Ranjana Arndt MD  Gynecologic Oncology  [facility]

## 2018-08-01 ENCOUNTER — CARE COORDINATION (OUTPATIENT)
Dept: ONCOLOGY | Facility: CLINIC | Age: 73
End: 2018-08-01

## 2018-08-01 NOTE — PROGRESS NOTES
POST HOSPITAL DISCHARGE CALL    No post hospital call needed as patient discharged to TCU.     Marianela Coates RN

## 2018-08-03 ENCOUNTER — CARE COORDINATION (OUTPATIENT)
Dept: ONCOLOGY | Facility: CLINIC | Age: 73
End: 2018-08-03

## 2018-08-03 NOTE — PROGRESS NOTES
Care Coordinator Note:  Face Sheet, Social Work Discharge Note 7/31/18, Progress Note 7/18/18, Discharge Summary 7/31/18, Op Notes, Pre Op H&P, Path Report 7/6/18 and 7/24/18, CT reports 7/25/18 and 7/2/18 faxed ATTN:  Reshma/[facility] #[phone]/phone # [phone].    Gladys Osman MSN, RN  Care Coordinator  Gynecologic Cancer   Office:  [phone]  Pager: [phone] #[phone]

## 2018-08-08 ENCOUNTER — TRANSFERRED RECORDS (OUTPATIENT)
Dept: HEALTH INFORMATION MANAGEMENT | Facility: CLINIC | Age: 73
End: 2018-08-08

## 2018-08-27 ENCOUNTER — OFFICE VISIT (OUTPATIENT)
Dept: ONCOLOGY | Facility: CLINIC | Age: 73
End: 2018-08-27
Attending: OBSTETRICS & GYNECOLOGY
Payer: MEDICARE

## 2018-08-27 VITALS
TEMPERATURE: 97 F | OXYGEN SATURATION: 98 % | RESPIRATION RATE: 18 BRPM | DIASTOLIC BLOOD PRESSURE: 73 MMHG | SYSTOLIC BLOOD PRESSURE: 141 MMHG | BODY MASS INDEX: 25.12 KG/M2 | HEART RATE: 74 BPM | WEIGHT: 137.35 LBS

## 2018-08-27 DIAGNOSIS — Z85.43 PERSONAL HISTORY OF OVARIAN CANCER: Primary | ICD-10-CM

## 2018-08-27 DIAGNOSIS — R82.90 FOUL SMELLING URINE: ICD-10-CM

## 2018-08-27 LAB
ALBUMIN UR-MCNC: NEGATIVE MG/DL
APPEARANCE UR: ABNORMAL
BACTERIA #/AREA URNS HPF: ABNORMAL /HPF
BILIRUB UR QL STRIP: NEGATIVE
COLOR UR AUTO: YELLOW
GLUCOSE UR STRIP-MCNC: NEGATIVE MG/DL
HGB UR QL STRIP: ABNORMAL
KETONES UR STRIP-MCNC: NEGATIVE MG/DL
LEUKOCYTE ESTERASE UR QL STRIP: ABNORMAL
MUCOUS THREADS #/AREA URNS LPF: PRESENT /LPF
NITRATE UR QL: NEGATIVE
PH UR STRIP: 5 PH (ref 5–7)
RBC #/AREA URNS AUTO: 5 /HPF (ref 0–2)
SOURCE: ABNORMAL
SP GR UR STRIP: 1.01 (ref 1–1.03)
SQUAMOUS #/AREA URNS AUTO: 4 /HPF (ref 0–1)
UROBILINOGEN UR STRIP-MCNC: 0 MG/DL (ref 0–2)
WBC #/AREA URNS AUTO: >182 /HPF (ref 0–5)
WBC CLUMPS #/AREA URNS HPF: PRESENT /HPF

## 2018-08-27 PROCEDURE — G0463 HOSPITAL OUTPT CLINIC VISIT: HCPCS | Mod: ZF

## 2018-08-27 PROCEDURE — 87186 SC STD MICRODIL/AGAR DIL: CPT | Performed by: OBSTETRICS & GYNECOLOGY

## 2018-08-27 PROCEDURE — 87086 URINE CULTURE/COLONY COUNT: CPT | Performed by: OBSTETRICS & GYNECOLOGY

## 2018-08-27 PROCEDURE — 99214 OFFICE O/P EST MOD 30 MIN: CPT | Mod: ZP | Performed by: OBSTETRICS & GYNECOLOGY

## 2018-08-27 PROCEDURE — 81001 URINALYSIS AUTO W/SCOPE: CPT | Performed by: OBSTETRICS & GYNECOLOGY

## 2018-08-27 PROCEDURE — 87088 URINE BACTERIA CULTURE: CPT | Performed by: OBSTETRICS & GYNECOLOGY

## 2018-08-27 ASSESSMENT — PAIN SCALES - GENERAL: PAINLEVEL: EXTREME PAIN (8)

## 2018-08-27 NOTE — LETTER
2018       RE: Afshan Cardona  [address]  [facility] [address]     Dear Colleague,    Thank you for referring your patient, Afshan Cardona, to the [facility]. Please see a copy of my visit note below.                Follow Up Notes on Referred Patient    Date: 2018       Dr. Roxanne Pascual MD  [facility]  [address] [facility] [place] [address]       RE: Afshan Cardona  : 1945  SRIKANTH: 2018    Dear Dr. Roxanne Pascual:    Afshan Cardona is a 73 year old woman with a diagnosis of metastatic high grade serous ovarian cancer.           Patient presents for followup.  She has received 1 cycle of neoadjuvant carboplatin and paclitaxel at an outside facility.  She has tolerated that fairly well.  She had some pain initially but is doing better.  Had some fatigue and some nausea, denies any vomiting.  Constipation has improved now.  Also has some urinary incontinence.  Of note, she does get recurrent UTIs, has been on prophylactic Keflex.  She has noticed some changes of her urine.  No B symptoms.           Past Medical History:    Past Medical History:   Diagnosis Date     Vivas's esophagus      Cardiomyopathy (H)     [place]     Colon polyps      Fibromyalgia      Former smoker      History of DVT (deep vein thrombosis)      HLD (hyperlipidemia)      HTN (hypertension)      Hypothyroid      TIA (transient ischemic attack)          Past Surgical History:    Past Surgical History:   Procedure Laterality Date     AS ESOPHAGOSCOPY, DIAGNOSTIC       BACK SURGERY       bladder lift       HYSTERECTOMY       HYSTERECTOMY TOTAL ABD, TRINY SALPINGO-OOPHORECTOMY, NODE DISSECTION, TUMOR DEBULKING, COMBINED N/A 2018    Procedure: COMBINED HYSTERECTOMY TOTAL ABDOMINAL, SALPINGO-OOPHORECTOMY, NODE DISSECTION, TUMOR DEBULKING;;  Surgeon: Bakari Galeas MD;  Location: [facility] OR     LAPAROSCOPY DIAGNOSTIC (GYN) N/A 2018    Procedure: LAPAROSCOPY DIAGNOSTIC  (GYN);  Diagnostic Laparoscopy, Biopsies;  Surgeon: Bakari Galeas MD;  Location: [facility] OR     LAPAROTOMY EXPLORATORY N/A 7/24/2018    Procedure: LAPAROTOMY EXPLORATORY;  Exploratory Laparotomy, lysis of adhesions, jejunal mesentary biopsy and sigmoid epiploic biopsy. ;  Surgeon: Bakari Galeas MD;  Location: [facility] OR         Health Maintenance Due   Topic Date Due     PHQ-2 Q1 YR  05/19/1956     TETANUS IMMUNIZATION (SYSTEM ASSIGNED)  05/19/1962     LIPID SCREEN Q5 YR FEMALE (SYSTEM ASSIGNED)  05/19/1989     DEXA SCAN SCREENING (SYSTEM ASSIGNED)  05/19/2009     PNEUMOCOCCAL (1 of 2 - PCV13) 05/19/2009       Current Medications:     Current Outpatient Prescriptions   Medication Sig Dispense Refill     iohexol (OMNIPAQUE) 140 MG/ML SOLN solution Mix entire bottle (50ml) of contast with 600ml (20 ounces) of water and drink half 2 hrs prior to CT scan and half 1 hr prior to scan 50 mL 0     acetaminophen (TYLENOL) 325 MG tablet Take 2 tablets (650 mg) by mouth every 6 hours as needed for mild pain 100 tablet      aspirin 81 MG EC tablet Take 81 mg by mouth every morning        carvedilol (COREG) 25 MG tablet Take 1 tablet (25 mg) by mouth 2 times daily (with meals) 60 tablet      cephalexin (KEFLEX) 250 MG capsule TAKE ONE CAPSULE BY MOUTH ONCE DAILY AT BEDTIME  3     Cholecalciferol (VITAMIN D3) 2000 units CAPS TAKE ONE CAPSULE BY MOUTH ONCE DAILY IN THE MORNING  3     CRANBERRY EXTRACT PO Take by mouth every morning       DULoxetine (CYMBALTA) 60 MG EC capsule TAKE ONE CAPSULE BY MOUTH AT BEDTIME  1     enoxaparin (LOVENOX) 40 MG/0.4ML injection Inject 0.4 mLs (40 mg) Subcutaneous every 24 hours 21 Syringe 0     gabapentin (NEURONTIN) 600 MG tablet Take 1 tablet (600 mg) by mouth 3 times daily 90 tablet      HUMALOG MARY KWIKPEN 100 UNIT/ML injection INJECT 1 UNIT PER 10GRAMS OF CARBOHYDRATES WITH CORRECTION 0.5 UNITS PER 50 ABOVE 150 ( UP TO 30 UNITS PER DAY)  6     insulin degludec (TRESIBA) 100 UNIT/ML pen  Inject 8 Units Subcutaneous At Bedtime       levothyroxine (SYNTHROID/LEVOTHROID) 75 MCG tablet TAKE ONE TABLET BY MOUTH ONCE DAILY  IN THE MORNING     (PLEASE CALL [phone] FOR AN OFFICE VISIT BEFORE NEXT REFILL.)  0     losartan (COZAAR) 100 MG tablet Take 100 mg by mouth At Bedtime        methylcellulose, laxative, (CITRUCEL) POWD Take by mouth every morning       ONETOUCH VERIO IQ test strip USE TO TEST BLOOD GLUCOSE 6-8 TIMES PER DAY, BEFORE MEALS, BEDTIME TO DOSE INSULIN, HIGH OR LOW BLOOD GLUCOSE WITH RECHECK  3     oxyCODONE IR (ROXICODONE) 5 MG tablet Take 0.5-1 tablets (2.5-5 mg) by mouth every 6 hours as needed for severe pain 25 tablet 0     polyethylene glycol (MIRALAX/GLYCOLAX) Packet Take 17 g by mouth daily as needed for constipation 7 packet      pravastatin (PRAVACHOL) 80 MG tablet Take 80 mg by mouth At Bedtime        senna-docusate (SENOKOT-S;PERICOLACE) 8.6-50 MG per tablet Take 1-2 tablets by mouth 2 times daily Take while on oral narcotics to prevent or treat constipation. 30 tablet 0         Allergies:        Allergies   Allergen Reactions     Nitrofurantoin Other (See Comments)     Burning around her mouth, pt advised by neurologist to not take macrobid  Other reaction(s): Other  Burning around her mouth, pt advised by neurologist to not take macrobid     Azithromycin Diarrhea     Clonazepam      Other reaction(s): *Unknown     Furosemide      Other reaction(s): Unknown Reaction  Low sodium     Hydrochlorothiazide Other (See Comments)     Low Sodium     Lisinopril Cough     Pregabalin Other (See Comments)     Other reaction(s): Dizziness  Worse, numbness/tingling/burning pain whole body, hospitalized     Sulfamethoxazole Hives     Buprenorphine Nausea and Vomiting     Severe vomiting        Social History:     Social History   Substance Use Topics     Smoking status: Former Smoker     Packs/day: 0.25     Years: 15.00     Quit date: 1/1/2018     Smokeless tobacco: Never Used     Alcohol  use Yes      Comment: social       History   Drug Use No           Physical Exam:     /73  Pulse 74  Temp 97  F (36.1  C) (Oral)  Resp 18  Wt 62.3 kg (137 lb 5.6 oz)  SpO2 98%  BMI 25.12 kg/m2  Body mass index is 25.12 kg/(m^2).    General Appearance: healthy and alert, no distress     Musculoskeletal: extremities non tender and without edema    Neurological: normal gait, no gross defects     Psychiatric: appropriate mood and affect                               ABDOMEN:  Soft, mildly distended, nontender, well-healed midline incision.             Assessment:    Afshan Cardona is a 73 year old woman with a diagnosis of metastatic high grade serous ovarian cancer.     A total of 30 minutes was spent with the patient, 25 minutes of which were spent in counseling the patient and/or treatment planning.    1.  Ovarian high-grade serous carcinoma.   2.  Positive stress test.   3.  Neoadjuvant chemotherapy.      Discussed with the patient.  We will continue 2 more cycles of neoadjuvant carboplatin and paclitaxel and see her after dose of total of 3 cycles.  We will then re-scan with CT chest, abdomen and pelvis and get a stress test for significant underlying cardiac disease which can be difficult to undergo a large cytoreductive surgery.  We will again reschedule and see her after 3 cycles whether we can do another 3 cycles versus proceed with surgery at that point.  They do agree with the plan, are very appreciative of her care.  We will also obtain a urinalysis today to rule out urinary tract infection.       Bakari Gaelas MD, MS    Department of Obstetrics and Gynecology   Division of Gynecologic Oncology   [facility]  Phone: [phone]        CC  Patient Care Team:  Sonja Hathaway MD as PCP - General (Family Practice)  ROMMEL LOWRY

## 2018-08-27 NOTE — NURSING NOTE
"Oncology Rooming Note    August 27, 2018 12:00 PM   Afshan Cardona is a 73 year old female who presents for:    Chief Complaint   Patient presents with     Oncology Clinic Visit     Return for Post-op      Initial Vitals: /73  Pulse 74  Temp 97  F (36.1  C) (Oral)  Resp 18  Wt 62.3 kg (137 lb 5.6 oz)  SpO2 98%  BMI 25.12 kg/m2 Estimated body mass index is 25.12 kg/(m^2) as calculated from the following:    Height as of 7/24/18: 1.575 m (5' 2\").    Weight as of this encounter: 62.3 kg (137 lb 5.6 oz). Body surface area is 1.65 meters squared.  Extreme Pain (8) Comment: Data Unavailable   No LMP recorded. Patient has had a hysterectomy.  Allergies reviewed: Yes  Medications reviewed: Yes    Medications: MEDICATION REFILLS NEEDED TODAY. Provider was notified.  Pharmacy name entered into EPIC: [facility] - [place], [place] - [address]    Clinical concerns: Refills Pain Meds  Gudelia  was notified.    9  minutes for nursing intake (face to face time)     Deidra Neely MA              "

## 2018-08-27 NOTE — PROGRESS NOTES
Follow Up Notes on Referred Patient    Date: 2018       Dr. Roxanne Pascual MD  [facility]  [address] [place] [address]       RE: Afshan Cardona  : 1945  SRIKANTH: 2018    Dear Dr. Roxanne Pascual:    Afshan Cardona is a 73 year old woman with a diagnosis of metastatic high grade serous ovarian cancer.           Patient presents for followup.  She has received 1 cycle of neoadjuvant carboplatin and paclitaxel at an outside facility.  She has tolerated that fairly well.  She had some pain initially but is doing better.  Had some fatigue and some nausea, denies any vomiting.  Constipation has improved now.  Also has some urinary incontinence.  Of note, she does get recurrent UTIs, has been on prophylactic Keflex.  She has noticed some changes of her urine.  No B symptoms.           Past Medical History:    Past Medical History:   Diagnosis Date     Vivas's esophagus      Cardiomyopathy (H)     [place]     Colon polyps      Fibromyalgia      Former smoker      History of DVT (deep vein thrombosis)      HLD (hyperlipidemia)      HTN (hypertension)      Hypothyroid      TIA (transient ischemic attack)          Past Surgical History:    Past Surgical History:   Procedure Laterality Date     AS ESOPHAGOSCOPY, DIAGNOSTIC       BACK SURGERY       bladder lift       HYSTERECTOMY       HYSTERECTOMY TOTAL ABD, TRINY SALPINGO-OOPHORECTOMY, NODE DISSECTION, TUMOR DEBULKING, COMBINED N/A 2018    Procedure: COMBINED HYSTERECTOMY TOTAL ABDOMINAL, SALPINGO-OOPHORECTOMY, NODE DISSECTION, TUMOR DEBULKING;;  Surgeon: Bakari Galeas MD;  Location: [facility] OR     LAPAROSCOPY DIAGNOSTIC (GYN) N/A 2018    Procedure: LAPAROSCOPY DIAGNOSTIC (GYN);  Diagnostic Laparoscopy, Biopsies;  Surgeon: Bakari Galeas MD;  Location: [facility] OR     LAPAROTOMY EXPLORATORY N/A 2018    Procedure: LAPAROTOMY EXPLORATORY;  Exploratory Laparotomy, lysis of adhesions, jejunal mesentary biopsy and  sigmoid epiploic biopsy. ;  Surgeon: Bakari Galeas MD;  Location:  OR         Health Maintenance Due   Topic Date Due     PHQ-2 Q1 YR  05/19/1956     TETANUS IMMUNIZATION (SYSTEM ASSIGNED)  05/19/1962     LIPID SCREEN Q5 YR FEMALE (SYSTEM ASSIGNED)  05/19/1989     DEXA SCAN SCREENING (SYSTEM ASSIGNED)  05/19/2009     PNEUMOCOCCAL (1 of 2 - PCV13) 05/19/2009       Current Medications:     Current Outpatient Prescriptions   Medication Sig Dispense Refill     iohexol (OMNIPAQUE) 140 MG/ML SOLN solution Mix entire bottle (50ml) of contast with 600ml (20 ounces) of water and drink half 2 hrs prior to CT scan and half 1 hr prior to scan 50 mL 0     acetaminophen (TYLENOL) 325 MG tablet Take 2 tablets (650 mg) by mouth every 6 hours as needed for mild pain 100 tablet      aspirin 81 MG EC tablet Take 81 mg by mouth every morning        carvedilol (COREG) 25 MG tablet Take 1 tablet (25 mg) by mouth 2 times daily (with meals) 60 tablet      cephalexin (KEFLEX) 250 MG capsule TAKE ONE CAPSULE BY MOUTH ONCE DAILY AT BEDTIME  3     Cholecalciferol (VITAMIN D3) 2000 units CAPS TAKE ONE CAPSULE BY MOUTH ONCE DAILY IN THE MORNING  3     CRANBERRY EXTRACT PO Take by mouth every morning       DULoxetine (CYMBALTA) 60 MG EC capsule TAKE ONE CAPSULE BY MOUTH AT BEDTIME  1     enoxaparin (LOVENOX) 40 MG/0.4ML injection Inject 0.4 mLs (40 mg) Subcutaneous every 24 hours 21 Syringe 0     gabapentin (NEURONTIN) 600 MG tablet Take 1 tablet (600 mg) by mouth 3 times daily 90 tablet      HUMALOG MARY KWIKPEN 100 UNIT/ML injection INJECT 1 UNIT PER 10GRAMS OF CARBOHYDRATES WITH CORRECTION 0.5 UNITS PER 50 ABOVE 150 ( UP TO 30 UNITS PER DAY)  6     insulin degludec (TRESIBA) 100 UNIT/ML pen Inject 8 Units Subcutaneous At Bedtime       levothyroxine (SYNTHROID/LEVOTHROID) 75 MCG tablet TAKE ONE TABLET BY MOUTH ONCE DAILY  IN THE MORNING     (PLEASE CALL [phone] FOR AN OFFICE VISIT BEFORE NEXT REFILL.)  0     losartan (COZAAR)  100 MG tablet Take 100 mg by mouth At Bedtime        methylcellulose, laxative, (CITRUCEL) POWD Take by mouth every morning       ONETOUCH VERIO IQ test strip USE TO TEST BLOOD GLUCOSE 6-8 TIMES PER DAY, BEFORE MEALS, BEDTIME TO DOSE INSULIN, HIGH OR LOW BLOOD GLUCOSE WITH RECHECK  3     oxyCODONE IR (ROXICODONE) 5 MG tablet Take 0.5-1 tablets (2.5-5 mg) by mouth every 6 hours as needed for severe pain 25 tablet 0     polyethylene glycol (MIRALAX/GLYCOLAX) Packet Take 17 g by mouth daily as needed for constipation 7 packet      pravastatin (PRAVACHOL) 80 MG tablet Take 80 mg by mouth At Bedtime        senna-docusate (SENOKOT-S;PERICOLACE) 8.6-50 MG per tablet Take 1-2 tablets by mouth 2 times daily Take while on oral narcotics to prevent or treat constipation. 30 tablet 0         Allergies:        Allergies   Allergen Reactions     Nitrofurantoin Other (See Comments)     Burning around her mouth, pt advised by neurologist to not take macrobid  Other reaction(s): Other  Burning around her mouth, pt advised by neurologist to not take macrobid     Azithromycin Diarrhea     Clonazepam      Other reaction(s): *Unknown     Furosemide      Other reaction(s): Unknown Reaction  Low sodium     Hydrochlorothiazide Other (See Comments)     Low Sodium     Lisinopril Cough     Pregabalin Other (See Comments)     Other reaction(s): Dizziness  Worse, numbness/tingling/burning pain whole body, hospitalized     Sulfamethoxazole Hives     Buprenorphine Nausea and Vomiting     Severe vomiting        Social History:     Social History   Substance Use Topics     Smoking status: Former Smoker     Packs/day: 0.25     Years: 15.00     Quit date: 1/1/2018     Smokeless tobacco: Never Used     Alcohol use Yes      Comment: social       History   Drug Use No           Physical Exam:     /73  Pulse 74  Temp 97  F (36.1  C) (Oral)  Resp 18  Wt 62.3 kg (137 lb 5.6 oz)  SpO2 98%  BMI 25.12 kg/m2  Body mass index is 25.12  kg/(m^2).    General Appearance: healthy and alert, no distress     Musculoskeletal: extremities non tender and without edema    Neurological: normal gait, no gross defects     Psychiatric: appropriate mood and affect                               ABDOMEN:  Soft, mildly distended, nontender, well-healed midline incision.             Assessment:    Afshan Cardona is a 73 year old woman with a diagnosis of metastatic high grade serous ovarian cancer.     A total of 30 minutes was spent with the patient, 25 minutes of which were spent in counseling the patient and/or treatment planning.    1.  Ovarian high-grade serous carcinoma.   2.  Positive stress test.   3.  Neoadjuvant chemotherapy.      Discussed with the patient.  We will continue 2 more cycles of neoadjuvant carboplatin and paclitaxel and see her after dose of total of 3 cycles.  We will then re-scan with CT chest, abdomen and pelvis and get a stress test for significant underlying cardiac disease which can be difficult to undergo a large cytoreductive surgery.  We will again reschedule and see her after 3 cycles whether we can do another 3 cycles versus proceed with surgery at that point.  They do agree with the plan, are very appreciative of her care.  We will also obtain a urinalysis today to rule out urinary tract infection.       Bakari Galeas MD, MS    Department of Obstetrics and Gynecology   Division of Gynecologic Oncology   [facility]  Phone: [phone]        CC  Patient Care Team:  Sonja Hathaway MD as PCP - General (Family Practice)  ROMMEL LOWRY

## 2018-08-27 NOTE — MR AVS SNAPSHOT
After Visit Summary   8/27/2018    Afshan Cardona    MRN: [id number]           Patient Information     Date Of Birth          1945        Visit Information        Provider Department      8/27/2018 11:40 AM Bakari Galeas MD [facility]        Today's Diagnoses     Personal history of ovarian cancer    -  1    Foul smelling urine           Follow-ups after your visit        Your next 10 appointments already scheduled     Oct 24, 2018 11:00 AM CDT   CT CHEST ABDOMEN PELVIS W/O & W CONTRAST with UCCT2   [facility] ([facility])    [address] [address] [phone]           Please bring any scans or X-rays taken at other hospitals, if similar tests were done. Also bring a list of your medicines, including vitamins, minerals and over-the-counter drugs. It is safest to leave personal items at home.  Be sure to tell your doctor:   If you have any allergies.   If there s any chance you are pregnant.   If you are breastfeeding.  How to prepare:   Do not eat or drink for 2 hours before your exam. If you need to take medicine, you may take it with small sips of water. (We may ask you to take liquid medicine as well.)   Please wear loose clothing, such as a sweat suit or jogging clothes. Avoid snaps, zippers and other metal. We may ask you to undress and put on a hospital gown.  Please arrive 30 minutes early for your CT. Once in the department you might be asked to drink water 15-20 minutes prior to your exam.  If indicated you may be asked to drink an oral contrast in advance of your CT.  If this is the case, the imaging team will let you know or be in contact with you prior to your appointment  Patients over 70 or patients with diabetes or kidney problems:   If you haven t had a blood test (creatinine test) within the last 30 days, the Cardiologist/Radiologist may require you to get this test prior  to your exam.  If you have diabetes:   Continue to take your metformin medication on the day of your exam  If you have any questions, please call the Imaging Department where you will have your exam.            Oct 24, 2018  2:20 PM CDT   (Arrive by 2:05 PM)   Return Visit with Bakari Galeas MD   [facility] ([facility])    [address] [place] [address]  [address] [place] [address] [phone]              Future tests that were ordered for you today     Open Future Orders        Priority Expected Expires Ordered    UA with Microscopic reflex to Culture Routine  8/27/2019 8/27/2018    CT Chest Abdomen Pelvis w/o & w Contrast Routine  8/27/2019 8/27/2018            Who to contact     If you have questions or need follow up information about today's clinic visit or your schedule please contact [facility] directly at [phone].  Normal or non-critical lab and imaging results will be communicated to you by MyChart, letter or phone within 4 business days after the clinic has received the results. If you do not hear from us within 7 days, please contact the clinic through MyChart or phone. If you have a critical or abnormal lab result, we will notify you by phone as soon as possible.  Submit refill requests through [email or web address] or call your pharmacy and they will forward the refill request to us. Please allow 3 business days for your refill to be completed.          Additional Information About Your Visit        Care EveryWhere ID     This is your Care EveryWhere ID. This could be used by other organizations to access your [facility] medical records  [id number]        Your Vitals Were     Pulse Temperature Respirations Pulse Oximetry BMI (Body Mass Index)       74 97  F (36.1  C) (Oral) 18 98% 25.12 kg/m2        Blood Pressure from Last 3 Encounters:   08/27/18 141/73   07/31/18 179/79   07/19/18 93/54    Weight from Last 3 Encounters:   08/27/18 62.3  kg (137 lb 5.6 oz)   07/30/18 72.5 kg (159 lb 14.4 oz)   07/19/18 67.1 kg (148 lb)                 Today's Medication Changes          These changes are accurate as of 8/27/18 12:43 PM.  If you have any questions, ask your nurse or doctor.               Start taking these medicines.        Dose/Directions    iohexol 140 MG/ML Soln solution   Commonly known as:  OMNIPAQUE   Used for:  Personal history of ovarian cancer   Started by:  Bakari Galeas MD        Mix entire bottle (50ml) of contast with 600ml (20 ounces) of water and drink half 2 hrs prior to CT scan and half 1 hr prior to scan   Quantity:  50 mL   Refills:  0            Where to get your medicines      These medications were sent to [facility] [address] [facility] [phone]  [address] [facility] [address] [address]    Hours:  TRANSPLANT PHONE NUMBER [phone] Phone:  [phone]     iohexol 140 MG/ML Soln solution                Primary Care Provider Office Phone # Fax #    Sonja Hathaway [phone]       [facility] [address] [place] [address]        Equal Access to Services     ANJANA KAYE AH: Hadii margarita ku hadasho Soomaali, waaxda luqadaha, qaybta kaalmada adeegyada, luis conteh. So [facility] [phone].    ATENCIÓN: Si habla español, tiene a roblero disposición servicios gratuitos de asistencia lingüística. Llame al [phone].    We comply with applicable federal civil rights laws and [place] laws. We do not discriminate on the basis of race, color, national origin, age, disability, sex, sexual orientation, or gender identity.            Thank you!     Thank you for choosing [facility]  for your care. Our goal is always to provide you with excellent care. Hearing back from our patients is one way we can continue to improve our services. Please take a few minutes to complete the written survey that you may  receive in the mail after your visit with us. Thank you!             Your Updated Medication List - Protect others around you: Learn how to safely use, store and throw away your medicines at www.disposemymeds.org.          This list is accurate as of 8/27/18 12:43 PM.  Always use your most recent med list.                   Brand Name Dispense Instructions for use Diagnosis    acetaminophen 325 MG tablet    TYLENOL    100 tablet    Take 2 tablets (650 mg) by mouth every 6 hours as needed for mild pain    Malignant neoplasm of ovary, unspecified laterality (H)       aspirin 81 MG EC tablet      Take 81 mg by mouth every morning        carvedilol 25 MG tablet    COREG    60 tablet    Take 1 tablet (25 mg) by mouth 2 times daily (with meals)    Malignant neoplasm of ovary, unspecified laterality (H)       cephalexin 250 MG capsule    KEFLEX     TAKE ONE CAPSULE BY MOUTH ONCE DAILY AT BEDTIME        CITRUCEL Powd   Generic drug:  methylcellulose (laxative)      Take by mouth every morning        CRANBERRY EXTRACT PO      Take by mouth every morning        DULoxetine 60 MG EC capsule    CYMBALTA     TAKE ONE CAPSULE BY MOUTH AT BEDTIME        enoxaparin 40 MG/0.4ML injection    LOVENOX    21 Syringe    Inject 0.4 mLs (40 mg) Subcutaneous every 24 hours    Malignant neoplasm of ovary, unspecified laterality (H)       gabapentin 600 MG tablet    NEURONTIN    90 tablet    Take 1 tablet (600 mg) by mouth 3 times daily    Malignant neoplasm of ovary, unspecified laterality (H)       HUMALOG MARY KWIKPEN 100 UNIT/ML injection   Generic drug:  insulin lispro      INJECT 1 UNIT PER 10GRAMS OF CARBOHYDRATES WITH CORRECTION 0.5 UNITS PER 50 ABOVE 150 ( UP TO 30 UNITS PER DAY)        insulin degludec 100 UNIT/ML pen    TRESIBA     Inject 8 Units Subcutaneous At Bedtime    Malignant neoplasm of ovary, unspecified laterality (H)       iohexol 140 MG/ML Soln solution    OMNIPAQUE    50 mL    Mix entire bottle (50ml) of contast with  600ml (20 ounces) of water and drink half 2 hrs prior to CT scan and half 1 hr prior to scan    Personal history of ovarian cancer       levothyroxine 75 MCG tablet    SYNTHROID/LEVOTHROID     TAKE ONE TABLET BY MOUTH ONCE DAILY  IN THE MORNING     (PLEASE CALL [phone] FOR AN OFFICE VISIT BEFORE NEXT REFILL.)        losartan 100 MG tablet    COZAAR     Take 100 mg by mouth At Bedtime        ONETOUCH VERIO IQ test strip   Generic drug:  blood glucose monitoring      USE TO TEST BLOOD GLUCOSE 6-8 TIMES PER DAY, BEFORE MEALS, BEDTIME TO DOSE INSULIN, HIGH OR LOW BLOOD GLUCOSE WITH RECHECK        oxyCODONE IR 5 MG tablet    ROXICODONE    25 tablet    Take 0.5-1 tablets (2.5-5 mg) by mouth every 6 hours as needed for severe pain    Malignant neoplasm of ovary, unspecified laterality (H)       polyethylene glycol Packet    MIRALAX/GLYCOLAX    7 packet    Take 17 g by mouth daily as needed for constipation    Malignant neoplasm of ovary, unspecified laterality (H)       pravastatin 80 MG tablet    PRAVACHOL     Take 80 mg by mouth At Bedtime        senna-docusate 8.6-50 MG per tablet    SENOKOT-S;PERICOLACE    30 tablet    Take 1-2 tablets by mouth 2 times daily Take while on oral narcotics to prevent or treat constipation.    S/P laparoscopic procedure       vitamin D3 2000 units Caps      TAKE ONE CAPSULE BY MOUTH ONCE DAILY IN THE MORNING

## 2018-08-28 LAB
BACTERIA SPEC CULT: ABNORMAL
Lab: ABNORMAL
SPECIMEN SOURCE: ABNORMAL

## 2018-09-10 DIAGNOSIS — N39.0 URINARY TRACT INFECTION: Primary | ICD-10-CM

## 2018-09-10 RX ORDER — CIPROFLOXACIN 500 MG/1
500 TABLET, FILM COATED ORAL 2 TIMES DAILY
Qty: 20 TABLET | Refills: 0 | Status: SHIPPED | OUTPATIENT
Start: 2018-09-10 | End: 2019-02-08

## 2018-10-18 DIAGNOSIS — Z85.43 PERSONAL HISTORY OF OVARIAN CANCER: Primary | ICD-10-CM

## 2018-10-24 ENCOUNTER — RADIANT APPOINTMENT (OUTPATIENT)
Dept: CT IMAGING | Facility: CLINIC | Age: 73
End: 2018-10-24
Attending: OBSTETRICS & GYNECOLOGY
Payer: MEDICARE

## 2018-10-24 ENCOUNTER — ONCOLOGY VISIT (OUTPATIENT)
Dept: ONCOLOGY | Facility: CLINIC | Age: 73
End: 2018-10-24
Attending: OBSTETRICS & GYNECOLOGY
Payer: MEDICARE

## 2018-10-24 VITALS
HEIGHT: 62 IN | DIASTOLIC BLOOD PRESSURE: 79 MMHG | OXYGEN SATURATION: 98 % | BODY MASS INDEX: 26.68 KG/M2 | SYSTOLIC BLOOD PRESSURE: 185 MMHG | HEART RATE: 85 BPM | WEIGHT: 145 LBS | RESPIRATION RATE: 16 BRPM | TEMPERATURE: 98.3 F

## 2018-10-24 VITALS
HEART RATE: 74 BPM | DIASTOLIC BLOOD PRESSURE: 72 MMHG | BODY MASS INDEX: 26.87 KG/M2 | RESPIRATION RATE: 18 BRPM | WEIGHT: 146.9 LBS | TEMPERATURE: 98 F | OXYGEN SATURATION: 96 % | SYSTOLIC BLOOD PRESSURE: 149 MMHG

## 2018-10-24 DIAGNOSIS — Z85.43 PERSONAL HISTORY OF OVARIAN CANCER: ICD-10-CM

## 2018-10-24 DIAGNOSIS — Z85.43 HISTORY OF OVARIAN CANCER: Primary | ICD-10-CM

## 2018-10-24 LAB
CANCER AG125 SERPL-ACNC: 50 U/ML (ref 0–30)
CREAT BLD-MCNC: 0.7 MG/DL (ref 0.52–1.04)
GFR SERPL CREATININE-BSD FRML MDRD: 82 ML/MIN/1.7M2
RADIOLOGIST FLAGS: NORMAL

## 2018-10-24 PROCEDURE — 36415 COLL VENOUS BLD VENIPUNCTURE: CPT

## 2018-10-24 PROCEDURE — G0463 HOSPITAL OUTPT CLINIC VISIT: HCPCS | Mod: ZF

## 2018-10-24 PROCEDURE — 99215 OFFICE O/P EST HI 40 MIN: CPT | Mod: ZP | Performed by: OBSTETRICS & GYNECOLOGY

## 2018-10-24 PROCEDURE — 86304 IMMUNOASSAY TUMOR CA 125: CPT | Performed by: OBSTETRICS & GYNECOLOGY

## 2018-10-24 RX ORDER — OXYCODONE AND ACETAMINOPHEN 5; 325 MG/1; MG/1
TABLET ORAL
COMMUNITY
Start: 2018-10-22 | End: 2019-02-08

## 2018-10-24 RX ORDER — IOPAMIDOL 755 MG/ML
89 INJECTION, SOLUTION INTRAVASCULAR ONCE
Status: COMPLETED | OUTPATIENT
Start: 2018-10-24 | End: 2018-10-24

## 2018-10-24 RX ADMIN — IOPAMIDOL 89 ML: 755 INJECTION, SOLUTION INTRAVASCULAR at 11:32

## 2018-10-24 ASSESSMENT — PAIN SCALES - GENERAL
PAINLEVEL: EXTREME PAIN (8)
PAINLEVEL: SEVERE PAIN (7)

## 2018-10-24 NOTE — NURSING NOTE
"Oncology Rooming Note    October 24, 2018 2:19 PM   Afshan Cardona is a 73 year old female who presents for:    Chief Complaint   Patient presents with     Oncology Clinic Visit     Return Ovarian Ca     Initial Vitals: /75  Pulse 85  Temp 98.3  F (36.8  C) (Oral)  Resp 16  Ht 1.575 m (5' 2\")  Wt 65.8 kg (145 lb)  SpO2 98%  BMI 26.52 kg/m2 Estimated body mass index is 26.52 kg/(m^2) as calculated from the following:    Height as of this encounter: 1.575 m (5' 2\").    Weight as of this encounter: 65.8 kg (145 lb). Body surface area is 1.7 meters squared.  Severe Pain (7) Comment: leg pain and back pain   No LMP recorded. Patient has had a hysterectomy.  Allergies reviewed: Yes  Medications reviewed: Yes    Medications: Medication refills not needed today.  Pharmacy name entered into EPIC: [facility] - [place], [place] - [address]    Clinical concerns: CT results     6 minutes for nursing intake (face to face time)     Hillary Lyman CMA              "

## 2018-10-24 NOTE — NURSING NOTE
Chief Complaint   Patient presents with     Blood Draw     Labs drawn via  by RN. VS taken.      Harish Owen RN

## 2018-10-24 NOTE — DISCHARGE INSTRUCTIONS

## 2018-10-24 NOTE — LETTER
10/24/2018       RE: Afshan Cardona  [address]  [facility] [address]     Dear Colleague,    Thank you for referring your patient, Afshan Cardona, to the [facility]. Please see a copy of my visit note below.                Follow Up Notes on Referred Patient    Date: 10/24/2018       Dr. Roxanne Pascual MD  [facility]  [address] [facility] [place] [place] [address]       RE: Afshan Cardona  : 1945  SRIKANTH: 10/24/2018    Dear Dr. Roxanne Pascual:    Afshan Cardona is a 73 year old woman with a diagnosis of metastatic high grade serous ovarian cancer.     The patient presents today for followup.  She is having a relatively difficult time with chemotherapy.  She is having quite a bit of difficulty with nausea and fatigue the first 2 weeks after chemotherapy, is doing better the third week.  Has recently had pneumonia, which is now improved.  She is otherwise eating and drinking well.  Again, has had nausea, no vomiting.  Fairly normal urinary and bowel function.  No vaginal bleeding or discharge.  Does have some significant neuropathy due to her diabetes, but she did notice it worse after she has gotten the chemotherapy.           Past Medical History:    Past Medical History:   Diagnosis Date     Vivas's esophagus      Cardiomyopathy (H)     Taksumoto     Colon polyps      Fibromyalgia      Former smoker      History of DVT (deep vein thrombosis)      HLD (hyperlipidemia)      HTN (hypertension)      Hypothyroid      TIA (transient ischemic attack)          Past Surgical History:    Past Surgical History:   Procedure Laterality Date     AS ESOPHAGOSCOPY, DIAGNOSTIC       BACK SURGERY       bladder lift       HYSTERECTOMY       HYSTERECTOMY TOTAL ABD, TRINY SALPINGO-OOPHORECTOMY, NODE DISSECTION, TUMOR DEBULKING, COMBINED N/A 2018    Procedure: COMBINED HYSTERECTOMY TOTAL ABDOMINAL, SALPINGO-OOPHORECTOMY, NODE DISSECTION, TUMOR DEBULKING;;  Surgeon: Bakari Galeas  MD SEVERO;  Location: [facility] OR     LAPAROSCOPY DIAGNOSTIC (GYN) N/A 7/6/2018    Procedure: LAPAROSCOPY DIAGNOSTIC (GYN);  Diagnostic Laparoscopy, Biopsies;  Surgeon: Bakair Galeas MD;  Location: [facility] OR     LAPAROTOMY EXPLORATORY N/A 7/24/2018    Procedure: LAPAROTOMY EXPLORATORY;  Exploratory Laparotomy, lysis of adhesions, jejunal mesentary biopsy and sigmoid epiploic biopsy. ;  Surgeon: Bakari Galeas MD;  Location: [facility] OR         Health Maintenance Due   Topic Date Due     PHQ-2 Q1 YR  05/19/1956     TETANUS IMMUNIZATION (SYSTEM ASSIGNED)  05/19/1962     LIPID SCREEN Q5 YR FEMALE (SYSTEM ASSIGNED)  05/19/1989     DEXA SCAN SCREENING (SYSTEM ASSIGNED)  05/19/2009     PNEUMOCOCCAL (1 of 2 - PCV13) 05/19/2009     INFLUENZA VACCINE (1) 09/01/2018       Current Medications:     Current Outpatient Prescriptions   Medication Sig Dispense Refill     aspirin 81 MG EC tablet Take 81 mg by mouth every morning        carvedilol (COREG) 25 MG tablet Take 1 tablet (25 mg) by mouth 2 times daily (with meals) 60 tablet      cephalexin (KEFLEX) 250 MG capsule TAKE ONE CAPSULE BY MOUTH ONCE DAILY AT BEDTIME  3     Cholecalciferol (VITAMIN D3) 2000 units CAPS TAKE ONE CAPSULE BY MOUTH ONCE DAILY IN THE MORNING  3     ciprofloxacin (CIPRO) 500 MG tablet Take 1 tablet (500 mg) by mouth 2 times daily 20 tablet 0     CRANBERRY EXTRACT PO Take by mouth every morning       DULoxetine (CYMBALTA) 60 MG EC capsule TAKE ONE CAPSULE BY MOUTH AT BEDTIME  1     gabapentin (NEURONTIN) 600 MG tablet Take 1 tablet (600 mg) by mouth 3 times daily 90 tablet      HUMALOG MARY KWIKPEN 100 UNIT/ML injection INJECT 1 UNIT PER 10GRAMS OF CARBOHYDRATES WITH CORRECTION 0.5 UNITS PER 50 ABOVE 150 ( UP TO 30 UNITS PER DAY)  6     insulin degludec (TRESIBA) 100 UNIT/ML pen Inject 8 Units Subcutaneous At Bedtime       levothyroxine (SYNTHROID/LEVOTHROID) 75 MCG tablet TAKE ONE TABLET BY MOUTH ONCE DAILY  IN THE MORNING     (PLEASE CALL [phone] FOR  AN OFFICE VISIT BEFORE NEXT REFILL.)  0     losartan (COZAAR) 100 MG tablet Take 100 mg by mouth At Bedtime        oxyCODONE IR (ROXICODONE) 5 MG tablet Take 0.5-1 tablets (2.5-5 mg) by mouth every 6 hours as needed for severe pain 25 tablet 0     oxyCODONE-acetaminophen (PERCOCET) 5-325 MG per tablet TAKE 1-2 TABLETS BY MOUTH EVERY 4 HOURS AS NEEDED FOR MODERATE OR SEVERE PAIN *CAUTION: OPIOID. RISK OF OVERDOSE AND ADDICTION.       pravastatin (PRAVACHOL) 80 MG tablet Take 80 mg by mouth At Bedtime        senna-docusate (SENOKOT-S;PERICOLACE) 8.6-50 MG per tablet Take 1-2 tablets by mouth 2 times daily Take while on oral narcotics to prevent or treat constipation. 30 tablet 0     acetaminophen (TYLENOL) 325 MG tablet Take 2 tablets (650 mg) by mouth every 6 hours as needed for mild pain (Patient not taking: Reported on 10/24/2018) 100 tablet      ONETOUCH VERIO IQ test strip USE TO TEST BLOOD GLUCOSE 6-8 TIMES PER DAY, BEFORE MEALS, BEDTIME TO DOSE INSULIN, HIGH OR LOW BLOOD GLUCOSE WITH RECHECK  3     polyethylene glycol (MIRALAX/GLYCOLAX) Packet Take 17 g by mouth daily as needed for constipation (Patient not taking: Reported on 10/24/2018) 7 packet          Allergies:        Allergies   Allergen Reactions     Nitrofurantoin Other (See Comments)     Burning around her mouth, pt advised by neurologist to not take macrobid         Azithromycin Diarrhea     Clonazepam      Other reaction(s): *Unknown     Furosemide      Low sodium     Hydrochlorothiazide Other (See Comments)     Low Sodium     Lantus [Insulin Glargine]      Low blood sugar     Lisinopril Cough     Pregabalin Other (See Comments)     Dizziness; Worse, numbness/tingling/burning pain whole body, hospitalized     Sulfamethoxazole Hives     Buprenorphine Nausea and Vomiting     Severe vomiting        Social History:     Social History   Substance Use Topics     Smoking status: Former Smoker     Packs/day: 0.25     Years: 15.00     Quit date: 1/1/2018      "Smokeless tobacco: Never Used     Alcohol use Yes      Comment: social       History   Drug Use No       Physical Exam:     /79  Pulse 85  Temp 98.3  F (36.8  C) (Oral)  Resp 16  Ht 1.575 m (5' 2\")  Wt 65.8 kg (145 lb)  SpO2 98%  BMI 26.52 kg/m2  Body mass index is 26.52 kg/(m^2).    General Appearance: healthy and alert, no distress    Neurological: normal gait, no gross defects     Psychiatric: appropriate mood and affect                                   Assessment:    Afshan Cardona is a 73 year old woman with a diagnosis of metastatic high grade serous ovarian cancer.     A total of 40 minutes was spent with the patient, 30 minutes of which were spent in counseling the patient and/or treatment planning.      1.  Metastatic high-grade serous ovarian carcinoma.   2.  Post-treatment response.   3.  CA-125 today is 50.   4.  Renal mass.    5.  Peripheral neuropathy.     6.  Positive stress test, significant cardiac disease.   7.  Hypertension.        Reviewed with the patient the results of her scan.  Of note, there is a renal mass noted which could be metastatic or a synchronous renal cell growth.  We will compare this on next CT.  She has again otherwise had a positive treatment response, still has a significant amount of disease.  Does have peripheral neuropathy, does have significant side effects from the chemotherapy.  We will switch her from carboplatin AUC of 6 every 3 weeks, to continue with that and then switch the Taxol to Taxotere dose 75 mg/m2 every 3 weeks.  The patient as well as family agree with the plan.  We will see them back after those additional 3 cycles and have another repeat scan at that time and then consider surgery at that point.  They do agree with this plan.  They are very appreciative of her care.  All of their questions were answered.  Also discussed we will then at a later point proceed with genetic counseling.  Of note, her blood pressure is elevated today.  It will " be again rechecked by her primary provider.  She again agrees with this plan.  They are all very appreciative of her care.  All questions were answered.       Bakari Galeas MD, MS    Department of Obstetrics and Gynecology   Division of Gynecologic Oncology   [facility]  Phone: [phone] cc  Patient Care Team:  Sonja Hathaway MD as PCP - General (Family Practice)  ROMMEL LOWRY

## 2018-10-24 NOTE — PROGRESS NOTES
Follow Up Notes on Referred Patient    Date: 10/24/2018       Dr. Roxanne Pascual MD  [facility]  [address] [place] [address]       RE: Afshan Cardona  : 1945  SRIKANTH: 10/24/2018    Dear Dr. Roxanne Pascual:    Afshan Cardona is a 73 year old woman with a diagnosis of metastatic high grade serous ovarian cancer.     The patient presents today for followup.  She is having a relatively difficult time with chemotherapy.  She is having quite a bit of difficulty with nausea and fatigue the first 2 weeks after chemotherapy, is doing better the third week.  Has recently had pneumonia, which is now improved.  She is otherwise eating and drinking well.  Again, has had nausea, no vomiting.  Fairly normal urinary and bowel function.  No vaginal bleeding or discharge.  Does have some significant neuropathy due to her diabetes, but she did notice it worse after she has gotten the chemotherapy.           Past Medical History:    Past Medical History:   Diagnosis Date     Vivas's esophagus      Cardiomyopathy (H)     Taksumoto     Colon polyps      Fibromyalgia      Former smoker      History of DVT (deep vein thrombosis)      HLD (hyperlipidemia)      HTN (hypertension)      Hypothyroid      TIA (transient ischemic attack)          Past Surgical History:    Past Surgical History:   Procedure Laterality Date     AS ESOPHAGOSCOPY, DIAGNOSTIC       BACK SURGERY       bladder lift       HYSTERECTOMY       HYSTERECTOMY TOTAL ABD, TRINY SALPINGO-OOPHORECTOMY, NODE DISSECTION, TUMOR DEBULKING, COMBINED N/A 2018    Procedure: COMBINED HYSTERECTOMY TOTAL ABDOMINAL, SALPINGO-OOPHORECTOMY, NODE DISSECTION, TUMOR DEBULKING;;  Surgeon: Bakari Galeas MD;  Location: [facility] OR     LAPAROSCOPY DIAGNOSTIC (GYN) N/A 2018    Procedure: LAPAROSCOPY DIAGNOSTIC (GYN);  Diagnostic Laparoscopy, Biopsies;  Surgeon: Bakari Galeas MD;  Location: [facility] OR     LAPAROTOMY EXPLORATORY N/A 2018     Procedure: LAPAROTOMY EXPLORATORY;  Exploratory Laparotomy, lysis of adhesions, jejunal mesentary biopsy and sigmoid epiploic biopsy. ;  Surgeon: Bakari Galeas MD;  Location: [facility] OR         Health Maintenance Due   Topic Date Due     PHQ-2 Q1 YR  05/19/1956     TETANUS IMMUNIZATION (SYSTEM ASSIGNED)  05/19/1962     LIPID SCREEN Q5 YR FEMALE (SYSTEM ASSIGNED)  05/19/1989     DEXA SCAN SCREENING (SYSTEM ASSIGNED)  05/19/2009     PNEUMOCOCCAL (1 of 2 - PCV13) 05/19/2009     INFLUENZA VACCINE (1) 09/01/2018       Current Medications:     Current Outpatient Prescriptions   Medication Sig Dispense Refill     aspirin 81 MG EC tablet Take 81 mg by mouth every morning        carvedilol (COREG) 25 MG tablet Take 1 tablet (25 mg) by mouth 2 times daily (with meals) 60 tablet      cephalexin (KEFLEX) 250 MG capsule TAKE ONE CAPSULE BY MOUTH ONCE DAILY AT BEDTIME  3     Cholecalciferol (VITAMIN D3) 2000 units CAPS TAKE ONE CAPSULE BY MOUTH ONCE DAILY IN THE MORNING  3     ciprofloxacin (CIPRO) 500 MG tablet Take 1 tablet (500 mg) by mouth 2 times daily 20 tablet 0     CRANBERRY EXTRACT PO Take by mouth every morning       DULoxetine (CYMBALTA) 60 MG EC capsule TAKE ONE CAPSULE BY MOUTH AT BEDTIME  1     gabapentin (NEURONTIN) 600 MG tablet Take 1 tablet (600 mg) by mouth 3 times daily 90 tablet      HUMALOG MARY KWIKPEN 100 UNIT/ML injection INJECT 1 UNIT PER 10GRAMS OF CARBOHYDRATES WITH CORRECTION 0.5 UNITS PER 50 ABOVE 150 ( UP TO 30 UNITS PER DAY)  6     insulin degludec (TRESIBA) 100 UNIT/ML pen Inject 8 Units Subcutaneous At Bedtime       levothyroxine (SYNTHROID/LEVOTHROID) 75 MCG tablet TAKE ONE TABLET BY MOUTH ONCE DAILY  IN THE MORNING     (PLEASE CALL [phone] FOR AN OFFICE VISIT BEFORE NEXT REFILL.)  0     losartan (COZAAR) 100 MG tablet Take 100 mg by mouth At Bedtime        oxyCODONE IR (ROXICODONE) 5 MG tablet Take 0.5-1 tablets (2.5-5 mg) by mouth every 6 hours as needed for severe pain 25 tablet 0  "    oxyCODONE-acetaminophen (PERCOCET) 5-325 MG per tablet TAKE 1-2 TABLETS BY MOUTH EVERY 4 HOURS AS NEEDED FOR MODERATE OR SEVERE PAIN *CAUTION: OPIOID. RISK OF OVERDOSE AND ADDICTION.       pravastatin (PRAVACHOL) 80 MG tablet Take 80 mg by mouth At Bedtime        senna-docusate (SENOKOT-S;PERICOLACE) 8.6-50 MG per tablet Take 1-2 tablets by mouth 2 times daily Take while on oral narcotics to prevent or treat constipation. 30 tablet 0     acetaminophen (TYLENOL) 325 MG tablet Take 2 tablets (650 mg) by mouth every 6 hours as needed for mild pain (Patient not taking: Reported on 10/24/2018) 100 tablet      ONETOUCH VERIO IQ test strip USE TO TEST BLOOD GLUCOSE 6-8 TIMES PER DAY, BEFORE MEALS, BEDTIME TO DOSE INSULIN, HIGH OR LOW BLOOD GLUCOSE WITH RECHECK  3     polyethylene glycol (MIRALAX/GLYCOLAX) Packet Take 17 g by mouth daily as needed for constipation (Patient not taking: Reported on 10/24/2018) 7 packet          Allergies:        Allergies   Allergen Reactions     Nitrofurantoin Other (See Comments)     Burning around her mouth, pt advised by neurologist to not take macrobid         Azithromycin Diarrhea     Clonazepam      Other reaction(s): *Unknown     Furosemide      Low sodium     Hydrochlorothiazide Other (See Comments)     Low Sodium     Lantus [Insulin Glargine]      Low blood sugar     Lisinopril Cough     Pregabalin Other (See Comments)     Dizziness; Worse, numbness/tingling/burning pain whole body, hospitalized     Sulfamethoxazole Hives     Buprenorphine Nausea and Vomiting     Severe vomiting        Social History:     Social History   Substance Use Topics     Smoking status: Former Smoker     Packs/day: 0.25     Years: 15.00     Quit date: 1/1/2018     Smokeless tobacco: Never Used     Alcohol use Yes      Comment: social       History   Drug Use No       Physical Exam:     /79  Pulse 85  Temp 98.3  F (36.8  C) (Oral)  Resp 16  Ht 1.575 m (5' 2\")  Wt 65.8 kg (145 lb)  SpO2 98%  " BMI 26.52 kg/m2  Body mass index is 26.52 kg/(m^2).    General Appearance: healthy and alert, no distress    Neurological: normal gait, no gross defects     Psychiatric: appropriate mood and affect                                   Assessment:    Afshan Cardona is a 73 year old woman with a diagnosis of metastatic high grade serous ovarian cancer.     A total of 40 minutes was spent with the patient, 30 minutes of which were spent in counseling the patient and/or treatment planning.      1.  Metastatic high-grade serous ovarian carcinoma.   2.  Post-treatment response.   3.  CA-125 today is 50.   4.  Renal mass.    5.  Peripheral neuropathy.     6.  Positive stress test, significant cardiac disease.   7.  Hypertension.        Reviewed with the patient the results of her scan.  Of note, there is a renal mass noted which could be metastatic or a synchronous renal cell growth.  We will compare this on next CT.  She has again otherwise had a positive treatment response, still has a significant amount of disease.  Does have peripheral neuropathy, does have significant side effects from the chemotherapy.  We will switch her from carboplatin AUC of 6 every 3 weeks, to continue with that and then switch the Taxol to Taxotere dose 75 mg/m2 every 3 weeks.  The patient as well as family agree with the plan.  We will see them back after those additional 3 cycles and have another repeat scan at that time and then consider surgery at that point.  They do agree with this plan.  They are very appreciative of her care.  All of their questions were answered.  Also discussed we will then at a later point proceed with genetic counseling.  Of note, her blood pressure is elevated today.  It will be again rechecked by her primary provider.  She again agrees with this plan.  They are all very appreciative of her care.  All questions were answered.       Bakari Galeas MD, MS    Department of Obstetrics and  Gynecology   Division of Gynecologic Oncology   [facility]  Phone: [phone]        CC  Patient Care Team:  Sonja Hathaway MD as PCP - General (Family Practice)  ROMMEL LOWRY

## 2018-12-20 DIAGNOSIS — Z85.43 PERSONAL HISTORY OF OVARIAN CANCER: Primary | ICD-10-CM

## 2019-01-09 ENCOUNTER — HOSPITAL ENCOUNTER (OUTPATIENT)
Dept: CT IMAGING | Facility: CLINIC | Age: 74
Discharge: HOME | End: 2019-01-09
Attending: OBSTETRICS & GYNECOLOGY | Admitting: OBSTETRICS & GYNECOLOGY
Payer: MEDICARE

## 2019-01-09 ENCOUNTER — ONCOLOGY VISIT (OUTPATIENT)
Dept: ONCOLOGY | Facility: CLINIC | Age: 74
End: 2019-01-09
Attending: OBSTETRICS & GYNECOLOGY
Payer: MEDICARE

## 2019-01-09 VITALS
HEART RATE: 84 BPM | OXYGEN SATURATION: 99 % | WEIGHT: 153 LBS | DIASTOLIC BLOOD PRESSURE: 61 MMHG | TEMPERATURE: 97 F | BODY MASS INDEX: 28.16 KG/M2 | HEIGHT: 62 IN | SYSTOLIC BLOOD PRESSURE: 139 MMHG | RESPIRATION RATE: 16 BRPM

## 2019-01-09 DIAGNOSIS — Z85.43 PERSONAL HISTORY OF OVARIAN CANCER: ICD-10-CM

## 2019-01-09 DIAGNOSIS — C56.9 OVARIAN CANCER, UNSPECIFIED LATERALITY (H): Primary | ICD-10-CM

## 2019-01-09 PROCEDURE — 74177 CT ABD & PELVIS W/CONTRAST: CPT

## 2019-01-09 PROCEDURE — 25000128 H RX IP 250 OP 636: Performed by: OBSTETRICS & GYNECOLOGY

## 2019-01-09 PROCEDURE — 71260 CT THORAX DX C+: CPT

## 2019-01-09 PROCEDURE — G0463 HOSPITAL OUTPT CLINIC VISIT: HCPCS | Mod: ZF

## 2019-01-09 PROCEDURE — 25000125 ZZHC RX 250: Performed by: OBSTETRICS & GYNECOLOGY

## 2019-01-09 PROCEDURE — 99214 OFFICE O/P EST MOD 30 MIN: CPT | Mod: ZP | Performed by: OBSTETRICS & GYNECOLOGY

## 2019-01-09 RX ORDER — IOPAMIDOL 755 MG/ML
50 INJECTION, SOLUTION INTRAVASCULAR ONCE
Status: COMPLETED | OUTPATIENT
Start: 2019-01-09 | End: 2019-01-09

## 2019-01-09 RX ADMIN — IOPAMIDOL 21 ML: 755 INJECTION, SOLUTION INTRAVENOUS at 12:10

## 2019-01-09 RX ADMIN — IOPAMIDOL 50 ML: 755 INJECTION, SOLUTION INTRAVENOUS at 12:09

## 2019-01-09 RX ADMIN — SODIUM CHLORIDE 58 ML: 9 INJECTION, SOLUTION INTRAVENOUS at 12:09

## 2019-01-09 ASSESSMENT — MIFFLIN-ST. JEOR: SCORE: 1147.25

## 2019-01-09 ASSESSMENT — PAIN SCALES - GENERAL: PAINLEVEL: WORST PAIN (10)

## 2019-01-09 NOTE — LETTER
2019       RE: Afshan Cardona  [address]  [place] [address]     Dear Colleague,    Thank you for referring your patient, Afshan Cardona, to the [facility]. Please see a copy of my visit note below.                Follow Up Notes on Referred Patient    Date: 10/24/2018       Dr. Roxanne Pascual MD  [facility]  [address] [place] [address]       RE: Afshan Cardona  : 1945  SRIKANTH: 10/24/2018    Dear Dr. Roxanne Pascual:    Afshan Cardona is a 73 year old woman with a diagnosis of metastatic high grade serous ovarian cancer, now s/p 6 cycles neoadjuvant chemotherapy.  The patient presents today for treatment planning.    She is overall doing well.  No nausea or vomiting at current.  Fairly normal urinary and bowel function.  No vaginal bleeding or discharge.  Does have some significant neuropathy initially due to her diabetes, which worsened some during chemotherapy.       Past Medical History:  Past Medical History:   Diagnosis Date     Vivas's esophagus      Cardiomyopathy (H)     Taksumoto     Colon polyps      Fibromyalgia      Former smoker      History of DVT (deep vein thrombosis)      HLD (hyperlipidemia)      HTN (hypertension)      Hypothyroid      TIA (transient ischemic attack)          Past Surgical History:    Past Surgical History:   Procedure Laterality Date     AS ESOPHAGOSCOPY, DIAGNOSTIC       BACK SURGERY       bladder lift       HYSTERECTOMY       HYSTERECTOMY TOTAL ABD, TRINY SALPINGO-OOPHORECTOMY, NODE DISSECTION, TUMOR DEBULKING, COMBINED N/A 2018    Procedure: COMBINED HYSTERECTOMY TOTAL ABDOMINAL, SALPINGO-OOPHORECTOMY, NODE DISSECTION, TUMOR DEBULKING;;  Surgeon: Bakari Galeas MD;  Location: [facility] OR     LAPAROSCOPY DIAGNOSTIC (GYN) N/A 2018    Procedure: LAPAROSCOPY DIAGNOSTIC (GYN);  Diagnostic Laparoscopy, Biopsies;  Surgeon: Bakari Galeas MD;  Location: [facility] OR     LAPAROTOMY EXPLORATORY N/A  7/24/2018    Procedure: LAPAROTOMY EXPLORATORY;  Exploratory Laparotomy, lysis of adhesions, jejunal mesentary biopsy and sigmoid epiploic biopsy. ;  Surgeon: Bakari Galeas MD;  Location:  OR         Health Maintenance Due   Topic Date Due     PHQ-2 Q1 YR  05/19/1956     LIPID SCREEN Q5 YR FEMALE (SYSTEM ASSIGNED)  05/19/1989     DEXA SCAN SCREENING (SYSTEM ASSIGNED)  05/19/2009     ZOSTER IMMUNIZATION (2 of 3) 02/15/2016       Current Medications:     Current Outpatient Medications   Medication Sig Dispense Refill     aspirin 81 MG EC tablet Take 81 mg by mouth every morning        carvedilol (COREG) 25 MG tablet Take 1 tablet (25 mg) by mouth 2 times daily (with meals) 60 tablet      cephalexin (KEFLEX) 250 MG capsule TAKE ONE CAPSULE BY MOUTH ONCE DAILY AT BEDTIME  3     Cholecalciferol (VITAMIN D3) 2000 units CAPS TAKE ONE CAPSULE BY MOUTH ONCE DAILY IN THE MORNING  3     ciprofloxacin (CIPRO) 500 MG tablet Take 1 tablet (500 mg) by mouth 2 times daily 20 tablet 0     CRANBERRY EXTRACT PO Take by mouth every morning       DULoxetine (CYMBALTA) 60 MG EC capsule TAKE ONE CAPSULE BY MOUTH AT BEDTIME  1     gabapentin (NEURONTIN) 600 MG tablet Take 1 tablet (600 mg) by mouth 3 times daily 90 tablet      HUMALOG MARY KWIKPEN 100 UNIT/ML injection INJECT 1 UNIT PER 10GRAMS OF CARBOHYDRATES WITH CORRECTION 0.5 UNITS PER 50 ABOVE 150 ( UP TO 30 UNITS PER DAY)  6     insulin degludec (TRESIBA) 100 UNIT/ML pen Inject 8 Units Subcutaneous At Bedtime       levothyroxine (SYNTHROID/LEVOTHROID) 75 MCG tablet TAKE ONE TABLET BY MOUTH ONCE DAILY  IN THE MORNING     (PLEASE CALL [phone] FOR AN OFFICE VISIT BEFORE NEXT REFILL.)  0     losartan (COZAAR) 100 MG tablet Take 100 mg by mouth At Bedtime        oxyCODONE-acetaminophen (PERCOCET) 5-325 MG per tablet TAKE 1-2 TABLETS BY MOUTH EVERY 4 HOURS AS NEEDED FOR MODERATE OR SEVERE PAIN *CAUTION: OPIOID. RISK OF OVERDOSE AND ADDICTION.       pravastatin (PRAVACHOL)  "80 MG tablet Take 80 mg by mouth At Bedtime        senna-docusate (SENOKOT-S;PERICOLACE) 8.6-50 MG per tablet Take 1-2 tablets by mouth 2 times daily Take while on oral narcotics to prevent or treat constipation. 30 tablet 0     ONETOUCH VERIO IQ test strip USE TO TEST BLOOD GLUCOSE 6-8 TIMES PER DAY, BEFORE MEALS, BEDTIME TO DOSE INSULIN, HIGH OR LOW BLOOD GLUCOSE WITH RECHECK  3     oxyCODONE IR (ROXICODONE) 5 MG tablet Take 0.5-1 tablets (2.5-5 mg) by mouth every 6 hours as needed for severe pain (Patient not taking: Reported on 2019) 25 tablet 0     polyethylene glycol (MIRALAX/GLYCOLAX) Packet Take 17 g by mouth daily as needed for constipation (Patient not taking: Reported on 10/24/2018) 7 packet          Allergies:        Allergies   Allergen Reactions     Nitrofurantoin Other (See Comments)     Burning around her mouth, pt advised by neurologist to not take macrobid         Azithromycin Diarrhea     Buprenorphine Nausea and Vomiting     Severe vomiting     Clonazepam      Furosemide      Low sodium     Hydrochlorothiazide Other (See Comments)     Low Sodium     Lantus [Insulin Glargine]      Low blood sugar     Lisinopril Cough     Pregabalin Other (See Comments)     Dizziness; Worse, numbness/tingling/burning pain whole body, hospitalized     Sulfamethoxazole Hives        Social History:     Social History     Tobacco Use     Smoking status: Former Smoker     Packs/day: 0.25     Years: 15.00     Pack years: 3.75     Last attempt to quit: 2018     Years since quittin.0     Smokeless tobacco: Never Used   Substance Use Topics     Alcohol use: Yes     Comment: social       History   Drug Use No       Physical Exam:     /61   Pulse 84   Temp 97  F (36.1  C) (Oral)   Resp 16   Ht 1.575 m (5' 2\")   Wt 69.4 kg (153 lb)   SpO2 99%   BMI 27.98 kg/m     Body mass index is 27.98 kg/m .    General Appearance: healthy and alert, no distress    Neurological: normal gait, no gross " defects     Psychiatric: appropriate mood and affect       Cardiovascular: Regular rate and rhythm    Pulmonary:  Clear to asculation bilaterally    Abdomen:  Soft, nontender, nondistended    Extremities:  Nontender, no edema                              CT C/A/P 1/9/19:  IMPRESSION:  In this patient with history of neoplasm of the ovaries, status post chemotherapy:  1.  Evidence of partial treatment response by the slightly decreased  size of mixed attenuation pelvic mass, consistent with known ovarian  neoplasm. No evidence of new metastatic disease.  2.  No significant change in scattered sub-4 mm pulmonary nodules.  3.  Mild right hydronephrosis slightly increased compared to prior.  4.  Enhancing mass arising from the upper pole of the left kidney, unchanged compared to prior exam and appears suspicious for malignancy favoring renal cell carcinoma until proven otherwise.  5.  Mild intra and extrahepatic biliary ductal dilatation without definitive obstructing lesion, unchanged since prior exam.  6.  Unchanged multiple right lower rib fractures.    Assessment:    Afshan Cardona is a 73 year old woman with a diagnosis of metastatic high grade serous ovarian cancer, now status post 6 cycles neoadjuvant chemotherapy.  She has a 8 mm right liver lobe lesion which is stable, normal sized spleen with 3 splenules, stable 1.7 cm left kidney mass with subcentimeter cystic lesion in the lower pole of the left kidney, and a slight decrease in size of the irregular mixed solid and cystic mass in the pelvis.     Reviewed with the patient the results of her scan.  Of note, there is a renal mass noted which could be metastatic or a synchronous renal cell growth.  She has again otherwise had a positive treatment response, still has a significant amount of disease.  Does have peripheral neuropathy, previously switched from taxol to taxotere. Pending a biopsy of this mass, we will proceed with an interval debulking given her  response to chemotherapy.    A total of 30 minutes was spent with the patient, 25 minutes of which were spent in counseling the patient and/or treatment planning.      1.  Metastatic high-grade serous ovarian carcinoma.   2.  Renal mass.    3.  Peripheral neuropathy.     4.  Positive stress test, significant cardiac disease.      Consent signed today for XL/bilateral salpingooophorectomy, debulking, possible stoma, bowel resection. She will require another visit with her cardiologist for evaluation prior to surgery given her cardiac history and prior positive stress test.  We will review her case with interventional radiology for a renal biopsy prior to scheduling her surgery.     Bakari Galeas MD, MS    Department of Obstetrics and Gynecology   Division of Gynecologic Oncology   [facility]  Phone: [phone]    CC  Patient Care Team:  Sonja Hathaway MD as PCP - General (Family Practice)  ROMMEL LOWRY

## 2019-01-09 NOTE — PROGRESS NOTES
Follow Up Notes on Referred Patient    Date: 10/24/2018       Dr. Roxanne Pascual MD  [facility]  [address] [place] [address]       RE: Afshan Cardona  : 1945  SRIKANTH: 10/24/2018    Dear Dr. Roxanne Pascual:    Afshan Cardona is a 73 year old woman with a diagnosis of metastatic high grade serous ovarian cancer, now s/p 6 cycles neoadjuvant chemotherapy.  The patient presents today for treatment planning.    She is overall doing well.  No nausea or vomiting at current.  Fairly normal urinary and bowel function.  No vaginal bleeding or discharge.  Does have some significant neuropathy initially due to her diabetes, which worsened some during chemotherapy.       Past Medical History:  Past Medical History:   Diagnosis Date     Vivas's esophagus      Cardiomyopathy (H)     Taksumoto     Colon polyps      Fibromyalgia      Former smoker      History of DVT (deep vein thrombosis)      HLD (hyperlipidemia)      HTN (hypertension)      Hypothyroid      TIA (transient ischemic attack)          Past Surgical History:    Past Surgical History:   Procedure Laterality Date     AS ESOPHAGOSCOPY, DIAGNOSTIC       BACK SURGERY       bladder lift       HYSTERECTOMY       HYSTERECTOMY TOTAL ABD, TRINY SALPINGO-OOPHORECTOMY, NODE DISSECTION, TUMOR DEBULKING, COMBINED N/A 2018    Procedure: COMBINED HYSTERECTOMY TOTAL ABDOMINAL, SALPINGO-OOPHORECTOMY, NODE DISSECTION, TUMOR DEBULKING;;  Surgeon: Bakari Galeas MD;  Location: [facility] OR     LAPAROSCOPY DIAGNOSTIC (GYN) N/A 2018    Procedure: LAPAROSCOPY DIAGNOSTIC (GYN);  Diagnostic Laparoscopy, Biopsies;  Surgeon: Bakari Galeas MD;  Location: [facility] OR     LAPAROTOMY EXPLORATORY N/A 2018    Procedure: LAPAROTOMY EXPLORATORY;  Exploratory Laparotomy, lysis of adhesions, jejunal mesentary biopsy and sigmoid epiploic biopsy. ;  Surgeon: Bakari Galeas MD;  Location: [facility] OR         Health Maintenance Due   Topic Date Due      PHQ-2 Q1 YR  05/19/1956     LIPID SCREEN Q5 YR FEMALE (SYSTEM ASSIGNED)  05/19/1989     DEXA SCAN SCREENING (SYSTEM ASSIGNED)  05/19/2009     ZOSTER IMMUNIZATION (2 of 3) 02/15/2016       Current Medications:     Current Outpatient Medications   Medication Sig Dispense Refill     aspirin 81 MG EC tablet Take 81 mg by mouth every morning        carvedilol (COREG) 25 MG tablet Take 1 tablet (25 mg) by mouth 2 times daily (with meals) 60 tablet      cephalexin (KEFLEX) 250 MG capsule TAKE ONE CAPSULE BY MOUTH ONCE DAILY AT BEDTIME  3     Cholecalciferol (VITAMIN D3) 2000 units CAPS TAKE ONE CAPSULE BY MOUTH ONCE DAILY IN THE MORNING  3     ciprofloxacin (CIPRO) 500 MG tablet Take 1 tablet (500 mg) by mouth 2 times daily 20 tablet 0     CRANBERRY EXTRACT PO Take by mouth every morning       DULoxetine (CYMBALTA) 60 MG EC capsule TAKE ONE CAPSULE BY MOUTH AT BEDTIME  1     gabapentin (NEURONTIN) 600 MG tablet Take 1 tablet (600 mg) by mouth 3 times daily 90 tablet      HUMALOG MARY KWIKPEN 100 UNIT/ML injection INJECT 1 UNIT PER 10GRAMS OF CARBOHYDRATES WITH CORRECTION 0.5 UNITS PER 50 ABOVE 150 ( UP TO 30 UNITS PER DAY)  6     insulin degludec (TRESIBA) 100 UNIT/ML pen Inject 8 Units Subcutaneous At Bedtime       levothyroxine (SYNTHROID/LEVOTHROID) 75 MCG tablet TAKE ONE TABLET BY MOUTH ONCE DAILY  IN THE MORNING     (PLEASE CALL [phone] FOR AN OFFICE VISIT BEFORE NEXT REFILL.)  0     losartan (COZAAR) 100 MG tablet Take 100 mg by mouth At Bedtime        oxyCODONE-acetaminophen (PERCOCET) 5-325 MG per tablet TAKE 1-2 TABLETS BY MOUTH EVERY 4 HOURS AS NEEDED FOR MODERATE OR SEVERE PAIN *CAUTION: OPIOID. RISK OF OVERDOSE AND ADDICTION.       pravastatin (PRAVACHOL) 80 MG tablet Take 80 mg by mouth At Bedtime        senna-docusate (SENOKOT-S;PERICOLACE) 8.6-50 MG per tablet Take 1-2 tablets by mouth 2 times daily Take while on oral narcotics to prevent or treat constipation. 30 tablet 0     ONETOUCH VERIO IQ test  "strip USE TO TEST BLOOD GLUCOSE 6-8 TIMES PER DAY, BEFORE MEALS, BEDTIME TO DOSE INSULIN, HIGH OR LOW BLOOD GLUCOSE WITH RECHECK  3     oxyCODONE IR (ROXICODONE) 5 MG tablet Take 0.5-1 tablets (2.5-5 mg) by mouth every 6 hours as needed for severe pain (Patient not taking: Reported on 2019) 25 tablet 0     polyethylene glycol (MIRALAX/GLYCOLAX) Packet Take 17 g by mouth daily as needed for constipation (Patient not taking: Reported on 10/24/2018) 7 packet          Allergies:        Allergies   Allergen Reactions     Nitrofurantoin Other (See Comments)     Burning around her mouth, pt advised by neurologist to not take macrobid         Azithromycin Diarrhea     Buprenorphine Nausea and Vomiting     Severe vomiting     Clonazepam      Furosemide      Low sodium     Hydrochlorothiazide Other (See Comments)     Low Sodium     Lantus [Insulin Glargine]      Low blood sugar     Lisinopril Cough     Pregabalin Other (See Comments)     Dizziness; Worse, numbness/tingling/burning pain whole body, hospitalized     Sulfamethoxazole Hives        Social History:     Social History     Tobacco Use     Smoking status: Former Smoker     Packs/day: 0.25     Years: 15.00     Pack years: 3.75     Last attempt to quit: 2018     Years since quittin.0     Smokeless tobacco: Never Used   Substance Use Topics     Alcohol use: Yes     Comment: social       History   Drug Use No       Physical Exam:     /61   Pulse 84   Temp 97  F (36.1  C) (Oral)   Resp 16   Ht 1.575 m (5' 2\")   Wt 69.4 kg (153 lb)   SpO2 99%   BMI 27.98 kg/m    Body mass index is 27.98 kg/m .    General Appearance: healthy and alert, no distress    Neurological: normal gait, no gross defects     Psychiatric: appropriate mood and affect       Cardiovascular: Regular rate and rhythm    Pulmonary:  Clear to asculation bilaterally    Abdomen:  Soft, nontender, nondistended    Extremities:  Nontender, no edema                              CT C/A/P " 1/9/19:  IMPRESSION:  In this patient with history of neoplasm of the ovaries, status post chemotherapy:  1.  Evidence of partial treatment response by the slightly decreased  size of mixed attenuation pelvic mass, consistent with known ovarian  neoplasm. No evidence of new metastatic disease.  2.  No significant change in scattered sub-4 mm pulmonary nodules.  3.  Mild right hydronephrosis slightly increased compared to prior.  4.  Enhancing mass arising from the upper pole of the left kidney, unchanged compared to prior exam and appears suspicious for malignancy favoring renal cell carcinoma until proven otherwise.  5.  Mild intra and extrahepatic biliary ductal dilatation without definitive obstructing lesion, unchanged since prior exam.  6.  Unchanged multiple right lower rib fractures.    Assessment:    Afshan aCrdona is a 73 year old woman with a diagnosis of metastatic high grade serous ovarian cancer, now status post 6 cycles neoadjuvant chemotherapy.  She has a 8 mm right liver lobe lesion which is stable, normal sized spleen with 3 splenules, stable 1.7 cm left kidney mass with subcentimeter cystic lesion in the lower pole of the left kidney, and a slight decrease in size of the irregular mixed solid and cystic mass in the pelvis.     Reviewed with the patient the results of her scan.  Of note, there is a renal mass noted which could be metastatic or a synchronous renal cell growth.  She has again otherwise had a positive treatment response, still has a significant amount of disease.  Does have peripheral neuropathy, previously switched from taxol to taxotere. Pending a biopsy of this mass, we will proceed with an interval debulking given her response to chemotherapy.    A total of 30 minutes was spent with the patient, 25 minutes of which were spent in counseling the patient and/or treatment planning.      1.  Metastatic high-grade serous ovarian carcinoma.   2.  Renal mass.    3.  Peripheral  neuropathy.     4.  Positive stress test, significant cardiac disease.      Consent signed today for XL/bilateral salpingooophorectomy, debulking, possible stoma, bowel resection. She will require another visit with her cardiologist for evaluation prior to surgery given her cardiac history and prior positive stress test.  We will review her case with interventional radiology for a renal biopsy prior to scheduling her surgery.       Bakari Galeas MD, MS    Department of Obstetrics and Gynecology   Division of Gynecologic Oncology   [facility]  Phone: [phone]        CC  Patient Care Team:  Sonja Hathaway MD as PCP - General (Family Practice)  ROMMEL LOWRY

## 2019-01-09 NOTE — NURSING NOTE
"Oncology Rooming Note    January 9, 2019 2:05 PM   Afshan Cardona is a 73 year old female who presents for:    Chief Complaint   Patient presents with     Oncology Clinic Visit     Return Ovarian Ca     Initial Vitals: /74   Pulse 84   Temp 97  F (36.1  C) (Oral)   Resp 16   Ht 1.575 m (5' 2\")   Wt 69.4 kg (153 lb)   SpO2 99%   BMI 27.98 kg/m   Estimated body mass index is 27.98 kg/m  as calculated from the following:    Height as of this encounter: 1.575 m (5' 2\").    Weight as of this encounter: 69.4 kg (153 lb). Body surface area is 1.74 meters squared.  Worst Pain (10) Comment: right knee   No LMP recorded. Patient has had a hysterectomy.  Allergies reviewed: Yes  Medications reviewed: Yes    Medications: Medication refills not needed today.  Pharmacy name entered into EPIC: [facility] - [place], [place] - [address]    Clinical concerns: reactions to chemo; right knee pain     6 minutes for nursing intake (face to face time)     Hillary Lyman CMA              "

## 2019-01-10 ENCOUNTER — DOCUMENTATION ONLY (OUTPATIENT)
Dept: RADIOLOGY | Facility: CLINIC | Age: 74
End: 2019-01-10

## 2019-01-10 NOTE — PROGRESS NOTES
Interventional  Radiology consulted for a renal biopsy.  There is a suspicious area in the left renal.   Dr. Tsang will see this patient in Interventional clinic.     Discussed with Dr. Sharan Rodriguez IR RPA  [phone] Call pager  [phone] pager

## 2019-01-14 ENCOUNTER — HOSPITAL ENCOUNTER (OUTPATIENT)
Facility: CLINIC | Age: 74
End: 2019-01-14
Attending: OBSTETRICS & GYNECOLOGY | Admitting: OBSTETRICS & GYNECOLOGY
Payer: MEDICARE

## 2019-01-14 ENCOUNTER — PRE VISIT (OUTPATIENT)
Dept: VASCULAR SURGERY | Facility: CLINIC | Age: 74
End: 2019-01-14

## 2019-01-14 ENCOUNTER — OFFICE VISIT (OUTPATIENT)
Dept: VASCULAR SURGERY | Facility: CLINIC | Age: 74
End: 2019-01-14
Payer: MEDICARE

## 2019-01-14 VITALS — SYSTOLIC BLOOD PRESSURE: 144 MMHG | DIASTOLIC BLOOD PRESSURE: 77 MMHG | HEART RATE: 83 BPM | OXYGEN SATURATION: 95 %

## 2019-01-14 DIAGNOSIS — C56.9 MALIGNANT NEOPLASM OF OVARY, UNSPECIFIED LATERALITY (H): ICD-10-CM

## 2019-01-14 DIAGNOSIS — N28.89 RENAL MASS: Primary | ICD-10-CM

## 2019-01-14 ASSESSMENT — PAIN SCALES - GENERAL: PAINLEVEL: EXTREME PAIN (8)

## 2019-01-14 NOTE — NURSING NOTE
Vascular Rooming Note    Afshan Cardona's goals for this visit include:   Chief Complaint   Patient presents with     Consult     Afshan is being seen today for a consult renal mass, explanation of biopsy and other information.     Elly العراقي LPN

## 2019-01-14 NOTE — PATIENT INSTRUCTIONS
On the day of the appointment, you will check  in 1.5 hours early to your scheduled procedure.     Please check into the Gold Waiting room, located on the main level, at [facility], located at [address] [place] [address].       Reminders:    -No solids or milk products 6 hours prior to appointment time.    -No clear liquids 2 hours prior to appointment time.     -Please take your morning medications as indicated with enough water to swallow, with the exception of those medications listed below:    PLEASE HOLD YOUR ASPIRIN 5 DAYS PRIOR TO THE PROCEDURE    PLEASE ONLY TAKE 80% OF YOUR TRESIBA    -Please have a  as you will be going home afterwards.      2. For all biopsy results, please contact your referring provider. DR. CONDON    Should you have any further questions, please call us anytime. It has been a pleasure to see you today.        Sonja Bell RN, BSN  Interventional Radiology Nurse Coordinator   Phone: [phone]

## 2019-01-14 NOTE — NURSING NOTE
Pre Op Nurse Teaching Template    Relevant Diagnosis: Ovarian cancer     Teaching Topic: Exploratory laparotomy, BSo, debulking, possible stoma, omentectomy, possible bowel ressection    Person(s) involved in teaching :  Patient  & other: Daughters   Motivation Level:  Asks Questions:    Yes      Eager to Learn:     Yes     Cooperative:          Yes    Receptive (willing. Able to accept information):    Yes      Patient and those who are listed above demonstrates understanding of the following:   Reason for the appointment, diagnosis and treatment plan:   Yes   Knowledge of proper use of medications and conditions for which they are ordered (with special attention to potential side effects or drug interactions): Yes   Which situations necessitate calling provider and whom to contact: Yes         Nutritional needs and diet plan:  Yes      Pain management techniques:     Yes, Pain Scale   Diet:   Yes, [facility] Diet Instructions    Teaching Concerns addressed: Yes    Infection Prevention:  Patient and those who are listed above demonstrate understanding of the following:  Pre-Op CHG Bathing Instructions: Yes  Surgical procedure site care taught:   Yes   Signs and symptoms of infection taught: Yes       Instructional Materials Used/Given:  The [place] Before You Surgery Booklet  Hysterectomy Guidelines  Pain Assessment Tool   Home Care after Major Abdominal or Vaginal Surgery  Map  Accommodations Brochure  Phone numbers for [facility] and [facility]  Copy of Surgical Consent    Comments:  YAZMIN Coates RN

## 2019-01-14 NOTE — TELEPHONE ENCOUNTER
FUTURE VISIT INFORMATION      FUTURE VISIT INFORMATION:    Date: 1/14    Time: 140    Location: Vascular  REFERRAL INFORMATION:    Referring provider:  Dr. Dahl    Referring providers clinic:       Reason for visit/diagnosis  Renal Mass    RECORDS REQUESTED FROM:       Clinic name Comments Records Status Imaging Status                                         All Records Internal

## 2019-01-14 NOTE — LETTER
1/14/2019       RE: Afshan Cardona  [address]  [facility] [address]     Dear Colleague,    Thank you for referring your patient, Afshan Cardona, to the [facility] at [facility]. Please see a copy of my visit note below.          Interventional Radiology Clinic Visit    Date of this visit: 1/18/2019    Afshan Cardona  is referred by  for treatment recommendations. The patient requires evaluation for diagnosis of metastatic high grade serous ovarian cancer.      Primary Physician: Sonja Hathaway      History Of Present Illness:    Afshan Cardona is a 73 year old female who presents with a history of  metastatic high grade serous ovarian cancer.  She has completed 6 cycles of neoadjuvant chemotherapy, and for the most part had a good response.  She is tentatively scheduled to undergo surgical resection in the upcoming months.  The one site of abnormality that has not responded to chemotherapy is an enhancing mass in her left kidney which measures 1.7 cm and has been stable since July of 2018.  Given its appearance and the fact that it has not responded to chemotherapy the question has arisen whether this represents a primary renal malignancy or a met.  She presents to discuss biopsy and treatment options.      Review of Systems:    General: Negative for recent fever.  Skin: Negative for jaundice.  Eyes: Negative for jaundice.  Respiratory: Negative for shortness of breath.  Cardiovascular: Negative for chest pain.  Gastrointestinal: Negative for abdominal pain or swelling, nausea, vomiting, or diarrhea.  Musculoskeletal: Negative for ankle swelling.    Past Medical/Surgical History:    Past Medical History:   Diagnosis Date     Vivas's esophagus      Cardiomyopathy (H)     Taksumoto     Colon polyps      Fibromyalgia      Former smoker      History of DVT (deep vein thrombosis)      HLD (hyperlipidemia)      HTN (hypertension)       Hypothyroid      TIA (transient ischemic attack)      Past Surgical History:   Procedure Laterality Date     AS ESOPHAGOSCOPY, DIAGNOSTIC       BACK SURGERY       bladder lift       HYSTERECTOMY       HYSTERECTOMY TOTAL ABD, TRINY SALPINGO-OOPHORECTOMY, NODE DISSECTION, TUMOR DEBULKING, COMBINED N/A 7/24/2018    Procedure: COMBINED HYSTERECTOMY TOTAL ABDOMINAL, SALPINGO-OOPHORECTOMY, NODE DISSECTION, TUMOR DEBULKING;;  Surgeon: Bakari Galeas MD;  Location: [facility] OR     LAPAROSCOPY DIAGNOSTIC (GYN) N/A 7/6/2018    Procedure: LAPAROSCOPY DIAGNOSTIC (GYN);  Diagnostic Laparoscopy, Biopsies;  Surgeon: Bakari Galeas MD;  Location: [facility] OR     LAPAROTOMY EXPLORATORY N/A 7/24/2018    Procedure: LAPAROTOMY EXPLORATORY;  Exploratory Laparotomy, lysis of adhesions, jejunal mesentary biopsy and sigmoid epiploic biopsy. ;  Surgeon: Bakari Galeas MD;  Location: [facility] OR       Current Medications:    Current Outpatient Medications   Medication Sig Dispense Refill     aspirin 81 MG EC tablet Take 81 mg by mouth 2 times daily        carvedilol (COREG) 25 MG tablet Take 1 tablet (25 mg) by mouth 2 times daily (with meals) 60 tablet      cephalexin (KEFLEX) 250 MG capsule TAKE ONE CAPSULE BY MOUTH ONCE DAILY AT BEDTIME  3     Cholecalciferol (VITAMIN D3) 2000 units CAPS TAKE ONE CAPSULE BY MOUTH ONCE DAILY IN THE MORNING  3     ciprofloxacin (CIPRO) 500 MG tablet Take 1 tablet (500 mg) by mouth 2 times daily 20 tablet 0     CRANBERRY EXTRACT PO Take by mouth every morning       DULoxetine (CYMBALTA) 60 MG EC capsule TAKE ONE CAPSULE BY MOUTH AT BEDTIME  1     gabapentin (NEURONTIN) 600 MG tablet Take 1 tablet (600 mg) by mouth 3 times daily 90 tablet      HUMALOG MARY KWIKPEN 100 UNIT/ML injection INJECT 1 UNIT PER 10GRAMS OF CARBOHYDRATES WITH CORRECTION 0.5 UNITS PER 50 ABOVE 150 ( UP TO 30 UNITS PER DAY)  6     insulin degludec (TRESIBA) 100 UNIT/ML pen Inject 8 Units Subcutaneous At Bedtime       levothyroxine  (SYNTHROID/LEVOTHROID) 75 MCG tablet TAKE ONE TABLET BY MOUTH ONCE DAILY  IN THE MORNING     (PLEASE CALL [phone] FOR AN OFFICE VISIT BEFORE NEXT REFILL.)  0     losartan (COZAAR) 100 MG tablet Take 100 mg by mouth At Bedtime        ONETOUCH VERIO IQ test strip USE TO TEST BLOOD GLUCOSE 6-8 TIMES PER DAY, BEFORE MEALS, BEDTIME TO DOSE INSULIN, HIGH OR LOW BLOOD GLUCOSE WITH RECHECK  3     oxyCODONE IR (ROXICODONE) 5 MG tablet Take 0.5-1 tablets (2.5-5 mg) by mouth every 6 hours as needed for severe pain 25 tablet 0     oxyCODONE-acetaminophen (PERCOCET) 5-325 MG per tablet TAKE 1-2 TABLETS BY MOUTH EVERY 4 HOURS AS NEEDED FOR MODERATE OR SEVERE PAIN *CAUTION: OPIOID. RISK OF OVERDOSE AND ADDICTION.       polyethylene glycol (MIRALAX/GLYCOLAX) Packet Take 17 g by mouth daily as needed for constipation 7 packet      pravastatin (PRAVACHOL) 80 MG tablet Take 80 mg by mouth At Bedtime        senna-docusate (SENOKOT-S;PERICOLACE) 8.6-50 MG per tablet Take 1-2 tablets by mouth 2 times daily Take while on oral narcotics to prevent or treat constipation. 30 tablet 0       Allergies:    Nitrofurantoin; Azithromycin; Buprenorphine; Clonazepam; Furosemide; Hydrochlorothiazide; Lantus [insulin glargine]; Lisinopril; Pregabalin; and Sulfamethoxazole    Family History:    No family history of renal cancer.     Social History:    Social History     Socioeconomic History     Marital status:      Spouse name: None     Number of children: None     Years of education: None     Highest education level: None   Social Needs     Financial resource strain: None     Food insecurity - worry: None     Food insecurity - inability: None     Transportation needs - medical: None     Transportation needs - non-medical: None   Occupational History     None   Tobacco Use     Smoking status: Former Smoker     Packs/day: 0.25     Years: 15.00     Pack years: 3.75     Last attempt to quit: 2018     Years since quittin.0      Smokeless tobacco: Never Used   Substance and Sexual Activity     Alcohol use: Yes     Comment: social     Drug use: No     Sexual activity: None   Other Topics Concern     None   Social History Narrative     None       Physical Exam:    /77 (BP Location: Left arm, Patient Position: Chair, Cuff Size: Adult Large)   Pulse 83   SpO2 95%      GENERAL APPEARANCE: healthy, alert and no distress  PSYCHIATRIC: mentation appears normal and affect normal.  EYES: No jaundice.  SKIN: No jaundice.   RESP: lungs clear to auscultation - no rales, rhonchi or wheezes  CARDIOVASCULAR: regular rates and rhythm, normal S1 S2, no S3 or S4 and no murmur  ABDOMEN:  soft, nontender, no masses and bowel sounds normal.  MUSCULOSKELETAL: No edema in the lower extremities.    Laboratory Studies:    Lab Results   Component Value Date    HGB 10.8 07/28/2018     Lab Results   Component Value Date     07/28/2018     Lab Results   Component Value Date    WBC 12.4 07/28/2018       Lab Results   Component Value Date    INR 1.14 07/24/2018       Lab Results   Component Value Date    PROTTOTAL 6.6 07/03/2018      Lab Results   Component Value Date    ALBUMIN 2.7 07/25/2018     Lab Results   Component Value Date    BILITOTAL 0.4 07/03/2018     No results found for: BILICONJ   Lab Results   Component Value Date    ALKPHOS 113 07/03/2018     Lab Results   Component Value Date    AST 16 07/03/2018     Lab Results   Component Value Date    ALT 27 07/03/2018       Lab Results   Component Value Date    CR 0.57 07/31/2018     Lab Results   Component Value Date    BUN 8 07/31/2018       No results found for: FETO    Imaging:     I reviewed the CT from 1/9/2019 and additional CTs from 10/24/2018 and 7/2/2018.  All demonstrate a stable 1.7 cm left renal mass which appears to originate from the kidney.       ASSESSMENT:    ECOG performance status: 1    Afshan Cardona is a 73 year old female with a left renal mass in the setting of  metastatic  high grade serous ovarian cancer. I think given the appearance of the mass and the fact that it has not responded to chemotherapy it is most likely a primary renal malignancy, however, given the clinical setting agree it requires biopsy to delineate.  I discussed with Ms. Cardona at length that if this was found to be a RCC her ovarian malignancy would be the most important disease to treat.  We could then discuss the options for the RCC following treatment of her ovarian malignancy.  Her treatment options would include watchful waiting, cryoablation, or partial nephrectomy.  We briefly discussed the pluses and minuses of each treatment stratedgy but tabled the detailed discussion until after biopsy results and likely definitive treatment for her ovarian cancer.      PLAN:    We will schedule Ms. Cardona for the biopsy of her left renal mass.  If as expected this demonstrates a primary renal malignancy we will schedule her to follow up with me in 3-6 months with repeat imaging.     A total of 30 minutes was spent in care for the patient, of which >50% was spent in counseling and co-ordination of care.    It was a pleasure seeing the patient.     Signed,    Bernardo Tsang M.D.  Department of Interventional Radiology  [facility]      CC  Patient Care Team:  Juan Hathaway MD as PCP - General (Family Practice)  JUAN HATHAWAY

## 2019-01-15 ENCOUNTER — TELEPHONE (OUTPATIENT)
Dept: ONCOLOGY | Facility: CLINIC | Age: 74
End: 2019-01-15

## 2019-01-15 RX ORDER — CEFAZOLIN SODIUM 1 G/50ML
1 INJECTION, SOLUTION INTRAVENOUS SEE ADMIN INSTRUCTIONS
Status: CANCELLED | OUTPATIENT
Start: 2019-01-15

## 2019-01-15 RX ORDER — CEFAZOLIN SODIUM 2 G/50ML
2 SOLUTION INTRAVENOUS
Status: CANCELLED | OUTPATIENT
Start: 2019-01-15

## 2019-01-15 NOTE — TELEPHONE ENCOUNTER
Called granddaughter Annie,     To inform her that we did schedule pt for next week Weds 1/23/2019    They are to check into the [facility] waiting room at 930am for an 11am.     Reiterated the instructions to her again.    She is to stop taking her ASA starting Thurs 1/17, however she state that she did stop this medication already.     Informed her that I did try to call Annie, to inform her of appt details however her VM box was full.       She will inform her granddaughter of this information.

## 2019-01-16 NOTE — TELEPHONE ENCOUNTER
Returning granddaughter's page. [facility].     Reiterated the biopsy appt to her.   This is scheduled for Weds 1/23 @ 11am with a 930am check in.   All prep reviewed with her.    She verbalized and will f/u with family    Sonja Bell RN, BSN  Interventional Radiology Nurse Coordinator   Phone: [phone]

## 2019-01-18 NOTE — PROGRESS NOTES
Interventional Radiology Clinic Visit    Date of this visit: 1/18/2019    Afshan Cardona  is referred by  for treatment recommendations. The patient requires evaluation for diagnosis of metastatic high grade serous ovarian cancer.      Primary Physician: Sonja Hathaway        History Of Present Illness:    Afshan Cardona is a 73 year old female who presents with a history of  metastatic high grade serous ovarian cancer.  She has completed 6 cycles of neoadjuvant chemotherapy, and for the most part had a good response.  She is tentatively scheduled to undergo surgical resection in the upcoming months.  The one site of abnormality that has not responded to chemotherapy is an enhancing mass in her left kidney which measures 1.7 cm and has been stable since July of 2018.  Given its appearance and the fact that it has not responded to chemotherapy the question has arisen whether this represents a primary renal malignancy or a met.  She presents to discuss biopsy and treatment options.      Review of Systems:    General: Negative for recent fever.  Skin: Negative for jaundice.  Eyes: Negative for jaundice.  Respiratory: Negative for shortness of breath.  Cardiovascular: Negative for chest pain.  Gastrointestinal: Negative for abdominal pain or swelling, nausea, vomiting, or diarrhea.  Musculoskeletal: Negative for ankle swelling.    Past Medical/Surgical History:    Past Medical History:   Diagnosis Date     Vivas's esophagus      Cardiomyopathy (H)     Taksumoto     Colon polyps      Fibromyalgia      Former smoker      History of DVT (deep vein thrombosis)      HLD (hyperlipidemia)      HTN (hypertension)      Hypothyroid      TIA (transient ischemic attack)      Past Surgical History:   Procedure Laterality Date     AS ESOPHAGOSCOPY, DIAGNOSTIC       BACK SURGERY       bladder lift       HYSTERECTOMY       HYSTERECTOMY TOTAL ABD, TRINY SALPINGO-OOPHORECTOMY, NODE DISSECTION, TUMOR DEBULKING,  COMBINED N/A 7/24/2018    Procedure: COMBINED HYSTERECTOMY TOTAL ABDOMINAL, SALPINGO-OOPHORECTOMY, NODE DISSECTION, TUMOR DEBULKING;;  Surgeon: Bakari Galeas MD;  Location: [facility] OR     LAPAROSCOPY DIAGNOSTIC (GYN) N/A 7/6/2018    Procedure: LAPAROSCOPY DIAGNOSTIC (GYN);  Diagnostic Laparoscopy, Biopsies;  Surgeon: Bakari Galeas MD;  Location: [facility] OR     LAPAROTOMY EXPLORATORY N/A 7/24/2018    Procedure: LAPAROTOMY EXPLORATORY;  Exploratory Laparotomy, lysis of adhesions, jejunal mesentary biopsy and sigmoid epiploic biopsy. ;  Surgeon: Bakari Galeas MD;  Location: [facility] OR       Current Medications:    Current Outpatient Medications   Medication Sig Dispense Refill     aspirin 81 MG EC tablet Take 81 mg by mouth 2 times daily        carvedilol (COREG) 25 MG tablet Take 1 tablet (25 mg) by mouth 2 times daily (with meals) 60 tablet      cephalexin (KEFLEX) 250 MG capsule TAKE ONE CAPSULE BY MOUTH ONCE DAILY AT BEDTIME  3     Cholecalciferol (VITAMIN D3) 2000 units CAPS TAKE ONE CAPSULE BY MOUTH ONCE DAILY IN THE MORNING  3     ciprofloxacin (CIPRO) 500 MG tablet Take 1 tablet (500 mg) by mouth 2 times daily 20 tablet 0     CRANBERRY EXTRACT PO Take by mouth every morning       DULoxetine (CYMBALTA) 60 MG EC capsule TAKE ONE CAPSULE BY MOUTH AT BEDTIME  1     gabapentin (NEURONTIN) 600 MG tablet Take 1 tablet (600 mg) by mouth 3 times daily 90 tablet      HUMALOG MARY KWIKPEN 100 UNIT/ML injection INJECT 1 UNIT PER 10GRAMS OF CARBOHYDRATES WITH CORRECTION 0.5 UNITS PER 50 ABOVE 150 ( UP TO 30 UNITS PER DAY)  6     insulin degludec (TRESIBA) 100 UNIT/ML pen Inject 8 Units Subcutaneous At Bedtime       levothyroxine (SYNTHROID/LEVOTHROID) 75 MCG tablet TAKE ONE TABLET BY MOUTH ONCE DAILY  IN THE MORNING     (PLEASE CALL [phone] FOR AN OFFICE VISIT BEFORE NEXT REFILL.)  0     losartan (COZAAR) 100 MG tablet Take 100 mg by mouth At Bedtime        ONETOUCH VERIO IQ test strip USE TO TEST BLOOD GLUCOSE  6-8 TIMES PER DAY, BEFORE MEALS, BEDTIME TO DOSE INSULIN, HIGH OR LOW BLOOD GLUCOSE WITH RECHECK  3     oxyCODONE IR (ROXICODONE) 5 MG tablet Take 0.5-1 tablets (2.5-5 mg) by mouth every 6 hours as needed for severe pain 25 tablet 0     oxyCODONE-acetaminophen (PERCOCET) 5-325 MG per tablet TAKE 1-2 TABLETS BY MOUTH EVERY 4 HOURS AS NEEDED FOR MODERATE OR SEVERE PAIN *CAUTION: OPIOID. RISK OF OVERDOSE AND ADDICTION.       polyethylene glycol (MIRALAX/GLYCOLAX) Packet Take 17 g by mouth daily as needed for constipation 7 packet      pravastatin (PRAVACHOL) 80 MG tablet Take 80 mg by mouth At Bedtime        senna-docusate (SENOKOT-S;PERICOLACE) 8.6-50 MG per tablet Take 1-2 tablets by mouth 2 times daily Take while on oral narcotics to prevent or treat constipation. 30 tablet 0       Allergies:    Nitrofurantoin; Azithromycin; Buprenorphine; Clonazepam; Furosemide; Hydrochlorothiazide; Lantus [insulin glargine]; Lisinopril; Pregabalin; and Sulfamethoxazole    Family History:    No family history of renal cancer.     Social History:    Social History     Socioeconomic History     Marital status:      Spouse name: None     Number of children: None     Years of education: None     Highest education level: None   Social Needs     Financial resource strain: None     Food insecurity - worry: None     Food insecurity - inability: None     Transportation needs - medical: None     Transportation needs - non-medical: None   Occupational History     None   Tobacco Use     Smoking status: Former Smoker     Packs/day: 0.25     Years: 15.00     Pack years: 3.75     Last attempt to quit: 2018     Years since quittin.0     Smokeless tobacco: Never Used   Substance and Sexual Activity     Alcohol use: Yes     Comment: social     Drug use: No     Sexual activity: None   Other Topics Concern     None   Social History Narrative     None       Physical Exam:    /77 (BP Location: Left arm, Patient Position: Chair, Cuff  Size: Adult Large)   Pulse 83   SpO2 95%      GENERAL APPEARANCE: healthy, alert and no distress  PSYCHIATRIC: mentation appears normal and affect normal.  EYES: No jaundice.  SKIN: No jaundice.   RESP: lungs clear to auscultation - no rales, rhonchi or wheezes  CARDIOVASCULAR: regular rates and rhythm, normal S1 S2, no S3 or S4 and no murmur  ABDOMEN:  soft, nontender, no masses and bowel sounds normal.  MUSCULOSKELETAL: No edema in the lower extremities.    Laboratory Studies:    Lab Results   Component Value Date    HGB 10.8 07/28/2018     Lab Results   Component Value Date     07/28/2018     Lab Results   Component Value Date    WBC 12.4 07/28/2018       Lab Results   Component Value Date    INR 1.14 07/24/2018       Lab Results   Component Value Date    PROTTOTAL 6.6 07/03/2018      Lab Results   Component Value Date    ALBUMIN 2.7 07/25/2018     Lab Results   Component Value Date    BILITOTAL 0.4 07/03/2018     No results found for: BILICONJ   Lab Results   Component Value Date    ALKPHOS 113 07/03/2018     Lab Results   Component Value Date    AST 16 07/03/2018     Lab Results   Component Value Date    ALT 27 07/03/2018       Lab Results   Component Value Date    CR 0.57 07/31/2018     Lab Results   Component Value Date    BUN 8 07/31/2018       No results found for: FETO    Imaging:     I reviewed the CT from 1/9/2019 and additional CTs from 10/24/2018 and 7/2/2018.  All demonstrate a stable 1.7 cm left renal mass which appears to originate from the kidney.       ASSESSMENT:    ECOG performance status: 1    Afshan Cardona is a 73 year old female with a left renal mass in the setting of  metastatic high grade serous ovarian cancer. I think given the appearance of the mass and the fact that it has not responded to chemotherapy it is most likely a primary renal malignancy, however, given the clinical setting agree it requires biopsy to delineate.  I discussed with Ms. Cardona at length that if this  was found to be a RCC her ovarian malignancy would be the most important disease to treat.  We could then discuss the options for the RCC following treatment of her ovarian malignancy.  Her treatment options would include watchful waiting, cryoablation, or partial nephrectomy.  We briefly discussed the pluses and minuses of each treatment stratedgy but tabled the detailed discussion until after biopsy results and likely definitive treatment for her ovarian cancer.      PLAN:    We will schedule Ms. Cardona for the biopsy of her left renal mass.  If as expected this demonstrates a primary renal malignancy we will schedule her to follow up with me in 3-6 months with repeat imaging.     A total of 30 minutes was spent in care for the patient, of which >50% was spent in counseling and co-ordination of care.    It was a pleasure seeing the patient.     Signed,    Bernardo Tsang M.D.  Department of Interventional Radiology  [facility]      CC  Patient Care Team:  Juan Hathaway MD as PCP - General (Family Practice)  JUAN HATHAWAY

## 2019-01-21 ENCOUNTER — TELEPHONE (OUTPATIENT)
Dept: INTERVENTIONAL RADIOLOGY/VASCULAR | Facility: CLINIC | Age: 74
End: 2019-01-21

## 2019-01-23 ENCOUNTER — APPOINTMENT (OUTPATIENT)
Dept: INTERVENTIONAL RADIOLOGY/VASCULAR | Facility: CLINIC | Age: 74
End: 2019-01-23
Attending: RADIOLOGY
Payer: MEDICARE

## 2019-01-23 ENCOUNTER — HOSPITAL ENCOUNTER (OUTPATIENT)
Facility: CLINIC | Age: 74
Discharge: HOME | End: 2019-01-23
Attending: RADIOLOGY | Admitting: RADIOLOGY
Payer: MEDICARE

## 2019-01-23 ENCOUNTER — APPOINTMENT (OUTPATIENT)
Dept: MEDSURG UNIT | Facility: CLINIC | Age: 74
End: 2019-01-23
Attending: RADIOLOGY
Payer: MEDICARE

## 2019-01-23 VITALS
SYSTOLIC BLOOD PRESSURE: 153 MMHG | TEMPERATURE: 98.1 F | OXYGEN SATURATION: 95 % | BODY MASS INDEX: 27.23 KG/M2 | RESPIRATION RATE: 20 BRPM | HEIGHT: 62 IN | WEIGHT: 148 LBS | HEART RATE: 85 BPM | DIASTOLIC BLOOD PRESSURE: 67 MMHG

## 2019-01-23 DIAGNOSIS — N28.89 RENAL MASS: ICD-10-CM

## 2019-01-23 DIAGNOSIS — C56.9 MALIGNANT NEOPLASM OF OVARY, UNSPECIFIED LATERALITY (H): ICD-10-CM

## 2019-01-23 LAB
ANION GAP SERPL CALCULATED.3IONS-SCNC: 5 MMOL/L (ref 3–14)
BUN SERPL-MCNC: 12 MG/DL (ref 7–30)
CALCIUM SERPL-MCNC: 9 MG/DL (ref 8.5–10.1)
CHLORIDE SERPL-SCNC: 101 MMOL/L (ref 94–109)
CO2 SERPL-SCNC: 29 MMOL/L (ref 20–32)
CREAT SERPL-MCNC: 0.65 MG/DL (ref 0.52–1.04)
ERYTHROCYTE [DISTWIDTH] IN BLOOD BY AUTOMATED COUNT: 15.6 % (ref 10–15)
GFR SERPL CREATININE-BSD FRML MDRD: 87 ML/MIN/{1.73_M2}
GLUCOSE BLDC GLUCOMTR-MCNC: 172 MG/DL (ref 70–99)
GLUCOSE SERPL-MCNC: 188 MG/DL (ref 70–99)
HCT VFR BLD AUTO: 29.8 % (ref 35–47)
HGB BLD-MCNC: 9.3 G/DL (ref 11.7–15.7)
INR PPP: 0.98 (ref 0.86–1.14)
MCH RBC QN AUTO: 31.5 PG (ref 26.5–33)
MCHC RBC AUTO-ENTMCNC: 31.2 G/DL (ref 31.5–36.5)
MCV RBC AUTO: 101 FL (ref 78–100)
PLATELET # BLD AUTO: 300 10E9/L (ref 150–450)
POTASSIUM SERPL-SCNC: 4.6 MMOL/L (ref 3.4–5.3)
RBC # BLD AUTO: 2.95 10E12/L (ref 3.8–5.2)
SODIUM SERPL-SCNC: 135 MMOL/L (ref 133–144)
WBC # BLD AUTO: 5.4 10E9/L (ref 4–11)

## 2019-01-23 PROCEDURE — 25000128 H RX IP 250 OP 636: Performed by: NURSE PRACTITIONER

## 2019-01-23 PROCEDURE — 25000128 H RX IP 250 OP 636: Performed by: RADIOLOGY

## 2019-01-23 PROCEDURE — 88305 TISSUE EXAM BY PATHOLOGIST: CPT | Performed by: OBSTETRICS & GYNECOLOGY

## 2019-01-23 PROCEDURE — 82962 GLUCOSE BLOOD TEST: CPT

## 2019-01-23 PROCEDURE — 85027 COMPLETE CBC AUTOMATED: CPT | Performed by: NURSE PRACTITIONER

## 2019-01-23 PROCEDURE — 88333 PATH CONSLTJ SURG CYTO XM 1: CPT | Performed by: OBSTETRICS & GYNECOLOGY

## 2019-01-23 PROCEDURE — 85610 PROTHROMBIN TIME: CPT | Performed by: NURSE PRACTITIONER

## 2019-01-23 PROCEDURE — 25000125 ZZHC RX 250: Performed by: RADIOLOGY

## 2019-01-23 PROCEDURE — 27210903 ZZH KIT CR5

## 2019-01-23 PROCEDURE — 88342 IMHCHEM/IMCYTCHM 1ST ANTB: CPT | Performed by: OBSTETRICS & GYNECOLOGY

## 2019-01-23 PROCEDURE — 27210732 ZZH ACCESSORY CR1

## 2019-01-23 PROCEDURE — 88341 IMHCHEM/IMCYTCHM EA ADD ANTB: CPT | Performed by: OBSTETRICS & GYNECOLOGY

## 2019-01-23 PROCEDURE — 40000168 ZZH STATISTIC PP CARE STAGE 3

## 2019-01-23 PROCEDURE — 27210909 ZZH NEEDLE CR5

## 2019-01-23 PROCEDURE — C1769 GUIDE WIRE: HCPCS

## 2019-01-23 PROCEDURE — 80048 BASIC METABOLIC PNL TOTAL CA: CPT | Performed by: NURSE PRACTITIONER

## 2019-01-23 PROCEDURE — 77012 CT SCAN FOR NEEDLE BIOPSY: CPT

## 2019-01-23 RX ORDER — SODIUM CHLORIDE 9 MG/ML
INJECTION, SOLUTION INTRAVENOUS CONTINUOUS
Status: DISCONTINUED | OUTPATIENT
Start: 2019-01-23 | End: 2019-01-23 | Stop reason: HOSPADM

## 2019-01-23 RX ORDER — NALOXONE HYDROCHLORIDE 0.4 MG/ML
.1-.4 INJECTION, SOLUTION INTRAMUSCULAR; INTRAVENOUS; SUBCUTANEOUS
Status: DISCONTINUED | OUTPATIENT
Start: 2019-01-23 | End: 2019-01-23 | Stop reason: HOSPADM

## 2019-01-23 RX ORDER — NICOTINE POLACRILEX 4 MG
15-30 LOZENGE BUCCAL
Status: DISCONTINUED | OUTPATIENT
Start: 2019-01-23 | End: 2019-01-23 | Stop reason: HOSPADM

## 2019-01-23 RX ORDER — LIDOCAINE 40 MG/G
CREAM TOPICAL
Status: DISCONTINUED | OUTPATIENT
Start: 2019-01-23 | End: 2019-01-23 | Stop reason: HOSPADM

## 2019-01-23 RX ORDER — ACETAMINOPHEN 325 MG/1
650 TABLET ORAL EVERY 4 HOURS PRN
Status: DISCONTINUED | OUTPATIENT
Start: 2019-01-23 | End: 2019-01-23 | Stop reason: HOSPADM

## 2019-01-23 RX ORDER — FENTANYL CITRATE 50 UG/ML
25-50 INJECTION, SOLUTION INTRAMUSCULAR; INTRAVENOUS EVERY 5 MIN PRN
Status: DISCONTINUED | OUTPATIENT
Start: 2019-01-23 | End: 2019-01-23 | Stop reason: HOSPADM

## 2019-01-23 RX ORDER — OXYCODONE AND ACETAMINOPHEN 5; 325 MG/1; MG/1
1 TABLET ORAL
Status: DISCONTINUED | OUTPATIENT
Start: 2019-01-23 | End: 2019-01-23 | Stop reason: HOSPADM

## 2019-01-23 RX ORDER — DEXTROSE MONOHYDRATE 25 G/50ML
25-50 INJECTION, SOLUTION INTRAVENOUS
Status: DISCONTINUED | OUTPATIENT
Start: 2019-01-23 | End: 2019-01-23 | Stop reason: HOSPADM

## 2019-01-23 RX ORDER — DIPHENHYDRAMINE HYDROCHLORIDE 50 MG/ML
25 INJECTION INTRAMUSCULAR; INTRAVENOUS EVERY 6 HOURS PRN
Status: DISCONTINUED | OUTPATIENT
Start: 2019-01-23 | End: 2019-01-23 | Stop reason: HOSPADM

## 2019-01-23 RX ORDER — FLUMAZENIL 0.1 MG/ML
0.2 INJECTION, SOLUTION INTRAVENOUS
Status: DISCONTINUED | OUTPATIENT
Start: 2019-01-23 | End: 2019-01-23 | Stop reason: HOSPADM

## 2019-01-23 RX ADMIN — SODIUM CHLORIDE: 9 INJECTION, SOLUTION INTRAVENOUS at 10:38

## 2019-01-23 RX ADMIN — FENTANYL CITRATE 12.5 MCG: 50 INJECTION INTRAMUSCULAR; INTRAVENOUS at 12:37

## 2019-01-23 RX ADMIN — DIPHENHYDRAMINE HYDROCHLORIDE 25 MG: 50 INJECTION INTRAMUSCULAR; INTRAVENOUS at 11:58

## 2019-01-23 RX ADMIN — LIDOCAINE HYDROCHLORIDE 10 ML: 10 INJECTION, SOLUTION EPIDURAL; INFILTRATION; INTRACAUDAL; PERINEURAL at 12:19

## 2019-01-23 RX ADMIN — FENTANYL CITRATE 12.5 MCG: 50 INJECTION INTRAMUSCULAR; INTRAVENOUS at 11:58

## 2019-01-23 SDOH — HEALTH STABILITY: MENTAL HEALTH: HOW OFTEN DO YOU HAVE A DRINK CONTAINING ALCOHOL?: NEVER

## 2019-01-23 ASSESSMENT — MIFFLIN-ST. JEOR: SCORE: 1124.57

## 2019-01-23 ASSESSMENT — PAIN DESCRIPTION - DESCRIPTORS: DESCRIPTORS: ACHING

## 2019-01-23 NOTE — PROGRESS NOTES
Interventional Radiology Pre-Procedure Sedation Assessment   Time of Assessment: 10:26 AM    Expected Level: Moderate Sedation    Indication: Sedation is required for the following type of Procedure: Biopsy    Sedation and procedural consent: Risks, benefits and alternatives were discussed with Patient    PO Intake: Appropriately NPO for procedure    ASA Class: Class 1 - HEALTHY PATIENT    Mallampati: Grade 1:  Soft palate, uvula, tonsillar pillars, and posterior pharyngeal wall visible    Lungs: Lungs Clear with good breath sounds bilaterally    Heart: Normal heart sounds and rate    History and physical reviewed and no updates needed. I have reviewed the lab findings, diagnostic data, medications, and the plan for sedation. I have determined this patient to be an appropriate candidate for the planned sedation and procedure and have reassessed the patient IMMEDIATELY PRIOR to sedation and procedure.    Dandy Nicholson MD

## 2019-01-23 NOTE — IP AVS SNAPSHOT
[address] [address]                                    After Visit Summary   1/23/2019    Afshan Cardona    MRN: [id number]           After Visit Summary Signature Page    I have received my discharge instructions, and my questions have been answered. I have discussed any challenges I see with this plan with the nurse or doctor.    ..........................................................................................................................................  Patient/Patient Representative Signature      ..........................................................................................................................................  Patient Representative Print Name and Relationship to Patient    ..................................................               ................................................  Date                                   Time    ..........................................................................................................................................  Reviewed by Signature/Title    ...................................................              ..............................................  Date                                               Time          22EPIC Rev 08/18

## 2019-01-23 NOTE — PROCEDURES
Interventional Radiology Brief Post Procedure Note    Procedure: CT RENAL BIOPSY PERCUTANEOUS    Proceduralist: Ted Haddad MD    Assistant: Dandy Nicholson MD and None    Time Out: Prior to the start of the procedure and with procedural staff participation, I verbally confirmed the patient s identity using two indicators, relevant allergies, that the procedure was appropriate and matched the consent or emergent situation, and that the correct equipment/implants were available. Immediately prior to starting the procedure I conducted the Time Out with the procedural staff and re-confirmed the patient s name, procedure, and site/side. (The Joint Commission universal protocol was followed.)  Yes    Medications   Medication Event Details Admin User Admin Time       Sedation: IR Nurse Monitored Care   Post Procedure Summary:  Prior to the start of the procedure and with procedural staff participation, I verbally confirmed the patient s identity using two indicators, relevant allergies, that the procedure was appropriate and matched the consent or emergent situation, and that the correct equipment/implants were available. Immediately prior to starting the procedure I conducted the Time Out with the procedural staff and re-confirmed the patient s name, procedure, and site/side. (The Joint Commission universal protocol was followed.)  Yes       Sedatives: Fentanyl and Diphenhydramine (Benadryl)    Vital signs, airway and pulse oximetry were monitored and remained stable throughout the procedure and sedation was maintained until the procedure was complete.  The patient was monitored by staff until sedation discharge criteria were met.    Patient tolerance: Patient tolerated the procedure well with no immediate complications.    Time of sedation in minutes: 45 Minutes minutes from beginning to end of physician one to one monitoring.          Findings: A total of 5 core samples obtained of the left renal mass.      Estimated Blood Loss: Minimal    Fluoroscopy Time:  minute(s)    SPECIMENS: Core samples were sent for pathological analysis.     Complications: 1. None     Condition: Stable    Plan: Bedrest for 4 hours. Then may discharge.     Comments: See dictated procedure note for full details.    Dandy Nicholson MD

## 2019-01-23 NOTE — PROGRESS NOTES
Discharge instructions given and pt voiced understanding. No scripts needed from pharmacy. Left mid back site is soft and flat. No hematoma. Up walking in room. Ate well for lunch. Urinating adequate amounts x 2 - clear urine. No complaint of discomfort. Adequate for discharge. Discharge to home with family.

## 2019-01-23 NOTE — PROGRESS NOTES
"Patient Name: Afshan Cardona  Medical Record Number: [id number]  Today's Date: 1/23/2019    Procedure: Left Renal Mass Biopsy  Proceduralist: Socorro CHRISTIANSEN, MGencturkMD    Sedation start time: 1154  Sedation end time: 1235  Sedation medications administered: Fentanyl 12.5 mcg, Benadryl 25 mg  Total sedation time: 30 minutes    Procedure start time: 1200  Puncture time: 1203  Procedure end time: 1235    Report given to: BUBBA Quintero RN    D: Pt arrived via cart, accompanied by RN.  Pt reported right knee pain due to a fall a couple months ago.  She is taking \"oxy\" for it and last took osy @ 0630 this morning.  Pt positioned prone with head going first into the CT scanner. Pt hypertensive upon arrival, other VSS - MD aware.  I: Monitored and sedated pt during the procedure.  A: Pt tolerated the procedure well. VS baseline throughout; hypertensive. 5 cores sent with pathology. Insertion site is clean, dry, covered.  Gelfoam used for closure.  P:Pt care to continue on 2A until discharge.  "

## 2019-01-23 NOTE — PROGRESS NOTES
Patient arrived at 2A for Renal Biopsy. H&P up to date, consent signed, PIV placed, labs completed, pt prepped.

## 2019-01-23 NOTE — DISCHARGE INSTRUCTIONS
[facility] Following Kidney Biopsy    Date:January 23, 2019    ACTIVITY:      Relax and take it easy, no strenuous activity for 24 hours.    No heavy lifting (>10 lbs.) for 1 week    DIET:            Resume your regular diet and drink plenty of fluids, unless you are fluid restricted                    DRAINAGE:    There should be minimal drainage from the biopsy site. If bleeding soaks the dressing, you should lie down and apply pressure to the site for a minimum of 10 minutes. If the bleeding persists, refer to the emergency contact numbers below; whether the bleeding persists or not, you should report the occurrence to one of the numbers below.    Refer to the emergency numbers below for the following:     Excessive bleeding or drainage    Excessive swelling, redness, or tenderness at the site    Fever above 100.5 degrees orally    Severe pain    Drainage that is green, yellow, thick white, or has a bad odor    Passage of bloody urine or clots after you are discharged.     Emergency Contact Numbers:             [phone]      Ask for the Adult Nephrology Fellow on Call, someone is available 24 hours a day.

## 2019-01-23 NOTE — PROGRESS NOTES
Returned to unit following kidney biopsy. Site is flat and soft. Dressing dry. Received sedation. Alert and orient. No complaint of nausea or discomfort. Respiratory status stable. Weaning off oxygen.

## 2019-01-24 LAB — COPATH REPORT: NORMAL

## 2019-01-28 ENCOUNTER — TELEPHONE (OUTPATIENT)
Dept: ONCOLOGY | Facility: CLINIC | Age: 74
End: 2019-01-28

## 2019-02-08 ENCOUNTER — APPOINTMENT (OUTPATIENT)
Dept: SURGERY | Facility: CLINIC | Age: 74
End: 2019-02-08
Payer: MEDICARE

## 2019-02-08 ENCOUNTER — OFFICE VISIT (OUTPATIENT)
Dept: SURGERY | Facility: CLINIC | Age: 74
End: 2019-02-08
Payer: MEDICARE

## 2019-02-08 ENCOUNTER — ANESTHESIA EVENT (OUTPATIENT)
Dept: SURGERY | Facility: CLINIC | Age: 74
DRG: 737 | End: 2019-02-08
Payer: MEDICARE

## 2019-02-08 VITALS
OXYGEN SATURATION: 95 % | HEIGHT: 62 IN | HEART RATE: 84 BPM | RESPIRATION RATE: 16 BRPM | TEMPERATURE: 98.3 F | BODY MASS INDEX: 28.65 KG/M2 | WEIGHT: 155.7 LBS | DIASTOLIC BLOOD PRESSURE: 65 MMHG | SYSTOLIC BLOOD PRESSURE: 102 MMHG

## 2019-02-08 DIAGNOSIS — I42.8 OTHER CARDIOMYOPATHY (H): ICD-10-CM

## 2019-02-08 DIAGNOSIS — Z01.818 PRE-OPERATIVE GENERAL PHYSICAL EXAMINATION: ICD-10-CM

## 2019-02-08 DIAGNOSIS — Z01.818 PRE-OPERATIVE GENERAL PHYSICAL EXAMINATION: Primary | ICD-10-CM

## 2019-02-08 DIAGNOSIS — R73.9 ELEVATED BLOOD SUGAR: ICD-10-CM

## 2019-02-08 DIAGNOSIS — Z01.818 PREOP EXAMINATION: Primary | ICD-10-CM

## 2019-02-08 LAB
ANION GAP SERPL CALCULATED.3IONS-SCNC: 7 MMOL/L (ref 3–14)
BUN SERPL-MCNC: 19 MG/DL (ref 7–30)
CALCIUM SERPL-MCNC: 8.5 MG/DL (ref 8.5–10.1)
CHLORIDE SERPL-SCNC: 100 MMOL/L (ref 94–109)
CO2 SERPL-SCNC: 29 MMOL/L (ref 20–32)
CREAT SERPL-MCNC: 0.75 MG/DL (ref 0.52–1.04)
ERYTHROCYTE [DISTWIDTH] IN BLOOD BY AUTOMATED COUNT: 14 % (ref 10–15)
GFR SERPL CREATININE-BSD FRML MDRD: 78 ML/MIN/{1.73_M2}
GLUCOSE SERPL-MCNC: 155 MG/DL (ref 70–99)
HBA1C MFR BLD: 9.2 % (ref 0–5.6)
HCT VFR BLD AUTO: 31.7 % (ref 35–47)
HGB BLD-MCNC: 9.8 G/DL (ref 11.7–15.7)
MCH RBC QN AUTO: 30.6 PG (ref 26.5–33)
MCHC RBC AUTO-ENTMCNC: 30.9 G/DL (ref 31.5–36.5)
MCV RBC AUTO: 99 FL (ref 78–100)
NT-PROBNP SERPL-MCNC: 261 PG/ML (ref 0–125)
PLATELET # BLD AUTO: 291 10E9/L (ref 150–450)
POTASSIUM SERPL-SCNC: 4.5 MMOL/L (ref 3.4–5.3)
RBC # BLD AUTO: 3.2 10E12/L (ref 3.8–5.2)
SODIUM SERPL-SCNC: 136 MMOL/L (ref 133–144)
WBC # BLD AUTO: 7.6 10E9/L (ref 4–11)

## 2019-02-08 ASSESSMENT — LIFESTYLE VARIABLES: TOBACCO_USE: 1

## 2019-02-08 ASSESSMENT — MIFFLIN-ST. JEOR: SCORE: 1159.5

## 2019-02-08 NOTE — PROGRESS NOTES
Preoperative Assessment Center medication history for February 8, 2019 is complete.    See Epic admission navigator for prior to admission medications.   Operating room staff will still need to confirm medications and last dose information on day of surgery.     Medication history interview sources:  Patient Interview    Changes made to PTA medication list (reason)  Added:   -- melatonin per patient    Deleted:   -- cipro - finished course  -- percocet - on oxyCODONE   -- miralax - not taking    Changed: None    Additional medication history information (including reliability of information, actions taken by pharmacist):    -- on chronic cephalexin for ppx of bladder infection  -- No recent (within 30 days) course of steroids  -- No recent (within 30 days) chronic daily medications stopped   -- Patient declines being on any other prescription or over-the-counter medications  -- patient averaging 20mg oxyCODONE/day = 30mg/day MME    Prior to Admission medications    Medication Sig Last Dose Taking? Auth Provider   carvedilol (COREG) 25 MG tablet Take 1 tablet (25 mg) by mouth 2 times daily (with meals) Taking Yes Juanita Jean-Baptiste APRN CNP   cephalexin (KEFLEX) 250 MG capsule TAKE ONE CAPSULE BY MOUTH ONCE DAILY AT BEDTIME Taking Yes Reported, Patient   Cholecalciferol (VITAMIN D3) 2000 units CAPS TAKE ONE CAPSULE BY MOUTH ONCE DAILY IN THE MORNING Taking Yes Reported, Patient   CRANBERRY EXTRACT PO Take by mouth every morning Taking Yes Reported, Patient   DULoxetine (CYMBALTA) 60 MG EC capsule TAKE ONE CAPSULE BY MOUTH AT BEDTIME Taking Yes Reported, Patient   gabapentin (NEURONTIN) 600 MG tablet Take 1 tablet (600 mg) by mouth 3 times daily Taking Yes Juanita Jean-Baptiste APRN CNP   HUMALOG MARY KWIKPEN 100 UNIT/ML injection INJECT 1 UNIT PER 10GRAMS OF CARBOHYDRATES WITH CORRECTION 0.5 UNITS PER 50 ABOVE 150 ( UP TO 30 UNITS PER DAY) Taking Yes Reported, Patient   insulin degludec (TRESIBA) 100 UNIT/ML pen  Inject 8 Units Subcutaneous At Bedtime  Patient taking differently: Inject 14 Units Subcutaneous At Bedtime  Taking Yes Juanita Jean-Baptiste APRN CNP   levothyroxine (SYNTHROID/LEVOTHROID) 75 MCG tablet TAKE ONE TABLET BY MOUTH ONCE DAILY  IN THE MORNING     (PLEASE CALL [phone] FOR AN OFFICE VISIT BEFORE NEXT REFILL.) Taking Yes Reported, Patient   melatonin 1 MG TABS tablet Take 1 mg by mouth nightly as needed for sleep Taking Yes Unknown, Entered By History   oxyCODONE IR (ROXICODONE) 5 MG tablet Take 0.5-1 tablets (2.5-5 mg) by mouth every 6 hours as needed for severe pain Taking Yes Juanita Jean-Baptiste APRN CNP   pravastatin (PRAVACHOL) 80 MG tablet Take 80 mg by mouth At Bedtime  Taking Yes Reported, Patient   senna-docusate (SENOKOT-S;PERICOLACE) 8.6-50 MG per tablet Take 1-2 tablets by mouth 2 times daily Take while on oral narcotics to prevent or treat constipation. Taking Yes Mary West MD   aspirin (ASA) 325 MG EC tablet Take 325 mg by mouth daily Not Taking  Unknown, Entered By History   losartan (COZAAR) 100 MG tablet Take 100 mg by mouth At Bedtime    Reported, Patient   ONETOUCH VERIO IQ test strip USE TO TEST BLOOD GLUCOSE 6-8 TIMES PER DAY, BEFORE MEALS, BEDTIME TO DOSE INSULIN, HIGH OR LOW BLOOD GLUCOSE WITH RECHECK   Reported, Patient         Medication history completed by: Stanley Vallejo [facility]

## 2019-02-08 NOTE — ANESTHESIA PREPROCEDURE EVALUATION
Anesthesia Pre-Procedure Evaluation    Patient: Afshan Cardona   MRN:     [id number] Gender:   female   Age:    73 year old :      1945        Preoperative Diagnosis: Ovarian Cancer   Procedure(s):  Exploratory Laparotomy, tumor debulking, omentectomy, possible stoma  Bilateral Salpingo Oophorectomy     Past Medical History:   Diagnosis Date     Vivas's esophagus      Cardiomyopathy (H)     Taksumoto     Colon polyps      Diabetes (H)      Fibromyalgia      Former smoker      History of DVT (deep vein thrombosis)      HLD (hyperlipidemia)      HTN (hypertension)      Hypothyroid      TIA (transient ischemic attack)       Past Surgical History:   Procedure Laterality Date     AS ESOPHAGOSCOPY, DIAGNOSTIC       BACK SURGERY       bladder lift       HYSTERECTOMY       HYSTERECTOMY TOTAL ABD, TRINY SALPINGO-OOPHORECTOMY, NODE DISSECTION, TUMOR DEBULKING, COMBINED N/A 2018    Procedure: COMBINED HYSTERECTOMY TOTAL ABDOMINAL, SALPINGO-OOPHORECTOMY, NODE DISSECTION, TUMOR DEBULKING;;  Surgeon: Bakari Galeas MD;  Location: [facility] OR     LAPAROSCOPY DIAGNOSTIC (GYN) N/A 2018    Procedure: LAPAROSCOPY DIAGNOSTIC (GYN);  Diagnostic Laparoscopy, Biopsies;  Surgeon: Bakari Galeas MD;  Location: [facility] OR     LAPAROTOMY EXPLORATORY N/A 2018    Procedure: LAPAROTOMY EXPLORATORY;  Exploratory Laparotomy, lysis of adhesions, jejunal mesentary biopsy and sigmoid epiploic biopsy. ;  Surgeon: Bakari Galeas MD;  Location: [facility] OR          Anesthesia Evaluation     . Pt has had prior anesthetic. Type: General and MAC    History of anesthetic complications    Significant hypotension after 1 hour induction;  cancelled surgery for this and for unexpected size of tumor; also needed to give narcan x 2 and had persistent  neuro sx post-op in hospital  (hallucinations and loss of orientation to place as well as prolo      ROS/MED HX    ENT/Pulmonary:     (+)SINCERE risk factors snores loudly, hypertension, tobacco  "use, Past use quit 30 years ago packs/day  asthma , recent URI resolved  pneumonia fall 2018 - hospitalized: Hx chronic bronchitis: . .    Neurologic:     (+)neuropathy CVA without deficitsTIA date: 19 years ago     Cardiovascular: Comment: TTE 4/2017 (outside records)  CONCLUSION:  1. Normal left ventricular size with preserved systolic function.  Ejection fraction is 60%.     2. Stage I diastolic dysfunction.     3. Mild to moderate mitral regurgitation.     4. Mild aortic and tricuspid regurgitation.     (+) Dyslipidemia, hypertension--CAD, --. Taking blood thinners Pt has received instructions: Instructions Given to patient: ASA held. CHF etiology: Taksumoto syndrome Last EF: 60% date: 4/18/17 . . :. . Previous cardiac testing Echodate:7/2018results:Notes form cardiologist at [facility]: ...\"Today with exercise we have inducible wall motion abnormality involving the lateral wall.\"...date: results: date: results:Cath date: results:          METS/Exercise Tolerance:  3 - Able to walk 1-2 blocks without stopping   Hematologic: Comments: Several clots RLE -one associated with pregnancy    (+) History of blood clots pt is not anticoagulated, History of Transfusion no previous transfusion reaction -      Musculoskeletal:   (+) arthritis, , , other musculoskeletal- hardware in right patella      GI/Hepatic:     (+) GERD Other,       Renal/Genitourinary:  - ROS Renal section negative   (+) chronic renal disease, type: ARF, Other Renal/ Genitourinary (renal biopsy showed cancer),       Endo:     (+) type I DM, Using insulin - not using insulin pump thyroid problem hypothyroidism, .      Psychiatric:     (+) psychiatric history anxiety and depression      Infectious Disease:  - neg infectious disease ROS       Malignancy:      - no malignancy   Other:    (+) No chance of pregnancy H/O Chronic Pain,H/O chronic opiod use , no other significant disability                        PHYSICAL EXAM:   Mental Status/Neuro: A/A/O " "  Airway: Facies: Feasible  Mallampati: II  Mouth/Opening: Full  TM distance: < 6 cm  Neck ROM: Limited   Respiratory: Auscultation: CTAB     Resp. Rate: Normal     Resp. Effort: Normal      CV: Rhythm: Regular  Rate: Age appropriate  Heart: Normal Sounds   Comments:      Dental:  Dentures: Lower; Partial                  Lab Results   Component Value Date    WBC 5.4 01/23/2019    HGB 9.3 (L) 01/23/2019    HCT 29.8 (L) 01/23/2019     01/23/2019     01/23/2019    POTASSIUM 4.6 01/23/2019    CHLORIDE 101 01/23/2019    CO2 29 01/23/2019    BUN 12 01/23/2019    CR 0.65 01/23/2019     (H) 01/23/2019    KAIN 9.0 01/23/2019    ALBUMIN 2.7 (L) 07/25/2018    PROTTOTAL 6.6 (L) 07/03/2018    ALT 27 07/03/2018    AST 16 07/03/2018    ALKPHOS 113 07/03/2018    BILITOTAL 0.4 07/03/2018    PTT 32 07/24/2018    INR 0.98 01/23/2019    FIBR 283 07/24/2018    TSH 6.77 (H) 07/27/2018    T4 1.10 07/27/2018       Preop Vitals  BP Readings from Last 3 Encounters:   02/08/19 102/65   01/23/19 153/67   01/14/19 144/77    Pulse Readings from Last 3 Encounters:   02/08/19 84   01/23/19 85   01/14/19 83      Resp Readings from Last 3 Encounters:   02/08/19 16   01/23/19 20   01/09/19 16    SpO2 Readings from Last 3 Encounters:   02/08/19 95%   01/23/19 95%   01/14/19 95%      Temp Readings from Last 1 Encounters:   02/08/19 98.3  F (36.8  C) (Oral)    Ht Readings from Last 1 Encounters:   02/08/19 1.575 m (5' 2\")      Wt Readings from Last 1 Encounters:   02/08/19 70.6 kg (155 lb 11.2 oz)    Estimated body mass index is 28.48 kg/m  as calculated from the following:    Height as of this encounter: 1.575 m (5' 2\").    Weight as of this encounter: 70.6 kg (155 lb 11.2 oz).     LDA:            Assessment:   ASA SCORE: 3    NPO Status: > 6 hours since completed Solid Foods   Documentation: H&P complete; Preop Testing complete; Consents complete   Proceeding: Proceed without further delay  Tobacco Use:  NO Active use of " Tobacco/UNKNOWN Tobacco use status     Plan:   Anes. Type:  General; Regional     RA Details:  Pre Induction; SS; Exparel   Pre-Induction: Acetaminophen PO; Gabapentin PO (avoid pre benzo's/ narcs if possible)     Drips/Meds-Preparation: Phenylephrine   Induction:  IV (Standard); Etomidate   Airway: Oral ETT   Access/Monitoring: PIV; 2nd PIV; A-Line; FloTrac     Advanced Monitoring: BIS   Maintenance: Balanced; Nickolas   Emergence: Procedure Site   Logistics: Observation/Admission     Postop Pain/Sedation Strategy:  Standard-Options: Opioids PRN; Block SS; Exparel     PONV Management:  Adult Risk Factors: Female, Non-Smoker, Postop Opioids  Prevention: Ondansetron; Dexamethasone     CONSENT: Direct conversation   Plan and risks discussed with: Patient   Blood Products: Consented (ALL Blood Products)                  PAC Discussion and Assessment    ASA Classification: 3  Case is suitable for:   Anesthetic techniques and relevant risks discussed: GA  Invasive monitoring and risk discussed: Yes  Types:   Possibility and Risk of blood transfusion discussed: Yes  NPO instructions given:   Additional anesthetic preparation and risks discussed:   Needs early admission to pre-op area:   Other:     PAC Resident/NP Anesthesia Assessment:  Salima Cardona is a 73 year old female for exploratory laparotomy, tumor debulking, omentectomy, possible stoma, BSO, in treatment high grade ovarian cancer. She is S/P 6 cycles of chemotherapy-completed 10/2018. PAC referral for risk assessment and optimization for anesthesia with comorbid conditions of diabetes, HTN, HLD, CAD, TIA, DVT.     Pre-operative considerations include:  1.) CV: HTN (losartan, carvedilol), HLD (statin), Takutsubo cardiomyopathy 2/2015 -EF 25-30% recovered to 60%. 4/2017 echo EF 60% with no RWMA.   -Positive stress test 7/5/2018, Ischemic heart ds documented by catheterization 7/10/2018, (left main nl; 50-60% in RCA and LAD).   -She did follow with cardiologist (  "Bill Atkins) 2018 prior to her laparoscopy: note reflects....\" Exercise echo done at visit and revealed inducible lateral wall motion abnormality. Assessment was that CAD was stable and asymptomatic and recommendation was to proceed to cancer surgery with an acceptable cardiac risk. He did not recommend any catheter based intervention until exact nature of cancer was known.  -Since July procedure patient's functional status partially dependent with slow post-op recovery, weakness / need for walker, decreased exercise tolerance. Now <  4 METS.   RCRI = 11% risk of major adverse cardiac event  Repeat echo  /  f/u with Dr. Atkins in cardiology due to change in exercise status. Daughter will schedule these pre-op and I will follow for results.  Cards recommended continue ASA, B-blocker and ACEI to DOS but with hypotensive event during last procedure will hold ACEI.  2.) Pulmonary: Distant smoking history Quit . No pulmonary dx, sx or meds.   SINCERE risks of HTN and age = 2f8 or low risk  3.)  Heme: DVT RLE x 2, both provoked (one after ) -last one ~4 years ago. No anticoagulation. Hx blood transfusion.  Elevated risk for DVT due to previous clot and cancer dx.  4.) Renal: pre-renal MARIAH 2018 in post-op setting. Chronic urinary retention -July surgery required prolonged post-op duffy. NO elevated creatinines noted in EMR.  5.) Endo: Insulin dependent diabetes since age 29. Fluctuates with poor control (elevated HGBa1c) and episodes of hypoglycemia requiring ER visits (glucose 30). On short and long acting insulin. No oral agents. Hypothyroid on synthroid, which she will take DOS.  Adjust Insulin dosing of long term as per nursing note.  HOLD short acting insulin DOS  6.) GI: GERD, well managed. Hx Vivas's esophagus  PONV score = 2 (2 or > antiemetic prophylaxis recommended.   7.) Neuro: Fibromyalgia. BLE neuropathy.     Anesthesia; Multiple past surgeries (back x 3)- last GA umbilical hernia-[place] " - no problems.   [employer] July 2018 exploratory lap: Per patient's daughter.... states Narcan was given x 2 due to hypotensive episode, surgery deferred at ~ 1 hour due to size of tumor and hypotension. Post-op prolonged sedation, confusion and hallucinations- CT negative for acute event. Pt had 5 day hospital stay, then to TCU.  Please see Dr. Grant's recommendations.      Reviewed and Signed by PAC Mid-Level Provider/Resident  Mid-Level Provider/Resident: Ely Petersen PA-C  Date: 2/8/19  Time: 1:39 PM    Attending Anesthesiologist Anesthesia Assessment:  I have examined the patient and reviewed the medical record.  I have discussed the patient with the CHYNA and concur with her assessment  The patient is scheduled for ovarian cancer tumor debulking following 3 rounds of chemotherapy after previous surgery was aborted 7/2018 for extensive disease spread.    The patient has several co morbidities:     CVS:  2015 - Takotsubo cardiomyopathy following husbands death.  EF 25%.  Has recovered to normal EF of 60% on echo 4/2017.  She had stress test 7/5/2018 which was positive for inducible lateral wall ischemia.  Surgery was performed the next day without complication.  4 days later the patient had Cardiac catheterization remarkable for diffuse 60% lesion of LAD and 60% RCA.  Medical management with no PCI intervention was prescribed by her cardiologist.     Endo:  Type 1 DM on insulin.  No recent Hgb A1c but records from hospitalization 2 weeks ago indicate good control.     GI:  History of Barretts esophagus, not on treatment at this time, has intermittent symptomatic GERD    The patient had GA for diagnostic laparoscopy 7/2018.  This was ultimately aborted because of extent of tumor and possibly patient hemodynamic instability (noted by surgeon though anesthesia record does not indicate sustained severe hypotension).  Post operatively there were no noted hemodynamic problems but the patient did have  significant post operative delirium and prolonged change in sensorium.  She does not report focal neurologic deficit at any time.  Head CT was normal during hospitalization.    PE:  Thin, frail appearing, pleasant female in NAD.  MPC 2-3, 3 FBTMD, Lung clear, CV  RRR without murmur     The patient did have several episodes of significant hypotension and per surgeon's note surgery was terminated because of that.  Will have patient re evaluated by her cardiologist prior to surgery (though cath 7/2018 did not indicate need for intervention)  Will obtain echo to see if new WMA or change in EF after surgery 7/2018.  Will have patient hold ARB full 24 hours prior to surgery given normal EF and intraop hypotension with previous surgery  Consider arterial line for monitoring with upcoming surgery  Given patient's post operative delirium consider holding all benzodiazepines and BIS monitoring to avoid prolonged periods with BIS< 40  Will T&C for two units as patient is now anemic post chemotherapy (Hgb 9)  Final plan per attending anesthesiologist the day of surgery.          Reviewed and Signed by PAC Anesthesiologist  Anesthesiologist: Mason Montelongo MD  Date: 2/8/2019  Time:   Pass/Fail:   Disposition:     PAC Pharmacist Assessment:        Pharmacist:   Date:   Time:        SALOME Goss

## 2019-02-08 NOTE — PATIENT INSTRUCTIONS
Preparing for Your Surgery      Name:  Afshan Cardona   MRN:  [id number]   :  1945   Today's Date:  2019     Arriving for surgery:  Surgery date:  19  Arrival time:  05:00 AM  Please come to:       [facility] [address]  [address] [place]  [address]    -   parking is available in front of the hospital from 5:15 am to 8:00 pm    -  Stop at the Information Desk in the lobby    -   Inform the information person that you are here for surgery. An escort to 3c will be provided. If you would not like an escort, please proceed to 3C on the 3rd floor. [phone]     What can I eat or drink?  -  You may have solid food or milk products until 8 hours prior to your surgery.  -  You may have water, apple juice or 7up/Sprite until 2 hours prior to your surgery.    Which medicines can I take?  Stop Aspirin, vitamins and supplements one week prior to surgery.  Hold Ibuprofen for 24 hours and/or Naproxen for 48 hours prior to surgery.   -  Do NOT take these medications in the morning, the day of surgery:  Cozaar (losaratan)  Do not take the evening before surgery (do not take 24 hours before surgery)  TAKE 11 UNITS OF TRESIBA INSULIN THE NIGHT BEFORE SURGERY  -  Please take these medications the day of surgery:  Tylenol if needed; take all other scheduled medications normally taken in the morning    How do I prepare myself?  -  Take two showers: one the night before surgery; and one the morning of surgery.         Use Scrubcare or Hibiclens to wash from neck down.  You may use your own     shampoo and conditioner. No other hair products.   -  Do NOT use lotion, powder, deodorant, or antiperspirant the day of your surgery.  -  Do NOT wear any makeup, fingernail polish or jewelry.    - Do not bring your own medications to the hospital, except for inhalers and eye   drops.  -  Bring your ID and insurance card.    Questions or Concerns:  -If you have questions or  concerns regarding the day of surgery, please call [phone].         -For questions after surgery please call your surgeons office.

## 2019-02-11 ENCOUNTER — TRANSFERRED RECORDS (OUTPATIENT)
Dept: HEALTH INFORMATION MANAGEMENT | Facility: CLINIC | Age: 74
End: 2019-02-11

## 2019-02-12 ENCOUNTER — TRANSFERRED RECORDS (OUTPATIENT)
Dept: HEALTH INFORMATION MANAGEMENT | Facility: CLINIC | Age: 74
End: 2019-02-12

## 2019-02-22 ENCOUNTER — ANESTHESIA (OUTPATIENT)
Dept: SURGERY | Facility: CLINIC | Age: 74
DRG: 737 | End: 2019-02-22
Payer: MEDICARE

## 2019-02-22 ENCOUNTER — SURGERY (OUTPATIENT)
Age: 74
End: 2019-02-22
Payer: MEDICARE

## 2019-02-22 ENCOUNTER — HOSPITAL ENCOUNTER (INPATIENT)
Facility: CLINIC | Age: 74
LOS: 4 days | Discharge: NURSING, REHAB OR LONG-TERM CARE | DRG: 737 | End: 2019-02-26
Attending: OBSTETRICS & GYNECOLOGY | Admitting: OBSTETRICS & GYNECOLOGY
Payer: MEDICARE

## 2019-02-22 DIAGNOSIS — Z98.890 S/P LAPAROSCOPIC PROCEDURE: ICD-10-CM

## 2019-02-22 DIAGNOSIS — C56.9 MALIGNANT NEOPLASM OF OVARY, UNSPECIFIED LATERALITY (H): ICD-10-CM

## 2019-02-22 DIAGNOSIS — C56.9 OVARIAN CANCER, UNSPECIFIED LATERALITY (H): ICD-10-CM

## 2019-02-22 LAB
ABO + RH BLD: NORMAL
ABO + RH BLD: NORMAL
B-HCG SERPL-ACNC: 3 IU/L (ref 0–5)
BASE EXCESS BLDA CALC-SCNC: 3.4 MMOL/L
BLD GP AB SCN SERPL QL: NORMAL
BLD PROD TYP BPU: NORMAL
BLD UNIT ID BPU: 0
BLD UNIT ID BPU: 0
BLOOD BANK CMNT PATIENT-IMP: NORMAL
BLOOD BANK CMNT PATIENT-IMP: NORMAL
BLOOD PRODUCT CODE: NORMAL
BLOOD PRODUCT CODE: NORMAL
BPU ID: NORMAL
BPU ID: NORMAL
CA-I BLD-MCNC: 4.7 MG/DL (ref 4.4–5.2)
GLUCOSE BLD-MCNC: 205 MG/DL (ref 70–99)
GLUCOSE BLDC GLUCOMTR-MCNC: 119 MG/DL (ref 70–99)
GLUCOSE BLDC GLUCOMTR-MCNC: 145 MG/DL (ref 70–99)
GLUCOSE BLDC GLUCOMTR-MCNC: 161 MG/DL (ref 70–99)
GLUCOSE BLDC GLUCOMTR-MCNC: 166 MG/DL (ref 70–99)
GLUCOSE BLDC GLUCOMTR-MCNC: 178 MG/DL (ref 70–99)
GLUCOSE BLDC GLUCOMTR-MCNC: 179 MG/DL (ref 70–99)
GLUCOSE BLDC GLUCOMTR-MCNC: 260 MG/DL (ref 70–99)
GLUCOSE BLDC GLUCOMTR-MCNC: 272 MG/DL (ref 70–99)
GLUCOSE BLDC GLUCOMTR-MCNC: 306 MG/DL (ref 70–99)
HCO3 BLD-SCNC: 28 MMOL/L (ref 21–28)
HGB BLD-MCNC: 10.3 G/DL (ref 11.7–15.7)
HGB BLD-MCNC: 9.3 G/DL (ref 11.7–15.7)
NUM BPU REQUESTED: 4
O2/TOTAL GAS SETTING VFR VENT: 50 %
PCO2 BLD: 40 MM HG (ref 35–45)
PH BLD: 7.45 PH (ref 7.35–7.45)
PO2 BLD: 112 MM HG (ref 80–105)
POTASSIUM BLD-SCNC: 3.6 MMOL/L (ref 3.4–5.3)
POTASSIUM SERPL-SCNC: 3.8 MMOL/L (ref 3.4–5.3)
SODIUM BLD-SCNC: 140 MMOL/L (ref 133–144)
SPECIMEN EXP DATE BLD: NORMAL
TRANSFUSION STATUS PATIENT QL: NORMAL

## 2019-02-22 PROCEDURE — 25000128 H RX IP 250 OP 636: Performed by: STUDENT IN AN ORGANIZED HEALTH CARE EDUCATION/TRAINING PROGRAM

## 2019-02-22 PROCEDURE — 25800030 ZZH RX IP 258 OP 636: Performed by: NURSE ANESTHETIST, CERTIFIED REGISTERED

## 2019-02-22 PROCEDURE — 93005 ELECTROCARDIOGRAM TRACING: CPT

## 2019-02-22 PROCEDURE — 88331 PATH CONSLTJ SURG 1 BLK 1SPC: CPT | Performed by: OBSTETRICS & GYNECOLOGY

## 2019-02-22 PROCEDURE — 40000171 ZZH STATISTIC PRE-PROCEDURE ASSESSMENT III: Performed by: OBSTETRICS & GYNECOLOGY

## 2019-02-22 PROCEDURE — P9016 RBC LEUKOCYTES REDUCED: HCPCS | Performed by: PHYSICIAN ASSISTANT

## 2019-02-22 PROCEDURE — 88307 TISSUE EXAM BY PATHOLOGIST: CPT | Performed by: OBSTETRICS & GYNECOLOGY

## 2019-02-22 PROCEDURE — 25000125 ZZHC RX 250: Performed by: NURSE ANESTHETIST, CERTIFIED REGISTERED

## 2019-02-22 PROCEDURE — C9290 INJ, BUPIVACAINE LIPOSOME: HCPCS | Performed by: STUDENT IN AN ORGANIZED HEALTH CARE EDUCATION/TRAINING PROGRAM

## 2019-02-22 PROCEDURE — 25000132 ZZH RX MED GY IP 250 OP 250 PS 637: Mod: GY | Performed by: OBSTETRICS & GYNECOLOGY

## 2019-02-22 PROCEDURE — 36000064 ZZH SURGERY LEVEL 4 EA 15 ADDTL MIN - UMMC: Performed by: OBSTETRICS & GYNECOLOGY

## 2019-02-22 PROCEDURE — 40000275 ZZH STATISTIC RCP TIME EA 10 MIN

## 2019-02-22 PROCEDURE — 0DN80ZZ RELEASE SMALL INTESTINE, OPEN APPROACH: ICD-10-PCS | Performed by: OBSTETRICS & GYNECOLOGY

## 2019-02-22 PROCEDURE — 0DNN0ZZ RELEASE SIGMOID COLON, OPEN APPROACH: ICD-10-PCS | Performed by: OBSTETRICS & GYNECOLOGY

## 2019-02-22 PROCEDURE — 84132 ASSAY OF SERUM POTASSIUM: CPT | Performed by: ANESTHESIOLOGY

## 2019-02-22 PROCEDURE — 25000128 H RX IP 250 OP 636: Performed by: ANESTHESIOLOGY

## 2019-02-22 PROCEDURE — 88305 TISSUE EXAM BY PATHOLOGIST: CPT | Performed by: OBSTETRICS & GYNECOLOGY

## 2019-02-22 PROCEDURE — 71000016 ZZH RECOVERY PHASE 1 LEVEL 3 FIRST HR: Performed by: OBSTETRICS & GYNECOLOGY

## 2019-02-22 PROCEDURE — 71000017 ZZH RECOVERY PHASE 1 LEVEL 3 EA ADDTL HR: Performed by: OBSTETRICS & GYNECOLOGY

## 2019-02-22 PROCEDURE — 84132 ASSAY OF SERUM POTASSIUM: CPT | Performed by: OBSTETRICS & GYNECOLOGY

## 2019-02-22 PROCEDURE — 25000125 ZZHC RX 250: Performed by: OBSTETRICS & GYNECOLOGY

## 2019-02-22 PROCEDURE — 0DNU0ZZ RELEASE OMENTUM, OPEN APPROACH: ICD-10-PCS | Performed by: OBSTETRICS & GYNECOLOGY

## 2019-02-22 PROCEDURE — 0TN60ZZ RELEASE RIGHT URETER, OPEN APPROACH: ICD-10-PCS | Performed by: OBSTETRICS & GYNECOLOGY

## 2019-02-22 PROCEDURE — 25000131 ZZH RX MED GY IP 250 OP 636 PS 637: Mod: GY | Performed by: STUDENT IN AN ORGANIZED HEALTH CARE EDUCATION/TRAINING PROGRAM

## 2019-02-22 PROCEDURE — 25000132 ZZH RX MED GY IP 250 OP 250 PS 637: Mod: GY | Performed by: STUDENT IN AN ORGANIZED HEALTH CARE EDUCATION/TRAINING PROGRAM

## 2019-02-22 PROCEDURE — 36000062 ZZH SURGERY LEVEL 4 1ST 30 MIN - UMMC: Performed by: OBSTETRICS & GYNECOLOGY

## 2019-02-22 PROCEDURE — 0UBF0ZZ EXCISION OF CUL-DE-SAC, OPEN APPROACH: ICD-10-PCS | Performed by: OBSTETRICS & GYNECOLOGY

## 2019-02-22 PROCEDURE — 25000128 H RX IP 250 OP 636: Performed by: NURSE ANESTHETIST, CERTIFIED REGISTERED

## 2019-02-22 PROCEDURE — 25000565 ZZH ISOFLURANE, EA 15 MIN: Performed by: OBSTETRICS & GYNECOLOGY

## 2019-02-22 PROCEDURE — A9270 NON-COVERED ITEM OR SERVICE: HCPCS | Mod: GY | Performed by: OBSTETRICS & GYNECOLOGY

## 2019-02-22 PROCEDURE — 40000014 ZZH STATISTIC ARTERIAL MONITORING DAILY

## 2019-02-22 PROCEDURE — 27210794 ZZH OR GENERAL SUPPLY STERILE: Performed by: OBSTETRICS & GYNECOLOGY

## 2019-02-22 PROCEDURE — 0JN80ZZ RELEASE ABDOMEN SUBCUTANEOUS TISSUE AND FASCIA, OPEN APPROACH: ICD-10-PCS | Performed by: OBSTETRICS & GYNECOLOGY

## 2019-02-22 PROCEDURE — 58950 RESECT OVARIAN MALIGNANCY: CPT | Mod: GC | Performed by: OBSTETRICS & GYNECOLOGY

## 2019-02-22 PROCEDURE — 82947 ASSAY GLUCOSE BLOOD QUANT: CPT | Performed by: ANESTHESIOLOGY

## 2019-02-22 PROCEDURE — 36415 COLL VENOUS BLD VENIPUNCTURE: CPT | Performed by: OBSTETRICS & GYNECOLOGY

## 2019-02-22 PROCEDURE — 84702 CHORIONIC GONADOTROPIN TEST: CPT | Performed by: OBSTETRICS & GYNECOLOGY

## 2019-02-22 PROCEDURE — 84295 ASSAY OF SERUM SODIUM: CPT | Performed by: ANESTHESIOLOGY

## 2019-02-22 PROCEDURE — 25000128 H RX IP 250 OP 636: Performed by: OBSTETRICS & GYNECOLOGY

## 2019-02-22 PROCEDURE — 0UT70ZZ RESECTION OF BILATERAL FALLOPIAN TUBES, OPEN APPROACH: ICD-10-PCS | Performed by: OBSTETRICS & GYNECOLOGY

## 2019-02-22 PROCEDURE — 37000008 ZZH ANESTHESIA TECHNICAL FEE, 1ST 30 MIN: Performed by: OBSTETRICS & GYNECOLOGY

## 2019-02-22 PROCEDURE — 25800030 ZZH RX IP 258 OP 636: Performed by: OBSTETRICS & GYNECOLOGY

## 2019-02-22 PROCEDURE — 0UT20ZZ RESECTION OF BILATERAL OVARIES, OPEN APPROACH: ICD-10-PCS | Performed by: OBSTETRICS & GYNECOLOGY

## 2019-02-22 PROCEDURE — A9270 NON-COVERED ITEM OR SERVICE: HCPCS | Mod: GY | Performed by: STUDENT IN AN ORGANIZED HEALTH CARE EDUCATION/TRAINING PROGRAM

## 2019-02-22 PROCEDURE — 85018 HEMOGLOBIN: CPT | Performed by: OBSTETRICS & GYNECOLOGY

## 2019-02-22 PROCEDURE — 00000146 ZZHCL STATISTIC GLUCOSE BY METER IP

## 2019-02-22 PROCEDURE — 82803 BLOOD GASES ANY COMBINATION: CPT | Performed by: ANESTHESIOLOGY

## 2019-02-22 PROCEDURE — 0DJD8ZZ INSPECTION OF LOWER INTESTINAL TRACT, VIA NATURAL OR ARTIFICIAL OPENING ENDOSCOPIC: ICD-10-PCS | Performed by: OBSTETRICS & GYNECOLOGY

## 2019-02-22 PROCEDURE — 0JNC0ZZ RELEASE PELVIC REGION SUBCUTANEOUS TISSUE AND FASCIA, OPEN APPROACH: ICD-10-PCS | Performed by: OBSTETRICS & GYNECOLOGY

## 2019-02-22 PROCEDURE — 0DTU0ZZ RESECTION OF OMENTUM, OPEN APPROACH: ICD-10-PCS | Performed by: OBSTETRICS & GYNECOLOGY

## 2019-02-22 PROCEDURE — 12000001 ZZH R&B MED SURG/OB UMMC

## 2019-02-22 PROCEDURE — 37000009 ZZH ANESTHESIA TECHNICAL FEE, EACH ADDTL 15 MIN: Performed by: OBSTETRICS & GYNECOLOGY

## 2019-02-22 PROCEDURE — 82330 ASSAY OF CALCIUM: CPT | Performed by: ANESTHESIOLOGY

## 2019-02-22 PROCEDURE — 93010 ELECTROCARDIOGRAM REPORT: CPT | Performed by: INTERNAL MEDICINE

## 2019-02-22 RX ORDER — NALOXONE HYDROCHLORIDE 0.4 MG/ML
.1-.4 INJECTION, SOLUTION INTRAMUSCULAR; INTRAVENOUS; SUBCUTANEOUS
Status: DISCONTINUED | OUTPATIENT
Start: 2019-02-22 | End: 2019-02-26 | Stop reason: HOSPADM

## 2019-02-22 RX ORDER — DULOXETIN HYDROCHLORIDE 60 MG/1
60 CAPSULE, DELAYED RELEASE ORAL AT BEDTIME
Status: DISCONTINUED | OUTPATIENT
Start: 2019-02-22 | End: 2019-02-26 | Stop reason: HOSPADM

## 2019-02-22 RX ORDER — OXYCODONE HYDROCHLORIDE 5 MG/1
5-10 TABLET ORAL EVERY 4 HOURS PRN
Status: DISCONTINUED | OUTPATIENT
Start: 2019-02-22 | End: 2019-02-26 | Stop reason: HOSPADM

## 2019-02-22 RX ORDER — ONDANSETRON 2 MG/ML
INJECTION INTRAMUSCULAR; INTRAVENOUS PRN
Status: DISCONTINUED | OUTPATIENT
Start: 2019-02-22 | End: 2019-02-22

## 2019-02-22 RX ORDER — ONDANSETRON 2 MG/ML
4 INJECTION INTRAMUSCULAR; INTRAVENOUS EVERY 30 MIN PRN
Status: DISCONTINUED | OUTPATIENT
Start: 2019-02-22 | End: 2019-02-22

## 2019-02-22 RX ORDER — MEPERIDINE HYDROCHLORIDE 50 MG/ML
12.5 INJECTION INTRAMUSCULAR; INTRAVENOUS; SUBCUTANEOUS
Status: DISCONTINUED | OUTPATIENT
Start: 2019-02-22 | End: 2019-02-22

## 2019-02-22 RX ORDER — HYDROMORPHONE HCL/0.9% NACL/PF 0.2MG/0.2
0.2 SYRINGE (ML) INTRAVENOUS
Status: DISCONTINUED | OUTPATIENT
Start: 2019-02-22 | End: 2019-02-26 | Stop reason: HOSPADM

## 2019-02-22 RX ORDER — FENTANYL CITRATE 50 UG/ML
25-50 INJECTION, SOLUTION INTRAMUSCULAR; INTRAVENOUS
Status: DISCONTINUED | OUTPATIENT
Start: 2019-02-22 | End: 2019-02-22 | Stop reason: HOSPADM

## 2019-02-22 RX ORDER — SODIUM CHLORIDE, SODIUM LACTATE, POTASSIUM CHLORIDE, CALCIUM CHLORIDE 600; 310; 30; 20 MG/100ML; MG/100ML; MG/100ML; MG/100ML
INJECTION, SOLUTION INTRAVENOUS CONTINUOUS
Status: DISCONTINUED | OUTPATIENT
Start: 2019-02-22 | End: 2019-02-22

## 2019-02-22 RX ORDER — CARVEDILOL 25 MG/1
25 TABLET ORAL 2 TIMES DAILY WITH MEALS
Status: DISCONTINUED | OUTPATIENT
Start: 2019-02-22 | End: 2019-02-26 | Stop reason: HOSPADM

## 2019-02-22 RX ORDER — NALOXONE HYDROCHLORIDE 0.4 MG/ML
.1-.4 INJECTION, SOLUTION INTRAMUSCULAR; INTRAVENOUS; SUBCUTANEOUS
Status: DISCONTINUED | OUTPATIENT
Start: 2019-02-22 | End: 2019-02-22 | Stop reason: HOSPADM

## 2019-02-22 RX ORDER — LIDOCAINE 40 MG/G
CREAM TOPICAL
Status: DISCONTINUED | OUTPATIENT
Start: 2019-02-22 | End: 2019-02-22 | Stop reason: HOSPADM

## 2019-02-22 RX ORDER — NALOXONE HYDROCHLORIDE 0.4 MG/ML
.1-.4 INJECTION, SOLUTION INTRAMUSCULAR; INTRAVENOUS; SUBCUTANEOUS
Status: DISCONTINUED | OUTPATIENT
Start: 2019-02-22 | End: 2019-02-22

## 2019-02-22 RX ORDER — BUPIVACAINE HYDROCHLORIDE 2.5 MG/ML
INJECTION, SOLUTION EPIDURAL; INFILTRATION; INTRACAUDAL PRN
Status: DISCONTINUED | OUTPATIENT
Start: 2019-02-22 | End: 2019-02-22

## 2019-02-22 RX ORDER — GABAPENTIN 300 MG/1
300 CAPSULE ORAL ONCE
Status: DISCONTINUED | OUTPATIENT
Start: 2019-02-22 | End: 2019-02-22 | Stop reason: HOSPADM

## 2019-02-22 RX ORDER — FENTANYL CITRATE 50 UG/ML
25-50 INJECTION, SOLUTION INTRAMUSCULAR; INTRAVENOUS
Status: DISCONTINUED | OUTPATIENT
Start: 2019-02-22 | End: 2019-02-22

## 2019-02-22 RX ORDER — CEFAZOLIN SODIUM 1 G/3ML
1 INJECTION, POWDER, FOR SOLUTION INTRAMUSCULAR; INTRAVENOUS SEE ADMIN INSTRUCTIONS
Status: DISCONTINUED | OUTPATIENT
Start: 2019-02-22 | End: 2019-02-22 | Stop reason: HOSPADM

## 2019-02-22 RX ORDER — ETOMIDATE 2 MG/ML
INJECTION INTRAVENOUS PRN
Status: DISCONTINUED | OUTPATIENT
Start: 2019-02-22 | End: 2019-02-22

## 2019-02-22 RX ORDER — HYDRALAZINE HYDROCHLORIDE 20 MG/ML
10 INJECTION INTRAMUSCULAR; INTRAVENOUS ONCE
Status: COMPLETED | OUTPATIENT
Start: 2019-02-22 | End: 2019-02-22

## 2019-02-22 RX ORDER — SIMETHICONE 80 MG
80 TABLET,CHEWABLE ORAL 4 TIMES DAILY PRN
Status: DISCONTINUED | OUTPATIENT
Start: 2019-02-22 | End: 2019-02-26 | Stop reason: HOSPADM

## 2019-02-22 RX ORDER — LEVOTHYROXINE SODIUM 75 UG/1
75 TABLET ORAL
Status: DISCONTINUED | OUTPATIENT
Start: 2019-02-23 | End: 2019-02-26 | Stop reason: HOSPADM

## 2019-02-22 RX ORDER — DEXTROSE MONOHYDRATE 25 G/50ML
25-50 INJECTION, SOLUTION INTRAVENOUS
Status: DISCONTINUED | OUTPATIENT
Start: 2019-02-22 | End: 2019-02-22

## 2019-02-22 RX ORDER — LOSARTAN POTASSIUM 50 MG/1
100 TABLET ORAL EVERY 24 HOURS
Status: DISCONTINUED | OUTPATIENT
Start: 2019-02-22 | End: 2019-02-26 | Stop reason: HOSPADM

## 2019-02-22 RX ORDER — SODIUM CHLORIDE, SODIUM LACTATE, POTASSIUM CHLORIDE, CALCIUM CHLORIDE 600; 310; 30; 20 MG/100ML; MG/100ML; MG/100ML; MG/100ML
INJECTION, SOLUTION INTRAVENOUS CONTINUOUS PRN
Status: DISCONTINUED | OUTPATIENT
Start: 2019-02-22 | End: 2019-02-22

## 2019-02-22 RX ORDER — ONDANSETRON 4 MG/1
4 TABLET, ORALLY DISINTEGRATING ORAL EVERY 30 MIN PRN
Status: DISCONTINUED | OUTPATIENT
Start: 2019-02-22 | End: 2019-02-22

## 2019-02-22 RX ORDER — FLUMAZENIL 0.1 MG/ML
0.2 INJECTION, SOLUTION INTRAVENOUS
Status: DISCONTINUED | OUTPATIENT
Start: 2019-02-22 | End: 2019-02-22 | Stop reason: HOSPADM

## 2019-02-22 RX ORDER — NICOTINE POLACRILEX 4 MG
15-30 LOZENGE BUCCAL
Status: DISCONTINUED | OUTPATIENT
Start: 2019-02-22 | End: 2019-02-22

## 2019-02-22 RX ORDER — HYDRALAZINE HYDROCHLORIDE 20 MG/ML
2.5-5 INJECTION INTRAMUSCULAR; INTRAVENOUS EVERY 10 MIN PRN
Status: DISCONTINUED | OUTPATIENT
Start: 2019-02-22 | End: 2019-02-22

## 2019-02-22 RX ORDER — DIMENHYDRINATE 50 MG/ML
25 INJECTION, SOLUTION INTRAMUSCULAR; INTRAVENOUS
Status: DISCONTINUED | OUTPATIENT
Start: 2019-02-22 | End: 2019-02-22

## 2019-02-22 RX ORDER — OXYCODONE HYDROCHLORIDE 5 MG/1
5 TABLET ORAL EVERY 4 HOURS PRN
Status: DISCONTINUED | OUTPATIENT
Start: 2019-02-22 | End: 2019-02-22

## 2019-02-22 RX ORDER — BISACODYL 10 MG
10 SUPPOSITORY, RECTAL RECTAL DAILY
Status: DISCONTINUED | OUTPATIENT
Start: 2019-02-22 | End: 2019-02-26 | Stop reason: HOSPADM

## 2019-02-22 RX ORDER — FENTANYL CITRATE 50 UG/ML
INJECTION, SOLUTION INTRAMUSCULAR; INTRAVENOUS PRN
Status: DISCONTINUED | OUTPATIENT
Start: 2019-02-22 | End: 2019-02-22

## 2019-02-22 RX ORDER — SODIUM CHLORIDE, SODIUM LACTATE, POTASSIUM CHLORIDE, CALCIUM CHLORIDE 600; 310; 30; 20 MG/100ML; MG/100ML; MG/100ML; MG/100ML
INJECTION, SOLUTION INTRAVENOUS CONTINUOUS
Status: DISCONTINUED | OUTPATIENT
Start: 2019-02-22 | End: 2019-02-22 | Stop reason: HOSPADM

## 2019-02-22 RX ORDER — CEFAZOLIN SODIUM 2 G/100ML
2 INJECTION, SOLUTION INTRAVENOUS
Status: COMPLETED | OUTPATIENT
Start: 2019-02-22 | End: 2019-02-22

## 2019-02-22 RX ORDER — GABAPENTIN 600 MG/1
600 TABLET ORAL 3 TIMES DAILY
Status: DISCONTINUED | OUTPATIENT
Start: 2019-02-22 | End: 2019-02-26 | Stop reason: HOSPADM

## 2019-02-22 RX ORDER — LIDOCAINE HYDROCHLORIDE 20 MG/ML
INJECTION, SOLUTION INFILTRATION; PERINEURAL PRN
Status: DISCONTINUED | OUTPATIENT
Start: 2019-02-22 | End: 2019-02-22

## 2019-02-22 RX ORDER — ACETAMINOPHEN 325 MG/1
650 TABLET ORAL EVERY 6 HOURS
Status: DISCONTINUED | OUTPATIENT
Start: 2019-02-22 | End: 2019-02-26 | Stop reason: HOSPADM

## 2019-02-22 RX ORDER — METOPROLOL TARTRATE 1 MG/ML
1-2 INJECTION, SOLUTION INTRAVENOUS EVERY 5 MIN PRN
Status: DISCONTINUED | OUTPATIENT
Start: 2019-02-22 | End: 2019-02-22

## 2019-02-22 RX ORDER — NICOTINE POLACRILEX 4 MG
15-30 LOZENGE BUCCAL
Status: DISCONTINUED | OUTPATIENT
Start: 2019-02-22 | End: 2019-02-26 | Stop reason: HOSPADM

## 2019-02-22 RX ORDER — CALCIUM CARBONATE 500 MG/1
1000 TABLET, CHEWABLE ORAL 4 TIMES DAILY PRN
Status: DISCONTINUED | OUTPATIENT
Start: 2019-02-22 | End: 2019-02-26 | Stop reason: HOSPADM

## 2019-02-22 RX ORDER — HYDROMORPHONE HYDROCHLORIDE 1 MG/ML
.3-.5 INJECTION, SOLUTION INTRAMUSCULAR; INTRAVENOUS; SUBCUTANEOUS EVERY 10 MIN PRN
Status: DISCONTINUED | OUTPATIENT
Start: 2019-02-22 | End: 2019-02-22

## 2019-02-22 RX ORDER — ALBUTEROL SULFATE 0.83 MG/ML
2.5 SOLUTION RESPIRATORY (INHALATION) EVERY 4 HOURS PRN
Status: DISCONTINUED | OUTPATIENT
Start: 2019-02-22 | End: 2019-02-22

## 2019-02-22 RX ORDER — AMOXICILLIN 250 MG
1 CAPSULE ORAL 2 TIMES DAILY PRN
Status: DISCONTINUED | OUTPATIENT
Start: 2019-02-22 | End: 2019-02-26 | Stop reason: HOSPADM

## 2019-02-22 RX ORDER — DEXAMETHASONE SODIUM PHOSPHATE 4 MG/ML
INJECTION, SOLUTION INTRA-ARTICULAR; INTRALESIONAL; INTRAMUSCULAR; INTRAVENOUS; SOFT TISSUE PRN
Status: DISCONTINUED | OUTPATIENT
Start: 2019-02-22 | End: 2019-02-22

## 2019-02-22 RX ORDER — LIDOCAINE 40 MG/G
CREAM TOPICAL
Status: DISCONTINUED | OUTPATIENT
Start: 2019-02-22 | End: 2019-02-26 | Stop reason: HOSPADM

## 2019-02-22 RX ORDER — AMOXICILLIN 250 MG
2 CAPSULE ORAL 2 TIMES DAILY PRN
Status: DISCONTINUED | OUTPATIENT
Start: 2019-02-22 | End: 2019-02-26 | Stop reason: HOSPADM

## 2019-02-22 RX ORDER — DEXMEDETOMIDINE HYDROCHLORIDE 4 UG/ML
0.2-1.2 INJECTION, SOLUTION INTRAVENOUS CONTINUOUS
Status: DISCONTINUED | OUTPATIENT
Start: 2019-02-22 | End: 2019-02-22

## 2019-02-22 RX ORDER — SODIUM CHLORIDE, SODIUM LACTATE, POTASSIUM CHLORIDE, CALCIUM CHLORIDE 600; 310; 30; 20 MG/100ML; MG/100ML; MG/100ML; MG/100ML
INJECTION, SOLUTION INTRAVENOUS CONTINUOUS
Status: DISCONTINUED | OUTPATIENT
Start: 2019-02-22 | End: 2019-02-23

## 2019-02-22 RX ADMIN — PHENYLEPHRINE HYDROCHLORIDE 100 MCG: 10 INJECTION, SOLUTION INTRAMUSCULAR; INTRAVENOUS; SUBCUTANEOUS at 10:48

## 2019-02-22 RX ADMIN — HUMAN INSULIN 1.5 UNITS/HR: 100 INJECTION, SOLUTION SUBCUTANEOUS at 09:00

## 2019-02-22 RX ADMIN — BUPIVACAINE 20 ML: 13.3 INJECTION, SUSPENSION, LIPOSOMAL INFILTRATION at 07:11

## 2019-02-22 RX ADMIN — ACETAMINOPHEN 650 MG: 325 TABLET, FILM COATED ORAL at 21:39

## 2019-02-22 RX ADMIN — FENTANYL CITRATE 50 MCG: 50 INJECTION, SOLUTION INTRAMUSCULAR; INTRAVENOUS at 08:00

## 2019-02-22 RX ADMIN — PHENYLEPHRINE HYDROCHLORIDE 100 MCG: 10 INJECTION, SOLUTION INTRAMUSCULAR; INTRAVENOUS; SUBCUTANEOUS at 10:43

## 2019-02-22 RX ADMIN — ONDANSETRON 4 MG: 2 INJECTION INTRAMUSCULAR; INTRAVENOUS at 08:51

## 2019-02-22 RX ADMIN — MAGNESIUM HYDROXIDE 15 ML: 400 SUSPENSION ORAL at 17:41

## 2019-02-22 RX ADMIN — PHENYLEPHRINE HYDROCHLORIDE 100 MCG: 10 INJECTION, SOLUTION INTRAMUSCULAR; INTRAVENOUS; SUBCUTANEOUS at 09:04

## 2019-02-22 RX ADMIN — CEFAZOLIN SODIUM 2 G: 2 INJECTION, SOLUTION INTRAVENOUS at 08:15

## 2019-02-22 RX ADMIN — SODIUM CHLORIDE, POTASSIUM CHLORIDE, SODIUM LACTATE AND CALCIUM CHLORIDE: 600; 310; 30; 20 INJECTION, SOLUTION INTRAVENOUS at 12:34

## 2019-02-22 RX ADMIN — ROCURONIUM BROMIDE 50 MG: 10 INJECTION INTRAVENOUS at 08:00

## 2019-02-22 RX ADMIN — PHENYLEPHRINE HYDROCHLORIDE 100 MCG: 10 INJECTION, SOLUTION INTRAMUSCULAR; INTRAVENOUS; SUBCUTANEOUS at 11:29

## 2019-02-22 RX ADMIN — PHENYLEPHRINE HYDROCHLORIDE 100 MCG: 10 INJECTION, SOLUTION INTRAMUSCULAR; INTRAVENOUS; SUBCUTANEOUS at 09:34

## 2019-02-22 RX ADMIN — FENTANYL CITRATE 25 MCG: 50 INJECTION, SOLUTION INTRAMUSCULAR; INTRAVENOUS at 10:10

## 2019-02-22 RX ADMIN — CARVEDILOL 25 MG: 25 TABLET, FILM COATED ORAL at 17:40

## 2019-02-22 RX ADMIN — Medication 0.3 MG: at 12:50

## 2019-02-22 RX ADMIN — PHENYLEPHRINE HYDROCHLORIDE 100 MCG: 10 INJECTION, SOLUTION INTRAMUSCULAR; INTRAVENOUS; SUBCUTANEOUS at 11:24

## 2019-02-22 RX ADMIN — DEXAMETHASONE SODIUM PHOSPHATE 10 MG: 4 INJECTION, SOLUTION INTRA-ARTICULAR; INTRALESIONAL; INTRAMUSCULAR; INTRAVENOUS; SOFT TISSUE at 08:51

## 2019-02-22 RX ADMIN — PHENYLEPHRINE HYDROCHLORIDE 100 MCG: 10 INJECTION, SOLUTION INTRAMUSCULAR; INTRAVENOUS; SUBCUTANEOUS at 11:13

## 2019-02-22 RX ADMIN — INSULIN DEGLUDEC INJECTION 15 UNITS: 100 INJECTION, SOLUTION SUBCUTANEOUS at 22:36

## 2019-02-22 RX ADMIN — Medication 0.2 MG: at 14:05

## 2019-02-22 RX ADMIN — SODIUM CHLORIDE, POTASSIUM CHLORIDE, SODIUM LACTATE AND CALCIUM CHLORIDE: 600; 310; 30; 20 INJECTION, SOLUTION INTRAVENOUS at 07:15

## 2019-02-22 RX ADMIN — PHENYLEPHRINE HYDROCHLORIDE 100 MCG: 10 INJECTION, SOLUTION INTRAMUSCULAR; INTRAVENOUS; SUBCUTANEOUS at 10:24

## 2019-02-22 RX ADMIN — PHENYLEPHRINE HYDROCHLORIDE 0.5 MCG/KG/MIN: 10 INJECTION, SOLUTION INTRAMUSCULAR; INTRAVENOUS; SUBCUTANEOUS at 09:45

## 2019-02-22 RX ADMIN — FENTANYL CITRATE 50 MCG: 50 INJECTION, SOLUTION INTRAMUSCULAR; INTRAVENOUS at 07:50

## 2019-02-22 RX ADMIN — OXYCODONE HYDROCHLORIDE 10 MG: 5 TABLET ORAL at 19:37

## 2019-02-22 RX ADMIN — INSULIN ASPART 3 UNITS: 100 INJECTION, SOLUTION INTRAVENOUS; SUBCUTANEOUS at 18:51

## 2019-02-22 RX ADMIN — PHENYLEPHRINE HYDROCHLORIDE 100 MCG: 10 INJECTION, SOLUTION INTRAMUSCULAR; INTRAVENOUS; SUBCUTANEOUS at 10:27

## 2019-02-22 RX ADMIN — LIDOCAINE HYDROCHLORIDE 100 MG: 20 INJECTION, SOLUTION INFILTRATION; PERINEURAL at 08:00

## 2019-02-22 RX ADMIN — FENTANYL CITRATE 25 MCG: 50 INJECTION, SOLUTION INTRAMUSCULAR; INTRAVENOUS at 10:55

## 2019-02-22 RX ADMIN — PHENYLEPHRINE HYDROCHLORIDE 100 MCG: 10 INJECTION, SOLUTION INTRAMUSCULAR; INTRAVENOUS; SUBCUTANEOUS at 10:31

## 2019-02-22 RX ADMIN — PHENYLEPHRINE HYDROCHLORIDE 100 MCG: 10 INJECTION, SOLUTION INTRAMUSCULAR; INTRAVENOUS; SUBCUTANEOUS at 09:45

## 2019-02-22 RX ADMIN — LOSARTAN POTASSIUM 100 MG: 50 TABLET ORAL at 17:40

## 2019-02-22 RX ADMIN — PHENYLEPHRINE HYDROCHLORIDE 100 MCG: 10 INJECTION, SOLUTION INTRAMUSCULAR; INTRAVENOUS; SUBCUTANEOUS at 11:03

## 2019-02-22 RX ADMIN — ROCURONIUM BROMIDE 10 MG: 10 INJECTION INTRAVENOUS at 10:24

## 2019-02-22 RX ADMIN — FENTANYL CITRATE 50 MCG: 50 INJECTION, SOLUTION INTRAMUSCULAR; INTRAVENOUS at 07:21

## 2019-02-22 RX ADMIN — ACETAMINOPHEN 650 MG: 325 TABLET, FILM COATED ORAL at 14:52

## 2019-02-22 RX ADMIN — DEXMEDETOMIDINE HYDROCHLORIDE 0.2 MCG/KG/HR: 4 INJECTION, SOLUTION INTRAVENOUS at 09:00

## 2019-02-22 RX ADMIN — FENTANYL CITRATE 50 MCG: 50 INJECTION, SOLUTION INTRAMUSCULAR; INTRAVENOUS at 09:24

## 2019-02-22 RX ADMIN — DULOXETINE HYDROCHLORIDE 60 MG: 60 CAPSULE, DELAYED RELEASE ORAL at 21:39

## 2019-02-22 RX ADMIN — Medication 0.2 MG: at 13:19

## 2019-02-22 RX ADMIN — ETOMIDATE 12 MG: 2 INJECTION INTRAVENOUS at 08:00

## 2019-02-22 RX ADMIN — GABAPENTIN 600 MG: 600 TABLET, FILM COATED ORAL at 19:37

## 2019-02-22 RX ADMIN — CEPHALEXIN 250 MG: 250 CAPSULE ORAL at 21:39

## 2019-02-22 RX ADMIN — SODIUM CHLORIDE, POTASSIUM CHLORIDE, SODIUM LACTATE AND CALCIUM CHLORIDE: 600; 310; 30; 20 INJECTION, SOLUTION INTRAVENOUS at 18:10

## 2019-02-22 RX ADMIN — OXYCODONE HYDROCHLORIDE 5 MG: 5 TABLET ORAL at 14:52

## 2019-02-22 RX ADMIN — CEFAZOLIN SODIUM 1 G: 2 INJECTION, SOLUTION INTRAVENOUS at 10:15

## 2019-02-22 RX ADMIN — ROCURONIUM BROMIDE 30 MG: 10 INJECTION INTRAVENOUS at 08:40

## 2019-02-22 RX ADMIN — HYDRALAZINE HYDROCHLORIDE 10 MG: 20 INJECTION INTRAMUSCULAR; INTRAVENOUS at 19:37

## 2019-02-22 RX ADMIN — FENTANYL CITRATE 25 MCG: 50 INJECTION, SOLUTION INTRAMUSCULAR; INTRAVENOUS at 11:41

## 2019-02-22 RX ADMIN — PHENYLEPHRINE HYDROCHLORIDE 100 MCG: 10 INJECTION, SOLUTION INTRAMUSCULAR; INTRAVENOUS; SUBCUTANEOUS at 10:53

## 2019-02-22 RX ADMIN — ROCURONIUM BROMIDE 20 MG: 10 INJECTION INTRAVENOUS at 09:28

## 2019-02-22 RX ADMIN — Medication 0.2 MG: at 16:19

## 2019-02-22 RX ADMIN — BUPIVACAINE HYDROCHLORIDE 20 ML: 2.5 INJECTION, SOLUTION EPIDURAL; INFILTRATION; INTRACAUDAL; PERINEURAL at 07:11

## 2019-02-22 ASSESSMENT — ACTIVITIES OF DAILY LIVING (ADL)
ADLS_ACUITY_SCORE: 13
ADLS_ACUITY_SCORE: 13

## 2019-02-22 ASSESSMENT — PAIN DESCRIPTION - DESCRIPTORS
DESCRIPTORS: ACHING;SORE
DESCRIPTORS: ACHING;SORE

## 2019-02-22 ASSESSMENT — MIFFLIN-ST. JEOR: SCORE: 1153.38

## 2019-02-22 NOTE — OP NOTE
Gynecologic Oncology Operative Report      Name: Afshan Cardona  MRN: [id number]    DATE OF PROCEDURE: 2/22/2019    PREOPERATIVE DIAGNOSES:   1. Metastatic high grade serous ovarian cancer     POSTOPERATIVE DIAGNOSES:   1. Same     PROCEDURES:   Exploratory laparotomy, interval tumor debulking, right ureterolysis, bilateral salpingo-oophorectomy, omentectomy, proctoscopy, lysis of adhesion was performed for 60 minutes.     SURGEON: Bakari Galeas MD    ASSISTANTS: Adan Dahl, fellow, Yadira Enamorado MD, PGY-3    ANESTHESIA: General endotracheal, TAP block    ESTIMATED BLOOD LOSS: 100 ml    IV FLUIDS: 1,400 ml crystalloid    DRAINS: Shepard     INDICATIONS: Afshan Cardona is a 73 year old with a diagnosis of metastatic high grade ovarian cancer. She initially underwent laparoscopy for dx and subsequent attempt at XL with debulking in July 2018 but this was stopped early due to prolonged hypotension and extent of disease. She received 6 cycles of chemotherapy and is now undergoing interval debulking.     FINDINGS: Prior vertical midline incision. Adhesions of omentum to anterior abdominal wall. Moderate adhesions present along small bowel as well as between small intestine and bladder. The tumor was arising from the right ovary and there was a small mass in the posterior cul du sac that was adhesed to the rectosigmoid colon.  Grossly normal appearing left ovary. No evidence of disease on the liver, spleen, small bowel, colon, or omentum. Proctoscopy revealed in tact rectum. Resection to R0.      SPECIMENS:   1. Cul-du-sac nodule  2. Right fallopian tube and ovary  3. Left fallopian tube ovary   4. Omentum     COMPLICATIONS: None.     CONDITION: Stable to PACU.     PROCEDURE:   Following informed consent, the patient was taken to the operating room where she was placed in supine position and underwent general endotracheal anesthesia without difficulty. A Shepard catheter was placed in the bladder, and the patient  was prepped and draped in normal sterile fashion. A midline laparotomy was performed from the pubic symphysis to just above umbilicus. Fascia was opened with the bovie. The peritoneum was entered sharply with metzenbaum scissors.  Lysis of adhesions was performed to drop the omentum and bowel away from the anterior abdominal wall. The abdomen was explored with the findings as described above. The liver and bilateral hemidiaphragms were palpably normal. The Bookwalter retractor was placed. The patient was placed in Trendelenberg.     Lysis of adhesions was continued to free the bladder and and pelvic mass away from the bowel. The posterior cul du sac nodule was then resected. Attention was turned to the right round ligament and it was transected with electrocautery. The retroperitoneal space was explored and the ureter was identified. There was mild hydronephrosis of the right ureter, the ureter was mobilized at this point to ensure it is  from the . The bovie was used to make a whole parallel to the IP in the peritoneum and the IP was doubly clamped, cut and doubly tied with 0 vicryl. The fallopian tube remnant, ovary and mass were removed and sent to pathology. Attention was turned to the left side and the round ligament was again transected and the ureter was identified in the retroperitoneal space and the IP was taken in the same fashion.  Omentectomy was performed using the Bovie. The gastroepiploic vessels were preserved. The bowel was run from the ileocecal valve to the ligament of Treitz. The abdomen and pelvis were copiously irrigated. Proctoscopy was done and the rectum was in tact. The vascular pedicles were verified to be hemostatic. Fascia was closed with looped 0 PDS starting at each extent of the incision to meet approximately two-thirds of the way up. The subcutaneous tissues were irrigated with warm saline and reapproximated with interrupted stitches of 2-0 Vicryl. Skin was closed with a  running subcuticular stitch of 3-0 Monocryl followed by steri-strips and was topped with a sterile dressing. The Shepard catheter was left in place.    The patient tolerated the procedure well and was transferred to PACU extubated and in stable condition. All sponge, needle, and instrument counts were correct x2.     Bakari Galeas MD, MS    Department of Obstetrics and Gynecology   Division of Gynecologic Oncology   [facility]  Phone: [phone]

## 2019-02-22 NOTE — DISCHARGE SUMMARY
Gynecologic Oncology Discharge Summary    Afshan Cardona  [id number]    Admit Date: 2/22/2019  Discharge Date: 2/26/2019   Admitting Provider: Bakari Galeas MD   Discharge Provider: Rima Kay MD    Admission Dx:   - Metastatic high grade serous ovarian cancer   - Anemia of chronic disease   - Type II diabetes mellitus   - Hx DVT x2   - HTN  - Hx Takutsubo cardiomyopathy 2/2015  - Coronary artery disease s/p cath 7/10/18  - Recurrent UTIs on PPx  - Peripheral neuropathy  - Renal cell carcinoma     Discharge Dx:  - Same     Patient Active Problem List   Diagnosis     Ovarian cancer (H)     Procedures: Exploratory laparotomy, interval tumor debulking, bilateral salpingo-oophorectomy,  omentectomy, proctoscopy, lysis of adhesion was performed for 60 minutes     Prior to Admission Medications:  Medication Sig     carvedilol (COREG) 25 MG tablet Take 1 tablet (25 mg) by mouth 2 times daily (with meals)     cephalexin (KEFLEX) 250 MG capsule TAKE ONE CAPSULE BY MOUTH ONCE DAILY AT BEDTIME     Cholecalciferol (VITAMIN D3) 2000 units CAPS TAKE ONE CAPSULE BY MOUTH ONCE DAILY IN THE MORNING     CRANBERRY EXTRACT PO Take by mouth every morning     DULoxetine (CYMBALTA) 60 MG EC capsule TAKE ONE CAPSULE BY MOUTH AT BEDTIME     gabapentin (NEURONTIN) 600 MG tablet Take 1 tablet (600 mg) by mouth 3 times daily     HUMALOG MARY KWIKPEN 100 UNIT/ML injection INJECT 1 UNIT PER 10GRAMS OF CARBOHYDRATES WITH CORRECTION 0.5 UNITS PER 50 ABOVE 150 ( UP TO 30 UNITS PER DAY)     levothyroxine (SYNTHROID/LEVOTHROID) 75 MCG tablet TAKE ONE TABLET BY MOUTH ONCE DAILY  IN THE MORNING     (PLEASE CALL [phone] FOR AN OFFICE VISIT BEFORE NEXT REFILL.)     losartan (COZAAR) 100 MG tablet Take 100 mg by mouth At Bedtime      melatonin 1 MG TABS tablet Take 1 mg by mouth nightly as needed for sleep     ONETOUCH VERIO IQ test strip USE TO TEST BLOOD GLUCOSE 6-8 TIMES PER DAY, BEFORE MEALS, BEDTIME TO DOSE INSULIN, HIGH OR LOW  BLOOD GLUCOSE WITH RECHECK     oxyCODONE IR (ROXICODONE) 5 MG tablet Take 0.5-1 tablets (2.5-5 mg) by mouth every 6 hours as needed for severe pain     pravastatin (PRAVACHOL) 80 MG tablet Take 80 mg by mouth At Bedtime      senna-docusate (SENOKOT-S;PERICOLACE) 8.6-50 MG per tablet Take 1-2 tablets by mouth 2 times daily Take while on oral narcotics to prevent or treat constipation.     aspirin (ASA) 325 MG EC tablet Take 325 mg by mouth daily     insulin degludec (TRESIBA) 100 UNIT/ML pen Inject 8 Units Subcutaneous At Bedtime (Patient taking differently: Inject 14 Units Subcutaneous At Bedtime )     Discharge Medications:   acetaminophen (TYLENOL) 325 MG tablet  Take 2 tablets (650 mg) by mouth every 6 hours as needed for mild pain   aspirin (ASA) 325 MG EC tablet  Take 325 mg by mouth daily   carvedilol (COREG) 25 MG tablet  Take 1 tablet (25 mg) by mouth 2 times daily (with meals)   cephalexin (KEFLEX) 250 MG capsule  TAKE ONE CAPSULE BY MOUTH ONCE DAILY AT BEDTIME   Cholecalciferol (VITAMIN D3) 2000 units CAPS  TAKE ONE CAPSULE BY MOUTH ONCE DAILY IN THE MORNING   CRANBERRY EXTRACT PO  Take by mouth every morning   DULoxetine (CYMBALTA) 60 MG EC capsule  TAKE ONE CAPSULE BY MOUTH AT BEDTIME   enoxaparin (LOVENOX) 40 MG/0.4ML syringe  Inject 0.4 mLs (40 mg) Subcutaneous every 24 hours for 25 days   gabapentin (NEURONTIN) 600 MG tablet  Take 1 tablet (600 mg) by mouth 3 times daily   HUMALOG MARY KWIKPEN 100 UNIT/ML injection  INJECT 1 UNIT PER 10GRAMS OF CARBOHYDRATES WITH CORRECTION 0.5 UNITS PER 50 ABOVE 150 ( UP TO 30 UNITS PER DAY)   insulin degludec (TRESIBA) 100 UNIT/ML pen  Inject 15 Units Subcutaneous At Bedtime   levothyroxine (SYNTHROID/LEVOTHROID) 75 MCG tablet  TAKE ONE TABLET BY MOUTH ONCE DAILY  IN THE MORNING     (PLEASE CALL [phone] FOR AN OFFICE VISIT BEFORE NEXT REFILL.)   losartan (COZAAR) 100 MG tablet  Take 100 mg by mouth At Bedtime    melatonin 1 MG TABS tablet  Take 1 mg by mouth  nightly as needed for sleep   ONETOUCH VERIO IQ test strip  USE TO TEST BLOOD GLUCOSE 6-8 TIMES PER DAY, BEFORE MEALS, BEDTIME TO DOSE INSULIN, HIGH OR LOW BLOOD GLUCOSE WITH RECHECK   oxyCODONE (ROXICODONE) 5 MG tablet  Take 1-2 tablets (5-10 mg) by mouth every 6 hours as needed for moderate to severe pain (take one tab for moderate pain and two tabs for severe pain)   polyethylene glycol (MIRALAX/GLYCOLAX) packet  Take 17 g by mouth 2 times daily as needed for constipation   pravastatin (PRAVACHOL) 80 MG tablet  Take 80 mg by mouth At Bedtime    senna-docusate (SENOKOT-S/PERICOLACE) 8.6-50 MG tablet  Take 1-2 tablets by mouth 2 times daily as needed for constipation Take while on oral narcotics to prevent or treat constipation.     Consultations:  PT/OT    Brief History of Illness:  Afshan Cardona is a 73 year old with a diagnosis of metastatic high grade ovarian cancer. She initially underwent laparoscopy for dx and subsequent attempt at XL with debulking in July 2018 but this was stopped early due to prolonged hypotension and extent of disease. She received 6 cycles of chemotherapy and is now undergoing interval debulking. Surgery was uncomplicated. . Findings:  prior vertical midline incision. Adhesions of omentum to anterior abdominal wall. Moderate adhesions present along small bowel as well as between small intestine and bladder. The tumor was arising from the right ovary and there was a small mass in the posterior cul du sac that was adhesed to the rectosigmoid colon.  Grossly normal appearing left ovary. No evidence of disease on the liver, spleen, small bowel, colon, or omentum. Proctoscopy revealed in tact rectum.     Hospital Course:  Dz:   Preoperative diagnosis was high grade serous ovarian cancer. initially diagnosed 7/8/2018 with laparoscopy, she had XL w biopsies 7/24/2018 that was stopped for hypotension, s/p 6 cycles Neoadjuvant carbo/taxol (C6D1) now s/p interval debulking to R0 (prior  [facility]). Ca-125 26.  Final pathology is pending at the time of discharge.  She will follow-up postoperatively for a care plan.  FEN:  She was maintained on IVF until POD#1, when her diet was slowly advanced.  By discharge, she was tolerating a regular diet without nausea and vomiting and able to maintain her hydration without IVF supplementation.   Pain:  She received TAP block pre operatively. Her pain was initially controlled on a dilaudid IV prn as well as oxycodone and tylenol.  Once tolerating PO pain meds, she was transitioned to a PO pain regimen.  Her pain was well controlled on this and she was discharged home with these medications.  CV:  She has hypertension and she was continued on her PTA carvedilol and losartan. Her statin for HLD was held. She has a history of Takutsubo cardiomyopathy 2/2015, EF 25% > most recent echo 65% in 2/2019. CAD s/p cath 7/10/18. Her vital signs were stable while in house and she had no acute CV issues.   PULM:  She has no history of pulmonary issues.  She was initially given O2 supplementation in to maintain her O2 sats in the immediate postop period and was transitioned off of this without difficulty.  By discharge, her O2 sats were greater than 94% on RA.  She was encouraged to use her bedside IS while in house.  She had no acute pulmonary issues while in house.  HEME:  Her preoperative Hgb was 9.3.  Her hgb was stable at 8.7 at the time of discharge.  She had no other acute heme issues while in house.  GI:  She was made NPO prior to the procedure.  On POD#0, her diet was advanced to clear liquids and then advanced slowly as tolerated.  At the time of discharge, she was tolerating a regular diet without nausea and vomiting.  She will be discharged with a bowel regimen to prevent constipation in the postoperative period.  She had no acute GI issues while in house.  :   A duffy catheter was placed at the time of the surgery.  Once ambulating unassisted, the duffy catheter was  removed.  Prior to discharge, the patient was voiding spontaneously without difficulty.  She had no acute  issues while in house. She was continued on Keflex for her UTI prophylaxis. She was diagnosed with Renal Cell Carcinoma in 1/2019, she has not followed up with Urology at this point. Dr. Galeas will be in contact with their team.  ID:  The patient was AF during her hospitalization.  She received standard preoperative antibiotics without incident as well as Keflex as above.   ENDO:  She was given PTA Tesiba 15u at bedtime, humalog 1u/10g CHO and a a medium dose for her insulin sliding scale and continued on her synthrooid.   PSYCH/NEURO:  She was continued on her neuropathy medications as well as home cymbalta.  PPX:   She was given SCDs, IS, PPI and lovenox during her hospital course. Of note, she does have hx DVT x 2 (both provoked, last 2015), not on anticoag PTA.  She tolerated these prophylactic interventions without incident and will be discharged with 28 days of Lovenox for ppx.     Discharge Instructions and Follow up:  Discharge Diet: regular  Discharge Activity: as tolerated with lifting restrictions <15-20 lbs for 6 weeks.   Discharge Follow up: She will follow up with Dr. Galeas on 3/13/2019.     Discharge Disposition:  Transitional Care Unit    Discharge Staff: Eliza Raygoza MD  OB/GYN PGY-1  02/26/19 10:41 AM    FINAL PATHOLOGY ADDENDUM    DIAGNOSIS:  Bilateral ovarian serous carcinoma with metastasis to the cul-de-sac and left fallopian tube.    Addendum added for Dr. Erica Raygoza on 3/4/2019 by Minerva Reid.    Rima Kay MD    Department of Ob/Gyn and Women's Health  Division of Gynecologic Oncology  [facility]  [phone]

## 2019-02-22 NOTE — ANESTHESIA PROCEDURE NOTES
Peripheral Nerve Block Procedure Note    Staff:     Anesthesiologist:  Scooter Bradley MD    Resident/CRNA:  Oswald Gutierrez MD    Block performed by resident/CRNA in the presence of a teaching physician    Location: Pre-op  Procedure Start/Stop TImes:      2/22/2019 7:12 AM     2/22/2019 7:22 AM    patient identified, IV checked, site marked, risks and benefits discussed, informed consent, monitors and equipment checked, pre-op evaluation, at physician/surgeon's request and post-op pain management      Correct Patient: Yes      Correct Position: Yes      Correct Site: Yes      Correct Procedure: Yes      Correct Laterality:  Yes    Site Marked:  Yes  Procedure details:     Procedure:  Rectus Sheath    ASA:  3    Laterality:  Bilateral    Position:  Supine    Sterile Prep: Betadine, chloraprep and mask      Needle:  Short bevel    Needle gauge:  21    Needle length (mm):  110    Ultrasound: Yes      Ultrasound used to identify targeted nerve, plexus, or vascular structure and placed a needle adjacent to it      Permanent Image entered into patiient's record      Abnormal pain on injection: No      Blood Aspirated: No      Bleeding at site: No      Test dose negative for signs of intravascular injection: Yes      Bolus via:  Needle    Infusion Method:  Single Shot    Complications:  None  Assessment/Narrative:     Injection made incrementally with aspirations every (mL):  5     Bilateral Rectus Sheath Block

## 2019-02-22 NOTE — BRIEF OP NOTE
[facility], [place]    Brief Operative Note    Pre-operative diagnosis: Ovarian Cancer  Post-operative diagnosis Same   Procedure: Procedure(s):  Exploratory Laparotomy, tumor debulking, omentectomy, lysis of adhesions x 60 minutes, pelvic paritonectomy  Bilateral Salpingo Oophorectomy  Proctoscopy  Surgeon: Surgeon(s) and Role:     * Bakari Galeas MD - Primary     * Yadira Enamorado MD - Resident - Assisting     * Adan Dahl MD - Fellow - Assisting  Anesthesia: Combined General with Block   Estimated blood loss:  100 ml  Drains:  duffy  Specimens:   ID Type Source Tests Collected by Time Destination   A : Cul-de-sac nodule Tissue Lymph Node SURGICAL PATHOLOGY EXAM Bakari Galeas MD 2/22/2019  9:22 AM    B : Right fallopian tube and ovary Tissue Fallopian Tube and Ovary, Right SURGICAL PATHOLOGY EXAM Bakari Galeas MD 2/22/2019 10:03 AM    C : Left fallopian tube and ovary Tissue Fallopian Tube and Ovary, Left SURGICAL PATHOLOGY EXAM Bakari Galeas MD 2/22/2019 10:19 AM    D : omentum Tissue Omentum SURGICAL PATHOLOGY EXAM Bakari Galeas MD 2/22/2019 10:37 AM      Findings: prior vertical midline incision. Adhesions of omentum to anterior abdominal wall. Moderate adhesions present along small bowel as well as between small intestine and bladder. The tumor was arising from the right ovary and there was a small mass in the posterior cul du sac that was adhesed to the rectosigmoid colon.  Grossly normal appearing left ovary. No evidence of disease on the liver, spleen, small bowel, colon, or omentum. Proctoscopy revealed in tact rectum.   Complications: None.  Implants: None    Yadira Enamorado MD   PGY-3 Ob/Gyn

## 2019-02-22 NOTE — PROGRESS NOTES
Dr. Rizzo at bedside on pt arrival, aware of patient status and current HTN (see VS). MDA stated continue to monitor BP and no treatment at this time.     Dr. Rizzo at bedside @ 1240. Pt continues to remain HTN (see VS). MDA states to continue to monitor and no treatment at this time and will sign out pt from PACU.

## 2019-02-22 NOTE — LETTER
Health Information Management Services               Recipient: TCU          Sender: ATA Camejo          Date: February 25, 2019  Patient Name:  Afshan Cardona  Routing Message:  Please assess for TCU placement. Pt is medically ready to discharge today - thanks!    ANGELA Camejo, ATA  [facility] Surgical/Oncology Unit   Phone: [phone]  Pager: [phone]            The documents accompanying this notice contain confidential information belonging to the sender.  This information is intended only for the use of the individual or entity named above.  The authorized recipient of this information is prohibited from disclosing this information to any other party and is required to destroy the information after its stated need has been fulfilled, unless otherwise required by state law.      If you are not the intended recipient, you are hereby notified that any disclosure, copying, distribution or action taken in reliance on the contents of these documents is strictly prohibited. If you have received this document in error, please notify [place] immediately at [phone].  You may return the document via fax ([phone]) or return mail  (Health Information Management, , [address] [place] [address]).

## 2019-02-22 NOTE — LETTER
Health Information Management Services               Recipient: TCU           Sender: ATA Camejo          Date: February 25, 2019  Patient Name:  Afshan Cardona  Routing Message:  Please assess for TCU placement, Patient is medically ready for discharge. Thanks!    ANGELA Camejo, ATA  [facility] Surgical/Oncology Unit   Phone: [phone]  Pager: [phone]            The documents accompanying this notice contain confidential information belonging to the sender.  This information is intended only for the use of the individual or entity named above.  The authorized recipient of this information is prohibited from disclosing this information to any other party and is required to destroy the information after its stated need has been fulfilled, unless otherwise required by state law.      If you are not the intended recipient, you are hereby notified that any disclosure, copying, distribution or action taken in reliance on the contents of these documents is strictly prohibited. If you have received this document in error, please notify [place] immediately at [phone].  You may return the document via fax ([phone]) or return mail  (Health Information Management, , [address] [place] [address]).

## 2019-02-22 NOTE — ANESTHESIA POSTPROCEDURE EVALUATION
Anesthesia POST Procedure Evaluation    Patient: Afshan Cardona   MRN:     [id number] Gender:   female   Age:    73 year old :      1945        Preoperative Diagnosis: Ovarian Cancer   Procedure(s):  Exploratory Laparotomy, tumor debulking, omentectomy, lysis of adhesions x 60 minutes, pelvic paritonectomy  Bilateral Salpingo Oophorectomy  Proctoscopy   Postop Comments: No value filed.       Anesthesia Type:  General, Regional    Reportable Event: NO     PAIN: Uncomplicated   Sign Out status: Comfortable, Well controlled pain     PONV: No PONV   Sign Out status:  No Nausea or Vomiting     Neuro/Psych: Uneventful perioperative course   Sign Out Status: Preoperative baseline; Age appropriate mentation     Airway/Resp.: Uneventful perioperative course   Sign Out Status: Non labored breathing, age appropriate RR; Resp. Status within EXPECTED Parameters     CV: Uneventful perioperative course   Sign Out status: Appropriate BP and perfusion indices; Appropriate HR/Rhythm     Disposition:   Sign Out in:  PACU  Disposition:  Phase II; Home  Recovery Course: Uneventful  Follow-Up: Not required           Last Anesthesia Record Vitals:  CRNA VITALS  2019 1128 - 2019 1228      2019             Pulse:  72    SpO2:  100 %    Resp Rate (observed):  12          Last PACU/Preop Vitals:  Vitals:    19 1200 19 1215 19 1230   BP: 165/75  185/79   Pulse: 69  67   Resp: 12 12 12   Temp: 36.6  C (97.9  F)  36.6  C (97.9  F)   SpO2: 100%  100%         Electronically Signed By: Destinee Rizzo MD, 2019, 12:41 PM

## 2019-02-22 NOTE — LETTER
Health Information Management Services               Recipient: TCU          Sender: Mayela StrongATA jackson P:[phone]          Date: February 25, 2019  Patient Name:  Afshan Cardona  Routing Message:  Please assess for TCU placement, medically ready to discharge today! Thanks    ANGELA Camejo, ATA   Surgical/Oncology Unit   Phone: [phone]  Pager: [phone]            The documents accompanying this notice contain confidential information belonging to the sender.  This information is intended only for the use of the individual or entity named above.  The authorized recipient of this information is prohibited from disclosing this information to any other party and is required to destroy the information after its stated need has been fulfilled, unless otherwise required by state law.      If you are not the intended recipient, you are hereby notified that any disclosure, copying, distribution or action taken in reliance on the contents of these documents is strictly prohibited. If you have received this document in error, please notify [place] immediately at [phone].  You may return the document via fax ([phone]) or return mail  (Health Information Management, , [address] [place] [address]).

## 2019-02-22 NOTE — LETTER
Transition Communication Hand-off for Care Transitions to Next Level of Care Provider    Name: Afshan Cardona  : 1945  MRN #: [id number]  Primary Care Provider: Sonja Hathaway     Primary Clinic: [facility] [address] [facility]  [facility] [address]     Reason for Hospitalization:  Ovarian cancer (H) [C56.9]  Admit Date/Time: 2019  5:04 AM  Discharge Date: 19  Payor Source: Payor: MEDICARE / Plan: MEDICARE / Product Type: Medicare /     Readmission Assessment Measure (MAYRA) Risk Score/category: Elevated    Reason for Communication Hand-off Referral: Fragility    Discharge Plan: [facility] for PT/OT prior to returning home and beginning another round of chemo in 3-4 weeks.       Concern for non-adherence with plan of care:   Y/N N  Discharge Needs Assessment:  Needs      Most Recent Value   Equipment Currently Used at Home  raised toilet, tub bench, walker, rolling, grab bar, toilet, grab bar, tub/shower        Follow-up specialty is recommended: Yes    Follow-up plan:    Future Appointments   Date Time Provider Department Center   2019  6:00 AM Yasmine Liu, PT [facility]   3/13/2019  7:00 AM Bakari Galeas MD [facility]       Any outstanding tests or procedures:        Referrals     Future Labs/Procedures    Occupational Therapy Adult Consult     Comments:    Evaluate and treat as clinically indicated.    Reason:  Post op weakness    Physical Therapy Adult Consult     Comments:    Evaluate and treat as clinically indicated.    Reason:  Post op weakness          ANGELA Camejo, KADESW  7C Surgical/Oncology Unit   Phone: [phone]  Pager: [phone]

## 2019-02-22 NOTE — ANESTHESIA PROCEDURE NOTES
Arterial Line Procedure Note  Staff:     Anesthesiologist:  Destinee Rizzo MD  Location: In OR Before Induction  Procedure Start/Stop Times:      2/22/2019 7:55 AM     2/22/2019 8:00 AM    patient identified, IV checked, site marked, risks and benefits discussed, informed consent, monitors and equipment checked, pre-op evaluation and at physician/surgeon's request      Correct Patient: Yes      Correct Position: Yes      Correct Site: Yes      Correct Procedure: Yes      Correct Laterality:  Yes    Site Marked:  Yes  Line Placement:     Procedure:  Arterial Line    Insertion Site:  Radial    Insertion laterality:  Left    Skin Prep: Chloraprep      Patient Prep: patient draped, mask, sterile gloves and hat      Local skin infiltration:  1% lidocaine    Catheter size:  20 gauge, Quick cath    Dressing:  Tegaderm    Complications:  None obvious    Arterial waveform: Yes      IBP within 10% of NIBP: Yes

## 2019-02-22 NOTE — PROGRESS NOTES
Gynecologic Oncology Progress Note  2019     HD#1,  POD#0 s/p Exploratory laparotomy, interval tumor debulking, bilateral salpingo-oophorectomy,  omentectomy, proctoscopy, lysis of adhesion   Disease: stage IIIC HGSOC    S: patient is having some pain and nausea but this improved when abdominal binder was loosened. She has not ambulated and still has her duffy in. Has not passed gas. No CP, SOB, HA or confusion.     O:  Vitals:    19 1440 19 1500 19 1530 19 1659   BP: 187/82 (!) 191/91 195/89 195/87   BP Location:  Left arm Left arm Left arm   Pulse: 85      Resp: 14      Temp:       TempSrc:       SpO2: 99%      Weight:       Height:            I/O last 3 completed shifts:  In:  [I.V.:]  Out: 820 [Urine:720; Blood:100]    75cc/hr since arriving to floor    Exam:  GEN - NAD, sitting comfortably in bed  CV - RRR  PULM - CTAB, no wheezing  ABD - soft, non distended, appropriately tender to palpation  INC - c/d/i with bandage  Ext - warm and well perfused, no edema, non-tender, SCDs on    Lines/drains:   PIV, duffy    Labs/Imaging:  Hgb 9.3 > EBL 100cc > am pending    B 145 260    Assessment: 74 yo POD#0 s/p XL, BSO, interval tumor debulking, omentectomy, AURELIANO, proctoscopy for HGSOC. Currently recovering appropriately a few hours after surgery apart from being hypertensive.     Active problem list:  - Metastatic high grade serous ovarian cancer   - HTN  - Anemia of chronic disease   - Type II diabetes mellitus   - Hx DVT x2   - Hx Takutsubo cardiomyopathy 2015  - Coronary artery disease s/p cath 7/10/18   - recurrent UTIs on PPx   - peripheral neuropathy   - renal cell carcinoma     Dz: initially diagnosed 2018 by diagnostic lsc, XL w biopsies 2018 that was stopped for hypotension and disease burden, s/p 6cycles Neoadjuvant carbo/taxol (C6D1) now s/p interval debulking to R0 (prior TVH). Ca-125 26.   FEN: ADAT, LR @100cc/hr  Pain: s/p TAP, dilaudid PRN, oxycodone  PRN, tylenol johny   Heme:     # anemia - chronic, no s/s anemia, am hgb pending. UOP appropriate.     # Hx DVT x 2 (both provoked, last 2015), not on anticoagulation prior to admission   CV:     # HTN - The patient has been hypertensive during surgery and postoperatively. She did not take her home losartan last night in anticipation of surgery   - losartan reordered ordered for this evening    - cont carvedilol    # HLD statin held     # Hx Takutsubo cardiomyopathy 2/2015, EF 25% > 65% 2/2019     # CAD s/p cath 7/10/18.   Pulm: former smoker  GI: reg diet, PRN senna/anti-emetics  : duffy. Prior post-op urinary retention.     # recurrent UTI, on keflex 250mg daily. Continue this.    # Renal cell carcinoma dx 1/2019- will address after ovarian ca treatment     # Mild R hydronephrosis  ID: as above  Endo:     # Hypothyroid on PTA synthroid.     # DM, home regimen Tresiba 14U qhs, humalog 1U/10carb.  A1C on 2/12 was 9.2%     - Ordered for MSSI   - will re-order home Tresiba at bedtime if she is able to tolerate PO and BG continues to be elevated  Psych/Neuro/MSK:     # deression/anxiety- cont cymbalta    # neuropathy- cont gabapentin.     # hx Confusion post-op after last surgery in the setting of prolonged hypotension - will minimize narcotics or altering medications   PPX: SCDs, IS, lovenox POD1  Dispo: when meeting post op goals    Drains/Lines: clive, KATHY Enamorado MD  OB/GYN Resident PGY3  Pgr [phone]

## 2019-02-23 LAB
ANION GAP SERPL CALCULATED.3IONS-SCNC: 6 MMOL/L (ref 3–14)
BUN SERPL-MCNC: 13 MG/DL (ref 7–30)
CALCIUM SERPL-MCNC: 8 MG/DL (ref 8.5–10.1)
CHLORIDE SERPL-SCNC: 100 MMOL/L (ref 94–109)
CO2 SERPL-SCNC: 29 MMOL/L (ref 20–32)
CREAT SERPL-MCNC: 0.57 MG/DL (ref 0.52–1.04)
ERYTHROCYTE [DISTWIDTH] IN BLOOD BY AUTOMATED COUNT: 13.8 % (ref 10–15)
GFR SERPL CREATININE-BSD FRML MDRD: >90 ML/MIN/{1.73_M2}
GLUCOSE BLDC GLUCOMTR-MCNC: 189 MG/DL (ref 70–99)
GLUCOSE BLDC GLUCOMTR-MCNC: 205 MG/DL (ref 70–99)
GLUCOSE BLDC GLUCOMTR-MCNC: 215 MG/DL (ref 70–99)
GLUCOSE BLDC GLUCOMTR-MCNC: 238 MG/DL (ref 70–99)
GLUCOSE BLDC GLUCOMTR-MCNC: 247 MG/DL (ref 70–99)
GLUCOSE BLDC GLUCOMTR-MCNC: 273 MG/DL (ref 70–99)
GLUCOSE SERPL-MCNC: 243 MG/DL (ref 70–99)
HCT VFR BLD AUTO: 29.5 % (ref 35–47)
HGB BLD-MCNC: 9.4 G/DL (ref 11.7–15.7)
INTERPRETATION ECG - MUSE: NORMAL
MAGNESIUM SERPL-MCNC: 1.5 MG/DL (ref 1.6–2.3)
MCH RBC QN AUTO: 31.5 PG (ref 26.5–33)
MCHC RBC AUTO-ENTMCNC: 31.9 G/DL (ref 31.5–36.5)
MCV RBC AUTO: 99 FL (ref 78–100)
PLATELET # BLD AUTO: 292 10E9/L (ref 150–450)
POTASSIUM SERPL-SCNC: 4.3 MMOL/L (ref 3.4–5.3)
RBC # BLD AUTO: 2.98 10E12/L (ref 3.8–5.2)
SODIUM SERPL-SCNC: 135 MMOL/L (ref 133–144)
WBC # BLD AUTO: 8.6 10E9/L (ref 4–11)

## 2019-02-23 PROCEDURE — 25000128 H RX IP 250 OP 636: Performed by: STUDENT IN AN ORGANIZED HEALTH CARE EDUCATION/TRAINING PROGRAM

## 2019-02-23 PROCEDURE — 25000132 ZZH RX MED GY IP 250 OP 250 PS 637: Mod: GY | Performed by: OBSTETRICS & GYNECOLOGY

## 2019-02-23 PROCEDURE — A9270 NON-COVERED ITEM OR SERVICE: HCPCS | Mod: GY | Performed by: OBSTETRICS & GYNECOLOGY

## 2019-02-23 PROCEDURE — 99024 POSTOP FOLLOW-UP VISIT: CPT | Performed by: OBSTETRICS & GYNECOLOGY

## 2019-02-23 PROCEDURE — 85027 COMPLETE CBC AUTOMATED: CPT | Performed by: STUDENT IN AN ORGANIZED HEALTH CARE EDUCATION/TRAINING PROGRAM

## 2019-02-23 PROCEDURE — 25800030 ZZH RX IP 258 OP 636: Performed by: OBSTETRICS & GYNECOLOGY

## 2019-02-23 PROCEDURE — A9270 NON-COVERED ITEM OR SERVICE: HCPCS | Mod: GY | Performed by: STUDENT IN AN ORGANIZED HEALTH CARE EDUCATION/TRAINING PROGRAM

## 2019-02-23 PROCEDURE — 83735 ASSAY OF MAGNESIUM: CPT | Performed by: OBSTETRICS & GYNECOLOGY

## 2019-02-23 PROCEDURE — 25000128 H RX IP 250 OP 636: Performed by: OBSTETRICS & GYNECOLOGY

## 2019-02-23 PROCEDURE — 00000146 ZZHCL STATISTIC GLUCOSE BY METER IP

## 2019-02-23 PROCEDURE — 80048 BASIC METABOLIC PNL TOTAL CA: CPT | Performed by: STUDENT IN AN ORGANIZED HEALTH CARE EDUCATION/TRAINING PROGRAM

## 2019-02-23 PROCEDURE — 83735 ASSAY OF MAGNESIUM: CPT | Performed by: STUDENT IN AN ORGANIZED HEALTH CARE EDUCATION/TRAINING PROGRAM

## 2019-02-23 PROCEDURE — 12000001 ZZH R&B MED SURG/OB UMMC

## 2019-02-23 PROCEDURE — 25000132 ZZH RX MED GY IP 250 OP 250 PS 637: Mod: GY | Performed by: STUDENT IN AN ORGANIZED HEALTH CARE EDUCATION/TRAINING PROGRAM

## 2019-02-23 PROCEDURE — 36415 COLL VENOUS BLD VENIPUNCTURE: CPT | Performed by: STUDENT IN AN ORGANIZED HEALTH CARE EDUCATION/TRAINING PROGRAM

## 2019-02-23 RX ORDER — MAGNESIUM SULFATE HEPTAHYDRATE 40 MG/ML
4 INJECTION, SOLUTION INTRAVENOUS EVERY 4 HOURS PRN
Status: DISCONTINUED | OUTPATIENT
Start: 2019-02-23 | End: 2019-02-26 | Stop reason: HOSPADM

## 2019-02-23 RX ORDER — HYDRALAZINE HYDROCHLORIDE 20 MG/ML
10 INJECTION INTRAMUSCULAR; INTRAVENOUS
Status: DISCONTINUED | OUTPATIENT
Start: 2019-02-23 | End: 2019-02-26 | Stop reason: HOSPADM

## 2019-02-23 RX ORDER — HYDRALAZINE HYDROCHLORIDE 20 MG/ML
10 INJECTION INTRAMUSCULAR; INTRAVENOUS ONCE
Status: DISCONTINUED | OUTPATIENT
Start: 2019-02-23 | End: 2019-02-23

## 2019-02-23 RX ADMIN — OXYCODONE HYDROCHLORIDE 10 MG: 5 TABLET ORAL at 08:07

## 2019-02-23 RX ADMIN — GABAPENTIN 600 MG: 600 TABLET, FILM COATED ORAL at 14:00

## 2019-02-23 RX ADMIN — ACETAMINOPHEN 650 MG: 325 TABLET, FILM COATED ORAL at 14:57

## 2019-02-23 RX ADMIN — INSULIN ASPART 1 UNITS: 100 INJECTION, SOLUTION INTRAVENOUS; SUBCUTANEOUS at 18:00

## 2019-02-23 RX ADMIN — CARVEDILOL 25 MG: 25 TABLET, FILM COATED ORAL at 08:09

## 2019-02-23 RX ADMIN — CARVEDILOL 25 MG: 25 TABLET, FILM COATED ORAL at 17:56

## 2019-02-23 RX ADMIN — ENOXAPARIN SODIUM 40 MG: 40 INJECTION SUBCUTANEOUS at 09:31

## 2019-02-23 RX ADMIN — LOSARTAN POTASSIUM 100 MG: 50 TABLET ORAL at 17:56

## 2019-02-23 RX ADMIN — OXYCODONE HYDROCHLORIDE 10 MG: 5 TABLET ORAL at 02:32

## 2019-02-23 RX ADMIN — DULOXETINE HYDROCHLORIDE 60 MG: 60 CAPSULE, DELAYED RELEASE ORAL at 21:51

## 2019-02-23 RX ADMIN — GABAPENTIN 600 MG: 600 TABLET, FILM COATED ORAL at 08:09

## 2019-02-23 RX ADMIN — MAGNESIUM HYDROXIDE 15 ML: 400 SUSPENSION ORAL at 08:08

## 2019-02-23 RX ADMIN — SODIUM CHLORIDE, POTASSIUM CHLORIDE, SODIUM LACTATE AND CALCIUM CHLORIDE: 600; 310; 30; 20 INJECTION, SOLUTION INTRAVENOUS at 03:56

## 2019-02-23 RX ADMIN — ACETAMINOPHEN 650 MG: 325 TABLET, FILM COATED ORAL at 21:51

## 2019-02-23 RX ADMIN — OXYCODONE HYDROCHLORIDE 10 MG: 5 TABLET ORAL at 12:49

## 2019-02-23 RX ADMIN — ACETAMINOPHEN 650 MG: 325 TABLET, FILM COATED ORAL at 02:32

## 2019-02-23 RX ADMIN — LEVOTHYROXINE SODIUM 75 MCG: 75 TABLET ORAL at 08:08

## 2019-02-23 RX ADMIN — INSULIN ASPART 2 UNITS: 100 INJECTION, SOLUTION INTRAVENOUS; SUBCUTANEOUS at 12:15

## 2019-02-23 RX ADMIN — ACETAMINOPHEN 650 MG: 325 TABLET, FILM COATED ORAL at 08:09

## 2019-02-23 RX ADMIN — INSULIN ASPART 2 UNITS: 100 INJECTION, SOLUTION INTRAVENOUS; SUBCUTANEOUS at 08:11

## 2019-02-23 RX ADMIN — GABAPENTIN 600 MG: 600 TABLET, FILM COATED ORAL at 19:36

## 2019-02-23 RX ADMIN — OXYCODONE HYDROCHLORIDE 10 MG: 5 TABLET ORAL at 21:51

## 2019-02-23 RX ADMIN — SODIUM CHLORIDE, POTASSIUM CHLORIDE, SODIUM LACTATE AND CALCIUM CHLORIDE: 600; 310; 30; 20 INJECTION, SOLUTION INTRAVENOUS at 14:00

## 2019-02-23 RX ADMIN — INSULIN DEGLUDEC INJECTION 15 UNITS: 100 INJECTION, SOLUTION SUBCUTANEOUS at 21:51

## 2019-02-23 RX ADMIN — Medication 0.2 MG: at 11:07

## 2019-02-23 RX ADMIN — CEPHALEXIN 250 MG: 250 CAPSULE ORAL at 21:50

## 2019-02-23 ASSESSMENT — ACTIVITIES OF DAILY LIVING (ADL)
ADLS_ACUITY_SCORE: 15
ADLS_ACUITY_SCORE: 12
ADLS_ACUITY_SCORE: 15
ADLS_ACUITY_SCORE: 15

## 2019-02-23 ASSESSMENT — PAIN DESCRIPTION - DESCRIPTORS
DESCRIPTORS: SORE
DESCRIPTORS: ACHING;SORE
DESCRIPTORS: TENDER;ACHING
DESCRIPTORS: ACHING
DESCRIPTORS: ACHING;SORE
DESCRIPTORS: ACHING;DISCOMFORT;SORE

## 2019-02-23 ASSESSMENT — MIFFLIN-ST. JEOR: SCORE: 1151.47

## 2019-02-23 NOTE — PLAN OF CARE
PT 7D: PT order for evaluation and treatment beginning POD #2 acknowledged. Pt rescheduled for 2/24/19, POD #2.

## 2019-02-23 NOTE — PROVIDER NOTIFICATION
MD notified regarding elevated BP. Order to continue monitoring received.        02/22/19 1440   Vitals   Pulse 85   /82   Resp 14

## 2019-02-23 NOTE — PLAN OF CARE
POD 1 exploratory laparotomy, interval tumor debulking, bilateral salpingo-oophorectomy,  omentectomy, proctoscopy, lysis of adhesion. Hx ovarian cancer. Pt receiving scheduled Tylenol, oxycodone, PRN IV dilaudid for pain control. Reports partially effective relief. Tolerating regular diet, carb counting. BG running in mid 200's. Ambulated with 1 assist in halls twice. Passing gas, no BM yet. LR infusing PIV at 100ml/hr. Shepard out at 1500. Using IS with encouragement. Continue with POC.

## 2019-02-23 NOTE — PLAN OF CARE
A&Ox4. Pt hypertensive, hydralazine one time dose given, normal BP, monitored closely after hydralazine, Tmax 100.0. MD notified. OVSS on 3 LPM nasal cannula. CAPNO monitoring. Pain controlled with scheduled tylenol and oxycodone given x2. Denies nausea. Tolerated regular diet. PIV infusing LR at 100 ml/hr. Additional PIV saline locked. Midline with primapore, marked unchanged drainge, and abd binder. Shepard patent with adequate UOP. BG monitoring, insulin given per sliding scale, and night time long acting per pts home routine. PCDs on, IS encouraged but needs reinforcement. Continue to monitor.

## 2019-02-23 NOTE — PROGRESS NOTES
Gynecology Oncology Progress Note  02/23/19    POD#1 s/p XL, BSO, interval tumor debulking, omentectomy, AURELIANO, proctoscopy    Disease: metastatic high grade serous ovarian cancer    24 hour events:   - under went procedure above  - BGs 166-306   - Elevated BPs (max 212/98,) improved w/10 mg IV hydralazine     Subjective: Patient is feeling doing well this morning, has gotten some sleep.  Pain is well-controlled.  Had some dinner last night with no nausea or vomiting.  +Flatus.  Shepard in place. Denies fevers/chills, CP, SOB.    Objective:   Vitals:    02/22/19 2200 02/23/19 0143 02/23/19 0352 02/23/19 0658   BP: 125/55 134/54 146/63 160/72   BP Location: Left arm Left arm Left arm Right arm   Pulse:       Resp: 14 17 14 16   Temp: 99.5  F (37.5  C) 100  F (37.8  C) 99.5  F (37.5  C) 98.4  F (36.9  C)   TempSrc: Oral Oral Oral Oral   SpO2: 96% 97% 96% 97%   Weight:       Height:       HR:     General: NAD, appears comfortable in bed  CV: RRR, no m/r/g  Resp: CTAB, no wheezes  Abdomen: soft, minimally tender, nondistended  Incision: dressing in place, clean and dry  Extremities: warm, well-perfused, nontender, trace edema, SCDs in place    I/O last 3 completed shifts:  In: 3600 [P.O.:60; I.V.:3540]  Out: 1945 [Urine:1845; Blood:100]      24 hour: IN - 1940 ml IVF 60 ml PO // OUT - 1370 ml UOP   Since MN: IN - 1600 ml IVF // OUT - 475 ml UOP    Lines/Drains:   Shepard, PIV    New labs/imaging-    ROUTINE IP LABS (Last four results)  BMP  Recent Labs   Lab 02/23/19  0536 02/22/19  0842 02/22/19  0542    140  --    POTASSIUM 4.3 3.6 3.8   CHLORIDE 100  --   --    KAIN 8.0*  --   --    CO2 29  --   --    BUN 13  --   --    CR 0.57  --   --    * 205*  --      CBC  Recent Labs   Lab 02/23/19  0536 02/22/19  0842 02/22/19 0542   WBC 8.6  --   --    RBC 2.98*  --   --    HGB 9.4* 9.3* 10.3*   HCT 29.5*  --   --    MCV 99  --   --    MCH 31.5  --   --    MCHC 31.9  --   --    RDW 13.8  --   --      --    --      Assessment: 72 yo POD#01s/p XL, BSO, interval tumor debulking, omentectomy, AURELIANO, proctoscopy for HGSOC. Doing well postoperatively.     Active problem list:  - Metastatic high grade serous ovarian cancer   - HTN  - Anemia of chronic disease   - Type II diabetes mellitus   - Hx DVT x2   - Hx Takutsubo cardiomyopathy 2/2015  - Coronary artery disease s/p cath 7/10/18   - recurrent UTIs on PPx   - peripheral neuropathy   - renal cell carcinoma      Dz: initially diagnosed 7/8/2018 by diagnostic lsc, XL w biopsies 7/24/2018 that was stopped for hypotension and disease burden, s/p 6C Neoadjuvant carbo/taxol (C6D1) now s/p interval debulking to R0 (prior TVH). Ca-125 26.   FEN: ADAT, LR @100cc/hr until adequate PO intake and UOP.   Pain: s/p TAP, dilaudid PRN, oxycodone PRN, tylenol johny   Heme:     # anemia - chronic, no s/s anemia, am hgb stable. UOP appropriate.     # Hx DVT x 2 (both provoked, last 2015), not on anticoagulation prior to admission   CV:     # HTN - The patient has been hypertensive during surgery and postoperatively.               - now on home losartan, carvedilol      - high BPs last night, improved with 10 mg IV hydralazine     # HLD statin held     # Hx Takutsubo cardiomyopathy 2/2015, EF 25% > 65% 2/2019     # CAD s/p cath 7/10/18.   Pulm: former smoker  GI: reg diet, PRN senna/anti-emetics  : duffy. Prior post-op urinary retention.    # recurrent UTI, on keflex 250mg daily. Continue this.    # Renal cell carcinoma dx 1/2019- will address after ovarian ca treatment     # Mild R hydronephrosis  ID: as above  Endo:     # Hypothyroid on PTA synthroid.     # DM, home regimen ordered: Tresiba 14U qhs, humalog 1U/10carb. MSSI. A1C on 2/12 was 9.2%   Psych/Neuro/MSK:    # deression/anxiety- cont cymbalta    # neuropathy- cont gabapentin.     # hx Confusion post-op after last surgery in the setting of prolonged hypotension - will minimize narcotics or altering medications   PPX: SCDs, IS, lovenox    Dispo: when meeting post op goals    Drains/Lines: KATHY duffy MD  OBGYN PGY-2  Pager [phone]    I, Evans Arndt personally examined and evaluated this patient on 02/23/19.  I discussed the patient with the resident and care team, and agree with the assessment and plan of care as documented in the residents note above.    I personally reviewed vital signs, laboratory values and imaging results.    Pt doing well this AM s/p interval debulking.  Routine post-op cares.    Ranjana Arndt MD  Gynecologic Oncology  [facility]

## 2019-02-23 NOTE — PLAN OF CARE
Focus: Post Op  D: Patient transferred at 1:45 PM from PACU to  accompanied by PACU NA with continuous oximetry. Report recieved from CURLY Rodrigues. Pain rated as tolerable upon arrival. Pt denied nausea and SOB. Elevated BP, max 212/98, HR in high 90s. Tmax 99.7 (MD notified and aware). Shepard in place with good output. Binder in place, dressing with marked/unchanged drainage.  I: Oriented patient to room, call light and phone. Family notifed of transfer. IS instruction provided. Pain well controlled with PO Tylenol and Oxycodone and IV Dilaudid. Home BP meds administered as ordered. Sliding scale and carb coverage insulin administered. SCDs on. Dangled at bedside with assist of 1.  A: Patient tolerated transfer.  P: Continue to assess and monitor.

## 2019-02-24 ENCOUNTER — APPOINTMENT (OUTPATIENT)
Dept: PHYSICAL THERAPY | Facility: CLINIC | Age: 74
DRG: 737 | End: 2019-02-24
Attending: OBSTETRICS & GYNECOLOGY
Payer: MEDICARE

## 2019-02-24 LAB
GLUCOSE BLDC GLUCOMTR-MCNC: 158 MG/DL (ref 70–99)
GLUCOSE BLDC GLUCOMTR-MCNC: 191 MG/DL (ref 70–99)
GLUCOSE BLDC GLUCOMTR-MCNC: 210 MG/DL (ref 70–99)
GLUCOSE BLDC GLUCOMTR-MCNC: 213 MG/DL (ref 70–99)
GLUCOSE BLDC GLUCOMTR-MCNC: 238 MG/DL (ref 70–99)
GLUCOSE BLDC GLUCOMTR-MCNC: 255 MG/DL (ref 70–99)
HGB BLD-MCNC: 8.3 G/DL (ref 11.7–15.7)

## 2019-02-24 PROCEDURE — A9270 NON-COVERED ITEM OR SERVICE: HCPCS | Mod: GY | Performed by: OBSTETRICS & GYNECOLOGY

## 2019-02-24 PROCEDURE — A9270 NON-COVERED ITEM OR SERVICE: HCPCS | Mod: GY | Performed by: STUDENT IN AN ORGANIZED HEALTH CARE EDUCATION/TRAINING PROGRAM

## 2019-02-24 PROCEDURE — 25000132 ZZH RX MED GY IP 250 OP 250 PS 637: Mod: GY | Performed by: STUDENT IN AN ORGANIZED HEALTH CARE EDUCATION/TRAINING PROGRAM

## 2019-02-24 PROCEDURE — 36415 COLL VENOUS BLD VENIPUNCTURE: CPT | Performed by: STUDENT IN AN ORGANIZED HEALTH CARE EDUCATION/TRAINING PROGRAM

## 2019-02-24 PROCEDURE — 97110 THERAPEUTIC EXERCISES: CPT | Mod: GP

## 2019-02-24 PROCEDURE — 97530 THERAPEUTIC ACTIVITIES: CPT | Mod: GP

## 2019-02-24 PROCEDURE — 12000001 ZZH R&B MED SURG/OB UMMC

## 2019-02-24 PROCEDURE — 25000128 H RX IP 250 OP 636: Performed by: STUDENT IN AN ORGANIZED HEALTH CARE EDUCATION/TRAINING PROGRAM

## 2019-02-24 PROCEDURE — 97161 PT EVAL LOW COMPLEX 20 MIN: CPT | Mod: GP

## 2019-02-24 PROCEDURE — 85018 HEMOGLOBIN: CPT | Performed by: STUDENT IN AN ORGANIZED HEALTH CARE EDUCATION/TRAINING PROGRAM

## 2019-02-24 PROCEDURE — 00000146 ZZHCL STATISTIC GLUCOSE BY METER IP

## 2019-02-24 PROCEDURE — 25000132 ZZH RX MED GY IP 250 OP 250 PS 637: Mod: GY | Performed by: OBSTETRICS & GYNECOLOGY

## 2019-02-24 PROCEDURE — 40000556 ZZH STATISTIC PERIPHERAL IV START W US GUIDANCE

## 2019-02-24 RX ORDER — POLYETHYLENE GLYCOL 3350 17 G/17G
17 POWDER, FOR SOLUTION ORAL 2 TIMES DAILY PRN
Status: DISCONTINUED | OUTPATIENT
Start: 2019-02-24 | End: 2019-02-26 | Stop reason: HOSPADM

## 2019-02-24 RX ADMIN — ACETAMINOPHEN 650 MG: 325 TABLET, FILM COATED ORAL at 21:10

## 2019-02-24 RX ADMIN — OXYCODONE HYDROCHLORIDE 5 MG: 5 TABLET ORAL at 19:29

## 2019-02-24 RX ADMIN — OXYCODONE HYDROCHLORIDE 10 MG: 5 TABLET ORAL at 02:54

## 2019-02-24 RX ADMIN — CEPHALEXIN 250 MG: 250 CAPSULE ORAL at 21:56

## 2019-02-24 RX ADMIN — GABAPENTIN 600 MG: 600 TABLET, FILM COATED ORAL at 07:49

## 2019-02-24 RX ADMIN — CARVEDILOL 25 MG: 25 TABLET, FILM COATED ORAL at 07:49

## 2019-02-24 RX ADMIN — GABAPENTIN 600 MG: 600 TABLET, FILM COATED ORAL at 21:10

## 2019-02-24 RX ADMIN — DULOXETINE HYDROCHLORIDE 60 MG: 60 CAPSULE, DELAYED RELEASE ORAL at 21:56

## 2019-02-24 RX ADMIN — ENOXAPARIN SODIUM 40 MG: 40 INJECTION SUBCUTANEOUS at 08:30

## 2019-02-24 RX ADMIN — ACETAMINOPHEN 650 MG: 325 TABLET, FILM COATED ORAL at 02:54

## 2019-02-24 RX ADMIN — ACETAMINOPHEN 650 MG: 325 TABLET, FILM COATED ORAL at 15:32

## 2019-02-24 RX ADMIN — ACETAMINOPHEN 650 MG: 325 TABLET, FILM COATED ORAL at 08:31

## 2019-02-24 RX ADMIN — OXYCODONE HYDROCHLORIDE 5 MG: 5 TABLET ORAL at 19:36

## 2019-02-24 RX ADMIN — INSULIN ASPART 2 UNITS: 100 INJECTION, SOLUTION INTRAVENOUS; SUBCUTANEOUS at 07:51

## 2019-02-24 RX ADMIN — POLYETHYLENE GLYCOL 3350 17 G: 17 POWDER, FOR SOLUTION ORAL at 07:54

## 2019-02-24 RX ADMIN — INSULIN ASPART 1 UNITS: 100 INJECTION, SOLUTION INTRAVENOUS; SUBCUTANEOUS at 12:39

## 2019-02-24 RX ADMIN — LEVOTHYROXINE SODIUM 75 MCG: 75 TABLET ORAL at 07:49

## 2019-02-24 RX ADMIN — LOSARTAN POTASSIUM 100 MG: 50 TABLET ORAL at 18:05

## 2019-02-24 RX ADMIN — INSULIN DEGLUDEC INJECTION 15 UNITS: 100 INJECTION, SOLUTION SUBCUTANEOUS at 21:57

## 2019-02-24 RX ADMIN — GABAPENTIN 600 MG: 600 TABLET, FILM COATED ORAL at 14:29

## 2019-02-24 RX ADMIN — CARVEDILOL 25 MG: 25 TABLET, FILM COATED ORAL at 18:05

## 2019-02-24 RX ADMIN — INSULIN ASPART 2 UNITS: 100 INJECTION, SOLUTION INTRAVENOUS; SUBCUTANEOUS at 18:02

## 2019-02-24 ASSESSMENT — ACTIVITIES OF DAILY LIVING (ADL)
ADLS_ACUITY_SCORE: 16
ADLS_ACUITY_SCORE: 12
ADLS_ACUITY_SCORE: 16
ADLS_ACUITY_SCORE: 16
ADLS_ACUITY_SCORE: 12
ADLS_ACUITY_SCORE: 12

## 2019-02-24 ASSESSMENT — MIFFLIN-ST. JEOR: SCORE: 1160.54

## 2019-02-24 ASSESSMENT — PAIN DESCRIPTION - DESCRIPTORS
DESCRIPTORS: ACHING;TENDER
DESCRIPTORS: ACHING;STABBING
DESCRIPTORS: TENDER
DESCRIPTORS: ACHING;SORE

## 2019-02-24 NOTE — PROGRESS NOTES
Gynecology Oncology Progress Note  02/24/19    POD#2 s/p XL, BSO, interval tumor debulking, omentectomy, AURELIANO, proctoscopy    Disease: metastatic high grade serous ovarian cancer    24 hour events:   - under went procedure above  - BGs 189-213, improved from 300    Subjective: Patient is doing well this morning. Had some pain last night accompanied by HTN. Pain is improved this mornin  Pain is well-controlled.  Had some dinner last night with no nausea or vomiting.  +Flatus.  Shepard in place. Denies fevers/chills, CP, SOB.    Objective:   Vitals:    02/23/19 1459 02/23/19 1500 02/23/19 1503 02/23/19 1757   BP:    164/75   BP Location:       Pulse: 84   82   Resp:    16   Temp:       TempSrc:       SpO2: 94% (!) 89% 94%    Weight:       Height:         General: NAD, appears comfortable in bed  CV: RRR, no m/r/g  Resp: CTAB, no wheezes  Abdomen: soft, minimally tender, nondistended BS  Incision: dressing in place, clean and dry  Extremities: warm, well-perfused, nontender, trace edema, SCDs in place    I/O last 3 completed shifts:  In: 2700 [P.O.:300; I.V.:2400]  Out: 2025 [Urine:2025]      24 hour: IN - 1940 ml IVF 60 ml PO // OUT - 1370 ml UOP   Since MN: IN - 1600 ml IVF // OUT - 475 ml UOP    Lines/Drains:   Shepard, PIV    New labs  Recent Labs   Lab 02/23/19  0536 02/22/19  0842   * 205*       Assessment: 72 yo POD#01s/p XL, BSO, interval tumor debulking, omentectomy, AURELIANO, proctoscopy for HGSOC. Doing well postoperatively. HTN last night not requiring treatment, Resolved with pain medication prior to administration of hydralazine      Active problem list:  - Metastatic high grade serous ovarian cancer   - HTN  - Anemia of chronic disease   - Type II diabetes mellitus   - Hx DVT x2   - Hx Takutsubo cardiomyopathy 2/2015  - Coronary artery disease s/p cath 7/10/18   - recurrent UTIs on PPx   - peripheral neuropathy   - renal cell carcinoma      Dz: initially diagnosed 7/8/2018 by diagnostic lsc, DAX w biopsies  7/24/2018 that was stopped for hypotension and disease burden, s/p 6C Neoadjuvant carbo/taxol (C6D1) now s/p interval debulking to R0 (prior TVH). Ca-125 26.   FEN: Reg, Mg 1.5>ERP> Mg pending  Pain: s/p TAP, dilaudid PRN, oxycodone PRN, tylenol johny   Heme:     # anemia - chronic, no s/s anemia, am hgb stable. UOP appropriate.     # Hx DVT x 2 (both provoked, last 2015), not on anticoagulation prior to admission   CV:     # HTN - The patient has been hypertensive during surgery and postoperatively.               - now on home losartan, carvedilol      - high BPs last night, improved with 10 mg IV hydralazine     # HLD statin held     # Hx Takutsubo cardiomyopathy 2/2015, EF 25% > 65% 2/2019     # CAD s/p cath 7/10/18.   Pulm: former smoker  GI: reg diet, PRN senna/anti-emetics  :Voiding spontaneously. Bladder scanned this a.m. With PVR of 336. Patient then voided additional 100cc. Will monitor closely for urinary retention.    # recurrent UTI, on keflex 250mg daily. Continue this.    # Renal cell carcinoma dx 1/2019- will address after ovarian ca treatment     # Mild R hydronephrosis  ID: as above  Endo:     # Hypothyroid on PTA synthroid.     # DM, home regimen ordered: Tresiba 14U qhs, humalog 1U/10carb. MSSI. A1C on 2/12 was 9.2%   Psych/Neuro/MSK:    # deression/anxiety- cont cymbalta    # neuropathy- cont gabapentin.     # hx Confusion post-op after last surgery in the setting of prolonged hypotension - will minimize narcotics or altering medications   PPX: SCDs, IS, lovenox   Dispo: when meeting post op goals    Drains/Lines: PIV Alicia Myhre, DO  Obstetrics and Gyncology, PGY-2  February 24, 2019 , 7:38 AM         Attestation: I personally examined and evaluated this patient on 02/24/19.  I discussed the patient with the resident and care team, and agree with the assessment and plan of care as documented in the residents note above.     I personally reviewed vital signs, laboratory values and imaging  results.     Pt with metastatic high grade serous ovarian cancer s/p XL, BSO, interval tumor debulking, omentectomy, AURELIANO, proctoscopy  Uneventful postop recovery. Continue routine post-op cares. PT consult today. Will co-ordinate with social work for TCU placement       Farrah Harper MD  Gynecologic Oncology Fellow   [facility]

## 2019-02-24 NOTE — PLAN OF CARE
POD#1 s/p XL, BSO, interval tumor debulking, omentectomy, AURELIANO, proctoscopy for metastatic high grade serous ovarian cancer(per MD note).   VS: VSS except elevated BPs in the 160/70s, meds given.   Neuro: wnl  Cardiac: wnl                Respiratory: clear, IS done independently.  GI/: +BS, passing flatus, no BM yet. Voided 200cc after duffy was removed.  Diet/appetite: on regular diet, good appetite. Insulin given for BGs and carb coverage.  Activity: SBA/walker in hallways.  Pain: has scheduled tylenol and prn oxycodone for pain, no pain meds given yet this rohan.  Skin/drains: M/L incision with intact dressing, abd binder in place.  Lines: Left hand PIV infusing with LR 100cc/hr.  P: continue to monitor ROBF, VS, incision and gen status and continue with POC.

## 2019-02-24 NOTE — PLAN OF CARE
A&Ox4. Pt hypertensive,MD notified, hydralazine one time dose ordered, before it was given blood pressure lowered, hydralazine was not given. OVSS on 1 LPM nasal cannula. Attempted to wean, pt would desat to 85% on RA. Pain controlled with scheduled tylenol and oxycodone given x2. Denies nausea. Tolerated regular diet. PIV saline locked. Midline with primapore, marked unchanged drainge, and abd binder. Voiding spontaneously, output decreased, MD notified, bladder scanned pt PVR of 336 cc, pt attempted to void again with 100 cc output. MD notified, will continue to monitor for now. BG monitoring, insulin given per sliding scale. PCDs on, IS encouraged. Continue to monitor.

## 2019-02-24 NOTE — PROGRESS NOTES
02/24/19 0934   Quick Adds   Type of Visit Initial PT Evaluation   Living Environment   Lives With alone   Living Arrangements other (see comments)  ([facility])   Home Accessibility no concerns   Transportation Anticipated family or friend will provide   Living Environment Comment Tub/shower, tub seat, grab bars   Self-Care   Usual Activity Tolerance good   Current Activity Tolerance moderate   Regular Exercise Yes   Activity/Exercise Type strength training   Exercise Amount/Frequency daily   Equipment Currently Used at Home raised toilet;tub bench;walker, rolling;grab bar, toilet;grab bar, tub/shower   Activity/Exercise/Self-Care Comment Pt was doing right knee and LE exercises from  PT   Functional Level Prior   Ambulation 1-->assistive equipment   Transferring 1-->assistive equipment   Toileting 1-->assistive equipment   Bathing 1-->assistive equipment   Communication 0-->understands/communicates without difficulty   Swallowing 0-->swallows foods/liquids without difficulty   Cognition 0 - no cognition issues reported   Fall history within last six months yes   Number of times patient has fallen within last six months 2   Which of the above functional risks had a recent onset or change? none   Prior Functional Level Comment Uses 4ww; able to ambulate short distance without walker.   General Information   Onset of Illness/Injury or Date of Surgery - Date 02/22/19   Referring Physician Yadira Enamorado MD    Patient/Family Goals Statement Wants to return home   Pertinent History of Current Problem (include personal factors and/or comorbidities that impact the POC) Afshan Cardona is a 73 year old with a diagnosis of metastatic high grade ovarian cancer. She initially underwent laparoscopy for dx and subsequent attempt at XL with debulking in July 2018 but this was stopped early due to prolonged hypotension and extent of disease. She received 6 cycles of chemotherapy and is now undergoing interval debulking. Pt  has history of right patellar fracture and internal fixation ~ 5 yrs ago.   Precautions/Limitations fall precautions;abdominal precautions   General Info Comments Activity: up with assist   Cognitive Status Examination   Orientation orientation to person, place and time   Level of Consciousness alert   Follows Commands and Answers Questions 100% of the time   Personal Safety and Judgment intact   Memory intact   Pain Assessment   Patient Currently in Pain Yes, see Vital Sign flowsheet   Integumentary/Edema   Integumentary/Edema no deficits were identifed   Posture    Posture Not impaired   Range of Motion (ROM)   ROM Comment LE ROM is normal bilaterally   Strength   Strength Comments LE strength is 5/5 bilaterally   Bed Mobility   Bed Mobility Comments SBA of 1 needed to cue for bed repositioning and assist as needed.   Transfer Skills   Transfer Comments Pt transfers supine < > sitting with SBA of 1 to instruct in maintaining abdominal precautions. Transfer sit to stand is with SBA of 1 to instruct in correct transfer technique   Gait   Gait Comments Pt ambulates with a 4ww and SBA of 1. Pt ambulated ~ 100' then sat in walker seat for short break. Pt then ambulated ~ 475' with SBA of 1. Mild right knee soreness occurred; no increased abdominal pain reported.   Balance   Balance Comments Normal   General Therapy Interventions   Planned Therapy Interventions gait training;strengthening;transfer training;risk factor education;home program guidelines;progressive activity/exercise   Intervention Comments Depending on length of stay, TCU is recommended at discharge.   Clinical Impression   Criteria for Skilled Therapeutic Intervention yes, treatment indicated   PT Diagnosis Decreased functional endurance, impaired functional mobility   Influenced by the following impairments s/p abdominal surgery, right knee pain   Functional limitations due to impairments Ambulation, transfers   Clinical Presentation Evolving/Changing  "  Clinical Presentation Rationale POD #2; movement ability improving daily   Clinical Decision Making (Complexity) Low complexity   Therapy Frequency` 5 times/week   Predicted Duration of Therapy Intervention (days/wks) 1 week   Anticipated Equipment Needs at Discharge walker;tub bench   Anticipated Discharge Disposition Transitional Care Facility;Home with Home Therapy   Risk & Benefits of therapy have been explained Yes   Patient, Family & other staff in agreement with plan of care Yes   [facility] TM \"6 Clicks\"   2016, Trustees of [facility], under license to [employer].  All rights reserved.   6 Clicks Short Forms Basic Mobility Inpatient Short Form   [facility] AM-PAC  \"6 Clicks\" V.2 Basic Mobility Inpatient Short Form   1. Turning from your back to your side while in a flat bed without using bedrails? 3 - A Little   2. Moving from lying on your back to sitting on the side of a flat bed without using bedrails? 3 - A Little   3. Moving to and from a bed to a chair (including a wheelchair)? 3 - A Little   4. Standing up from a chair using your arms (e.g., wheelchair, or bedside chair)? 3 - A Little   5. To walk in hospital room? 3 - A Little   6. Climbing 3-5 steps with a railing? 3 - A Little   Basic Mobility Raw Score (Score out of 24.Lower scores equate to lower levels of function) 18   Total Evaluation Time   Total Evaluation Time (Minutes) 10     "

## 2019-02-24 NOTE — PROGRESS NOTES
Notified by RN that patient's BP elevated 179-193/74-80. 10mg Hydralazine ordered. Patient c/o of increased pain per RN. Went to assess patient at bedside. Patient s/p pain medications and sleeping comfortably.     Alicia Myhre, DO  Obstetrics and Gyncology, PGY-2  February 23, 2019 , 10:46 PM

## 2019-02-24 NOTE — PLAN OF CARE
POD#2 s/p XL, BSO, interval tumor debulking, omentectomy, AURELIANO, proctoscopy for metastatic high grade serous ovarian cancer(per MD note).   VS: VSS except elevated BPs in the 160/70s, BP meds given with improvement.   Neuro: wnl  Cardiac: HTN                Respiratory: clear, IS done independently.  GI/: +BS, passing flatus, no BM yet. Voiding adeq amounts  Diet/appetite: on regular diet, good appetite. Insulin given for BGs and carb coverage.  Activity: SBA/walker in hallways, shower with assist.  Pain: has scheduled tylenol and prn oxycodone for pain, no c/o's incisional pain except on knees with PT. Warm pack applied to left upper abd for tenderness, effective.  Skin/drains: M/L incision with intact steri-strips, no drainage noted, abd binder in place.  Lines: Left PIV sl'd  P: continue to monitor ROBF, VS, incision and gen status and continue with POC.

## 2019-02-24 NOTE — PLAN OF CARE
Discharge Planner PT  PT 7C  Patient plan for discharge: TCU vs return home  Current status: PT evaluation completed and treatment initiated. Pt completed supine LE exercises. Pt instructed in abdominal precautions and instructed in transfer technique supine < > sitting and sit < > stand. Pt completed transfers with SBA of 1 and cues for correct movements. Pt ambulated with 4ww and SBA of 1 ~ 100', then ~ 475'. Pt ambulated with normal gait pattern and balance at moderately slow pace. Right knee pain increases during ambulation.  Barriers to return to prior living situation: Medical condition, SBA for transfers and ambulation, right knee pain during ambulation, decreased functional endurance.  Recommendations for discharge: TCU. If pt has longer length of stay and progresses adequately, discharge home with  PT may be appropriate.  Rationale for recommendations: Pt lives alone and SBA needed for functional mobility. Given need for movement assistance and post-op care, TCU needed short term to progress to independence for a safe return home.       Entered by: Kennedy Martinez 02/24/2019 10:49 AM

## 2019-02-25 LAB
ANION GAP SERPL CALCULATED.3IONS-SCNC: 8 MMOL/L (ref 3–14)
BUN SERPL-MCNC: 9 MG/DL (ref 7–30)
CALCIUM SERPL-MCNC: 8.2 MG/DL (ref 8.5–10.1)
CHLORIDE SERPL-SCNC: 99 MMOL/L (ref 94–109)
CO2 SERPL-SCNC: 29 MMOL/L (ref 20–32)
CREAT SERPL-MCNC: 0.6 MG/DL (ref 0.52–1.04)
GFR SERPL CREATININE-BSD FRML MDRD: 90 ML/MIN/{1.73_M2}
GLUCOSE BLDC GLUCOMTR-MCNC: 106 MG/DL (ref 70–99)
GLUCOSE BLDC GLUCOMTR-MCNC: 140 MG/DL (ref 70–99)
GLUCOSE BLDC GLUCOMTR-MCNC: 170 MG/DL (ref 70–99)
GLUCOSE BLDC GLUCOMTR-MCNC: 211 MG/DL (ref 70–99)
GLUCOSE BLDC GLUCOMTR-MCNC: 225 MG/DL (ref 70–99)
GLUCOSE SERPL-MCNC: 191 MG/DL (ref 70–99)
HGB BLD-MCNC: 8.7 G/DL (ref 11.7–15.7)
MAGNESIUM SERPL-MCNC: 1.7 MG/DL (ref 1.6–2.3)
POTASSIUM SERPL-SCNC: 3.6 MMOL/L (ref 3.4–5.3)
SODIUM SERPL-SCNC: 137 MMOL/L (ref 133–144)

## 2019-02-25 PROCEDURE — 80048 BASIC METABOLIC PNL TOTAL CA: CPT | Performed by: OBSTETRICS & GYNECOLOGY

## 2019-02-25 PROCEDURE — A9270 NON-COVERED ITEM OR SERVICE: HCPCS | Mod: GY | Performed by: OBSTETRICS & GYNECOLOGY

## 2019-02-25 PROCEDURE — A9270 NON-COVERED ITEM OR SERVICE: HCPCS | Mod: GY | Performed by: STUDENT IN AN ORGANIZED HEALTH CARE EDUCATION/TRAINING PROGRAM

## 2019-02-25 PROCEDURE — 99024 POSTOP FOLLOW-UP VISIT: CPT | Performed by: OBSTETRICS & GYNECOLOGY

## 2019-02-25 PROCEDURE — 25000132 ZZH RX MED GY IP 250 OP 250 PS 637: Mod: GY | Performed by: STUDENT IN AN ORGANIZED HEALTH CARE EDUCATION/TRAINING PROGRAM

## 2019-02-25 PROCEDURE — 36415 COLL VENOUS BLD VENIPUNCTURE: CPT | Performed by: OBSTETRICS & GYNECOLOGY

## 2019-02-25 PROCEDURE — 85018 HEMOGLOBIN: CPT | Performed by: OBSTETRICS & GYNECOLOGY

## 2019-02-25 PROCEDURE — 25000132 ZZH RX MED GY IP 250 OP 250 PS 637: Mod: GY | Performed by: OBSTETRICS & GYNECOLOGY

## 2019-02-25 PROCEDURE — 83735 ASSAY OF MAGNESIUM: CPT | Performed by: OBSTETRICS & GYNECOLOGY

## 2019-02-25 PROCEDURE — 12000001 ZZH R&B MED SURG/OB UMMC

## 2019-02-25 PROCEDURE — 00000146 ZZHCL STATISTIC GLUCOSE BY METER IP

## 2019-02-25 PROCEDURE — 25000128 H RX IP 250 OP 636: Performed by: STUDENT IN AN ORGANIZED HEALTH CARE EDUCATION/TRAINING PROGRAM

## 2019-02-25 RX ORDER — ACETAMINOPHEN 325 MG/1
650 TABLET ORAL EVERY 6 HOURS PRN
Refills: 0 | DISCHARGE
Start: 2019-02-25 | End: 2019-03-27

## 2019-02-25 RX ORDER — AMOXICILLIN 250 MG
1-2 CAPSULE ORAL 2 TIMES DAILY PRN
Qty: 120 TABLET | Refills: 0 | DISCHARGE
Start: 2019-02-25 | End: 2019-03-27

## 2019-02-25 RX ORDER — OXYCODONE HYDROCHLORIDE 5 MG/1
5-10 TABLET ORAL EVERY 6 HOURS PRN
Qty: 25 TABLET | Refills: 0 | Status: SHIPPED | OUTPATIENT
Start: 2019-02-25 | End: 2019-10-09 | Stop reason: ALTCHOICE

## 2019-02-25 RX ORDER — POLYETHYLENE GLYCOL 3350 17 G/17G
17 POWDER, FOR SOLUTION ORAL 2 TIMES DAILY PRN
DISCHARGE
Start: 2019-02-25 | End: 2019-03-27

## 2019-02-25 RX ADMIN — SENNOSIDES AND DOCUSATE SODIUM 2 TABLET: 8.6; 5 TABLET ORAL at 20:15

## 2019-02-25 RX ADMIN — OXYCODONE HYDROCHLORIDE 10 MG: 5 TABLET ORAL at 11:55

## 2019-02-25 RX ADMIN — ACETAMINOPHEN 650 MG: 325 TABLET, FILM COATED ORAL at 08:13

## 2019-02-25 RX ADMIN — GABAPENTIN 600 MG: 600 TABLET, FILM COATED ORAL at 14:39

## 2019-02-25 RX ADMIN — LEVOTHYROXINE SODIUM 75 MCG: 75 TABLET ORAL at 08:13

## 2019-02-25 RX ADMIN — ACETAMINOPHEN 650 MG: 325 TABLET, FILM COATED ORAL at 14:39

## 2019-02-25 RX ADMIN — ENOXAPARIN SODIUM 40 MG: 40 INJECTION SUBCUTANEOUS at 08:22

## 2019-02-25 RX ADMIN — GABAPENTIN 600 MG: 600 TABLET, FILM COATED ORAL at 20:16

## 2019-02-25 RX ADMIN — INSULIN DEGLUDEC INJECTION 15 UNITS: 100 INJECTION, SOLUTION SUBCUTANEOUS at 21:56

## 2019-02-25 RX ADMIN — SENNOSIDES AND DOCUSATE SODIUM 2 TABLET: 8.6; 5 TABLET ORAL at 11:53

## 2019-02-25 RX ADMIN — OXYCODONE HYDROCHLORIDE 10 MG: 5 TABLET ORAL at 06:16

## 2019-02-25 RX ADMIN — OXYCODONE HYDROCHLORIDE 10 MG: 5 TABLET ORAL at 20:15

## 2019-02-25 RX ADMIN — POLYETHYLENE GLYCOL 3350 17 G: 17 POWDER, FOR SOLUTION ORAL at 11:53

## 2019-02-25 RX ADMIN — INSULIN ASPART 1 UNITS: 100 INJECTION, SOLUTION INTRAVENOUS; SUBCUTANEOUS at 12:20

## 2019-02-25 RX ADMIN — INSULIN ASPART 1 UNITS: 100 INJECTION, SOLUTION INTRAVENOUS; SUBCUTANEOUS at 08:14

## 2019-02-25 RX ADMIN — DULOXETINE HYDROCHLORIDE 60 MG: 60 CAPSULE, DELAYED RELEASE ORAL at 21:44

## 2019-02-25 RX ADMIN — CARVEDILOL 25 MG: 25 TABLET, FILM COATED ORAL at 08:20

## 2019-02-25 RX ADMIN — LOSARTAN POTASSIUM 100 MG: 50 TABLET ORAL at 17:11

## 2019-02-25 RX ADMIN — CARVEDILOL 25 MG: 25 TABLET, FILM COATED ORAL at 18:06

## 2019-02-25 RX ADMIN — ACETAMINOPHEN 650 MG: 325 TABLET, FILM COATED ORAL at 21:44

## 2019-02-25 RX ADMIN — GABAPENTIN 600 MG: 600 TABLET, FILM COATED ORAL at 08:19

## 2019-02-25 RX ADMIN — ACETAMINOPHEN 650 MG: 325 TABLET, FILM COATED ORAL at 02:43

## 2019-02-25 RX ADMIN — BISACODYL 10 MG: 10 SUPPOSITORY RECTAL at 09:48

## 2019-02-25 RX ADMIN — CEPHALEXIN 250 MG: 250 CAPSULE ORAL at 21:43

## 2019-02-25 ASSESSMENT — PAIN DESCRIPTION - DESCRIPTORS
DESCRIPTORS: ACHING
DESCRIPTORS: SORE
DESCRIPTORS: ACHING

## 2019-02-25 ASSESSMENT — ACTIVITIES OF DAILY LIVING (ADL)
ADLS_ACUITY_SCORE: 16

## 2019-02-25 ASSESSMENT — MIFFLIN-ST. JEOR: SCORE: 1160.99

## 2019-02-25 NOTE — PROGRESS NOTES
"Social Work Services Progress Note    Hospital Day: 3  Date of Initial Social Work Evaluation:  Not completed this admission. SWA completed 7/26/18 continues to be updated information per family.   Collaborated with:  Pt and three daughters at bedside.    Data:  SW involved for discharge planning - TCU recommended.    SW met w/ Pt and daughters at bedside. Pt is agreeable to TCU recommendation at this time and has been to a TCU before. SW reviewed TCU listing with Pt and family and established the following preferences:     1. [facility] in [place] (P: [phone], F: [phone]) - top preferences as Pt has been here several times before. Referral sent; left VM at 1 PM  2. [facility] in [place] (P: [phone], F: [phone]) -currently no beds available for the next several days; referral sent.  3. [facility] (Main: [phone], Fax: [phone]). Referral sent; left VM at 1 PM.    4. [facility] (Main: [phone], Fax: [phone]) - referral sent  5. [facility] (P: [phone]; F: [phone]) - referral sent    Pt's daughter Emmanuelle (P: [phone]) plans to transport patient in personal vehicle upon discharge. SW updated family that we are still waiting for rehab placement. Family reports they plan to drive 3 hrs home today and will drive back when Pt is released from the hospital to transport themselves and report the drive is \"not a problem.\"     Intervention:  Discharge planning    Assessment:  Pt was pleasant and open to SW visit. Patient expresses understanding of TCU recommendation and reprots motivation to returning home when safe to do so.     Plan:    Anticipated Disposition:  Facility:  D    Barriers to d/c plan:  Rehab placement    Follow Up:  SW to f/u & assist as needed.    ANGELA Camejo, ATA  7C Surgical/Oncology Unit   Phone: [phone]  Pager: [phone]    "

## 2019-02-25 NOTE — PROGRESS NOTES
Gynecology Oncology Progress Note    POD#3 s/p XL, BSO, interval tumor debulking, omentectomy, AURELIANO, proctoscopy    Disease: metastatic high grade serous ovarian cancer    24 hour events:   - worked with PT    Subjective: Patient is doing well. Pain controlled this morning. Tolerating PO without n/v. +Flatus. S/p duffy, voiding spontaneously. Denies fevers/chills, CP, SOB. Is warming up to the idea of going to a TCU for discharge after talking with the physical therapist,     Objective:   Vitals:    02/24/19 1132 02/24/19 1426 02/24/19 1931 02/24/19 2200   BP: 142/63 131/52 157/65 132/55   BP Location: Left arm Right arm  Right arm   Pulse:   88    Resp: 18 18 16 16   Temp: 97.2  F (36.2  C) 96.9  F (36.1  C) 98.1  F (36.7  C) 99.9  F (37.7  C)   TempSrc: Oral Oral Oral Oral   SpO2: 92% 92% 94% 93%   Weight:       Height:         General: NAD, appears comfortable in bed  CV: RRR, no m/r/g  Resp: CTAB, no wheezes  Abdomen: soft, minimally tender, nondistended BS  Incision: dressing in place, clean and dry  Extremities: warm, well-perfused, nontender, trace edema, SCDs in place    I/O last 3 completed shifts:  In: 240 [P.O.:240]  Out: 1175 [Urine:1175]      24 hour: IN - 120 ml PO // OUT - 1775 ml UOP   Since MN: IN - 0 ml IVF // OUT - 500 ml UOP    Lines/Drains:   PIV    New labs  Recent Labs   Lab 02/23/19  0536 02/22/19  0842   * 205*       Assessment: 74 yo POD#3 s/p XL, BSO, interval tumor debulking, omentectomy, AURELIANO, proctoscopy for HGSOC. Doing well postoperatively.     Active problem list:  - Metastatic high grade serous ovarian cancer   - HTN  - Anemia of chronic disease   - Type II diabetes mellitus   - Hx DVT x2   - Hx Takutsubo cardiomyopathy 2/2015  - Coronary artery disease s/p cath 7/10/18   - recurrent UTIs on PPx   - peripheral neuropathy   - renal cell carcinoma      Dz: initially diagnosed 7/8/2018 by diagnostic lsc, XL w biopsies 7/24/2018 that was stopped for hypotension and disease burden,  s/p 6C Neoadjuvant carbo/taxol (C6D1) now s/p interval debulking to R0 (prior TVH). Ca-125 26.   FEN: Regular diet. Mg 1.5>ERP > repeat pending   Pain: s/p TAP, dilaudid PRN, oxycodone PRN, tylenol johny   Heme:     # anemia - chronic, no s/s anemia, am hgb stable. UOP appropriate. Repeat pending     # Hx DVT x 2 (both provoked, last 2015), not on anticoagulation prior to admission   CV:     # HTN - The patient has been hypertensive during surgery and postoperatively.               - now on home losartan, carvedilol      - high BPs last night, improved with 10 mg IV hydralazine     # HLD statin held     # Hx Takutsubo cardiomyopathy 2/2015, EF 25% > 65% 2/2019     # CAD s/p cath 7/10/18.   Pulm: former smoker  GI: reg diet, PRN senna/anti-emetics  :Voiding spontaneously.     # recurrent UTI, on keflex 250mg daily    # Renal cell carcinoma dx 1/2019- will address after ovarian ca treatment     # Mild R hydronephrosis  ID: as above  Endo:     # Hypothyroid on PTA synthroid.     # DM, home regimen ordered: Tresiba 14U qhs, humalog 1U/10carb. MSSI. A1C on 2/12 was 9.2%   Psych/Neuro/MSK:    # deression/anxiety- cont cymbalta    # neuropathy- cont gabapentin.     # hx Confusion post-op after last surgery in the setting of prolonged hypotension - will minimize narcotics or altering medications   PPX: SCDs, IS, lovenox   Dispo: To TCU when meeting post op goals, likely today   Drains/Lines: PIV     Yadira Enamorado MD   PGY-3 Ob/Gyn    I, Evans Arndt personally examined and evaluated this patient on 02/25/19.  I discussed the patient with the resident and care team, and agree with the assessment and plan of care as documented in the residents note above.    I personally reviewed vital signs, laboratory values and imaging results.    Pt medically stable for discharge to TCU post-operatively but awaiting placement.    Ranjana Arndt MD  Gynecologic Oncology  [facility]

## 2019-02-25 NOTE — PLAN OF CARE
Assumed care from 1900H-2300H. Up with SBA of 1 with walker. Voiding spontaneously and adequately. Abdominal pain managed with prn po oxycodone. Bilateral shoulder soreness treated with warm packs with some relief. OVSS, afebrile. Blood sugar at bedtime 255, 2U of sliding scale insulin given. Audible bowel sound, + gas no BM. Midline surgical incision with sterile strips, CDI/ELLIOTT. On Regular diet. Tolerating well. PLAN: To continue with the care plan. Waiting for the ROBF.Pain management.

## 2019-02-25 NOTE — PLAN OF CARE
POD3 of an ex lap tumor debulking, omentectomy, AURELIANO, pelvic peritonectomy, BSO, proctoscopy. A&Ox4, VSS on room air, pain is controlled with PO oxycodone. UP SBA with the walker, LSC, BS+ passing gas, LBM 2/21. Suppository and all bowel meds given. Midline incision has steri strips and is ELLIOTT, Showered, PIV is SL. Glucose checks AC/HS with carb coverage. Waiting for ROBF, will discharge to TCU.

## 2019-02-25 NOTE — PLAN OF CARE
A&Ox4. VSS on room air. Pain controlled with scheduled tylenol and oxycodone given x1. Denies nausea. Tolerating regular diet. PIV saline locked. Midline approximated with steristrips and abd binder.  Voiding spontaneously adequate UOP. BG monitoring. PCDs on, IS encouraged. Continue to monitor.

## 2019-02-26 ENCOUNTER — APPOINTMENT (OUTPATIENT)
Dept: PHYSICAL THERAPY | Facility: CLINIC | Age: 74
DRG: 737 | End: 2019-02-26
Attending: OBSTETRICS & GYNECOLOGY
Payer: MEDICARE

## 2019-02-26 VITALS
WEIGHT: 155.2 LBS | SYSTOLIC BLOOD PRESSURE: 168 MMHG | HEIGHT: 62 IN | DIASTOLIC BLOOD PRESSURE: 73 MMHG | RESPIRATION RATE: 15 BRPM | TEMPERATURE: 98.6 F | OXYGEN SATURATION: 95 % | BODY MASS INDEX: 28.56 KG/M2 | HEART RATE: 78 BPM

## 2019-02-26 LAB
BLD PROD TYP BPU: NORMAL
BLD PROD TYP BPU: NORMAL
BLD UNIT ID BPU: 0
BLD UNIT ID BPU: 0
BLOOD PRODUCT CODE: NORMAL
BLOOD PRODUCT CODE: NORMAL
BPU ID: NORMAL
BPU ID: NORMAL
GLUCOSE BLDC GLUCOMTR-MCNC: 136 MG/DL (ref 70–99)
GLUCOSE BLDC GLUCOMTR-MCNC: 143 MG/DL (ref 70–99)
GLUCOSE BLDC GLUCOMTR-MCNC: 184 MG/DL (ref 70–99)
TRANSFUSION STATUS PATIENT QL: NORMAL

## 2019-02-26 PROCEDURE — 97110 THERAPEUTIC EXERCISES: CPT | Mod: GP

## 2019-02-26 PROCEDURE — A9270 NON-COVERED ITEM OR SERVICE: HCPCS | Mod: GY | Performed by: OBSTETRICS & GYNECOLOGY

## 2019-02-26 PROCEDURE — 99024 POSTOP FOLLOW-UP VISIT: CPT | Performed by: OBSTETRICS & GYNECOLOGY

## 2019-02-26 PROCEDURE — 25000132 ZZH RX MED GY IP 250 OP 250 PS 637: Mod: GY | Performed by: STUDENT IN AN ORGANIZED HEALTH CARE EDUCATION/TRAINING PROGRAM

## 2019-02-26 PROCEDURE — 97530 THERAPEUTIC ACTIVITIES: CPT | Mod: GP

## 2019-02-26 PROCEDURE — 25000128 H RX IP 250 OP 636: Performed by: STUDENT IN AN ORGANIZED HEALTH CARE EDUCATION/TRAINING PROGRAM

## 2019-02-26 PROCEDURE — 25000132 ZZH RX MED GY IP 250 OP 250 PS 637: Mod: GY | Performed by: OBSTETRICS & GYNECOLOGY

## 2019-02-26 PROCEDURE — A9270 NON-COVERED ITEM OR SERVICE: HCPCS | Mod: GY | Performed by: STUDENT IN AN ORGANIZED HEALTH CARE EDUCATION/TRAINING PROGRAM

## 2019-02-26 PROCEDURE — 40000193 ZZH STATISTIC PT WARD VISIT

## 2019-02-26 PROCEDURE — 00000146 ZZHCL STATISTIC GLUCOSE BY METER IP

## 2019-02-26 RX ADMIN — CARVEDILOL 25 MG: 25 TABLET, FILM COATED ORAL at 08:13

## 2019-02-26 RX ADMIN — BISACODYL 10 MG: 10 SUPPOSITORY RECTAL at 08:14

## 2019-02-26 RX ADMIN — ENOXAPARIN SODIUM 40 MG: 40 INJECTION SUBCUTANEOUS at 08:15

## 2019-02-26 RX ADMIN — ACETAMINOPHEN 650 MG: 325 TABLET, FILM COATED ORAL at 02:39

## 2019-02-26 RX ADMIN — ACETAMINOPHEN 650 MG: 325 TABLET, FILM COATED ORAL at 08:14

## 2019-02-26 RX ADMIN — LEVOTHYROXINE SODIUM 75 MCG: 75 TABLET ORAL at 08:14

## 2019-02-26 RX ADMIN — OXYCODONE HYDROCHLORIDE 10 MG: 5 TABLET ORAL at 08:26

## 2019-02-26 RX ADMIN — GABAPENTIN 600 MG: 600 TABLET, FILM COATED ORAL at 08:14

## 2019-02-26 ASSESSMENT — MIFFLIN-ST. JEOR: SCORE: 1157.36

## 2019-02-26 ASSESSMENT — ACTIVITIES OF DAILY LIVING (ADL)
ADLS_ACUITY_SCORE: 16

## 2019-02-26 NOTE — PLAN OF CARE
POD # 3 XL BSO tumor debulking, omentectomy, AURELIANO, proctoscopy for metastatic ovarian CA. Up with SBA of 1 with walker. Ambulating in the hallway. Abdominal pain managed with prn oxycodone 10 mg given once this shift with good result and scheduled po tylenol. + gas and bowel sound, but no BM yet. OVSS, afebrile. Py BP at 2000H elevated. Pt was then c/o pain. BP improves after that. Using IS independently. Voiding spontaneously with adequate urine volume. Tolerating regular diet. Blood sugar monitoring sliding scale insulin given at 2200H. Midline surgical incision with sterile strips, CDI/ELLIOTT. PLAN: To continue with the care plan. Discharge to TCU once placement available. Waiting for return of bowel function.

## 2019-02-26 NOTE — PLAN OF CARE
VSS on RA. Pain managed with tylenol and oxycodone. Midline intact with steri strips. PIV removed. SBA with walker. +Flatus, no BM. Showered this AM. Adequate for discharge. All belongings with patient. Discharge education and paperwork complete, no further questions. Self administered Lovenox. BG checks. Left unit at 1145 to lobby via wheelchair. Family will transport to TCU.

## 2019-02-26 NOTE — PROGRESS NOTES
Gynecology Oncology Progress Note    POD#4 s/p XL, BSO, interval tumor debulking, omentectomy, AURELIANO, proctoscopy    Disease: metastatic high grade serous ovarian cancer    24 hour events:   - SW placing referrals for TCU     Subjective: Patient is doing well. Pain controlled this morning. Tolerating PO without n/v. +Flatus. S/p duffy, voiding spontaneously. Denies fevers/chills, CP, SOB.     Objective:   Vitals:    02/25/19 2143 02/25/19 2200 02/26/19 0735 02/26/19 0853   BP: 136/64 143/60 168/73    BP Location:   Right arm    Pulse:  81 78    Resp:  16 15    Temp:  98.5  F (36.9  C) 98.6  F (37  C)    TempSrc:  Oral Oral    SpO2:  94% 95%    Weight:    70.4 kg (155 lb 3.2 oz)   Height:         General: NAD, appears comfortable in bed  CV: RRR, no m/r/g  Resp: CTAB, no wheezes  Abdomen: soft, minimally tender, nondistended   Incision: dressing in place, clean and dry  Extremities: warm, well-perfused, nontender, trace edema, SCDs in place    I/O last 3 completed shifts:  In: 120 [P.O.:120]  Out: 2625 [Urine:2625]    New labs  Recent Labs   Lab 02/25/19  0617 02/23/19  0536 02/22/19  0842   * 243* 205*       Assessment: 72 yo POD#4 s/p XL, BSO, interval tumor debulking, omentectomy, AURELIANO, proctoscopy for HGSOC. Doing well postoperatively.     Active problem list:  - Metastatic high grade serous ovarian cancer   - HTN  - Anemia of chronic disease   - Type II diabetes mellitus   - Hx DVT x2   - Hx Takutsubo cardiomyopathy 2/2015  - Coronary artery disease s/p cath 7/10/18   - recurrent UTIs on PPx   - peripheral neuropathy   - renal cell carcinoma      Dz: initially diagnosed 7/8/2018 by diagnostic lsc, XL w biopsies 7/24/2018 that was stopped for hypotension and disease burden, s/p 6C Neoadjuvant carbo/taxol (C6D1) now s/p interval debulking to R0 (prior TVH). Ca-125 26.   FEN: Regular diet. Mg 1.5>ERP>1.7  Pain: s/p TAP, dilaudid PRN, oxycodone PRN, tylenol johny   Heme:     # anemia - chronic, no s/s anemia, hgb  stable. UOP appropriate    # Hx DVT x 2 (both provoked, last 2015), not on anticoagulation prior to admission   CV:     # HTN - The patient has been hypertensive during surgery and postoperatively, now resolved                - now on home losartan, carvedilol    # HLD statin held     # Hx Takutsubo cardiomyopathy 2/2015, EF 25% > 65% 2/2019     # CAD s/p cath 7/10/18.   Pulm: former smoker, on RA  GI: reg diet, PRN senna/anti-emetics  :Voiding spontaneously.     # recurrent UTIs, on keflex 250mg daily    # Renal cell carcinoma dx 1/2019- will address after ovarian ca treatment     # Mild R hydronephrosis  ID: as above  Endo:     # Hypothyroid on PTA synthroid.     # DM, home regimen ordered: Tresiba 15U qhs, humalog 1U/10carb. MSSI. A1C on 2/12 was 9.2%   Psych/Neuro/MSK:    # deression/anxiety- cont cymbalta    # neuropathy- cont gabapentin.     # hx Confusion post-op after last surgery in the setting of prolonged hypotension - will minimize narcotics or altering medications   PPX: SCDs, IS, lovenox   Dispo: Awaiting TCU placement   Drains/Lines: PIV     Yadira Enamorado MD   PGY-3 Ob/Gyn    Rima Kay MD    Department of Ob/Gyn and Women's Health  Division of Gynecologic Oncology  [facility]  [phone]    I saw and evaluated the patient with the resident.  I edited and reviewed the above note.   Plan home today with TCU placement, +flatus, no BM. Wound CDI looks good, no nausea or vomiting.

## 2019-02-26 NOTE — PLAN OF CARE
Discharge Planner PT 7C  Patient plan for discharge: prefers home but agreeable to rehab placement if needed  Current status: pt encountered in supine; demonstrates ability to complete bed mobility, sit <> stand transfers and ambulation with SBA and use of 4WW. As pt progresses distance increased antalgic RLE stepping pattern noted. Endorses abdominal discomfort throughout. Pt reports family is able to provide 24 hr assistance at home however per discussion with SW pt likely does not have this as family lives further away than pt has described. Pt also endorses IND use of bathroom however upon follow up pt needing CGA for safety with transfers from low surface.   Barriers to return to prior living situation: medical status, lives alone, assist level  Recommendations for discharge: TCU vs home with 24 hr assist and HHPT   Rationale for recommendations: from mobility standpoint pt mobilizing safely with SBA-CGA of 1; if family is able to provide 24 hr assistance anticipate safe return home with HHPT however if pt does not have this support for management of medical needs and assistance, recommend short stay at TCU.        Entered by: Vicente Pascual 02/26/2019 9:26 AM

## 2019-02-26 NOTE — PROGRESS NOTES
Social Work Services Discharge Note      Patient Name:  Afshan Cardona     Anticipated Discharge Date:  2/26/19    Discharge Disposition:   TCU:  [facility] (P: [phone]; F: [phone]; Adm Vaishnavi [phone] )    Following MD:  per facility     Pre-Admission Screening (PAS) online form has been completed.  The Level of Care (LOC) is:  Determined  Confirmation Code is:  [id number].  Patient/caregiver informed of referral to [facility] for Pre-Admission Screening for skilled nursing facility (SNF) placement and to expect a phone call post discharge from SNF.     Additional Services/Equipment Arranged:  N/A - family will transport.     Patient / Family response to discharge plan:  Pt and family are agreeable with discharge plan to TCU.      Persons notified of above discharge plan:  Pt, Pt's family, medical team, bedside nurse, Vaishnavi GANNON at [facility]    Staff Discharge Instructions:  SW faxed discharge orders and signed hard scripts for any controlled substances.  Please print a packet and send with patient.   Please call for Nurse to Nurse Report    CTS Handoff completed:  YES    Medicare Notice of Rights provided to the patient/family:  YES    ANGELA Camejo, ATA  7C Surgical/Oncology Unit   Phone: [phone]  Pager: [phone]

## 2019-02-26 NOTE — PROGRESS NOTES
Social Work Services Progress Note    Hospital Day: 4  Collaborated with:  Chart review    Data:  SW involved for discharge planning - TCU recommended.    1. [facility] in [place] (P: [phone], F: [phone]) - top preferences as Pt has been here several times before. Spoke w/ Admissions & Pt was declined for admission.   2. [facility] in [place] (P: [phone], F: [phone]) -currently no beds available for the next several days  3. [facility] (Main: [phone], Fax: [phone]; Adm Sarika [phone]). Spoke to Sarika, they requested add'l info (sent this AM) and they would not be able to accept today. Reached out to PCP to see if they'd be able to follow.   4. [facility] (Main: [phone], Fax: [phone]) - no bed availability this week.   5. [facility] (P: [phone]; F: [phone]; Adm Vaishnavi [phone] ) - Pt was accepted for admission today.     It is anticipated Pt will have outpatient chemo in approx. 3-4 weeks outpatient and it is not anticipated she will be in TCU that long.     Intervention:  Discharge planning    Assessment:  Pt is pleasant and open to TCU placement    Plan:    Anticipated Disposition:  Facility:  [facility]    Barriers to d/c plan:  Rehab placement    Follow Up:  SW to f/u & assist as needed.    ANGELA Camejo, ATA  7C Surgical/Oncology Unit   Phone: [phone]  Pager: [phone]

## 2019-02-27 ENCOUNTER — CARE COORDINATION (OUTPATIENT)
Dept: ONCOLOGY | Facility: CLINIC | Age: 74
End: 2019-02-27

## 2019-02-27 DIAGNOSIS — C64.2 CLEAR CELL RENAL CELL CARCINOMA, LEFT (H): Primary | ICD-10-CM

## 2019-02-27 NOTE — PLAN OF CARE
Physical Therapy Discharge Summary    Reason for therapy discharge:    Discharged to transitional care facility.    Progress towards therapy goal(s). See goals on Care Plan in [facility] electronic health record for goal details.  Goals not met.  Barriers to achieving goals:   discharge from facility.    Therapy recommendation(s):    Continued therapy is recommended.  Rationale/Recommendations:  for progression of strength, balance, activity tolerance, and IND/safety with functional mobility.

## 2019-02-28 LAB — COPATH REPORT: NORMAL

## 2019-02-28 NOTE — PROGRESS NOTES
POST HOSPITAL DISCHARGE CALL    No post hospital call needed as patient discharged to TCU.    Discharge Disposition:   U:  [facility] (P: [phone]; F: [phone]; Adm Riggins [phone] )    Marianela Coates RN

## 2019-03-13 ENCOUNTER — ONCOLOGY VISIT (OUTPATIENT)
Dept: ONCOLOGY | Facility: CLINIC | Age: 74
End: 2019-03-13
Attending: OBSTETRICS & GYNECOLOGY
Payer: MEDICARE

## 2019-03-13 VITALS
OXYGEN SATURATION: 95 % | BODY MASS INDEX: 28.34 KG/M2 | HEART RATE: 83 BPM | SYSTOLIC BLOOD PRESSURE: 137 MMHG | DIASTOLIC BLOOD PRESSURE: 73 MMHG | WEIGHT: 154 LBS | TEMPERATURE: 98.7 F | HEIGHT: 62 IN | RESPIRATION RATE: 18 BRPM

## 2019-03-13 DIAGNOSIS — C56.9 OVARIAN CANCER, UNSPECIFIED LATERALITY (H): Primary | ICD-10-CM

## 2019-03-13 PROCEDURE — G0463 HOSPITAL OUTPT CLINIC VISIT: HCPCS | Mod: ZF

## 2019-03-13 PROCEDURE — 99215 OFFICE O/P EST HI 40 MIN: CPT | Mod: 24 | Performed by: OBSTETRICS & GYNECOLOGY

## 2019-03-13 RX ORDER — CYANOCOBALAMIN 1000 UG/ML
1000 INJECTION, SOLUTION INTRAMUSCULAR; SUBCUTANEOUS
COMMUNITY
Start: 2019-02-18 | End: 2020-07-15

## 2019-03-13 RX ORDER — OXYCODONE AND ACETAMINOPHEN 5; 325 MG/1; MG/1
1 TABLET ORAL 3 TIMES DAILY
COMMUNITY
Start: 2019-03-05 | End: 2020-08-19

## 2019-03-13 ASSESSMENT — MIFFLIN-ST. JEOR: SCORE: 1151.92

## 2019-03-13 ASSESSMENT — PAIN SCALES - GENERAL: PAINLEVEL: SEVERE PAIN (6)

## 2019-03-13 NOTE — NURSING NOTE
"Oncology Rooming Note    March 13, 2019 7:14 AM   Afshan Cardona is a 73 year old female who presents for:    Chief Complaint   Patient presents with     Oncology Clinic Visit     Visit related to Malignant Neoplasm of righ Ovary.     Initial Vitals: /73 (BP Location: Left arm, Patient Position: Sitting, Cuff Size: Adult Regular)   Pulse 83   Temp 98.7  F (37.1  C) (Oral)   Resp 18   Ht 1.575 m (5' 2.01\")   Wt 69.9 kg (154 lb)   SpO2 95%   BMI 28.16 kg/m   Estimated body mass index is 28.16 kg/m  as calculated from the following:    Height as of this encounter: 1.575 m (5' 2.01\").    Weight as of this encounter: 69.9 kg (154 lb). Body surface area is 1.75 meters squared.  Severe Pain (6) Comment: Data Unavailable   No LMP recorded. Patient has had a hysterectomy.  Allergies reviewed: Yes  Medications reviewed: Yes    Medications: Medication refills not needed today.  Pharmacy name entered into EPIC: [facility] - [place], [place] - [address]    Clinical concerns: No new concerns. Provider was notified.      Coleen Andre LPN            "

## 2019-03-13 NOTE — LETTER
RE: Afshan Cardona  [address]  [facility] [address]     Dear Colleague,    Thank you for referring your patient, Afshan Cardona, to the [facility]. Please see a copy of my visit note below.                Follow Up Notes on Referred Patient    Date: 3/13/2019    Dr. Willian Lucero MD  No address on file     RE: Afshan Cardona  : 1945  SRIKANTH: 3/13/2019    Dear Dr. 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      Ms. Cardona presents today for followup.  She has been doing well since surgery.  She is eating and drinking well.  No nausea, vomiting, fever, chills.  Normal urinary and bowel function.  No B symptoms, vaginal bleeding or discharge.     Past Medical History:  Past Medical History:   Diagnosis Date     Vivas's esophagus      Cardiomyopathy (H)     Taksumoto     Colon polyps      Diabetes (H)      Fibromyalgia      Former smoker      History of DVT (deep vein thrombosis)      HLD (hyperlipidemia)      HTN (hypertension)      Hypothyroid      TIA (transient ischemic attack)      Past Surgical History:  Past Surgical History:   Procedure Laterality Date     AS ESOPHAGOSCOPY, DIAGNOSTIC       BACK SURGERY       bladder lift       HYSTERECTOMY       HYSTERECTOMY TOTAL ABD, TRINY SALPINGO-OOPHORECTOMY, NODE DISSECTION, TUMOR DEBULKING, COMBINED N/A 2018    Procedure: COMBINED HYSTERECTOMY TOTAL ABDOMINAL, SALPINGO-OOPHORECTOMY, NODE DISSECTION, TUMOR DEBULKING;;  Surgeon: Bakari Galeas MD;  [facility]     LAPAROSCOPY DIAGNOSTIC (GYN) N/A 2018    Procedure: LAPAROSCOPY DIAGNOSTIC (GYN);  Diagnostic Laparoscopy, Biopsies;  Surgeon: Bakari Galeas MD;  Location: [facility] OR     LAPAROTOMY EXPLORATORY N/A 2018    Procedure: LAPAROTOMY EXPLORATORY;  Exploratory Laparotomy, lysis of adhesions, jejunal mesentary biopsy and sigmoid epiploic biopsy. ;  Surgeon:  Bakari Galeas MD;  Location: [facility] OR     LAPAROTOMY, TUMOR DEBULKING, COMBINED N/A 2/22/2019    Procedure: Exploratory Laparotomy, tumor debulking, omentectomy, lysis of adhesions x 60 minutes, pelvic paritonectomy;  Surgeon: Bakari Galeas MD;  [facility]     PROCTOSCOPY N/A 2/22/2019    Procedure: Proctoscopy;  Surgeon: Bakari Galeas MD;  Location: [facility]     SALPINGO-OOPHORECTOMY BILATERAL Bilateral 2/22/2019    Procedure: Bilateral Salpingo Oophorectomy;  Surgeon: Bakari Galeas MD;  Location: [facility] OR         Health Maintenance Due   Topic Date Due     PHQ-2 Q1 YR  05/19/1956     LIPID SCREEN Q5 YR FEMALE (SYSTEM ASSIGNED)  05/19/1989     MEDICARE ANNUAL WELLNESS VISIT  05/19/2009     DEXA SCAN SCREENING (SYSTEM ASSIGNED)  05/19/2009     ZOSTER IMMUNIZATION (2 of 3) 02/15/2016     Current Medications:   Current Outpatient Medications   Medication Sig Dispense Refill     aspirin (ASA) 325 MG EC tablet Take 325 mg by mouth daily       carvedilol (COREG) 25 MG tablet Take 1 tablet (25 mg) by mouth 2 times daily (with meals) 60 tablet      cephalexin (KEFLEX) 250 MG capsule TAKE ONE CAPSULE BY MOUTH ONCE DAILY AT BEDTIME  3     Cholecalciferol (VITAMIN D3) 2000 units CAPS TAKE ONE CAPSULE BY MOUTH ONCE DAILY IN THE MORNING  3     CRANBERRY EXTRACT PO Take by mouth every morning       cyanocobalamin (CYANOCOBALAMIN) 1000 MCG/ML injection Inject 1,000 mcg into the muscle every 30 days       DULoxetine (CYMBALTA) 60 MG EC capsule TAKE ONE CAPSULE BY MOUTH AT BEDTIME  1     gabapentin (NEURONTIN) 600 MG tablet Take 1 tablet (600 mg) by mouth 3 times daily 90 tablet      HUMALOG MARY KWIKPEN 100 UNIT/ML injection INJECT 1 UNIT PER 10GRAMS OF CARBOHYDRATES WITH CORRECTION 0.5 UNITS PER 50 ABOVE 150 ( UP TO 30 UNITS PER DAY)  6     insulin degludec (TRESIBA) 100 UNIT/ML pen Inject 15 Units Subcutaneous At Bedtime 4.5 mL 0     levothyroxine (SYNTHROID/LEVOTHROID) 75 MCG tablet TAKE ONE TABLET BY  MOUTH ONCE DAILY  IN THE MORNING     (PLEASE CALL [phone] FOR AN OFFICE VISIT BEFORE NEXT REFILL.)  0     losartan (COZAAR) 100 MG tablet Take 100 mg by mouth At Bedtime        melatonin 1 MG TABS tablet Take 1 mg by mouth nightly as needed for sleep       omeprazole (PRILOSEC) 20 MG DR capsule TAKE ONE CAPSULE BY MOUTH ONCE DAILY  1     ONETOUCH VERIO IQ test strip USE TO TEST BLOOD GLUCOSE 6-8 TIMES PER DAY, BEFORE MEALS, BEDTIME TO DOSE INSULIN, HIGH OR LOW BLOOD GLUCOSE WITH RECHECK  3     oxyCODONE (ROXICODONE) 5 MG tablet Take 1-2 tablets (5-10 mg) by mouth every 6 hours as needed for moderate to severe pain (take one tab for moderate pain and two tabs for severe pain) 25 tablet 0     oxyCODONE-acetaminophen (PERCOCET) 5-325 MG tablet Take 1 tablet by mouth       polyethylene glycol (MIRALAX/GLYCOLAX) packet Take 17 g by mouth 2 times daily as needed for constipation       pravastatin (PRAVACHOL) 80 MG tablet Take 80 mg by mouth At Bedtime        senna-docusate (SENOKOT-S/PERICOLACE) 8.6-50 MG tablet Take 1-2 tablets by mouth 2 times daily as needed for constipation Take while on oral narcotics to prevent or treat constipation. 120 tablet 0     acetaminophen (TYLENOL) 325 MG tablet Take 2 tablets (650 mg) by mouth every 6 hours as needed for mild pain (Patient not taking: Reported on 3/13/2019)  0     enoxaparin (LOVENOX) 40 MG/0.4ML syringe Inject 0.4 mLs (40 mg) Subcutaneous every 24 hours for 25 days (Patient not taking: Reported on 3/13/2019) 25 Syringe 0     Allergies:   Allergies   Allergen Reactions     Nitrofurantoin Other (See Comments)     Burning around her mouth, pt advised by neurologist to not take macrobid         Gabapentin Other (See Comments)     Other reaction(s): Confusion  Confusion and lethargy  Too strong for patient  Lethargic  Confusion and lethargy     Morphine Nausea and Vomiting     Azithromycin Diarrhea     Buprenorphine Nausea and Vomiting     Severe vomiting     Clonazepam  "Unknown     Furosemide      Low sodium     Hydrochlorothiazide Other (See Comments)     Low Sodium     Lantus [Insulin Glargine]      Low blood sugar     Lisinopril Cough     Pregabalin Other (See Comments)     Dizziness; Worse, numbness/tingling/burning pain whole body, hospitalized     Sulfamethoxazole Hives      Social History:     Social History     Tobacco Use     Smoking status: Former Smoker     Packs/day: 0.25     Years: 15.00     Pack years: 3.75     Smokeless tobacco: Never Used   Substance Use Topics     Alcohol use: No     Frequency: Never     History   Drug Use No     Physical Exam:     /73 (BP Location: Left arm, Patient Position: Sitting, Cuff Size: Adult Regular)   Pulse 83   Temp 98.7  F (37.1  C) (Oral)   Resp 18   Ht 1.575 m (5' 2.01\")   Wt 69.9 kg (154 lb)   SpO2 95%   BMI 28.16 kg/m     Body mass index is 28.16 kg/m .    General Appearance: healthy and alert, no distress     Musculoskeletal: extremities non tender and without edema    Neurological: normal gait, no gross defects     Psychiatric: appropriate mood and affect                               ABDOMEN:  Soft, nontender, nondistended.  Well-healing midline incision.  No organomegaly.     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     A total of 30 minutes was spent with the patient, 25 minutes of which were spent in counseling the patient and/or treatment planning.      1.  Stage IIIC high-grade serous ovarian cancer.     2.  Status post neoadjuvant chemotherapy.   3.  Status post R0 interval debulking.   4.  Renal clear cell carcinoma.        I had a long discussion with the patient as well as with her family.  Discussed to continue 3 more cycles of adjuvant chemotherapy.  She will get that closer to home with carboplatin and paclitaxel every 3 weeks.  We will then have her come back after that.  She will see my colleagues in Interventional " Radiology for possible cryoablation of her biopsy-proven renal cell carcinoma.  We will also have her see our genetic counselor at that point for germline testing as well as do a surveillance visit at that point.  It will be about 4 months from now.  Patient agrees with the plan.  She is very appreciative of her care.  All questions were answered.       Bakari Galeas MD, MS    Department of Obstetrics and Gynecology   Division of Gynecologic Oncology   [facility]  Phone: [phone]        CC  Patient Care Team:  Sonja Hathaway MD as PCP - General (Family Practice)  SELF, REFERRED

## 2019-03-13 NOTE — PROGRESS NOTES
Follow Up Notes on Referred Patient    Date: 3/13/2019       Dr. Willian Lucero MD  No address on file       RE: Afshan Cardona  : 1945  SRIKANTH: 3/13/2019    Dear Dr. 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      Ms. Cardona presents today for followup.  She has been doing well since surgery.  She is eating and drinking well.  No nausea, vomiting, fever, chills.  Normal urinary and bowel function.  No B symptoms, vaginal bleeding or discharge.           Past Medical History:    Past Medical History:   Diagnosis Date     Vivas's esophagus      Cardiomyopathy (H)     [place]     Colon polyps      Diabetes (H)      Fibromyalgia      Former smoker      History of DVT (deep vein thrombosis)      HLD (hyperlipidemia)      HTN (hypertension)      Hypothyroid      TIA (transient ischemic attack)          Past Surgical History:    Past Surgical History:   Procedure Laterality Date     AS ESOPHAGOSCOPY, DIAGNOSTIC       BACK SURGERY       bladder lift       HYSTERECTOMY       HYSTERECTOMY TOTAL ABD, TRINY SALPINGO-OOPHORECTOMY, NODE DISSECTION, TUMOR DEBULKING, COMBINED N/A 2018    Procedure: COMBINED HYSTERECTOMY TOTAL ABDOMINAL, SALPINGO-OOPHORECTOMY, NODE DISSECTION, TUMOR DEBULKING;;  Surgeon: Bakari Galeas MD;  Location: [facility] OR     LAPAROSCOPY DIAGNOSTIC (GYN) N/A 2018    Procedure: LAPAROSCOPY DIAGNOSTIC (GYN);  Diagnostic Laparoscopy, Biopsies;  Surgeon: Bakari Galeas MD;  Location: [facility] OR     LAPAROTOMY EXPLORATORY N/A 2018    Procedure: LAPAROTOMY EXPLORATORY;  Exploratory Laparotomy, lysis of adhesions, jejunal mesentary biopsy and sigmoid epiploic biopsy. ;  Surgeon: Bakari Galeas MD;  Location: [facility] OR     LAPAROTOMY, TUMOR DEBULKING, COMBINED N/A 2019    Procedure: Exploratory Laparotomy, tumor debulking, omentectomy, lysis of adhesions x 60 minutes, pelvic paritonectomy;   Surgeon: Bakari Galeas MD;  Location: [facility] OR     PROCTOSCOPY N/A 2/22/2019    Procedure: Proctoscopy;  Surgeon: Bakari Galeas MD;  Location: [facility] OR     SALPINGO-OOPHORECTOMY BILATERAL Bilateral 2/22/2019    Procedure: Bilateral Salpingo Oophorectomy;  Surgeon: Bakari Galeas MD;  Location: [facility] OR         Health Maintenance Due   Topic Date Due     PHQ-2 Q1 YR  05/19/1956     LIPID SCREEN Q5 YR FEMALE (SYSTEM ASSIGNED)  05/19/1989     MEDICARE ANNUAL WELLNESS VISIT  05/19/2009     DEXA SCAN SCREENING (SYSTEM ASSIGNED)  05/19/2009     ZOSTER IMMUNIZATION (2 of 3) 02/15/2016       Current Medications:     Current Outpatient Medications   Medication Sig Dispense Refill     aspirin (ASA) 325 MG EC tablet Take 325 mg by mouth daily       carvedilol (COREG) 25 MG tablet Take 1 tablet (25 mg) by mouth 2 times daily (with meals) 60 tablet      cephalexin (KEFLEX) 250 MG capsule TAKE ONE CAPSULE BY MOUTH ONCE DAILY AT BEDTIME  3     Cholecalciferol (VITAMIN D3) 2000 units CAPS TAKE ONE CAPSULE BY MOUTH ONCE DAILY IN THE MORNING  3     CRANBERRY EXTRACT PO Take by mouth every morning       cyanocobalamin (CYANOCOBALAMIN) 1000 MCG/ML injection Inject 1,000 mcg into the muscle every 30 days       DULoxetine (CYMBALTA) 60 MG EC capsule TAKE ONE CAPSULE BY MOUTH AT BEDTIME  1     gabapentin (NEURONTIN) 600 MG tablet Take 1 tablet (600 mg) by mouth 3 times daily 90 tablet      HUMALOG MARY KWIKPEN 100 UNIT/ML injection INJECT 1 UNIT PER 10GRAMS OF CARBOHYDRATES WITH CORRECTION 0.5 UNITS PER 50 ABOVE 150 ( UP TO 30 UNITS PER DAY)  6     insulin degludec (TRESIBA) 100 UNIT/ML pen Inject 15 Units Subcutaneous At Bedtime 4.5 mL 0     levothyroxine (SYNTHROID/LEVOTHROID) 75 MCG tablet TAKE ONE TABLET BY MOUTH ONCE DAILY  IN THE MORNING     (PLEASE CALL [phone] FOR AN OFFICE VISIT BEFORE NEXT REFILL.)  0     losartan (COZAAR) 100 MG tablet Take 100 mg by mouth At Bedtime        melatonin 1 MG TABS tablet Take 1 mg  by mouth nightly as needed for sleep       omeprazole (PRILOSEC) 20 MG DR capsule TAKE ONE CAPSULE BY MOUTH ONCE DAILY  1     ONETOUCH VERIO IQ test strip USE TO TEST BLOOD GLUCOSE 6-8 TIMES PER DAY, BEFORE MEALS, BEDTIME TO DOSE INSULIN, HIGH OR LOW BLOOD GLUCOSE WITH RECHECK  3     oxyCODONE (ROXICODONE) 5 MG tablet Take 1-2 tablets (5-10 mg) by mouth every 6 hours as needed for moderate to severe pain (take one tab for moderate pain and two tabs for severe pain) 25 tablet 0     oxyCODONE-acetaminophen (PERCOCET) 5-325 MG tablet Take 1 tablet by mouth       polyethylene glycol (MIRALAX/GLYCOLAX) packet Take 17 g by mouth 2 times daily as needed for constipation       pravastatin (PRAVACHOL) 80 MG tablet Take 80 mg by mouth At Bedtime        senna-docusate (SENOKOT-S/PERICOLACE) 8.6-50 MG tablet Take 1-2 tablets by mouth 2 times daily as needed for constipation Take while on oral narcotics to prevent or treat constipation. 120 tablet 0     acetaminophen (TYLENOL) 325 MG tablet Take 2 tablets (650 mg) by mouth every 6 hours as needed for mild pain (Patient not taking: Reported on 3/13/2019)  0     enoxaparin (LOVENOX) 40 MG/0.4ML syringe Inject 0.4 mLs (40 mg) Subcutaneous every 24 hours for 25 days (Patient not taking: Reported on 3/13/2019) 25 Syringe 0         Allergies:        Allergies   Allergen Reactions     Nitrofurantoin Other (See Comments)     Burning around her mouth, pt advised by neurologist to not take macrobid         Gabapentin Other (See Comments)     Other reaction(s): Confusion  Confusion and lethargy  Too strong for patient  Lethargic  Confusion and lethargy     Morphine Nausea and Vomiting     Azithromycin Diarrhea     Buprenorphine Nausea and Vomiting     Severe vomiting     Clonazepam Unknown     Furosemide      Low sodium     Hydrochlorothiazide Other (See Comments)     Low Sodium     Lantus [Insulin Glargine]      Low blood sugar     Lisinopril Cough     Pregabalin Other (See Comments)      "Dizziness; Worse, numbness/tingling/burning pain whole body, hospitalized     Sulfamethoxazole Hives        Social History:     Social History     Tobacco Use     Smoking status: Former Smoker     Packs/day: 0.25     Years: 15.00     Pack years: 3.75     Smokeless tobacco: Never Used   Substance Use Topics     Alcohol use: No     Frequency: Never       History   Drug Use No           Physical Exam:     /73 (BP Location: Left arm, Patient Position: Sitting, Cuff Size: Adult Regular)   Pulse 83   Temp 98.7  F (37.1  C) (Oral)   Resp 18   Ht 1.575 m (5' 2.01\")   Wt 69.9 kg (154 lb)   SpO2 95%   BMI 28.16 kg/m    Body mass index is 28.16 kg/m .    General Appearance: healthy and alert, no distress     Musculoskeletal: extremities non tender and without edema    Neurological: normal gait, no gross defects     Psychiatric: appropriate mood and affect                               ABDOMEN:  Soft, nontender, nondistended.  Well-healing midline incision.  No organomegaly.           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     A total of 30 minutes was spent with the patient, 25 minutes of which were spent in counseling the patient and/or treatment planning.      1.  Stage IIIC high-grade serous ovarian cancer.     2.  Status post neoadjuvant chemotherapy.   3.  Status post R0 interval debulking.   4.  Renal clear cell carcinoma.        I had a long discussion with the patient as well as with her family.  Discussed to continue 3 more cycles of adjuvant chemotherapy.  She will get that closer to home with carboplatin and paclitaxel every 3 weeks.  We will then have her come back after that.  She will see my colleagues in Interventional Radiology for possible cryoablation of her biopsy-proven renal cell carcinoma.  We will also have her see our genetic counselor at that point for germline testing as well as do a surveillance visit at " that point.  It will be about 4 months from now.  Patient agrees with the plan.  She is very appreciative of her care.  All questions were answered.       Bakari Galeas MD, MS    Department of Obstetrics and Gynecology   Division of Gynecologic Oncology   [facility]  Phone: [phone]        CC  Patient Care Team:  Sonja Hathaway MD as PCP - General (Family Practice)  SELF, REFERRED

## 2019-03-19 ENCOUNTER — CARE COORDINATION (OUTPATIENT)
Dept: ONCOLOGY | Facility: CLINIC | Age: 74
End: 2019-03-19

## 2019-03-19 NOTE — PROGRESS NOTES
Care Coordinator Note  3/13/19 clinic note faxed [facility] #[phone].     Gladys FRIEDMAN, RN  Care Coordinator  Gynecologic Cancer   Office:  [phone]  Pager: [phone] #[phone]

## 2019-04-03 ENCOUNTER — TRANSFERRED RECORDS (OUTPATIENT)
Dept: HEALTH INFORMATION MANAGEMENT | Facility: CLINIC | Age: 74
End: 2019-04-03

## 2019-05-02 ENCOUNTER — TELEPHONE (OUTPATIENT)
Dept: ONCOLOGY | Facility: CLINIC | Age: 74
End: 2019-05-02

## 2019-05-02 NOTE — TELEPHONE ENCOUNTER
"Patient's granddaughter stated that patient has decided not to continue with chemotherapy. Patient recently had a \"bout\" of diarrhea along with not feeling well. This did not corollate with her chemotherapy treatment. It was after when she would have traditionally had issues. During the time of feeling unwell the patient had a glass of whine before bed and when she woke up in the morning she \"really\" felt unwell. The granddaughter went to check on patient and found her blood pressure extremely low. Patient had taken her blood pressure medication but it never drops her pressures this low. Granddaughter brought patient to ED were it was found that she broke all the bones in the top of her foot. They do not know how or when the patient broke her foot. Patient is currently blaming all her troubles on the chemotherapy and granddaughter is concerned.     RN and granddaughter discussed the pro's and con's to the best of the RN ability.     Granddaughter had questions about the ortho MD and how that affects things. RN referred these questions to the ortho MD.     Granddaughter will discuss this all again with patient and will update RN if needed.    Granddaughter appreciative of the RN time.     Marianela Coates RN    "

## 2019-05-16 ENCOUNTER — TELEPHONE (OUTPATIENT)
Dept: ONCOLOGY | Facility: CLINIC | Age: 74
End: 2019-05-16

## 2019-05-16 NOTE — TELEPHONE ENCOUNTER
"RN returned patient's voicemail.     Patient is wanting to \"take care of my kidney lesion\" and would like to get that appointment scheduled.     Patient stated that she has had  \"rough go\" of things and decided to not complete her chemotherapy treatment. After her last round she lost her hair, had \"massive diarrhea\" and while going to the bathroom with one of \"those episodes\" she stepped on the top of her foot and broke 5 bones. She stated several time how beautiful her hair was growing in and it is very stressful to have lost all of it again.     Patient also stated that her granddaughter Desire id very angry with her for not completing her chemotherapy and is so angry she can't even come visit her.     Patient would like to be contacted with appointment information but also would like her daughter India to be called as she will be the one driving her.     Patient stated that she is at piece with not completing the chemotherapy. She was very tearful and again explained about her diarrhea and hair loss. Patient also told RN about all of the family struggles since she has stopped treatment early. She said several times some her family is really angry with her.     Patient updated that IR will contact her with appointment information.     RN reached out to IR to address the needed appointment to address her kidney lesion.     Patient appreciative of the RN time.     15 minutes was spent on this call.     Marianela Coates RN      "

## 2019-05-17 ENCOUNTER — TELEPHONE (OUTPATIENT)
Dept: VASCULAR SURGERY | Facility: CLINIC | Age: 74
End: 2019-05-17

## 2019-05-17 NOTE — TELEPHONE ENCOUNTER
Called and spoke to [place]    Informed her that I did get a msg from Dr. Galeas's team regarding patient wanting to address her RCC    Informed her that as last time, we would like to have a CT scan prior to seeing them in clinic for consult and treatment.     She agrees to plan .    We will plan on having pt come back on Mon 6/17 @ 140pm     We will also have them do a CT scan prior to this appt time/date.     We will have our  schedule for this and then call her back.     Sonja STOCK RN, BSN  Interventional Radiology Nurse Coordinator  Phone: [phone]

## 2019-05-28 NOTE — MR AVS SNAPSHOT
"              After Visit Summary   10/24/2018    Afshan Cardona    MRN: [id number]           Patient Information     Date Of Birth          1945        Visit Information        Provider Department      10/24/2018 2:20 PM Bakari Galeas MD [facility]        Today's Diagnoses     History of ovarian cancer    -  1       Follow-ups after your visit        Who to contact     If you have questions or need follow up information about today's clinic visit or your schedule please contact [facility] directly at [phone].  Normal or non-critical lab and imaging results will be communicated to you by MyChart, letter or phone within 4 business days after the clinic has received the results. If you do not hear from us within 7 days, please contact the clinic through MyChart or phone. If you have a critical or abnormal lab result, we will notify you by phone as soon as possible.  Submit refill requests through [employer] or call your pharmacy and they will forward the refill request to us. Please allow 3 business days for your refill to be completed.          Additional Information About Your Visit        Care EveryWhere ID     This is your Care EveryWhere ID. This could be used by other organizations to access your [place] medical records  [id number]        Your Vitals Were     Pulse Temperature Respirations Height Pulse Oximetry BMI (Body Mass Index)    85 98.3  F (36.8  C) (Oral) 16 1.575 m (5' 2\") 98% 26.52 kg/m2       Blood Pressure from Last 3 Encounters:   10/24/18 185/79   10/24/18 149/72   08/27/18 141/73    Weight from Last 3 Encounters:   10/24/18 65.8 kg (145 lb)   10/24/18 66.6 kg (146 lb 14.4 oz)   08/27/18 62.3 kg (137 lb 5.6 oz)              Today, you had the following     No orders found for display      Information about OPIOIDS     PRESCRIPTION OPIOIDS: WHAT YOU NEED TO KNOW   We gave you an opioid (narcotic) pain medicine. It is important to manage your " Plan as above     pain, but opioids are not always the best choice. You should first try all the other options your care team gave you. Take this medicine for as short a time (and as few doses) as possible.    Some activities can increase your pain, such as bandage changes or therapy sessions. It may help to take your pain medicine 30 to 60 minutes before these activities. Reduce your stress by getting enough sleep, working on hobbies you enjoy and practicing relaxation or meditation. Talk to your care team about ways to manage your pain beyond prescription opioids.    These medicines have risks:    DO NOT drive when on new or higher doses of pain medicine. These medicines can affect your alertness and reaction times, and you could be arrested for driving under the influence (DUI). If you need to use opioids long-term, talk to your care team about driving.    DO NOT operate heavy machinery    DO NOT do any other dangerous activities while taking these medicines.    DO NOT drink any alcohol while taking these medicines.     If the opioid prescribed includes acetaminophen, DO NOT take with any other medicines that contain acetaminophen. Read all labels carefully. Look for the word  acetaminophen  or  Tylenol.  Ask your pharmacist if you have questions or are unsure.    You can get addicted to pain medicines, especially if you have a history of addiction (chemical, alcohol or substance dependence). Talk to your care team about ways to reduce this risk.    All opioids tend to cause constipation. Drink plenty of water and eat foods that have a lot of fiber, such as fruits, vegetables, prune juice, apple juice and high-fiber cereal. Take a laxative (Miralax, milk of magnesia, Colace, Senna) if you don t move your bowels at least every other day. Other side effects include upset stomach, sleepiness, dizziness, throwing up, tolerance (needing more of the medicine to have the same effect), physical dependence and slowed breathing.    Store  your pills in a secure place, locked if possible. We will not replace any lost or stolen medicine. If you don t finish your medicine, please throw away (dispose) as directed by your pharmacist. The [place] [employer]ution [employer] has more information about safe disposal: [email or web address]-medications         Primary Care Provider Office Phone # Fax #    Sonja Hathaway [phone]       [facility] [address] [place] [address]        Equal Access to Services     [facility]: Hadii aad ku hadasho Soomaali, waaxda luqadaha, qaybta kaalmada adegillianyadevin, luis hernandez . So [facility] [phone].    ATENCIÓN: Si habla español, tiene a roblero disposición servicios gratuitos de asistencia lingüística. Rita al [phone].    We comply with applicable federal civil rights laws and [place] laws. We do not discriminate on the basis of race, color, national origin, age, disability, sex, sexual orientation, or gender identity.            Thank you!     Thank you for choosing [facility]  for your care. Our goal is always to provide you with excellent care. Hearing back from our patients is one way we can continue to improve our services. Please take a few minutes to complete the written survey that you may receive in the mail after your visit with us. Thank you!             Your Updated Medication List - Protect others around you: Learn how to safely use, store and throw away your medicines at www.disposemymeds.org.          This list is accurate as of 10/24/18 11:59 PM.  Always use your most recent med list.                   Brand Name Dispense Instructions for use Diagnosis    acetaminophen 325 MG tablet    TYLENOL    100 tablet    Take 2 tablets (650 mg) by mouth every 6 hours as needed for mild pain    Malignant neoplasm of ovary, unspecified laterality (H)       aspirin 81 MG EC tablet      Take 81  mg by mouth every morning        carvedilol 25 MG tablet    COREG    60 tablet    Take 1 tablet (25 mg) by mouth 2 times daily (with meals)    Malignant neoplasm of ovary, unspecified laterality (H)       cephalexin 250 MG capsule    KEFLEX     TAKE ONE CAPSULE BY MOUTH ONCE DAILY AT BEDTIME        ciprofloxacin 500 MG tablet    CIPRO    20 tablet    Take 1 tablet (500 mg) by mouth 2 times daily    Urinary tract infection       CRANBERRY EXTRACT PO      Take by mouth every morning        DULoxetine 60 MG EC capsule    CYMBALTA     TAKE ONE CAPSULE BY MOUTH AT BEDTIME        gabapentin 600 MG tablet    NEURONTIN    90 tablet    Take 1 tablet (600 mg) by mouth 3 times daily    Malignant neoplasm of ovary, unspecified laterality (H)       HUMALOG MARY KWIKPEN 100 UNIT/ML injection   Generic drug:  insulin lispro      INJECT 1 UNIT PER 10GRAMS OF CARBOHYDRATES WITH CORRECTION 0.5 UNITS PER 50 ABOVE 150 ( UP TO 30 UNITS PER DAY)        insulin degludec 100 UNIT/ML pen    TRESIBA     Inject 8 Units Subcutaneous At Bedtime    Malignant neoplasm of ovary, unspecified laterality (H)       levothyroxine 75 MCG tablet    SYNTHROID/LEVOTHROID     TAKE ONE TABLET BY MOUTH ONCE DAILY  IN THE MORNING     (PLEASE CALL [phone] FOR AN OFFICE VISIT BEFORE NEXT REFILL.)        losartan 100 MG tablet    COZAAR     Take 100 mg by mouth At Bedtime        ONETOUCH VERIO IQ test strip   Generic drug:  blood glucose monitoring      USE TO TEST BLOOD GLUCOSE 6-8 TIMES PER DAY, BEFORE MEALS, BEDTIME TO DOSE INSULIN, HIGH OR LOW BLOOD GLUCOSE WITH RECHECK        oxyCODONE IR 5 MG tablet    ROXICODONE    25 tablet    Take 0.5-1 tablets (2.5-5 mg) by mouth every 6 hours as needed for severe pain    Malignant neoplasm of ovary, unspecified laterality (H)       oxyCODONE-acetaminophen 5-325 MG per tablet    PERCOCET     TAKE 1-2 TABLETS BY MOUTH EVERY 4 HOURS AS NEEDED FOR MODERATE OR SEVERE PAIN *CAUTION: OPIOID. RISK OF OVERDOSE AND  ADDICTION.        polyethylene glycol Packet    MIRALAX/GLYCOLAX    7 packet    Take 17 g by mouth daily as needed for constipation    Malignant neoplasm of ovary, unspecified laterality (H)       pravastatin 80 MG tablet    PRAVACHOL     Take 80 mg by mouth At Bedtime        senna-docusate 8.6-50 MG per tablet    SENOKOT-S;PERICOLACE    30 tablet    Take 1-2 tablets by mouth 2 times daily Take while on oral narcotics to prevent or treat constipation.    S/P laparoscopic procedure       vitamin D3 2000 units Caps      TAKE ONE CAPSULE BY MOUTH ONCE DAILY IN THE MORNING

## 2019-05-30 DIAGNOSIS — C64.2 CLEAR CELL RENAL CELL CARCINOMA, LEFT (H): Primary | ICD-10-CM

## 2019-06-03 ENCOUNTER — ANCILLARY PROCEDURE (OUTPATIENT)
Dept: CT IMAGING | Facility: CLINIC | Age: 74
End: 2019-06-03
Attending: RADIOLOGY
Payer: MEDICARE

## 2019-06-03 ENCOUNTER — OFFICE VISIT (OUTPATIENT)
Dept: VASCULAR SURGERY | Facility: CLINIC | Age: 74
End: 2019-06-03
Payer: MEDICARE

## 2019-06-03 VITALS — HEART RATE: 77 BPM | OXYGEN SATURATION: 97 % | SYSTOLIC BLOOD PRESSURE: 174 MMHG | DIASTOLIC BLOOD PRESSURE: 91 MMHG

## 2019-06-03 DIAGNOSIS — C64.2 CLEAR CELL RENAL CELL CARCINOMA, LEFT (H): Primary | ICD-10-CM

## 2019-06-03 DIAGNOSIS — C64.2 CLEAR CELL RENAL CELL CARCINOMA, LEFT (H): ICD-10-CM

## 2019-06-03 LAB
ALBUMIN SERPL-MCNC: 3.6 G/DL (ref 3.4–5)
ALP SERPL-CCNC: 87 U/L (ref 40–150)
ALT SERPL W P-5'-P-CCNC: 20 U/L (ref 0–50)
ANION GAP SERPL CALCULATED.3IONS-SCNC: 7 MMOL/L (ref 3–14)
AST SERPL W P-5'-P-CCNC: 16 U/L (ref 0–45)
BILIRUB SERPL-MCNC: 0.4 MG/DL (ref 0.2–1.3)
BUN SERPL-MCNC: 18 MG/DL (ref 7–30)
CALCIUM SERPL-MCNC: 9.2 MG/DL (ref 8.5–10.1)
CHLORIDE SERPL-SCNC: 103 MMOL/L (ref 94–109)
CO2 SERPL-SCNC: 27 MMOL/L (ref 20–32)
CREAT SERPL-MCNC: 0.58 MG/DL (ref 0.52–1.04)
ERYTHROCYTE [DISTWIDTH] IN BLOOD BY AUTOMATED COUNT: 15.7 % (ref 10–15)
GFR SERPL CREATININE-BSD FRML MDRD: 90 ML/MIN/{1.73_M2}
GLUCOSE SERPL-MCNC: 137 MG/DL (ref 70–99)
HCT VFR BLD AUTO: 32.7 % (ref 35–47)
HGB BLD-MCNC: 10.6 G/DL (ref 11.7–15.7)
INR PPP: 1.06 (ref 0.86–1.14)
MCH RBC QN AUTO: 29.8 PG (ref 26.5–33)
MCHC RBC AUTO-ENTMCNC: 32.4 G/DL (ref 31.5–36.5)
MCV RBC AUTO: 92 FL (ref 78–100)
PLATELET # BLD AUTO: 268 10E9/L (ref 150–450)
POTASSIUM SERPL-SCNC: 4.1 MMOL/L (ref 3.4–5.3)
PROT SERPL-MCNC: 6.8 G/DL (ref 6.8–8.8)
RADIOLOGIST FLAGS: NORMAL
RBC # BLD AUTO: 3.56 10E12/L (ref 3.8–5.2)
SODIUM SERPL-SCNC: 137 MMOL/L (ref 133–144)
WBC # BLD AUTO: 7.4 10E9/L (ref 4–11)

## 2019-06-03 RX ORDER — IOPAMIDOL 755 MG/ML
95 INJECTION, SOLUTION INTRAVASCULAR ONCE
Status: COMPLETED | OUTPATIENT
Start: 2019-06-03 | End: 2019-06-03

## 2019-06-03 RX ADMIN — IOPAMIDOL 95 ML: 755 INJECTION, SOLUTION INTRAVASCULAR at 14:25

## 2019-06-03 ASSESSMENT — PAIN SCALES - GENERAL: PAINLEVEL: WORST PAIN (10)

## 2019-06-03 NOTE — LETTER
6/3/2019       RE: Afshan Cardona  [address]  [place] [address]     Dear Colleague,    Thank you for referring your patient, Afshan Cardona, to the [facility] at [facility]. Please see a copy of my visit note below.        INTERVENTIONAL RADIOLOGY FOLLOWUP    Name: Afshan Cardona  Age: 74 year old   Referring Physician: Dr. Galeas   REASON FOR VISIT: RCC    S: Salima Cardona is a 73yo female with Hx of ovarian CA s/p hysterectomy, salpingooopherectomy and chemotherapy who presents for f/u evaluation of her left RCC. She reports doing well.  She had difficulty with her chemotherapy which included a fall with right foot fracture and states that she initally planned for 2 more cycles of chemotherapy but is deferring them. She reports that she worries somewhat about her renal cell carcinoma and is unsure what to do about it.     Labs:    BMP RESULTS:  Lab Results   Component Value Date     06/03/2019    POTASSIUM 4.1 06/03/2019    CHLORIDE 103 06/03/2019    CO2 27 06/03/2019    ANIONGAP 7 06/03/2019     (H) 06/03/2019    BUN 18 06/03/2019    CR 0.58 06/03/2019    GFRESTIMATED 90 06/03/2019    GFRESTBLACK >90 06/03/2019    KAIN 9.2 06/03/2019        CBC RESULTS:  Lab Results   Component Value Date    WBC 7.4 06/03/2019    RBC 3.56 (L) 06/03/2019    HGB 10.6 (L) 06/03/2019    HCT 32.7 (L) 06/03/2019    MCV 92 06/03/2019    MCH 29.8 06/03/2019    MCHC 32.4 06/03/2019    RDW 15.7 (H) 06/03/2019     06/03/2019       INR/PTT:  Lab Results   Component Value Date    INR 1.06 06/03/2019    PTT 32 07/24/2018       Diagnostic studies:     CT abdomen pelvis - Stable left renal cell carcinoma measuring 1.9cm (unchanged compared with chest CT from 7/2/2018.    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    Assessment: Salima Cardona is a 73yo female with Hx of ovarian CA s/p hysterectomy, salpingooopherectomy and  chemotherapy with a 1.9cm left RCC which has been stable by imaging for at least 6 months.  Given the low likelihood of progression/metastasis we recommend watchful waiting. This was discussed with Salima and her family (2 daughters and one granddaughter) who expressed agreement with this plan.     Plan  - CT in 6 months with IR clinic appointment same day.     Garfield Arora MD  Interventional Radiology Fellow  [facility]  [phone] after hours    I, Dr Bernardo Tsang, was present with the fellow during the history and exam. I discussed the case with the fellow and agree with the findings as documented in the assessment and plan.    A total of 25 minutes was spent in care for the patient, of which >50% was spent in counseling and co-ordination of care.    CC  Patient Care Team:  Sonja Hathaway MD as PCP - General (Family Practice)  JEANNIE CONDON

## 2019-06-03 NOTE — NURSING NOTE
Vascular Rooming Note     Afshan Cardona's goals for this visit include:   Chief Complaint   Patient presents with     RECHECK     Afshan, is being seen today for a follow up RCC, feeling fine,as reported by patient.     Elly العراقي LPN

## 2019-06-03 NOTE — DISCHARGE INSTRUCTIONS

## 2019-06-03 NOTE — PROGRESS NOTES
INTERVENTIONAL RADIOLOGY FOLLOWUP    Name: Afshan Cardona  Age: 74 year old   Referring Physician: Dr. Galeas   REASON FOR VISIT: RCC    S: Salima Cardona is a 73yo female with Hx of ovarian CA s/p hysterectomy, salpingooopherectomy and chemotherapy who presents for f/u evaluation of her left RCC. She reports doing well.  She had difficulty with her chemotherapy which included a fall with right foot fracture and states that she initally planned for 2 more cycles of chemotherapy but is deferring them. She reports that she worries somewhat about her renal cell carcinoma and is unsure what to do about it.     Labs:    BMP RESULTS:  Lab Results   Component Value Date     06/03/2019    POTASSIUM 4.1 06/03/2019    CHLORIDE 103 06/03/2019    CO2 27 06/03/2019    ANIONGAP 7 06/03/2019     (H) 06/03/2019    BUN 18 06/03/2019    CR 0.58 06/03/2019    GFRESTIMATED 90 06/03/2019    GFRESTBLACK >90 06/03/2019    KAIN 9.2 06/03/2019        CBC RESULTS:  Lab Results   Component Value Date    WBC 7.4 06/03/2019    RBC 3.56 (L) 06/03/2019    HGB 10.6 (L) 06/03/2019    HCT 32.7 (L) 06/03/2019    MCV 92 06/03/2019    MCH 29.8 06/03/2019    MCHC 32.4 06/03/2019    RDW 15.7 (H) 06/03/2019     06/03/2019       INR/PTT:  Lab Results   Component Value Date    INR 1.06 06/03/2019    PTT 32 07/24/2018       Diagnostic studies:     CT abdomen pelvis - Stable left renal cell carcinoma measuring 1.9cm (unchanged compared with chest CT from 7/2/2018.    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        Assessment: Salima Cardona is a 73yo female with Hx of ovarian CA s/p hysterectomy, salpingooopherectomy and chemotherapy with a 1.9cm left RCC which has been stable by imaging for at least 6 months.  Given the low likelihood of progression/metastasis we recommend watchful waiting. This was discussed with Salima and her family (2 daughters and one granddaughter) who expressed agreement with this  plan.     Plan  - CT in 6 months with IR clinic appointment same day.         Garfield Arora MD  Interventional Radiology Fellow  [facility]  [phone] after hours    I, Dr Bernardo Tsang, was present with the fellow during the history and exam. I discussed the case with the fellow and agree with the findings as documented in the assessment and plan.    A total of 25 minutes was spent in care for the patient, of which >50% was spent in counseling and co-ordination of care.    CC  Patient Care Team:  Sonja Hathaway MD as PCP - General (Family Practice)  JEANNIE CONDON

## 2019-06-04 DIAGNOSIS — C64.2 CLEAR CELL RENAL CELL CARCINOMA, LEFT (H): Primary | ICD-10-CM

## 2019-06-21 DIAGNOSIS — C56.9 OVARIAN CANCER, UNSPECIFIED LATERALITY (H): Primary | ICD-10-CM

## 2019-06-24 NOTE — TELEPHONE ENCOUNTER
ONCOLOGY INTAKE: Records Information      APPT INFORMATION:  Referring provider:  Dr. Bakari Galeas  Referring provider s clinic:  [facility] [[email or web address]  Reason for visit/diagnosis:  Ovarian Cancer  Has patient been notified of appointment date and time?: yes    RECORDS INFORMATION:  Were the records received with the referral (via [employer])? no    Has patient been seen for any external appt for this diagnosis? no    Records are in Epic

## 2019-07-01 ENCOUNTER — OFFICE VISIT (OUTPATIENT)
Dept: ONCOLOGY | Facility: CLINIC | Age: 74
End: 2019-07-01
Attending: OBSTETRICS & GYNECOLOGY
Payer: MEDICARE

## 2019-07-01 ENCOUNTER — PRE VISIT (OUTPATIENT)
Dept: ONCOLOGY | Facility: CLINIC | Age: 74
End: 2019-07-01

## 2019-07-01 DIAGNOSIS — Z80.3 FAMILY HISTORY OF MALIGNANT NEOPLASM OF BREAST: ICD-10-CM

## 2019-07-01 DIAGNOSIS — Z85.43 PERSONAL HISTORY OF OVARIAN CANCER: Primary | ICD-10-CM

## 2019-07-01 DIAGNOSIS — Z85.43 PERSONAL HISTORY OF OVARIAN CANCER: ICD-10-CM

## 2019-07-01 LAB — COPATH REPORT: NORMAL

## 2019-07-01 PROCEDURE — 40000803 ZZHCL STATISTIC DNA ISOL HIGH PURITY: Performed by: OBSTETRICS & GYNECOLOGY

## 2019-07-01 PROCEDURE — 96040 ZZH GENETIC COUNSELING, EACH 30 MINUTES: CPT | Mod: ZF | Performed by: GENETIC COUNSELOR, MS

## 2019-07-01 NOTE — PATIENT INSTRUCTIONS
Assessing Cancer Risk  Only about 5-10% of cancers are thought to be due to an inherited cancer susceptibility gene.    These families often have:    Several people with the same or related types of cancer    Cancers diagnosed at a young age (before age 50)    Individuals with more than one primary cancer    Multiple generations of the family affected with cancer    Some people may be candidates for genetic testing of more than one gene.  For these families, genetic testing using a cancer panel may be offered.  These panels can test many genes at once known to increase the risk for gynecologic (and other) cancers:  BRCA1, BRCA2, BRIP1 MLH1, MSH2, MSH6, PMS2, EPCAM, PTEN, PALB2, RAD51C, RAD51D, and TP53. The purpose of this handout is to serve as a brief summary of the gynecologic cancer risk genes that have published clinical management guidelines for individuals who are found to carry a mutation.    ______________________________________________________________________________  Hereditary Breast and Ovarian Cancer Syndrome   (BRCA1 and BRCA2)  A single mutation in one of the copies of BRCA1 or BRCA2 increases the risk for breast and ovarian cancer, among others.  The risk for pancreatic cancer and melanoma may also be slightly increased in some families.  The chart below shows the chance that someone with a BRCA mutation would develop cancer in his or her lifetime1,2,3,4.        A person s ethnic background is also important to consider, as individuals of Ashkenazi Episcop[employer] ancestry have a higher chance of having a BRCA gene mutation.  There are three BRCA mutations that occur more frequently in this population.    Siddiqui Syndrome   (MLH1, MSH2, MSH6, PMS2, and EPCAM)  Currently five genes are known to cause Siddiqui Syndrome: MLH1, MSH2, MSH6, PMS2, and EPCAM.  A single mutation in one of the Siddiqui Syndrome genes increases the risk for colon, endometrial, ovarian, and stomach cancers.  Other cancers that occur  less commonly in Siddiqui Syndrome include urinary tract, skin, and brain cancers.  The chart below shows the chance that a person with Siddiqui syndrome would develop cancer in his or her lifetime7.      *Cancer risk varies depending on Siddiqui syndrome gene found    Cowden Syndrome   (PTEN)  Cowden syndrome is a hereditary condition that increases the risk for breast, thyroid, endometrial, and kidney cancer.  Cowden syndrome is caused by a mutation in the PTEN gene.  A single mutation in one of the copies of PTEN causes Cowden syndrome and increases cancer risk.  The chart below shows the chance that someone with a PTEN mutation would develop cancer in their lifetime5,6.  Other benign features seen in some individuals with Cowden syndrome include benign skin lesions (facial papules, keratoses, lipomas), learning disability, autism, thyroid nodules, colon polyps, and larger head size.      *One recent study found breast cancer risk to be increased to 85%  Li-Fraumeni Syndrome   (TP53)  Li-Fraumeni Syndrome (LFS) is a cancer predisposition syndrome caused by a mutation in the TP53 gene. A single mutation in one of the copies of TP53 increases the risk for multiple cancers. Individuals with LFS are at an increased risk for developing cancer at a young age. The general lifetime risk for development of cancer is 50% by age 30 and 90% by age 60.     Core Cancers: Sarcomas, Breast, Brain, Lung, Leukemias/Lymphomas, Adrenocortical carcinomas  Other Cancers: Gastrointestinal, Thyroid, Skin, Genitourinary    Additional Genes  PALB2  Mutations in PALB2 have been shown to increase the risk of breast cancer up to 33-58% in some families; where individuals fall within this risk range is dependent upon family history. PALB2 mutations have also been associated with increased risk for pancreatic cancer, although this risk has not been quantified yet.  Individuals who inherit two PALB2 mutations--one from their mother and one from their  father--have a condition called Fanconi Anemia.  This rare autosomal recessive condition is associated with short stature, developmental delay, bone marrow failure, and increased risk for childhood cancers.    BRIP1, RAD51C and RAD51D  Mutations in BRIP1, RAD51C, and RAD51D have been shown to increase the risk of ovarian cancer as well as female breast cancer.    ______________________________________________________________________________    Inheritance  All of the cancer syndromes reviewed above are inherited in an autosomal dominant pattern.  This means that if a parent has a mutation, each of his or her children will have a 50% chance of inheriting that same mutation.  Therefore, each child--male or female--would have a 50% chance of being at increased risk for developing cancer.      Image obtained from Genetics Home Reference, 2013     Mutations in some genes can occur de wong, which means that a person s mutation occurred for the first time in them and was not inherited from a parent.  Now that they have the mutation, however, it can be passed on to future generations.    Genetic Testing  Genetic testing involves a blood test and will look for any harmful mutations that are associated with increased cancer risk.  If possible, it is recommended that the person(s) who has had cancer be tested before other family members.  That person will give us the most useful information about whether or not a specific gene is associated with the cancer in the family.    Results  There are three possible results of genetic testing:    Positive--a harmful mutation was identified in one or more of the genes    Negative--no mutation was identified in any of the 9 genes on this panel    Variant of unknown significance--a variation in one of the genes was identified, but it is unclear how this impacts cancer risk in the family    Advantages and Disadvantages   There are advantages and disadvantages to genetic  testing.  Advantages    May clarify your cancer risk    Can help you make medical decisions    May explain the cancers in your family    May give useful information to your family members (if you share your results)    Disadvantages    Possible negative emotional impact of learning about inherited cancer risk    Uncertainty in interpreting a negative test result in some situations    Possible genetic discrimination concerns (see below)    Genetic Information Nondiscrimination Act (MARGOTH)  MARGOTH is a federal law that protects individuals from health insurance or employment discrimination based on a genetic test result alone.  Although rare, there are currently no legal discrimination protections in terms of life insurance, long term care, or disability insurances.  Visit the [employer] [place] website to learn more.    Reducing Cancer Risk  Each of the genes listed within this handout have nationally recognized cancer screening guidelines that would be recommended for individuals who test positive.  In addition to increased cancer screening, surgeries may be offered or recommended to reduce cancer risk.  Recommendations are based upon an individual s genetic test result as well as their personal and family history of cancer.    Questions to Think About Regarding Genetic Testing:    What effect will the test result have on me and my relationship with my family members if I have an inherited gene mutation?  If I don t have a gene mutation?    Should I share my test results, and how will my family react to this news, which may also affect them?    Are my children ready to learn new information that may one day affect their own health?    Hereditary Cancer Resources    FORCE: Facing Our Risk of Cancer Empowered facingourrisk.org   Bright Pink bebrightpink.org   Li-Fraumeni Syndrome Association lfsassociation.org   PTEN World PTENworld.com   Collaborative Group of the Americas on Inherited Colorectal  Cancer (CGA) cgaicc.com http://www.facingourrisk.org/   Cancer Care cancercare.org   American Cancer Society (ACS) cancer.org   National Cancer [place] (NCI) cancer.gov     Please call us if you have any questions or concerns.   Cancer Risk Management Program [phone]-CANCER ([phone])  ? Reny Montez, MS, [facility]  [phone]  ? Geneva Parra, MS, [facility]  [phone]  ? Jazmine Castaneda, MS, [facility]  [phone]  ? Josefina Hilliard, MS, [facility]  [phone]  ? Sadia Collins, MS, [facility] [phone]  ? Terese Billings, MS, [facility] [phone]    References  1. Corrine Damon PDP, Michelet S, Verenice NEELY, Chinedu JE, Leroy JL, Unique N, Angus H, Asiya O, Feliciano A, Brain B, Cici P, Tim S, Darnell DM, Murali N, Shakira E, Reagan H, William E, Lubinski J, Gronwald J, Rick B, Yelena H, Thorlacius S, Eerola H, Gena H, Tammy K, Ana Paula OP. Average risks of breast and ovarian cancer associated with BRCA1 or BRCA2 mutations detected in case series unselected for family history: a combined analysis of 222 studies. Am J Hum Cat. 2003;72:1117-30.  2. Marisa N, Monisha M, Gilbert G.  BRCA1 and BRCA2 Hereditary Breast and Ovarian Cancer. Gene Reviews online. 2013.  3. Guanako YC, Von S, [place] G, Phan S. Breast cancer risk among male BRCA1 and BRCA2 mutation carriers. J Natl Cancer Inst. 2007;99:1811-4.  4. Delifn HENDRIX, Anna Marie I, Dimas ELKINS, Hiwot E, Jameel ER, Anna F. Risk of breast cancer in male BRCA2 carriers. J Med Cat. 2010;47:710-1.  5. Hany DAN, Piero ELKINS, Stalin ELKINS, Ralf KHAN, Carlos SALGADO, Toya C. Lifetime cancer risks in individuals with germline PTEN mutations. Clin Cancer Res. 2012;18:400-7.  6. Chelsi PERSON. Cowden Syndrome: A Critical Review of the Clinical Literature. J Cat . 2009:18:13-27.  7. National Comprehensive Cancer Network. Clinical practice guidelines in oncology, colorectal cancer screening. Available online (registration required). 2015.  8. Jules LO, et al. Breast-Cancer Risk in  Families with Mutations in PALB2. NEJM. 2014; 371(6):497-506.

## 2019-07-01 NOTE — LETTER
7/1/2019       RE: Afshan Cardona  [address]  [facility] [address]     Dear Colleague,    Thank you for referring your patient, Afshan Cardona, to the [facility]. Please see a copy of my visit note below.    7/1/2019    Referring Provider: Bakari Galeas MD     Presenting Information:   I met with Afshan Cardona today for genetic counseling at the Cancer Risk Management Program at the [facility] to discuss her personal history of ovarian cancer. She was accompanied by her daughters Karla, Rachel, and [place]. She is here today to review this history, cancer screening recommendations, and available genetic testing options.     Personal History:  Afshan is a 74 year old female.  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      She had her first menstrual period at age 12, her first child at age 15, and is postmenopausal (in her 50s).  She reports no history of hormone replacement therapy.  She reports having annual mammograms and breast screening. Her mammogram from July of 2016 was normal. Afshan reports having at least 25 colon polyps throughout her lifetime. Her most recent colonoscopy was in August of 2017.     Colonoscopy note from 8/31/2017:  Rectal exam performed and noted no masses.  Mild internal hemorrhoids.  The colonoscope was passed under direct vision. The colonoscope introduced through the anus and advanced to the terminal ileum with identification of the appendiceal orifice and ileocecal valve. Photo documentation was obtained. The colonoscope was withdrawn and the entire colon was well visualized.  4-5 mm sessile transverse colon polyp removed with cold snare.  2 diminutive 4-6 mm sessile transverse colon polyps removed by cold and hot snare larger polyp.  Diminutive 4-5 mm sessile  hepatic flexure polyps removed by cold snare.  Proximal diminutive ascending colon polyp 3 mm removed by biopsy forceps.  4 mm sessile hepatic flexure colon polyp removed by cold snare.  Diminutive 4 mm transverse colon polyp removed by cold snare.  Diminutive transverse colon polyp 4-5 mm removed by cold snare.  Pedunculated transverse colon polyp of 7-8 mm removed by hot snare at its base.  4 diminutive 3-5 mm sessile splenic flexure colon polyps removed by cold and hot snare.  2 descending colon polyps sessile of 4 mm removed by cold snare.  Sigmoid colon submucosal mass with pillow sign and coloration consistent with lipoma.  Mild to moderate diverticulosis.  The patient tolerated the procedure well. The quality of the bowel preparation was good. The patient was transferred to the PACU in Good condition.    Impression:  -Small hiatal hernia.  -Irregular Z line with history of Vivas's consistent with short   segment Vivas's.  -Mild antral inflammation with H. pylori biopsies taken.  -6 transverse colon polyps.  -3 hepatic flexure polyps.  -Proximal ascending colon polyp.  -Splenic flexure polyps ×4.  -2 descending colon polyps.   -Sigmoid lipoma.  -Pan mild to moderate diverticulosis.     Pathology report from 8/31/2017:    Transverse colon polyp #1;  tubular adenoma.  Negative for high grade dysplasia and malignancy.      Transverse colon polyp #2;  tubular adenoma.  Negative for high grade dysplasia and malignancy.      Hepatic flexure polyp x2;  tubular adenoma.  Negative for high grade dysplasia and malignancy.      Proximal ascending colon biopsy;  tubular adenoma.  Negative for high grade dysplasia and       malignancy.      Hepatic flexure polyp #2;  tubular adenoma.  Negative for high grade dysplasia and malignancy.      Transverse colon polyp #3;  no diagnostic abnormality.        Transverse colon polyp #4;  tubular adenoma.  Negative for high grade dysplasia and malignancy.      Transverse colon  polyp #5;  no diagnostic abnormality.        Splenic flexure polyp x4;  fragments of tubular adenoma.  Negative for high grade dysplasia and      malignancy.      Descending colon polyp #1;  no diagnostic abnormality.       Descending colon polyp #2;  tubular adenoma.  Negative for high grade dysplasia and malignancy.      Family History: (Please see scanned pedigree for detailed family history information)    Afshan was adopted and has very limited information about her biological parents and relatives.    Afshan's sister, 77, was diagnosed with breast cancer in her early 40s. She has not had any genetic testing.    Afshan's brother passed away from Hodgkin's lymphoma at 65.    Afshan's mother was diagnosed with colon cancer in her 60s and passed away at 76. She may have also had cervical or ovarian cancer, age at diagnosis unknown but reported to be before her diagnosis with colon cancer.    Afshan's daughter Fiorella, 56, was diagnosed with thyroid cancer in her late 20s.     Her maternal ethnicity is . Her paternal ethnicity is Lebanese. Ashkenazi Shinto ancestry unknown. There is no reported consanguinity.    Discussion:    Afshan's personal history of ovarian cancer and family history of breast cancer is suggestive of a possible cancer susceptibility gene in the family. Given her diagnosis of ovarian cancer, we discussed Hereditary Breast and Ovarian Cancer syndrome.    We discussed the natural history and genetics of Hereditary Breast and Ovarian cancer syndrome due to BRCA1 and BRCA2 gene mutations.     Based on her personal history, Afshan meets current National Comprehensive Cancer Network (NCCN) criteria for genetic testing of BRCA1 and BRCA2.    Medical Management: The information from genetic testing may determine:     additional cancer screening recommendations (i.e. mammogram and breast MRI, more frequent colonoscopies, annual dermatologic exams, etc.)     options for risk reducing  surgeries (i.e. bilateral mastectomy, surgery to remove the ovaries and/or uterus, etc.) for Afshan and her relatives.      Targeted chemotherapies for patients who have certain cancers now, or who develop certain cancers in the future.     These recommendations and possible targeted chemotherapies will be discussed in detail once genetic testing is completed.     We discussed that there are additional genes that could cause increased risk of ovarian cancer such as Siddiqui syndrome.     Siddiqui syndrome can be caused by a mutation in one of five genes:  MLH1, MSH2, MSH6, PMS2, and EPCAM.  Siddiqui syndrome may present with polyps, but typically a small number. The highest cancer risks associated with Siddiqui syndrome include colon cancer, endometrial/uterine cancer, gastric cancer, and ovarian cancer.    Given Afshan's colon polyp history of >20 cumulative adenomas, she also meets the NCCN criteria for genetic testing of APC and MUTYH.   Familial Adenomatous Polyposis (FAP) is a condition caused by mutations in the APC gene. Individuals with FAP typically have many (more than 100) adenoma type polyps in the colon and significantly increased risk for colon and other cancers. These precancerous polyps can develop in the teens and without preventative surgery, colon cancer is almost inevitable. Attenuated FAP (AFAP) is also a condition caused by mutations in the APC gene. It is milder than classic FAP, however, as affected usually have  polyps and the lifetime risk of colon cancer is lower at approximately 70%. Extracolonic manifestations of FAP and AFAP include congenital hypertrophy of retinal pigment epithelium (CHRPE), osteomas, supernumerary teeth, odontomas, desmoids, epidermoid cysts, duodenal and other small bowel adenomas, and gastric fundic gland polyps. Other cancers associated with FAP and AFAP include thyroid, pancreatic, gastric, and duodenal cancers.   MUTYH-Associated Polyposis (MAP) is caused by  mutations in the MUTYH gene. Individuals with MAP typically have 10 to more than 100 adenoma type polyps in the colon and the gastrointestinal tract, as well as increased colon and duodenal cancer risk.   Everyone has two copies of the MUTYH gene. In order to be affected with MAP, a person must have a mutation in both copies of the gene.  Carriers of MAP (people who only have one mutation in one copy of the MUTYH gene) may have a very slightly increased risk of colon cancer.     As many of these genes present with overlapping features in a family, it would be reasonable for Afshan to consider panel genetic testing to analyze multiple genes at once. Afshan is enrolled in the [facility] and will be receiving genetic analysis of other genes as part of this program (including Siddiqui syndrome, APC, and MUTYH).    We discussed the benefits and limitations of getting additional genetic testing through the [facility]. Afshan expressed understanding.    A detailed handout regarding hereditary gynecologic cancer risk genes and the information was provided to Afshan and can be found in the after visit summary.  Topics included: inheritance pattern, cancer risks, cancer screening recommendations, and also risks, benefits and limitations of testing.     Plan:  1) Today Afshan elected to proceed with genetic testing for BRCA1 and BRCA2 through the [facility].  2) This information should be available in 4 weeks.  3) Results will be initially communicated over the phone and Afshan will return to clinic to discuss the results in more detail. Afshan requested that I also contact her daughter [place] with the results.      Face to face time: 45 minutes     Josefina Hilliard MS, [facility]  Licensed Genetic Counselor  T: [phone]  F: [phone]

## 2019-07-01 NOTE — LETTER
Cancer Risk Management  Program Locations    [facility]  [facility]  [facility] Cancer [facility]  Mailing Address  Cancer Risk Management Program  [facility]  [address] [place] [address]    New patient appointments  [phone]  July 1, 2019    Afshan Cardona  [address]   [facility] [address]      Dear Afshan,    It was a pleasure meeting with you and your daughters at the [facility] on 7/1/2019. Here is a copy of the progress note from your recent genetic counseling visit to the Cancer Risk Management Program.  If you have any additional questions, please feel free to call.    7/1/2019    Referring Provider: Bakari Galeas MD     Presenting Information:   I met with Afshan Cardona today for genetic counseling at the Cancer Risk Management Program at the [facility] to discuss her personal history of ovarian cancer. She was accompanied by her daughters Karla, Rachel, and [place]. She is here today to review this history, cancer screening recommendations, and available genetic testing options.     Personal History:  Afshan is a 74 year old female.  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      She had her first menstrual period at age 12, her first child at age 15, and is postmenopausal (in her 50s).  She reports no history of hormone replacement therapy.  She reports having annual mammograms and breast screening. Her mammogram from July of 2016 was normal. Afshan reports having at least 25 colon polyps throughout her lifetime. Her most recent colonoscopy was in August of 2017.     Colonoscopy note from 8/31/2017:  Rectal exam  performed and noted no masses.  Mild internal hemorrhoids.  The colonoscope was passed under direct vision. The colonoscope introduced through the anus and advanced to the terminal ileum with identification of the appendiceal orifice and ileocecal valve. Photo documentation was obtained. The colonoscope was withdrawn and the entire colon was well visualized.  4-5 mm sessile transverse colon polyp removed with cold snare.  2 diminutive 4-6 mm sessile transverse colon polyps removed by cold and hot snare larger polyp.  Diminutive 4-5 mm sessile hepatic flexure polyps removed by cold snare.  Proximal diminutive ascending colon polyp 3 mm removed by biopsy forceps.  4 mm sessile hepatic flexure colon polyp removed by cold snare.  Diminutive 4 mm transverse colon polyp removed by cold snare.  Diminutive transverse colon polyp 4-5 mm removed by cold snare.  Pedunculated transverse colon polyp of 7-8 mm removed by hot snare at its base.  4 diminutive 3-5 mm sessile splenic flexure colon polyps removed by cold and hot snare.  2 descending colon polyps sessile of 4 mm removed by cold snare.  Sigmoid colon submucosal mass with pillow sign and coloration consistent with lipoma.  Mild to moderate diverticulosis.  The patient tolerated the procedure well. The quality of the bowel preparation was good. The patient was transferred to the PACU in Good condition.    Impression:  -Small hiatal hernia.  -Irregular Z line with history of Vivas's consistent with short   segment Vivas's.  -Mild antral inflammation with H. pylori biopsies taken.  -6 transverse colon polyps.  -3 hepatic flexure polyps.  -Proximal ascending colon polyp.  -Splenic flexure polyps ×4.  -2 descending colon polyps.   -Sigmoid lipoma.  -Pan mild to moderate diverticulosis.     Pathology report from 8/31/2017:    Transverse colon polyp #1;  tubular adenoma.  Negative for high grade dysplasia and malignancy.      Transverse colon polyp #2;  tubular  adenoma.  Negative for high grade dysplasia and malignancy.      Hepatic flexure polyp x2;  tubular adenoma.  Negative for high grade dysplasia and malignancy.      Proximal ascending colon biopsy;  tubular adenoma.  Negative for high grade dysplasia and       malignancy.      Hepatic flexure polyp #2;  tubular adenoma.  Negative for high grade dysplasia and malignancy.      Transverse colon polyp #3;  no diagnostic abnormality.        Transverse colon polyp #4;  tubular adenoma.  Negative for high grade dysplasia and malignancy.      Transverse colon polyp #5;  no diagnostic abnormality.        Splenic flexure polyp x4;  fragments of tubular adenoma.  Negative for high grade dysplasia and      malignancy.      Descending colon polyp #1;  no diagnostic abnormality.       Descending colon polyp #2;  tubular adenoma.  Negative for high grade dysplasia and malignancy.      Family History: (Please see scanned pedigree for detailed family history information)    Afshan was adopted and has very limited information about her biological parents and relatives.    Afshan's sister, 77, was diagnosed with breast cancer in her early 40s. She has not had any genetic testing.    Afshan's brother passed away from Hodgkin's lymphoma at 65.    Afshan's mother was diagnosed with colon cancer in her 60s and passed away at 76. She may have also had cervical or ovarian cancer, age at diagnosis unknown but reported to be before her diagnosis with colon cancer.    Afshan's daughter Fiorella, 56, was diagnosed with thyroid cancer in her late 20s.     Her maternal ethnicity is . Her paternal ethnicity is [place]ian. Ashkenazi Confucianist ancestry unknown. There is no reported consanguinity.    Discussion:    Afshan's personal history of ovarian cancer and family history of breast cancer is suggestive of a possible cancer susceptibility gene in the family. Given her diagnosis of ovarian cancer, we discussed Hereditary Breast and  Ovarian Cancer syndrome.    We discussed the natural history and genetics of Hereditary Breast and Ovarian cancer syndrome due to BRCA1 and BRCA2 gene mutations.     Based on her personal history, Afshan meets current National Comprehensive Cancer Network (NCCN) criteria for genetic testing of BRCA1 and BRCA2.    Medical Management: The information from genetic testing may determine:     additional cancer screening recommendations (i.e. mammogram and breast MRI, more frequent colonoscopies, annual dermatologic exams, etc.)     options for risk reducing surgeries (i.e. bilateral mastectomy, surgery to remove the ovaries and/or uterus, etc.) for Afshan and her relatives.      Targeted chemotherapies for patients who have certain cancers now, or who develop certain cancers in the future.     These recommendations and possible targeted chemotherapies will be discussed in detail once genetic testing is completed.     We discussed that there are additional genes that could cause increased risk of ovarian cancer such as Siddiqui syndrome.     Siddiqui syndrome can be caused by a mutation in one of five genes:  MLH1, MSH2, MSH6, PMS2, and EPCAM.  Siddiqui syndrome may present with polyps, but typically a small number. The highest cancer risks associated with Siddiqui syndrome include colon cancer, endometrial/uterine cancer, gastric cancer, and ovarian cancer.    Given Afshan's colon polyp history of >20 cumulative adenomas, she also meets the NCCN criteria for genetic testing of APC and MUTYH.   Familial Adenomatous Polyposis (FAP) is a condition caused by mutations in the APC gene. Individuals with FAP typically have many (more than 100) adenoma type polyps in the colon and significantly increased risk for colon and other cancers. These precancerous polyps can develop in the teens and without preventative surgery, colon cancer is almost inevitable. Attenuated FAP (AFAP) is also a condition caused by mutations in the APC gene. It  is milder than classic FAP, however, as affected usually have  polyps and the lifetime risk of colon cancer is lower at approximately 70%. Extracolonic manifestations of FAP and AFAP include congenital hypertrophy of retinal pigment epithelium (CHRPE), osteomas, supernumerary teeth, odontomas, desmoids, epidermoid cysts, duodenal and other small bowel adenomas, and gastric fundic gland polyps. Other cancers associated with FAP and AFAP include thyroid, pancreatic, gastric, and duodenal cancers.   MUTYH-Associated Polyposis (MAP) is caused by mutations in the MUTYH gene. Individuals with MAP typically have 10 to more than 100 adenoma type polyps in the colon and the gastrointestinal tract, as well as increased colon and duodenal cancer risk.   Everyone has two copies of the MUTYH gene. In order to be affected with MAP, a person must have a mutation in both copies of the gene.  Carriers of MAP (people who only have one mutation in one copy of the MUTYH gene) may have a very slightly increased risk of colon cancer.     As many of these genes present with overlapping features in a family, it would be reasonable for Afshan to consider panel genetic testing to analyze multiple genes at once. Afshan is enrolled in the [facility] in Ovarian Cancer and will be receiving genetic analysis of other genes as part of this program (including Siddiqui syndrome, APC, and MUTYH).    We discussed the benefits and limitations of getting additional genetic testing through the [facility] in Ovarian Cancer. Afshan expressed understanding.    A detailed handout regarding hereditary gynecologic cancer risk genes and the information was provided to Sydneymeeta and can be found in the after visit summary.  Topics included: inheritance pattern, cancer risks, cancer screening recommendations, and also risks, benefits and limitations of testing.     Plan:  1) Today Afshan elected to proceed with genetic testing  for BRCA1 and BRCA2 through the [facility].  2) This information should be available in 4 weeks.  3) Results will be initially communicated over the phone and Sydneymeeta will return to clinic to discuss the results in more detail. Sydneymeeta requested that I also contact her daughter India with the results.      Face to face time: 45 minutes     Josefina Hilliard MS, [facility]  Licensed Genetic Counselor  T: [phone]  F: [phone]

## 2019-07-01 NOTE — PROGRESS NOTES
7/1/2019    Referring Provider: Bakari Galeas MD     Presenting Information:   I met with Afshan Cardona today for genetic counseling at the Cancer Risk Management Program at the [facility] to discuss her personal history of ovarian cancer. She was accompanied by her daughters Karla, Rachel, and [place]. She is here today to review this history, cancer screening recommendations, and available genetic testing options.     Personal History:  Afshan is a 74 year old female.  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      She had her first menstrual period at age 12, her first child at age 15, and is postmenopausal (in her 50s).  She reports no history of hormone replacement therapy.  She reports having annual mammograms and breast screening. Her mammogram from July of 2016 was normal. Afshan reports having at least 25 colon polyps throughout her lifetime. Her most recent colonoscopy was in August of 2017.     Colonoscopy note from 8/31/2017:  Rectal exam performed and noted no masses.  Mild internal hemorrhoids.  The colonoscope was passed under direct vision. The colonoscope introduced through the anus and advanced to the terminal ileum with identification of the appendiceal orifice and ileocecal valve. Photo documentation was obtained. The colonoscope was withdrawn and the entire colon was well visualized.  4-5 mm sessile transverse colon polyp removed with cold snare.  2 diminutive 4-6 mm sessile transverse colon polyps removed by cold and hot snare larger polyp.  Diminutive 4-5 mm sessile hepatic flexure polyps removed by cold snare.  Proximal diminutive ascending colon polyp 3 mm removed by biopsy forceps.  4 mm sessile hepatic flexure colon polyp removed by cold snare.  Diminutive 4 mm transverse colon polyp removed by cold  snare.  Diminutive transverse colon polyp 4-5 mm removed by cold snare.  Pedunculated transverse colon polyp of 7-8 mm removed by hot snare at its base.  4 diminutive 3-5 mm sessile splenic flexure colon polyps removed by cold and hot snare.  2 descending colon polyps sessile of 4 mm removed by cold snare.  Sigmoid colon submucosal mass with pillow sign and coloration consistent with lipoma.  Mild to moderate diverticulosis.  The patient tolerated the procedure well. The quality of the bowel preparation was good. The patient was transferred to the PACU in Good condition.    Impression:  -Small hiatal hernia.  -Irregular Z line with history of Vivas's consistent with short   segment Vivas's.  -Mild antral inflammation with H. pylori biopsies taken.  -6 transverse colon polyps.  -3 hepatic flexure polyps.  -Proximal ascending colon polyp.  -Splenic flexure polyps ×4.  -2 descending colon polyps.   -Sigmoid lipoma.  -Pan mild to moderate diverticulosis.     Pathology report from 8/31/2017:    Transverse colon polyp #1;  tubular adenoma.  Negative for high grade dysplasia and malignancy.      Transverse colon polyp #2;  tubular adenoma.  Negative for high grade dysplasia and malignancy.      Hepatic flexure polyp x2;  tubular adenoma.  Negative for high grade dysplasia and malignancy.      Proximal ascending colon biopsy;  tubular adenoma.  Negative for high grade dysplasia and       malignancy.      Hepatic flexure polyp #2;  tubular adenoma.  Negative for high grade dysplasia and malignancy.      Transverse colon polyp #3;  no diagnostic abnormality.        Transverse colon polyp #4;  tubular adenoma.  Negative for high grade dysplasia and malignancy.      Transverse colon polyp #5;  no diagnostic abnormality.        Splenic flexure polyp x4;  fragments of tubular adenoma.  Negative for high grade dysplasia and      malignancy.      Descending colon polyp #1;  no diagnostic abnormality.       Descending colon  polyp #2;  tubular adenoma.  Negative for high grade dysplasia and malignancy.      Family History: (Please see scanned pedigree for detailed family history information)    Afshan was adopted and has very limited information about her biological parents and relatives.    Afshan's sister, 77, was diagnosed with breast cancer in her early 40s. She has not had any genetic testing.    Afshan's brother passed away from Hodgkin's lymphoma at 65.    Afshan's mother was diagnosed with colon cancer in her 60s and passed away at 76. She may have also had cervical or ovarian cancer, age at diagnosis unknown but reported to be before her diagnosis with colon cancer.    Afshan's daughter Fiorella, 56, was diagnosed with thyroid cancer in her late 20s.     Her maternal ethnicity is . Her paternal ethnicity is Slov[place]. Ashkenazi Hindu ancestry unknown. There is no reported consanguinity.    Discussion:    Afshan's personal history of ovarian cancer and family history of breast cancer is suggestive of a possible cancer susceptibility gene in the family. Given her diagnosis of ovarian cancer, we discussed Hereditary Breast and Ovarian Cancer syndrome.    We discussed the natural history and genetics of Hereditary Breast and Ovarian cancer syndrome due to BRCA1 and BRCA2 gene mutations.     Based on her personal history, Afshan meets current National Comprehensive Cancer Network (NCCN) criteria for genetic testing of BRCA1 and BRCA2.    Medical Management: The information from genetic testing may determine:     additional cancer screening recommendations (i.e. mammogram and breast MRI, more frequent colonoscopies, annual dermatologic exams, etc.)     options for risk reducing surgeries (i.e. bilateral mastectomy, surgery to remove the ovaries and/or uterus, etc.) for Afshan and her relatives.      Targeted chemotherapies for patients who have certain cancers now, or who develop certain cancers in the future.      These recommendations and possible targeted chemotherapies will be discussed in detail once genetic testing is completed.     We discussed that there are additional genes that could cause increased risk of ovarian cancer such as Siddiqui syndrome.     Siddiqui syndrome can be caused by a mutation in one of five genes:  MLH1, MSH2, MSH6, PMS2, and EPCAM.  Siddiqui syndrome may present with polyps, but typically a small number. The highest cancer risks associated with Siddiqui syndrome include colon cancer, endometrial/uterine cancer, gastric cancer, and ovarian cancer.    Given Afshan's colon polyp history of >20 cumulative adenomas, she also meets the NCCN criteria for genetic testing of APC and MUTYH.   Familial Adenomatous Polyposis (FAP) is a condition caused by mutations in the APC gene. Individuals with FAP typically have many (more than 100) adenoma type polyps in the colon and significantly increased risk for colon and other cancers. These precancerous polyps can develop in the teens and without preventative surgery, colon cancer is almost inevitable. Attenuated FAP (AFAP) is also a condition caused by mutations in the APC gene. It is milder than classic FAP, however, as affected usually have  polyps and the lifetime risk of colon cancer is lower at approximately 70%. Extracolonic manifestations of FAP and AFAP include congenital hypertrophy of retinal pigment epithelium (CHRPE), osteomas, supernumerary teeth, odontomas, desmoids, epidermoid cysts, duodenal and other small bowel adenomas, and gastric fundic gland polyps. Other cancers associated with FAP and AFAP include thyroid, pancreatic, gastric, and duodenal cancers.   MUTYH-Associated Polyposis (MAP) is caused by mutations in the MUTYH gene. Individuals with MAP typically have 10 to more than 100 adenoma type polyps in the colon and the gastrointestinal tract, as well as increased colon and duodenal cancer risk.   Everyone has two copies of the MUTYH  gene. In order to be affected with MAP, a person must have a mutation in both copies of the gene.  Carriers of MAP (people who only have one mutation in one copy of the MUTYH gene) may have a very slightly increased risk of colon cancer.     As many of these genes present with overlapping features in a family, it would be reasonable for Afshan to consider panel genetic testing to analyze multiple genes at once. Afshan is enrolled in the [facility] Program in Ovarian Cancer and will be receiving genetic analysis of other genes as part of this program (including Siddiqui syndrome, APC, and MUTYH).    We discussed the benefits and limitations of getting additional genetic testing through the [facility] Program in Ovarian Cancer. Afshan expressed understanding.    A detailed handout regarding hereditary gynecologic cancer risk genes and the information was provided to Afshan and can be found in the after visit summary.  Topics included: inheritance pattern, cancer risks, cancer screening recommendations, and also risks, benefits and limitations of testing.     Plan:  1) Today Afshan elected to proceed with genetic testing for BRCA1 and BRCA2 through the [facility].  2) This information should be available in 4 weeks.  3) Results will be initially communicated over the phone and Afshan will return to clinic to discuss the results in more detail. Afshan requested that I also contact her daughter India with the results.      Face to face time: 45 minutes     Josefina Hilliard MS, [facility]  Licensed Genetic Counselor  T: [phone]  F: [phone]

## 2019-07-03 ENCOUNTER — TELEPHONE (OUTPATIENT)
Dept: LAB | Facility: CLINIC | Age: 74
End: 2019-07-03

## 2019-07-03 ENCOUNTER — APPOINTMENT (OUTPATIENT)
Dept: LAB | Facility: CLINIC | Age: 74
End: 2019-07-03
Attending: OBSTETRICS & GYNECOLOGY
Payer: MEDICARE

## 2019-07-03 DIAGNOSIS — Z80.3 FAMILY HISTORY OF MALIGNANT NEOPLASM OF BREAST: ICD-10-CM

## 2019-07-03 DIAGNOSIS — Z85.43 PERSONAL HISTORY OF OVARIAN CANCER: Primary | ICD-10-CM

## 2019-07-03 PROCEDURE — 81162 BRCA1&2 GEN FULL SEQ DUP/DEL: CPT | Performed by: GENETIC COUNSELOR, MS

## 2019-07-03 NOTE — TELEPHONE ENCOUNTER
Notified Afshan that she qualifies for genetic testing. Explained that insurance benefits may still apply, therefore, there could be an out of pocket cost.     Afshan expressed understanding and stated that she wants to proceed with testing. We will call when results are available. Afshan had no further questions.    Daisy Randall  Genomics Billing    [facility]   Molecular Diagnostics Laboratory  [address] [place] [id number]  [phone]

## 2019-07-03 NOTE — TELEPHONE ENCOUNTER
I called 07/03/2019 to update Afshan on insurance coverage for her genetic testing (No PA was needed). However, I was unable to reach Afshan.  I left a non-detailed voicemail with my name and phone number.    Daisy Randall  Genomics Billing    [facility]   Molecular Diagnostics Laboratory  [address] [place] [id number]  [phone]

## 2019-07-22 ENCOUNTER — TELEPHONE (OUTPATIENT)
Dept: ONCOLOGY | Facility: CLINIC | Age: 74
End: 2019-07-22

## 2019-07-22 NOTE — TELEPHONE ENCOUNTER
"July 22, 2019    Referring Provider: Bakari Galeas MD     Presenting Information:   I spoke with Afshan over the phone to discuss her genetic testing results. BRCA1/2 testing was ordered from [facility]. This testing was done because of her personal history of ovarian cancer.     Afshan is enrolled in the [facility] and will be receiving genetic analysis of other ovarian cancer-associated genes as part of this program. These results will most likely be available in the next couple of weeks.    Genetic Testing Result: NEGATIVE  Afshan is negative for mutations in BRCA1 and BRCA2. No mutations were found in her BRCA1 and BRCA2 gene.  She does not have an identifiable mutation associated with Hereditary Breast and Ovarian Cancer Syndrome.     A copy of the test report can be found in the Laboratory tab, dated 7/3/2019, and named \"Hereditary Cancer BRCA1/BRCA2\".     Interpretation:  We discussed several different interpretations of this negative test result.    1. One explanation may be that there is a different gene or combination of genes and environment that are associated with the cancers in Afshan and/or her relatives. Afshan is enrolled in the [facility] O[facility]ian Cancer and will be receiving genetic analysis of other ovarian cancer-associated genes as part of this program. These results will most likely be available in the next couple of weeks.  2. There is also a small possibility that there is a mutation in one of these genes, and we could not find it with our current testing methods.        Screening:  Based on this negative test result, it is important for Afshan and her relatives to refer back to the family history for appropriate cancer screening.      Due to Afshan's personal history of ovarian cancer, other close female relatives remain at slightly increased risk for ovarian cancer. Ovarian " cancer screening (pelvic exams, CA-125 blood tests, and transvaginal ultrasounds) is available; however, there are significant limitations of this screening. As such, this screening is not typically recommended. That being said, women in this family should discuss this screening and the signs and symptoms of ovarian cancer with their primary OB/GYN provider, as they may have individualized recommendations.    Afshan s close female relatives remain at increased risk for breast cancer given their family history. Breast cancer screening is generally recommended to begin approximately 10 years younger than the earliest age of breast cancer diagnosis in the family, or at age 40, whichever comes first. Breast screening options should be discussed with an individual's primary care provider and a genetic counselor, to determine at what age to begin screening, what screening is appropriate, and if additional screening (such as breast MRI) is necessary based on personal/family history factors.      Other population cancer screenings, such as recommendations by the American Cancer Society, are also appropriate for Afshan and her family.  These screening recommendations may change if there are changes to Afshan's personal and/or family history.  Final screening recommendations should be made by each individual's managing physician.         Inheritance:  Mutations in BRCA1 and BRCA2 are inherited in an autosomal dominant manner. Afshan cannot pass on an identifiable mutation in these genes to her children based on this negative test result. Mutations in these genes do not skip generations.       Additional Testing:  It is possible Afshan does carry a gene or combination of genes and environment that increase her risk for ovarian cancer. Afshan is enrolled in the [facility] in Ovarian Cancer and will be receiving genetic analysis of other ovarian cancer-associated genes as part of this program. These results  will most likely be available in the next couple of weeks. Screening and management recommendations for Afshan and her relatives may change based on these results.      Summary:  We do not have a genetic explanation for Afshan's ovarian cancer. Because of that, it is important that she continue with cancer screening based on her personal and family history as discussed above.     Genetic testing is rapidly advancing, and new cancer susceptibility genes will most likely be identified in the future. Afshan is encouraged to contact me if there are changes in her personal or family history. This may change how we assess her cancer risk, screening, and the testing we would offer.     Plan:  1. A copy of her test results will be mailed with this clinic note as well as a handout summarizing negative genetic test results.  2. I will contact Afshan once the genetic analysis results are available from the [facility] Ovarian Cancer.     Josefina Hilliard MS, [facility]  Licensed Genetic Counselor  T: [phone]  F: [phone]

## 2019-07-22 NOTE — Clinical Note
Please print and send a copy of this letter and the patient's genetic testing report to the patient.    Please enclose test report: Hereditary cancer BRCA1/BRCA2 [[id number]] (Order [id number])     PLEASE SEND IN ENHANCED VIEW FORMAT

## 2019-07-22 NOTE — LETTER
"    Cancer Risk Management  Program Locations    [facility]  [facility]  [facility]  Mailing Address  Cancer Risk Management Program  [facility]  [address] [place] [address]    New patient appointments  [phone]  July 22, 2019    Afshan Brendan  [address]   [facility] [address]      Dear Afshan,    It was a pleasure speaking with you on the phone on 7/22/2019.  Here is a copy of the progress note from our discussion.  If you have any additional questions, please feel free to call.    July 22, 2019    Referring Provider: Bakari Galeas MD     Presenting Information:   I spoke with Afshan over the phone to discuss her genetic testing results. BRCA1/2 testing was ordered from [facility]. This testing was done because of her personal history of ovarian cancer.     Afshan is enrolled in the [facility] Ovarian Cancer and will be receiving genetic analysis of other ovarian cancer-associated genes as part of this program. These results will most likely be available in the next couple of weeks.    Genetic Testing Result: NEGATIVE  Afshan is negative for mutations in BRCA1 and BRCA2. No mutations were found in her BRCA1 and BRCA2 gene.  She does not have an identifiable mutation associated with Hereditary Breast and Ovarian Cancer Syndrome.     A copy of the test report can be found in the Laboratory tab, dated 7/3/2019, and named \"Hereditary Cancer BRCA1/BRCA2\".     Interpretation:  We discussed several different interpretations of this negative test result.    1. One explanation may be that there is a different gene or combination of genes and environment that are associated with the cancers in Afshan and/or her relatives. Afshan is " enrolled in the [facility] in Ovarian Cancer and will be receiving genetic analysis of other ovarian cancer-associated genes as part of this program. These results will most likely be available in the next couple of weeks.  2. There is also a small possibility that there is a mutation in one of these genes, and we could not find it with our current testing methods.        Screening:  Based on this negative test result, it is important for Afshan and her relatives to refer back to the family history for appropriate cancer screening.      Due to Afshan's personal history of ovarian cancer, other close female relatives remain at slightly increased risk for ovarian cancer. Ovarian cancer screening (pelvic exams, CA-125 blood tests, and transvaginal ultrasounds) is available; however, there are significant limitations of this screening. As such, this screening is not typically recommended. That being said, women in this family should discuss this screening and the signs and symptoms of ovarian cancer with their primary OB/GYN provider, as they may have individualized recommendations.    Afshan s close female relatives remain at increased risk for breast cancer given their family history. Breast cancer screening is generally recommended to begin approximately 10 years younger than the earliest age of breast cancer diagnosis in the family, or at age 40, whichever comes first. Breast screening options should be discussed with an individual's primary care provider and a genetic counselor, to determine at what age to begin screening, what screening is appropriate, and if additional screening (such as breast MRI) is necessary based on personal/family history factors.      Other population cancer screenings, such as recommendations by the American Cancer Society, are also appropriate for Afshan and her family.  These screening recommendations may change if there are changes to Afshan's personal and/or  family history.  Final screening recommendations should be made by each individual's managing physician.         Inheritance:  Mutations in BRCA1 and BRCA2 are inherited in an autosomal dominant manner. Afshan cannot pass on an identifiable mutation in these genes to her children based on this negative test result. Mutations in these genes do not skip generations.       Additional Testing:  It is possible Afshan does carry a gene or combination of genes and environment that increase her risk for ovarian cancer. Afshan is enrolled in the [facility] Ovarian Cancer and will be receiving genetic analysis of other ovarian cancer-associated genes as part of this program. These results will most likely be available in the next couple of weeks. Screening and management recommendations for Afshan and her relatives may change based on these results.      Summary:  We do not have a genetic explanation for Afshan's ovarian cancer. Because of that, it is important that she continue with cancer screening based on her personal and family history as discussed above.     Genetic testing is rapidly advancing, and new cancer susceptibility genes will most likely be identified in the future. Afshan is encouraged to contact me if there are changes in her personal or family history. This may change how we assess her cancer risk, screening, and the testing we would offer.     Plan:  1. A copy of her test results will be mailed with this clinic note as well as a handout summarizing negative genetic test results.  2. I will contact Afshan once the genetic analysis results are available from the [facility] in Ovarian Cancer.     Josefina Hilliard MS, [facility]  Licensed Genetic Counselor  T: [phone]  F: [phone]              Negative Genetic Test Result    Genetic Testing  You had a blood test that looked at the genetic information in one or more genes associated with increased cancer risk.  The testing  looked for any harmful changes that would stop this particular gene from working like it should. If an individual does not have any harmful changes or variants of unknown significance found from their blood test, their genetic test result is reported as negative.       Results  The genetic test did not identify any pathogenic (harmful) changes in the genes that were tested. There are several possible explanations for a negative test result. Without knowing the gene mutation in your family, the cause of the cancer in you or your relatives is still unknown. Your genetic counselor can help interpret the result for you and your relatives. In this case, there are several reasons that may explain the negative test result:    There may be a gene mutation in the family that you did not inherit.     You may have a gene mutation in a different gene that was not included in the test, or has not yet been discovered.     The cancers in you or your family may be due to a combination of genetic factors and environment (multifactorial/familial).    The cancers in you or your family may be sporadic/random cancers.    There is very small chance that a mutation was not found by current testing methods.  As testing technology evolves over time, it may still be possible to identify a mutation in a gene that was not found on this test.    It is important to note which genes were included in your test. A list of these genes can be found on your test result.    Screening Recommendations  Due to this negative test result, cancer screening recommendations should be based on your personal and family history. This may include increased cancer screening for you and/or your family members. Your genetic counselor and health care provider can help make appropriate recommendations.      Please call us if you have any questions or concerns.   Cancer Risk Management Program [phone] ([phone])  ? Reny Montez MS,  [facility]  [phone]  ? Addy Taylor, [place]  [phone]  ? Geneva Parra, [place], [facility]  [phone]  ? Jazmine Castaneda, MS, [facility]  [phone]  ? Josefina Hilliard, MS, [facility]  [phone]  ? Sadia Collins, MS, [facility] [phone]  ? Terese Billings, MS, [facility] [phone]

## 2019-07-22 NOTE — LETTER
"7/22/2019       RE: Afshan Cardona  [address]  [place] [address]     Dear Colleague,    Thank you for referring your patient, Afshan Cardona, to the [facility]. Please see a copy of my visit note below.    July 22, 2019    Referring Provider: Bakari Galeas MD     Presenting Information:   I spoke with Afshan over the phone to discuss her genetic testing results. BRCA1/2 testing was ordered from [facility]. This testing was done because of her personal history of ovarian cancer.     Afshan is enrolled in the [facility] in Ovarian Cancer and will be receiving genetic analysis of other ovarian cancer-associated genes as part of this program. These results will most likely be available in the next couple of weeks.    Genetic Testing Result: NEGATIVE  Afshan is negative for mutations in BRCA1 and BRCA2. No mutations were found in her BRCA1 and BRCA2 gene.  She does not have an identifiable mutation associated with Hereditary Breast and Ovarian Cancer Syndrome.     A copy of the test report can be found in the Laboratory tab, dated 7/3/2019, and named \"Hereditary Cancer BRCA1/BRCA2\".     Interpretation:  We discussed several different interpretations of this negative test result.    1. One explanation may be that there is a different gene or combination of genes and environment that are associated with the cancers in Afshan and/or her relatives. Afshan is enrolled in the [facility] in Ovarian Cancer and will be receiving genetic analysis of other ovarian cancer-associated genes as part of this program. These results will most likely be available in the next couple of weeks.  2. There is also a small possibility that there is a mutation in one of these genes, and we could not find it with our current testing methods.        Screening:  Based on this negative test result, it is important " for Afshan and her relatives to refer back to the family history for appropriate cancer screening.      Due to Afshan's personal history of ovarian cancer, other close female relatives remain at slightly increased risk for ovarian cancer. Ovarian cancer screening (pelvic exams, CA-125 blood tests, and transvaginal ultrasounds) is available; however, there are significant limitations of this screening. As such, this screening is not typically recommended. That being said, women in this family should discuss this screening and the signs and symptoms of ovarian cancer with their primary OB/GYN provider, as they may have individualized recommendations.    Afshan s close female relatives remain at increased risk for breast cancer given their family history. Breast cancer screening is generally recommended to begin approximately 10 years younger than the earliest age of breast cancer diagnosis in the family, or at age 40, whichever comes first. Breast screening options should be discussed with an individual's primary care provider and a genetic counselor, to determine at what age to begin screening, what screening is appropriate, and if additional screening (such as breast MRI) is necessary based on personal/family history factors.      Other population cancer screenings, such as recommendations by the American Cancer Society, are also appropriate for Afshan and her family.  These screening recommendations may change if there are changes to Afshan's personal and/or family history.  Final screening recommendations should be made by each individual's managing physician.         Inheritance:  Mutations in BRCA1 and BRCA2 are inherited in an autosomal dominant manner. Afshan cannot pass on an identifiable mutation in these genes to her children based on this negative test result. Mutations in these genes do not skip generations.       Additional Testing:  It is possible Afshan does carry a gene or combination of genes  and environment that increase her risk for ovarian cancer. Afshan is enrolled in the [facility] in Ovarian Cancer and will be receiving genetic analysis of other ovarian cancer-associated genes as part of this program. These results will most likely be available in the next couple of weeks. Screening and management recommendations for Afshan and her relatives may change based on these results.      Summary:  We do not have a genetic explanation for Afshan's ovarian cancer. Because of that, it is important that she continue with cancer screening based on her personal and family history as discussed above.     Genetic testing is rapidly advancing, and new cancer susceptibility genes will most likely be identified in the future. Afshan is encouraged to contact me if there are changes in her personal or family history. This may change how we assess her cancer risk, screening, and the testing we would offer.     Plan:  1. A copy of her test results will be mailed with this clinic note as well as a handout summarizing negative genetic test results.  2. I will contact Afshan once the genetic analysis results are available from the [facility] in Ovarian Cancer.     Josefina Hilliard MS, [facility]  Licensed Genetic Counselor  T: [phone]  F: [phone]      Again, thank you for allowing me to participate in the care of your patient.      Sincerely,    Josefina Hilliard GC

## 2019-07-31 LAB — COPATH REPORT: NORMAL

## 2019-08-01 ENCOUNTER — TELEPHONE (OUTPATIENT)
Dept: ONCOLOGY | Facility: CLINIC | Age: 74
End: 2019-08-01

## 2019-08-01 NOTE — TELEPHONE ENCOUNTER
"August 1, 2019    Referring Provider: Bakari Galeas MD     Presenting Information:   I spoke with Afshan over the phone to discuss her additional primary and secondary genetic analysis results. This genetic data analysis was done as part of the [facility].     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 This variant is called c.3440C>G ([id number]).  No other variants or mutations were found in the MSG6 gene. Given the uncertain significance of this result, medical management decisions should NOT be made based on this test result alone.    Of note, Afshan tested negative for mutations in the following genes by sequencing and deletion/duplication analysis: CRISPIN, BARD1, BRIP1, CDH1, CHEK2, DICER1, EPCAM, MLH1, MRE11A, MSH2, MSH6, MUTYH, NBN, NF1, PALB2, PMS2, PTEN, RAD50, RAD51C, RAD51D, SMARCA4, STK11, TP53, POLD1, POLE, AXIN2, NTHL1, MSH3. Afshan cannot pass on a mutation in any of these genes to her children based on this test result. Mutations in these genes do not skip generations.  Genetic testing of APC was also performed through the [facility] research - secondary findings and results were negative.     A copy of the test report can be found in the Laboratory tab, dated 7/3/2019, and named \"Hereditary Cancer BRCA1/BRCA2\".     Interpretation:  We discussed several different interpretations of this inconclusive test result. It is not clear if this variant in the MSH6 gene is associated with increased cancer risk.  1. This variant may be a benign change that does not increase cancer risk.  2. This variant may be a harmful mutation that causes increased risk of cancers associated with Siddiqui syndrome (colon, uterine, other).    Siddiqui syndrome can be caused by a harmful mutation in one of five genes:  " MLH1, MSH2, MSH6, PMS2, and EPCAM.  Siddiqui syndrome may present with polyps, but typically a small number.  The highest cancer risks associated with Siddiqui syndrome include colon cancer, endometrial/uterine cancer, gastric cancer, and ovarian cancer.    In rare situations in which both parents have a harmful mutation in the MSh6 gene, their children each have a 25% risk for constitutional mismatch repair deficiency syndrome (CMMRD).  CMMRD is a disorder that causes cafe-au-lait spots and risk for childhood cancers. For individuals of childbearing age with harmful MSH6 mutations, genetic counseling and genetic testing may be advised for their partners.    The laboratory is working to determine if this variant is harmful or benign, and they will contact us if it is reclassified. If this variant is determined to be a benign change, there may be a different gene or combination of genes and environment that are associated with the cancers in this family.    Inheritance:  We reviewed the autosomal dominant inheritance of this variant in the MSH6 gene. We discussed that Afshan has a 50% chance to pass this variant to each of her children.   Likewise, each of her siblings has a 50% risk of having the same variant. Because it is unclear what, if any, risk is associated with this variant, clinical genetic testing for this MSH6 variant alone is not recommended for relatives.    Family Studies:  The laboratory may offer additional research based testing for specific relatives in order to help reclassify this variant.    Screening:  Based on this inconclusive test result, it is important for Afshan and her relatives to refer back to the family history for appropriate cancer screening.      Due to Afshan's personal history of ovarian cancer, Afshan's close female relatives remain at slightly increased risk for ovarian cancer. We discussed available ovarian cancer screening (pelvic exams, CA-125 blood tests, and transvaginal  ultrasounds) as well as the significant limitations of this screening. As such, this screening is not typically recommended. That being said, women in this family should discuss this screening and the signs and symptoms of ovarian cancer with their primary OB/GYN provider, as they may have individualized recommendations.    Afshan reports that she has had at least 25 colon polyps. In her most recent colonoscopy in August of 2017, about 15 polyps were detected, with the majority of the polyps being tubular adenomas. Afshan is strongly encouraged to follow up with her managing physician to determine appropriate screening plan based on the most recent colonoscopy result.     Afshan is encouraged to verify the total number of polyps she has had and obtain records.     If the total number of colon polyps she has had is indeed >20 and <100, per the most recent NCCN guideline of 2019 for Colonic Adenomatous Polyposis of Unknown Etiology, Afshan may be recommended to receive colonoscopy every 1-2 years and consider baseline upper endoscopy. The final determination of the frequency of the colonoscopies should be determined by Afshan's managing physician.     Afshan's siblings and children may also be recommended to consider colonoscopy every 3-5 years based on this information. Final screening recommendations should be made by each individual's managing physician.    Other population cancer screenings, such as recommendations by the American Cancer Society, are also appropriate for Afshan and her family.  These screening recommendations may change if there are changes to Afshan's personal and/or family history.  Final screening recommendations should be made by each individual's managing physician.    Limitations of Research Study Results:  We discussed that certainly there are limitations in results from a research study compared to results from a clinical genetic test. For example, certain types of mutations  (inversions), or in some regions of low coverage, mutations may not be confidently excluded. Clinical genetic testing of ovarian cancer risk genes is available should Afshan want to clinically confirm the negative test results from this research study.      Secondary Results:  As part of the [facility], 57 medically significant genes and 92 reproductively significance genes were analyzed (sequencing only, copy number variation analysis was not performed). These results were also NEGATIVE. Details can be found in the same report in the Laboratory tab.    Additional Testing Considerations:  Afshan's sister with breast cancer history may benefit from genetic counseling given her early age at diagnosis.     Summary:  We do not have an explanation for Afshan's ovarian cancer based on the genetic analysis results from the [facility]. Because of that, it is important that she continue with cancer management based on her personal and family history as discussed above.     Genetic testing is rapidly advancing, and new cancer susceptibility genes will most likely be identified in the future. Afshan is encouraged to contact us if there are changes in her personal or family history, or if she would like to pursue clinical genetic testing to confirm the negative results from the research. This may change how we assess her cancer risk, screening, and the testing we would offer.     Plan:  1. A copy of her test results will be mailed with this clinic note.  2. Afshan is encouraged to contact us with any questions or concerns. The scheduling line for the Cancer Risk Management Program is [phone].      Josefina Hilliard MS, [facility]  Licensed Genetic Counselor  T: [phone]  F: [phone]

## 2019-08-01 NOTE — LETTER
"8/1/2019       RE: Afshan Cardona  [address]  [facility] [address]     Dear Colleague,    Thank you for referring your patient, Afshan Cardona, to the [facility]. Please see a copy of my visit note below.    August 1, 2019    Referring Provider:  Bakari Galeas MD     Presenting Information:   I spoke with Afshan over the phone to discuss her additional primary and secondary genetic analysis results. This genetic data analysis was done as part of the [facility].     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 This variant is called c.3440C>G ([id number]).  No other variants or mutations were found in the MSG6 gene. Given the uncertain significance of this result, medical management decisions should NOT be made based on this test result alone.    Of note, Afshan tested negative for mutations in the following genes by sequencing and deletion/duplication analysis: CRISPIN, BARD1, BRIP1, CDH1, CHEK2, DICER1, EPCAM, MLH1, MRE11A, MSH2, MSH6, MUTYH, NBN, NF1, PALB2, PMS2, PTEN, RAD50, RAD51C, RAD51D, SMARCA4, STK11, TP53, POLD1, POLE, AXIN2, NTHL1, MSH3. Afshan cannot pass on a mutation in any of these genes to her children based on this test result. Mutations in these genes do not skip generations.  Genetic testing of APC was also performed through the [facility] research - secondary findings and results were negative.     A copy of the test report can be found in the Laboratory tab, dated 7/3/2019, and named \"Hereditary Cancer BRCA1/BRCA2\".     Interpretation:  We discussed several different interpretations of this inconclusive test result. It is not clear if this variant in the MSH6 gene is associated with increased cancer risk.  1. This variant may be a benign change " that does not increase cancer risk.  2. This variant may be a harmful mutation that causes increased risk of cancers associated with Siddiqui syndrome (colon, uterine, other).    Siddiqui syndrome can be caused by a harmful mutation in one of five genes:  MLH1, MSH2, MSH6, PMS2, and EPCAM.  Siddiqui syndrome may present with polyps, but typically a small number.  The highest cancer risks associated with Siddiqui syndrome include colon cancer, endometrial/uterine cancer, gastric cancer, and ovarian cancer.    In rare situations in which both parents have a harmful mutation in the MSh6 gene, their children each have a 25% risk for constitutional mismatch repair deficiency syndrome (CMMRD).  CMMRD is a disorder that causes cafe-au-lait spots and risk for childhood cancers. For individuals of childbearing age with harmful MSH6 mutations, genetic counseling and genetic testing may be advised for their partners.    The laboratory is working to determine if this variant is harmful or benign, and they will contact us if it is reclassified. If this variant is determined to be a benign change, there may be a different gene or combination of genes and environment that are associated with the cancers in this family.    Inheritance:  We reviewed the autosomal dominant inheritance of this variant in the MSH6 gene. We discussed that Afshan has a 50% chance to pass this variant to each of her children.   Likewise, each of her siblings has a 50% risk of having the same variant. Because it is unclear what, if any, risk is associated with this variant, clinical genetic testing for this MSH6 variant alone is not recommended for relatives.    Family Studies:  The laboratory may offer additional research based testing for specific relatives in order to help reclassify this variant.    Screening:  Based on this inconclusive test result, it is important for Afshan and her relatives to refer back to the family history for appropriate cancer  screening.      Due to Afshan's personal history of ovarian cancer, Afshan's close female relatives remain at slightly increased risk for ovarian cancer. We discussed available ovarian cancer screening (pelvic exams, CA-125 blood tests, and transvaginal ultrasounds) as well as the significant limitations of this screening. As such, this screening is not typically recommended. That being said, women in this family should discuss this screening and the signs and symptoms of ovarian cancer with their primary OB/GYN provider, as they may have individualized recommendations.    Afshan reports that she has had at least 25 colon polyps. In her most recent colonoscopy in August of 2017, about 15 polyps were detected, with the majority of the polyps being tubular adenomas. Afshan is strongly encouraged to follow up with her managing physician to determine appropriate screening plan based on the most recent colonoscopy result.     Afshan is encouraged to verify the total number of polyps she has had and obtain records.     If the total number of colon polyps she has had is indeed >20 and <100, per the most recent NCCN guideline of 2019 for Colonic Adenomatous Polyposis of Unknown Etiology, Afshan may be recommended to receive colonoscopy every 1-2 years and consider baseline upper endoscopy. The final determination of the frequency of the colonoscopies should be determined by Afshan's managing physician.     Afshan's siblings and children may also be recommended to consider colonoscopy every 3-5 years based on this information. Final screening recommendations should be made by each individual's managing physician.    Other population cancer screenings, such as recommendations by the American Cancer Society, are also appropriate for Afshan and her family.  These screening recommendations may change if there are changes to Afshan's personal and/or family history.  Final screening recommendations should be made by each  individual's managing physician.    Limitations of Research Study Results:  We discussed that certainly there are limitations in results from a research study compared to results from a clinical genetic test. For example, certain types of mutations (inversions), or in some regions of low coverage, mutations may not be confidently excluded. Clinical genetic testing of ovarian cancer risk genes is available should Afshan want to clinically confirm the negative test results from this research study.      Secondary Results:  As part of the [facility]ian [facility], 57 medically significant genes and 92 reproductively significance genes were analyzed (sequencing only, copy number variation analysis was not performed). These results were also NEGATIVE. Details can be found in the same report in the Laboratory tab.    Additional Testing Considerations:  Afshan's sister with breast cancer history may benefit from genetic counseling given her early age at diagnosis.     Summary:  We do not have an explanation for Afshan's ovarian cancer based on the genetic analysis results from the [facility]. Because of that, it is important that she continue with cancer management based on her personal and family history as discussed above.     Genetic testing is rapidly advancing, and new cancer susceptibility genes will most likely be identified in the future. Afshan is encouraged to contact us if there are changes in her personal or family history, or if she would like to pursue clinical genetic testing to confirm the negative results from the research. This may change how we assess her cancer risk, screening, and the testing we would offer.     Plan:  1. A copy of her test results will be mailed with this clinic note.  2. Afshan is encouraged to contact us with any questions or concerns. The scheduling line for the Cancer Risk Management Program is [phone].      Josefina  MS Babak, [facility]  Licensed Genetic Counselor  T: [phone]  F: [phone]      Again, thank you for allowing me to participate in the care of your patient.      Sincerely,    Josefina Hilliard GC

## 2019-08-01 NOTE — LETTER
Cancer Risk Management  Program [facility]  [facility]  [facility]  Mailing Address  Cancer Risk Management Program  [facility]  [address] [place] [address]    New patient appointments  [phone]  August 1, 2019    Afshan Brendan  [address]   [facility] [address]      Dear Afshan,    It was a pleasure speaking with you on the phone on 8/1/2019.  Here is a copy of the progress note from our discussion.  If you have any additional questions, please feel free to call.    August 1, 2019    Referring Provider:  Bakari Galeas MD     Presenting Information:   I spoke with Afshan over the phone to discuss her additional primary and secondary genetic analysis results. This genetic data analysis was done as part of the [facility] Program in Ovarian Cancer.     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 This variant is called c.3440C>G ([id number]).  No other variants or mutations were found in the MSG6 gene. Given the uncertain significance of this result, medical management decisions should NOT be made based on this test result alone.    Of note, Afshan tested negative for mutations in the following genes by sequencing and deletion/duplication analysis: CRISPIN, BARD1, BRIP1, CDH1, CHEK2, DICER1, EPCAM, MLH1, MRE11A, MSH2, MSH6, MUTYH, NBN, NF1, PALB2, PMS2, PTEN, RAD50, RAD51C, RAD51D, SMARCA4, STK11, TP53, POLD1, POLE, AXIN2, NTHL1, MSH3. Afshan cannot pass on a mutation in any of these genes to her children based on this test result. Mutations in these genes do not skip generations.  Genetic testing of APC was also performed through the  "Prec[facility] Medicine Program in Ovarian Cancer research - secondary findings and results were negative.     A copy of the test report can be found in the Laboratory tab, dated 7/3/2019, and named \"Hereditary Cancer BRCA1/BRCA2\".     Interpretation:  We discussed several different interpretations of this inconclusive test result. It is not clear if this variant in the MSH6 gene is associated with increased cancer risk.  1. This variant may be a benign change that does not increase cancer risk.  2. This variant may be a harmful mutation that causes increased risk of cancers associated with Siddiqui syndrome (colon, uterine, other).    Siddiqui syndrome can be caused by a harmful mutation in one of five genes:  MLH1, MSH2, MSH6, PMS2, and EPCAM.  Siddiqui syndrome may present with polyps, but typically a small number.  The highest cancer risks associated with Siddiqui syndrome include colon cancer, endometrial/uterine cancer, gastric cancer, and ovarian cancer.    In rare situations in which both parents have a harmful mutation in the MSh6 gene, their children each have a 25% risk for constitutional mismatch repair deficiency syndrome (CMMRD).  CMMRD is a disorder that causes cafe-au-lait spots and risk for childhood cancers. For individuals of childbearing age with harmful MSH6 mutations, genetic counseling and genetic testing may be advised for their partners.    The laboratory is working to determine if this variant is harmful or benign, and they will contact us if it is reclassified. If this variant is determined to be a benign change, there may be a different gene or combination of genes and environment that are associated with the cancers in this family.    Inheritance:  We reviewed the autosomal dominant inheritance of this variant in the MSH6 gene. We discussed that Afshan has a 50% chance to pass this variant to each of her children.   Likewise, each of her siblings has a 50% risk of having the same variant. Because it is " unclear what, if any, risk is associated with this variant, clinical genetic testing for this MSH6 variant alone is not recommended for relatives.    Family Studies:  The laboratory may offer additional research based testing for specific relatives in order to help reclassify this variant.    Screening:  Based on this inconclusive test result, it is important for Afshan and her relatives to refer back to the family history for appropriate cancer screening.      Due to Afshan's personal history of ovarian cancer, Afshan's close female relatives remain at slightly increased risk for ovarian cancer. We discussed available ovarian cancer screening (pelvic exams, CA-125 blood tests, and transvaginal ultrasounds) as well as the significant limitations of this screening. As such, this screening is not typically recommended. That being said, women in this family should discuss this screening and the signs and symptoms of ovarian cancer with their primary OB/GYN provider, as they may have individualized recommendations.    Afshan reports that she has had at least 25 colon polyps. In her most recent colonoscopy in August of 2017, about 15 polyps were detected, with the majority of the polyps being tubular adenomas. Afshan is strongly encouraged to follow up with her managing physician to determine appropriate screening plan based on the most recent colonoscopy result.     Afshan is encouraged to verify the total number of polyps she has had and obtain records.     If the total number of colon polyps she has had is indeed >20 and <100, per the most recent NCCN guideline of 2019 for Colonic Adenomatous Polyposis of Unknown Etiology, Afshan may be recommended to receive colonoscopy every 1-2 years and consider baseline upper endoscopy. The final determination of the frequency of the colonoscopies should be determined by Afshan's managing physician.     Afshan's siblings and children may also be recommended to consider  colonoscopy every 3-5 years based on this information. Final screening recommendations should be made by each individual's managing physician.    Other population cancer screenings, such as recommendations by the American Cancer Society, are also appropriate for Afshan and her family.  These screening recommendations may change if there are changes to Afshan's personal and/or family history.  Final screening recommendations should be made by each individual's managing physician.    Limitations of Research Study Results:  We discussed that certainly there are limitations in results from a research study compared to results from a clinical genetic test. For example, certain types of mutations (inversions), or in some regions of low coverage, mutations may not be confidently excluded. Clinical genetic testing of ovarian cancer risk genes is available should Afshan want to clinically confirm the negative test results from this research study.      Secondary Results:  As part of the Precision Medicine Program in Ovarian Cancer, 57 medically significant genes and 92 reproductively significance genes were analyzed (sequencing only, copy number variation analysis was not performed). These results were also NEGATIVE. Details can be found in the same report in the Laboratory tab.    Additional Testing Considerations:  Afshan's sister with breast cancer history may benefit from genetic counseling given her early age at diagnosis.     Summary:  We do not have an explanation for Afshan's ovarian cancer based on the genetic analysis results from the Precision Medicine Program in Ovarian Cancer. Because of that, it is important that she continue with cancer management based on her personal and family history as discussed above.     Genetic testing is rapidly advancing, and new cancer susceptibility genes will most likely be identified in the future. Afshan is encouraged to contact us if there are changes in her personal or  family history, or if she would like to pursue clinical genetic testing to confirm the negative results from the research. This may change how we assess her cancer risk, screening, and the testing we would offer.     Plan:  1. A copy of her test results will be mailed with this clinic note.  2. Afshan is encouraged to contact us with any questions or concerns. The scheduling line for the Cancer Risk Management Program is [phone].      Josefina Hilliard MS, [facility]  Licensed Genetic Counselor  T: [phone]  F: [phone]            Genetic Testing  You had a blood test that looked at the genetic information in one or more genes associated with increased cancer risk.  The testing looked for any harmful changes that would stop this particular gene from working like it should.  It is important to remember that everyone has variants in multiple genes in their bodies that do not affect how the gene works.  These changes are benign and do not cause disease or increase cancer risk.  The term often used to describe these variants is benign polymorphism.  If an individual is found to have a benign polymorphism, their genetic test result is reported as Negative.    Results  There are three possible results of any genetic test:    Positive--a harmful mutation was identified    Negative--no mutation was identified    Variant of unknown significance--a variation in one of the genes was identified, but it is unclear how this impacts cancer risk in the family    Variant of Unknown Significance (VUS)  A VUS was identified in your blood sample.  Scientists currently do not know if this variant is harmful or if it is a benign polymorphism not associated with any increased risk of cancer.   There are several reasons for this lack of knowledge, but likely your VUS has either never been seen before or has only been seen in a small number of individuals.  Until your VUS is studied in more detail and/or seen in more families, scientists  are not able to predict if it is a harmful mutation or a benign polymorphism.  Therefore, the cause of the cancer in you and your relatives is still unknown.          Reclassification  Genetic testing laboratories and researchers are constantly working to determine the importance of variants of unknown significance.  Most laboratories will notify the genetic counselor when a patient s VUS has been reclassified as a harmful mutation or a benign polymorphism.      Some families may be candidates for participation in the laboratory s variant research programs to help determine the importance of their VUS.  Only the testing laboratory can decide who is eligible for participation.  Participating in these programs does not guarantee that families will learn the significance of their VUS immediately.  It could be months or years before a VUS is reclassified.       Screening Recommendations  Cancer screening recommendations for patients with a VUS should be based on their personal and family history rather than the VUS itself.  This may include increased cancer screening for certain individuals in the family.  Your genetic counselor and health care provider can help make appropriate recommendations for you and your relatives.      It is usually not recommended that other relatives have genetic testing for the VUS unless it is done as part of the laboratory s variant research program because an individual s test results should not influence their cancer screening until we determine the importance of the VUS.  Your genetic counselor can help you and your relatives understand the risks and benefits of all genetic testing and cancer screening options.    Please call us if you have any questions or concerns.     Please call us if you have any questions or concerns.   Cancer Risk Management Program [phone]-[facility] ([phone])  ? Reny Montez MS, [facility]  [phone]  ? Addy Taylor MS  [phone]  ? Geneva Parra MS,  [facility]  [phone]  ? Jazmine Castaneda, MS, [facility]  [phone]  ? Josefina Hilliard, MS, [facility]  [phone]  ? Sadia Collins, MS, [facility] [phone]  ? Terese Billings, MS, [facility] [phone]

## 2019-08-01 NOTE — Clinical Note
Please print and send a copy of this letter and the patient's genetic testing report to the patient.    Please enclose test report: Hereditary cancer BRCA1/BRCA2 [[id number]] (Order [id number])       PLEASE SEND IN ENHANCED VIEW FORMAT    PLEASE SEND BOTH ORIGINAL AND ADDENDUM REPORT

## 2019-08-13 ENCOUNTER — TELEPHONE (OUTPATIENT)
Dept: INTERVENTIONAL RADIOLOGY/VASCULAR | Facility: CLINIC | Age: 74
End: 2019-08-13

## 2019-08-13 NOTE — TELEPHONE ENCOUNTER
"Called pt on her home phone.     She states that she would rather have her kidney \"removed\". She states that she doesn't want to wait til her appt in December.   Informed her that I'll consult with Dr. Tsang and then get back to her as he's out this week    She agrees to plan.     Sonja STOCK RN, BSN  Interventional Radiology Nurse Coordinator   Phone: [phone]              [facility] Call Center    Phone Message    May a detailed message be left on voicemail: yes    Reason for Call: Other: Pt called requesting to schedule a procedure to have something on the kidney removed. She was not specific about the exactly procedure needed. Please call pt to discuss, thanks!     Action Taken: Message routed to:  [facility] Vascular    "

## 2019-08-27 ENCOUNTER — TELEPHONE (OUTPATIENT)
Dept: ONCOLOGY | Facility: CLINIC | Age: 74
End: 2019-08-27

## 2019-08-27 NOTE — TELEPHONE ENCOUNTER
Patient called and asked to cancel her September appt with Dr Galeas as she is having knee surgery.     Patient was re-scheduled for Oct 9th.     Patient aware of appointment     Marianela Coates RN

## 2019-10-09 ENCOUNTER — ONCOLOGY VISIT (OUTPATIENT)
Dept: ONCOLOGY | Facility: CLINIC | Age: 74
End: 2019-10-09
Attending: OBSTETRICS & GYNECOLOGY
Payer: MEDICARE

## 2019-10-09 VITALS
DIASTOLIC BLOOD PRESSURE: 66 MMHG | TEMPERATURE: 98.3 F | BODY MASS INDEX: 28.53 KG/M2 | OXYGEN SATURATION: 98 % | RESPIRATION RATE: 16 BRPM | HEART RATE: 85 BPM | SYSTOLIC BLOOD PRESSURE: 145 MMHG | WEIGHT: 156 LBS

## 2019-10-09 DIAGNOSIS — C56.9 OVARIAN CANCER, UNSPECIFIED LATERALITY (H): Primary | ICD-10-CM

## 2019-10-09 PROBLEM — M48.07 SPINAL STENOSIS OF LUMBOSACRAL REGION: Status: ACTIVE | Noted: 2017-06-05

## 2019-10-09 PROBLEM — K44.9 HIATAL HERNIA: Status: ACTIVE | Noted: 2019-10-09

## 2019-10-09 PROBLEM — K22.70 BARRETT'S ESOPHAGUS DETERMINED BY ENDOSCOPY: Status: ACTIVE | Noted: 2019-10-09

## 2019-10-09 PROBLEM — I10 HYPERTENSION: Status: ACTIVE | Noted: 2019-10-09

## 2019-10-09 PROBLEM — H04.129 DRY EYE: Status: ACTIVE | Noted: 2019-10-09

## 2019-10-09 PROBLEM — M17.31 POST-TRAUMATIC OSTEOARTHRITIS OF RIGHT KNEE: Status: ACTIVE | Noted: 2017-04-26

## 2019-10-09 PROBLEM — S04.30XA: Status: ACTIVE | Noted: 2017-07-12

## 2019-10-09 PROBLEM — J18.1 LOBAR PNEUMONIA (H): Status: ACTIVE | Noted: 2018-09-18

## 2019-10-09 PROBLEM — F32.A DEPRESSION: Status: ACTIVE | Noted: 2019-10-09

## 2019-10-09 PROBLEM — I25.10 CORONARY ATHEROSCLEROSIS: Status: ACTIVE | Noted: 2018-07-05

## 2019-10-09 PROBLEM — C64.2 RENAL CELL CARCINOMA OF LEFT KIDNEY (H): Status: ACTIVE | Noted: 2019-03-06

## 2019-10-09 PROBLEM — M17.11 PRIMARY OSTEOARTHRITIS OF RIGHT KNEE: Status: ACTIVE | Noted: 2019-09-11

## 2019-10-09 PROBLEM — Z86.0100 HISTORY OF COLON POLYPS: Status: ACTIVE | Noted: 2018-11-03

## 2019-10-09 PROBLEM — E03.9 HYPOTHYROIDISM: Status: ACTIVE | Noted: 2019-10-09

## 2019-10-09 PROBLEM — H35.00 RETINOPATHY: Status: ACTIVE | Noted: 2019-10-09

## 2019-10-09 PROBLEM — R94.39 ABNORMAL CARDIOVASCULAR STRESS TEST: Status: ACTIVE | Noted: 2018-07-09

## 2019-10-09 PROBLEM — D50.0 IRON DEFICIENCY ANEMIA DUE TO CHRONIC BLOOD LOSS: Status: ACTIVE | Noted: 2019-10-08

## 2019-10-09 PROBLEM — E55.9 VITAMIN D DEFICIENCY: Status: ACTIVE | Noted: 2019-10-09

## 2019-10-09 PROCEDURE — 99215 OFFICE O/P EST HI 40 MIN: CPT | Mod: ZP | Performed by: OBSTETRICS & GYNECOLOGY

## 2019-10-09 PROCEDURE — G0463 HOSPITAL OUTPT CLINIC VISIT: HCPCS | Mod: ZF

## 2019-10-09 ASSESSMENT — PAIN SCALES - GENERAL: PAINLEVEL: SEVERE PAIN (7)

## 2019-10-09 NOTE — LETTER
10/9/2019      RE: Afshan Cardona  [address]  [facility] [address]                   Follow Up Notes on Referred Patient    Date: 10/9/2019       Dr. Willian Lucero MD  No address on file       RE: Afshan Cardona  : 1945  SRIKANTH: 10/9/2019    Dear Dr. 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     The patient presents today for followup.  She has received 1 cycle of adjuvant chemotherapy after her R0 interval cytoreduction.  She had a recent  in August which was elevated.  Waqar was 22 and now it is 41.8.  She had one done on Monday which is not back yet.  She is otherwise eating and drinking well.  No nausea, vomiting, fever or chills.  Normal bowel function.  Does have some issues with urinary incontinence.  Just had her right knee replacement 3 weeks ago and has started rehab.  No vaginal bleeding or discharge.  No B symptoms.           Past Medical History:    Past Medical History:   Diagnosis Date     Vivas's esophagus      Cardiomyopathy (H)     [place]     Colon polyps      Diabetes (H)      Fibromyalgia      Former smoker      History of DVT (deep vein thrombosis)      HLD (hyperlipidemia)      HTN (hypertension)      Hypothyroid      TIA (transient ischemic attack)          Past Surgical History:    Past Surgical History:   Procedure Laterality Date     AS ESOPHAGOSCOPY, DIAGNOSTIC       BACK SURGERY       bladder lift       HYSTERECTOMY       HYSTERECTOMY TOTAL ABD, TRINY SALPINGO-OOPHORECTOMY, NODE DISSECTION, TUMOR DEBULKING, COMBINED N/A 2018    Procedure: COMBINED HYSTERECTOMY TOTAL ABDOMINAL, SALPINGO-OOPHORECTOMY, NODE DISSECTION, TUMOR DEBULKING;;  Surgeon: Bakari Galeas MD;  Location: [facility] OR     LAPAROSCOPY DIAGNOSTIC (GYN) N/A 2018    Procedure: LAPAROSCOPY DIAGNOSTIC (GYN);  Diagnostic Laparoscopy, Biopsies;  Surgeon: Bakari Galeas MD;  Location: [facility] OR      LAPAROTOMY EXPLORATORY N/A 7/24/2018    Procedure: LAPAROTOMY EXPLORATORY;  Exploratory Laparotomy, lysis of adhesions, jejunal mesentary biopsy and sigmoid epiploic biopsy. ;  Surgeon: Bakari Galeas MD;  Location: [facility] OR     LAPAROTOMY, TUMOR DEBULKING, COMBINED N/A 2/22/2019    Procedure: Exploratory Laparotomy, tumor debulking, omentectomy, lysis of adhesions x 60 minutes, pelvic paritonectomy;  Surgeon: Bakari Galeas MD;  Location: [facility] OR     PROCTOSCOPY N/A 2/22/2019    Procedure: Proctoscopy;  Surgeon: Bakari Galeas MD;  Location: [facility] OR     SALPINGO-OOPHORECTOMY BILATERAL Bilateral 2/22/2019    Procedure: Bilateral Salpingo Oophorectomy;  Surgeon: Bakari Galeas MD;  Location: [facility] OR         Health Maintenance Due   Topic Date Due     DEXA  1945     HF ACTION PLAN  1945     MICROALBUMIN  1945     DIABETIC FOOT EXAM  1945     URINE DRUG SCREEN  1945     DEPRESSION ACTION PLAN  1945     EYE EXAM  1945     PHQ-9  1945     MEDICARE ANNUAL WELLNESS VISIT  05/19/2009     ZOSTER IMMUNIZATION (2 of 3) 02/15/2016     A1C  05/08/2019     LIPID  07/10/2019     INFLUENZA VACCINE (1) 09/01/2019     MAMMO SCREENING  10/01/2019       Current Medications:     Current Outpatient Medications   Medication Sig Dispense Refill     aspirin (ASA) 325 MG EC tablet Take 325 mg by mouth daily       carvedilol (COREG) 25 MG tablet Take 1 tablet (25 mg) by mouth 2 times daily (with meals) (Patient taking differently: Take 12.5 mg by mouth 2 times daily (with meals) ) 60 tablet      cephalexin (KEFLEX) 250 MG capsule TAKE ONE CAPSULE BY MOUTH ONCE DAILY AT BEDTIME  3     Cholecalciferol (VITAMIN D3) 2000 units CAPS TAKE ONE CAPSULE BY MOUTH ONCE DAILY IN THE MORNING  3     CRANBERRY EXTRACT PO Take by mouth every morning       cyanocobalamin (CYANOCOBALAMIN) 1000 MCG/ML injection Inject 1,000 mcg into the muscle every 30 days       DULoxetine (CYMBALTA) 60 MG EC  capsule TAKE ONE CAPSULE BY MOUTH AT BEDTIME  1     gabapentin (NEURONTIN) 600 MG tablet Take 1 tablet (600 mg) by mouth 3 times daily 90 tablet      HUMALOG MARY KWIKPEN 100 UNIT/ML injection INJECT 1 UNIT PER 10GRAMS OF CARBOHYDRATES WITH CORRECTION 0.5 UNITS PER 50 ABOVE 150 ( UP TO 30 UNITS PER DAY)  6     levothyroxine (SYNTHROID/LEVOTHROID) 75 MCG tablet TAKE ONE TABLET BY MOUTH ONCE DAILY  IN THE MORNING     (PLEASE CALL [phone] FOR AN OFFICE VISIT BEFORE NEXT REFILL.)  0     losartan (COZAAR) 100 MG tablet Take 100 mg by mouth At Bedtime        melatonin 1 MG TABS tablet Take 1 mg by mouth nightly as needed for sleep       omeprazole (PRILOSEC) 20 MG DR capsule TAKE ONE CAPSULE BY MOUTH ONCE DAILY  1     oxyCODONE-acetaminophen (PERCOCET) 5-325 MG tablet Take 1 tablet by mouth       pravastatin (PRAVACHOL) 80 MG tablet Take 80 mg by mouth At Bedtime        ONETOUCH VERIO IQ test strip USE TO TEST BLOOD GLUCOSE 6-8 TIMES PER DAY, BEFORE MEALS, BEDTIME TO DOSE INSULIN, HIGH OR LOW BLOOD GLUCOSE WITH RECHECK  3         Allergies:        Allergies   Allergen Reactions     Nitrofurantoin      Burning around her mouth, pt advised by neurologist to not take macrobid         Azithromycin Diarrhea     Buprenorphine Nausea and Vomiting     Severe vomiting     Clonazepam Unknown     Furosemide      Low sodium     Hydrochlorothiazide Other (See Comments)     Low Sodium     Lantus [Insulin Glargine]      Low blood sugar     Lisinopril Cough     Morphine Nausea and Vomiting     Pregabalin Other (See Comments)     Dizziness; Worse, numbness/tingling/burning pain whole body, hospitalized     Sulfamethoxazole Hives        Social History:     Social History     Tobacco Use     Smoking status: Former Smoker     Packs/day: 0.25     Years: 15.00     Pack years: 3.75     Smokeless tobacco: Never Used   Substance Use Topics     Alcohol use: No     Frequency: Never       History   Drug Use No           Physical Exam:     BP  (!) 145/66   Pulse 85   Temp 98.3  F (36.8  C) (Oral)   Resp 16   Wt 70.8 kg (156 lb)   SpO2 98%   BMI 28.53 kg/m     Body mass index is 28.53 kg/m .    General Appearance:  healthy and alert, no distress     Musculoskeletal:         extremities non tender and without edema     Neurological:   normal gait, no gross defects                Psychiatric:      appropriate mood and affect                               ABDOMEN:  Soft, nontender, nondistended.  Well-healing midline incision.  No organomegaly.      HEMATOLOGIC:  No cervical, supra-, infraclavicular, axillary or inguinal lymphadenopathy.   ABDOMEN:  Soft, nontender, nondistended.  There is about a 4 cm hernia on the upper one-third of the incision.  It is nontender.     PELVIC:  Normal external genitalia.  Normal vaginal mucosa.  Well-healed vaginal cuff.  No adnexal masses or tenderness.  Rectovaginal confirms.               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      A total of  40 minutes was spent with the patient, 30 minutes of which were spent in counseling the patient and/or treatment planning.        1.  Stage IIIC high-grade serous ovarian cancer.     2.  Status post neoadjuvant chemotherapy.   3.  Status post R0 interval debulking.   4.  Renal clear cell carcinoma.     5.  Asymptomatic incisional hernia    We will await the next  that was just done on Monday.  If this is elevated, we will repeat one in 4 weeks given that she is asymptomatic and normal exam.  Discussed to wait to see the status of her .  If  is lower than it was in August, we will do a surveillance visit in 3-4 months.  She may also get her renal cell carcinoma taken care of with cryoablation, which was the original plan.  If there is any sign of recurrent ovarian cancer, then this would have precedence over the renal cell carcinoma.  Patient agrees with the plan.  She is very  appreciative of her care.  All questions were answered.       Bakari Galeas MD, MS    Department of Obstetrics and Gynecology   Division of Gynecologic Oncology   [facility]  Phone: [phone]    CC  Patient Care Team:  Sonja Hathaway MD as PCP - General (Family Practice)  SELF, REFERRED    Bakari Galeas MD

## 2019-10-09 NOTE — PROGRESS NOTES
Follow Up Notes on Referred Patient    Date: 10/9/2019       Dr. Willian Lucero MD  No address on file       RE: Afshan Cardona  : 1945  SRIKANTH: 10/9/2019    Dear Dr. 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     The patient presents today for followup.  She has received 1 cycle of adjuvant chemotherapy after her R0 interval cytoreduction.  She had a recent  in August which was elevated.  Waqra was 22 and now it is 41.8.  She had one done on Monday which is not back yet.  She is otherwise eating and drinking well.  No nausea, vomiting, fever or chills.  Normal bowel function.  Does have some issues with urinary incontinence.  Just had her right knee replacement 3 weeks ago and has started rehab.  No vaginal bleeding or discharge.  No B symptoms.           Past Medical History:    Past Medical History:   Diagnosis Date     Vivas's esophagus      Cardiomyopathy (H)     [place]     Colon polyps      Diabetes (H)      Fibromyalgia      Former smoker      History of DVT (deep vein thrombosis)      HLD (hyperlipidemia)      HTN (hypertension)      Hypothyroid      TIA (transient ischemic attack)          Past Surgical History:    Past Surgical History:   Procedure Laterality Date     AS ESOPHAGOSCOPY, DIAGNOSTIC       BACK SURGERY       bladder lift       HYSTERECTOMY       HYSTERECTOMY TOTAL ABD, TRINY SALPINGO-OOPHORECTOMY, NODE DISSECTION, TUMOR DEBULKING, COMBINED N/A 2018    Procedure: COMBINED HYSTERECTOMY TOTAL ABDOMINAL, SALPINGO-OOPHORECTOMY, NODE DISSECTION, TUMOR DEBULKING;;  Surgeon: Bakari Galeas MD;  [facility]     LAPAROSCOPY DIAGNOSTIC (GYN) N/A 2018    Procedure: LAPAROSCOPY DIAGNOSTIC (GYN);  Diagnostic Laparoscopy, Biopsies;  Surgeon: Bakari Galeas MD;  Location[facility]     LAPAROTOMY EXPLORATORY N/A 2018    Procedure: LAPAROTOMY EXPLORATORY;  Exploratory  Laparotomy, lysis of adhesions, jejunal mesentary biopsy and sigmoid epiploic biopsy. ;  Surgeon: Bakari Galeas MD;  Location: [facility]     LAPAROTOMY, TUMOR DEBULKING, COMBINED N/A 2/22/2019    Procedure: Exploratory Laparotomy, tumor debulking, omentectomy, lysis of adhesions x 60 minutes, pelvic paritonectomy;  Surgeon: Bakari Galeas MD;  [facility]     PROCTOSCOPY N/A 2/22/2019    Procedure: Proctoscopy;  Surgeon: Bakari Galeas MD;  [facility]     SALPINGO-OOPHORECTOMY BILATERAL Bilateral 2/22/2019    Procedure: Bilateral Salpingo Oophorectomy;  Surgeon: Bakari Galeas MD;  Location: [facility]         Health Maintenance Due   Topic Date Due     DEXA  1945     HF ACTION PLAN  1945     MICROALBUMIN  1944     DIABETIC FOOT EXAM  1944     URINE DRUG SCREEN  1944     DEPRESSION ACTION PLAN  1944     EYE EXAM  1945     PHQ-9  1945     MEDICARE ANNUAL WELLNESS VISIT  05/19/2009     ZOSTER IMMUNIZATION (2 of 3) 02/15/2016     A1C  05/08/2019     LIPID  07/10/2019     INFLUENZA VACCINE (1) 09/01/2019     MAMMO SCREENING  10/01/2019       Current Medications:     Current Outpatient Medications   Medication Sig Dispense Refill     aspirin (ASA) 325 MG EC tablet Take 325 mg by mouth daily       carvedilol (COREG) 25 MG tablet Take 1 tablet (25 mg) by mouth 2 times daily (with meals) (Patient taking differently: Take 12.5 mg by mouth 2 times daily (with meals) ) 60 tablet      cephalexin (KEFLEX) 250 MG capsule TAKE ONE CAPSULE BY MOUTH ONCE DAILY AT BEDTIME  3     Cholecalciferol (VITAMIN D3) 2000 units CAPS TAKE ONE CAPSULE BY MOUTH ONCE DAILY IN THE MORNING  3     CRANBERRY EXTRACT PO Take by mouth every morning       cyanocobalamin (CYANOCOBALAMIN) 1000 MCG/ML injection Inject 1,000 mcg into the muscle every 30 days       DULoxetine (CYMBALTA) 60 MG EC capsule TAKE ONE CAPSULE BY MOUTH AT BEDTIME  1     gabapentin (NEURONTIN) 600 MG tablet Take  1 tablet (600 mg) by mouth 3 times daily 90 tablet      HUMALOG MARY KWIKPEN 100 UNIT/ML injection INJECT 1 UNIT PER 10GRAMS OF CARBOHYDRATES WITH CORRECTION 0.5 UNITS PER 50 ABOVE 150 ( UP TO 30 UNITS PER DAY)  6     levothyroxine (SYNTHROID/LEVOTHROID) 75 MCG tablet TAKE ONE TABLET BY MOUTH ONCE DAILY  IN THE MORNING     (PLEASE CALL [phone] FOR AN OFFICE VISIT BEFORE NEXT REFILL.)  0     losartan (COZAAR) 100 MG tablet Take 100 mg by mouth At Bedtime        melatonin 1 MG TABS tablet Take 1 mg by mouth nightly as needed for sleep       omeprazole (PRILOSEC) 20 MG DR capsule TAKE ONE CAPSULE BY MOUTH ONCE DAILY  1     oxyCODONE-acetaminophen (PERCOCET) 5-325 MG tablet Take 1 tablet by mouth       pravastatin (PRAVACHOL) 80 MG tablet Take 80 mg by mouth At Bedtime        ONETOUCH VERIO IQ test strip USE TO TEST BLOOD GLUCOSE 6-8 TIMES PER DAY, BEFORE MEALS, BEDTIME TO DOSE INSULIN, HIGH OR LOW BLOOD GLUCOSE WITH RECHECK  3         Allergies:        Allergies   Allergen Reactions     Nitrofurantoin      Burning around her mouth, pt advised by neurologist to not take macrobid         Azithromycin Diarrhea     Buprenorphine Nausea and Vomiting     Severe vomiting     Clonazepam Unknown     Furosemide      Low sodium     Hydrochlorothiazide Other (See Comments)     Low Sodium     Lantus [Insulin Glargine]      Low blood sugar     Lisinopril Cough     Morphine Nausea and Vomiting     Pregabalin Other (See Comments)     Dizziness; Worse, numbness/tingling/burning pain whole body, hospitalized     Sulfamethoxazole Hives        Social History:     Social History     Tobacco Use     Smoking status: Former Smoker     Packs/day: 0.25     Years: 15.00     Pack years: 3.75     Smokeless tobacco: Never Used   Substance Use Topics     Alcohol use: No     Frequency: Never       History   Drug Use No           Physical Exam:     BP (!) 145/66   Pulse 85   Temp 98.3  F (36.8  C) (Oral)   Resp 16   Wt 70.8 kg (156 lb)    SpO2 98%   BMI 28.53 kg/m    Body mass index is 28.53 kg/m .    General Appearance:  healthy and alert, no distress     Musculoskeletal:         extremities non tender and without edema     Neurological:   normal gait, no gross defects                Psychiatric:      appropriate mood and affect                               ABDOMEN:  Soft, nontender, nondistended.  Well-healing midline incision.  No organomegaly.      HEMATOLOGIC:  No cervical, supra-, infraclavicular, axillary or inguinal lymphadenopathy.   ABDOMEN:  Soft, nontender, nondistended.  There is about a 4 cm hernia on the upper one-third of the incision.  It is nontender.     PELVIC:  Normal external genitalia.  Normal vaginal mucosa.  Well-healed vaginal cuff.  No adnexal masses or tenderness.  Rectovaginal confirms.               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      A total of 40 minutes was spent with the patient, 30 minutes of which were spent in counseling the patient and/or treatment planning.        1.  Stage IIIC high-grade serous ovarian cancer.     2.  Status post neoadjuvant chemotherapy.   3.  Status post R0 interval debulking.   4.  Renal clear cell carcinoma.     5.  Asymptomatic incisional hernia    We will await the next  that was just done on Monday.  If this is elevated, we will repeat one in 4 weeks given that she is asymptomatic and normal exam.  Discussed to wait to see the status of her .  If  is lower than it was in August, we will do a surveillance visit in 3-4 months.  She may also get her renal cell carcinoma taken care of with cryoablation, which was the original plan.  If there is any sign of recurrent ovarian cancer, then this would have precedence over the renal cell carcinoma.  Patient agrees with the plan.  She is very appreciative of her care.  All questions were answered.       Bakari Galeas MD,  MS    Department of Obstetrics and Gynecology   Division of Gynecologic Oncology   [facility]  Phone: [phone]    CC  Patient Care Team:  Sonja Hathaway MD as PCP - General (Family Practice)  SELF, REFERRED

## 2019-10-09 NOTE — Clinical Note
10/9/2019       RE: Afshan Cardona  [address]  [facility] [address]     Dear Colleague,    Thank you for referring your patient, Afshan Cardona, to the [facility]. Please see a copy of my visit note below.    No notes on file    Again, thank you for allowing me to participate in the care of your patient.      Sincerely,    Bakari Galeas MD

## 2019-10-09 NOTE — NURSING NOTE
"Oncology Rooming Note    October 9, 2019 3:33 PM   Afshan Cardona is a 74 year old female who presents for:    Chief Complaint   Patient presents with     Oncology Clinic Visit     Return; Ovarian Ca     Initial Vitals: BP (!) 145/66   Pulse 85   Temp 98.3  F (36.8  C) (Oral)   Resp 16   Wt 70.8 kg (156 lb)   SpO2 98%   BMI 28.53 kg/m   Estimated body mass index is 28.53 kg/m  as calculated from the following:    Height as of 3/13/19: 1.575 m (5' 2.01\").    Weight as of this encounter: 70.8 kg (156 lb). Body surface area is 1.76 meters squared.  Severe Pain (7) Comment: back pain   No LMP recorded. Patient has had a hysterectomy.  Allergies reviewed: Yes  Medications reviewed: Yes    Medications: Medication refills not needed today.  Pharmacy name entered into EPIC: [id number] - [place], [place] - [address]    Clinical concerns: CT scan results       Hillary Lyman CMA              "

## 2019-10-11 ENCOUNTER — TELEPHONE (OUTPATIENT)
Dept: ONCOLOGY | Facility: CLINIC | Age: 74
End: 2019-10-11

## 2019-10-11 NOTE — TELEPHONE ENCOUNTER
Note was faxed to local Oncologist requesting a recheck of  in 4 weeks.     Patient also called with result and plan.     Marianela Coates RN

## 2019-11-11 ENCOUNTER — TELEPHONE (OUTPATIENT)
Dept: ONCOLOGY | Facility: CLINIC | Age: 74
End: 2019-11-11

## 2019-11-12 NOTE — TELEPHONE ENCOUNTER
MD updated about patients rising . Patient states she is feeling well and denies any signs and symptoms of concern.     MD recommendations: If patient continues to feel well, repeat  in 4 weeks. If patients becomes symptomatic CT Scan.     Patient is scheduled to see Dr Tsang with a CT scan on December 9th. Patient would like to keep this as is and will call RN if she would like the scan moved up.     Patient is aware of signs and symptoms of concern. Again denies these but RN encouraged patient to reach out if they do arise.     Patient appreciative of the RN time.     Marianela Coates RN

## 2019-11-14 DIAGNOSIS — C56.9 OVARIAN CANCER, UNSPECIFIED LATERALITY (H): Primary | ICD-10-CM

## 2019-12-02 ENCOUNTER — DOCUMENTATION ONLY (OUTPATIENT)
Dept: CARE COORDINATION | Facility: CLINIC | Age: 74
End: 2019-12-02

## 2019-12-20 DIAGNOSIS — N28.89 RENAL MASS: ICD-10-CM

## 2019-12-20 DIAGNOSIS — C64.2 CLEAR CELL RENAL CELL CARCINOMA, LEFT (H): Primary | ICD-10-CM

## 2019-12-23 ENCOUNTER — ANCILLARY PROCEDURE (OUTPATIENT)
Dept: CT IMAGING | Facility: CLINIC | Age: 74
End: 2019-12-23
Attending: RADIOLOGY
Payer: MEDICARE

## 2019-12-23 DIAGNOSIS — C64.2 CLEAR CELL RENAL CELL CARCINOMA, LEFT (H): ICD-10-CM

## 2019-12-23 DIAGNOSIS — N28.89 RENAL MASS: ICD-10-CM

## 2019-12-23 DIAGNOSIS — C56.9 OVARIAN CANCER, UNSPECIFIED LATERALITY (H): ICD-10-CM

## 2019-12-23 LAB
ALBUMIN SERPL-MCNC: 3.6 G/DL (ref 3.4–5)
ALP SERPL-CCNC: 113 U/L (ref 40–150)
ALT SERPL W P-5'-P-CCNC: 18 U/L (ref 0–50)
ANION GAP SERPL CALCULATED.3IONS-SCNC: 4 MMOL/L (ref 3–14)
AST SERPL W P-5'-P-CCNC: 10 U/L (ref 0–45)
BILIRUB SERPL-MCNC: 0.4 MG/DL (ref 0.2–1.3)
BUN SERPL-MCNC: 12 MG/DL (ref 7–30)
CALCIUM SERPL-MCNC: 9.7 MG/DL (ref 8.5–10.1)
CANCER AG125 SERPL-ACNC: 66 U/ML (ref 0–30)
CHLORIDE SERPL-SCNC: 99 MMOL/L (ref 94–109)
CO2 SERPL-SCNC: 31 MMOL/L (ref 20–32)
CREAT BLD-MCNC: 0.7 MG/DL (ref 0.52–1.04)
CREAT SERPL-MCNC: 0.56 MG/DL (ref 0.52–1.04)
ERYTHROCYTE [DISTWIDTH] IN BLOOD BY AUTOMATED COUNT: 13.6 % (ref 10–15)
GFR SERPL CREATININE-BSD FRML MDRD: 82 ML/MIN/{1.73_M2}
GFR SERPL CREATININE-BSD FRML MDRD: >90 ML/MIN/{1.73_M2}
GLUCOSE SERPL-MCNC: 172 MG/DL (ref 70–99)
HCT VFR BLD AUTO: 35.6 % (ref 35–47)
HGB BLD-MCNC: 11.5 G/DL (ref 11.7–15.7)
INR PPP: 1.01 (ref 0.86–1.14)
MCH RBC QN AUTO: 28.8 PG (ref 26.5–33)
MCHC RBC AUTO-ENTMCNC: 32.3 G/DL (ref 31.5–36.5)
MCV RBC AUTO: 89 FL (ref 78–100)
PLATELET # BLD AUTO: 344 10E9/L (ref 150–450)
POTASSIUM SERPL-SCNC: 4 MMOL/L (ref 3.4–5.3)
PROT SERPL-MCNC: 7.1 G/DL (ref 6.8–8.8)
RADIOLOGIST FLAGS: ABNORMAL
RBC # BLD AUTO: 3.99 10E12/L (ref 3.8–5.2)
SODIUM SERPL-SCNC: 134 MMOL/L (ref 133–144)
WBC # BLD AUTO: 6.7 10E9/L (ref 4–11)

## 2019-12-23 PROCEDURE — 86304 IMMUNOASSAY TUMOR CA 125: CPT | Performed by: RADIOLOGY

## 2019-12-23 RX ORDER — IOPAMIDOL 755 MG/ML
96 INJECTION, SOLUTION INTRAVASCULAR ONCE
Status: COMPLETED | OUTPATIENT
Start: 2019-12-23 | End: 2019-12-23

## 2019-12-23 RX ADMIN — IOPAMIDOL 96 ML: 755 INJECTION, SOLUTION INTRAVASCULAR at 11:47

## 2019-12-27 ENCOUNTER — TELEPHONE (OUTPATIENT)
Dept: ONCOLOGY | Facility: CLINIC | Age: 74
End: 2019-12-27

## 2019-12-27 NOTE — TELEPHONE ENCOUNTER
RN reviewed recent scan with On-call provider. No urgency to urology stent placement but this will need to be addressed and patient will need to see Dr Galeas for recurrence.     RN reached out to patient and reviewed scan and  result. Patient was advised she will need to see Dr Galeas on Jan 3rd to discuss scan and potential treatment. Patient also advised she would need to a stent and would need to see urology but can do this closer to home.     Patient asked that RN reach out to Dr Back office to update them. RN attempted to reach them but was unable to do so. Detail voicemail left.     Request sent for patient to be scheduled on Jan 3rd with Dr Galeas.    Signs and symptoms of concern discussed with patient and when to seek more immediate medical attention discussed.       Patient appreciative of the call.     Marianela Coates RN

## 2020-01-03 ENCOUNTER — ONCOLOGY VISIT (OUTPATIENT)
Dept: ONCOLOGY | Facility: CLINIC | Age: 75
End: 2020-01-03
Attending: OBSTETRICS & GYNECOLOGY
Payer: MEDICARE

## 2020-01-03 ENCOUNTER — RESEARCH ENCOUNTER (OUTPATIENT)
Dept: ONCOLOGY | Facility: CLINIC | Age: 75
End: 2020-01-03

## 2020-01-03 ENCOUNTER — ONCOLOGY VISIT (OUTPATIENT)
Dept: ONCOLOGY | Facility: CLINIC | Age: 75
End: 2020-01-03

## 2020-01-03 ENCOUNTER — PATIENT OUTREACH (OUTPATIENT)
Dept: ONCOLOGY | Facility: CLINIC | Age: 75
End: 2020-01-03

## 2020-01-03 ENCOUNTER — TELEPHONE (OUTPATIENT)
Dept: ONCOLOGY | Facility: CLINIC | Age: 75
End: 2020-01-03

## 2020-01-03 VITALS
WEIGHT: 154 LBS | DIASTOLIC BLOOD PRESSURE: 77 MMHG | SYSTOLIC BLOOD PRESSURE: 157 MMHG | BODY MASS INDEX: 28.16 KG/M2 | RESPIRATION RATE: 16 BRPM | HEART RATE: 74 BPM | TEMPERATURE: 98.4 F | OXYGEN SATURATION: 97 %

## 2020-01-03 DIAGNOSIS — C56.9 MALIGNANT NEOPLASM OF OVARY, UNSPECIFIED LATERALITY (H): Primary | ICD-10-CM

## 2020-01-03 DIAGNOSIS — C56.9 MALIGNANT NEOPLASM OF OVARY, UNSPECIFIED LATERALITY (H): ICD-10-CM

## 2020-01-03 PROBLEM — G89.29 CHRONIC BILATERAL LOW BACK PAIN WITHOUT SCIATICA: Status: ACTIVE | Noted: 2019-11-12

## 2020-01-03 PROBLEM — M54.50 CHRONIC BILATERAL LOW BACK PAIN WITHOUT SCIATICA: Status: ACTIVE | Noted: 2019-11-12

## 2020-01-03 PROCEDURE — G0463 HOSPITAL OUTPT CLINIC VISIT: HCPCS | Mod: ZF

## 2020-01-03 PROCEDURE — 99214 OFFICE O/P EST MOD 30 MIN: CPT | Mod: ZP | Performed by: OBSTETRICS & GYNECOLOGY

## 2020-01-03 ASSESSMENT — PAIN SCALES - GENERAL: PAINLEVEL: NO PAIN (1)

## 2020-01-03 NOTE — PROGRESS NOTES
Follow Up Notes on Referred Patient    Date: 1/3/2020       Dr. Willian Lucero MD  No address on file       RE: Afshan Cardona  : 1945  SRIKANTH: 1/3/2020    Dear Dr. 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   The patient presents today for followup.  She has received 1 cycle of adjuvant chemotherapy after her R0 interval cytoreduction.  She had a recent  in August which was elevated.  Waqar was 22 and now has increased to 66. No nausea, vomiting, fever or chills.  Normal bowel function.  Does have some issues with urinary incontinence.  Just had her right knee replacement 3 weeks ago and has started rehab.  No vaginal bleeding or discharge.  No B symptoms.     Past Medical History:    Past Medical History:   Diagnosis Date     Vivas's esophagus      Cardiomyopathy (H)     [place]     Colon polyps      Diabetes (H)      Fibromyalgia      Former smoker      History of DVT (deep vein thrombosis)      HLD (hyperlipidemia)      HTN (hypertension)      Hypothyroid      TIA (transient ischemic attack)          Past Surgical History:    Past Surgical History:   Procedure Laterality Date     AS ESOPHAGOSCOPY, DIAGNOSTIC       BACK SURGERY       bladder lift       HYSTERECTOMY       HYSTERECTOMY TOTAL ABD, TRINY SALPINGO-OOPHORECTOMY, NODE DISSECTION, TUMOR DEBULKING, COMBINED N/A 2018    Procedure: COMBINED HYSTERECTOMY TOTAL ABDOMINAL, SALPINGO-OOPHORECTOMY, NODE DISSECTION, TUMOR DEBULKING;;  Surgeon: Bakari Galeas MD;  [facility]     LAPAROSCOPY DIAGNOSTIC (GYN) N/A 2018    Procedure: LAPAROSCOPY DIAGNOSTIC (GYN);  Diagnostic Laparoscopy, Biopsies;  Surgeon: Bakari Galeas MD;  [facility]     LAPAROTOMY EXPLORATORY N/A 2018    Procedure: LAPAROTOMY EXPLORATORY;  Exploratory Laparotomy, lysis of adhesions, jejunal mesentary biopsy and sigmoid epiploic biopsy. ;   Surgeon: Bakari Galeas MD;  [facility]     LAPAROTOMY, TUMOR DEBULKING, COMBINED N/A 2/22/2019    Procedure: Exploratory Laparotomy, tumor debulking, omentectomy, lysis of adhesions x 60 minutes, pelvic paritonectomy;  Surgeon: Bakari Galeas MD;  [facility]     PROCTOSCOPY N/A 2/22/2019    Procedure: Proctoscopy;  Surgeon: Bakari Galeas MD;  [facility]     SALPINGO-OOPHORECTOMY BILATERAL Bilateral 2/22/2019    Procedure: Bilateral Salpingo Oophorectomy;  Surgeon: Bakari Galeas MD;  Location: [facility]         Health Maintenance Due   Topic Date Due     DEXA  1945     HF ACTION PLAN  1945     MICROALBUMIN  1945     DIABETIC FOOT EXAM  1944     URINE DRUG SCREEN  1944     DEPRESSION ACTION PLAN  1944     EYE EXAM  1945     PHQ-9  1945     MEDICARE ANNUAL WELLNESS VISIT  05/19/2009     ZOSTER IMMUNIZATION (2 of 3) 02/15/2016     A1C  05/08/2019     LIPID  07/10/2019     INFLUENZA VACCINE (1) 09/01/2019     MAMMO SCREENING  10/01/2019       Current Medications:     Current Outpatient Medications   Medication Sig Dispense Refill     aspirin (ASA) 325 MG EC tablet Take 325 mg by mouth daily       carvedilol (COREG) 25 MG tablet Take 1 tablet (25 mg) by mouth 2 times daily (with meals) (Patient taking differently: Take 12.5 mg by mouth 2 times daily (with meals) ) 60 tablet      cephalexin (KEFLEX) 250 MG capsule TAKE ONE CAPSULE BY MOUTH ONCE DAILY AT BEDTIME  3     Cholecalciferol (VITAMIN D3) 2000 units CAPS TAKE ONE CAPSULE BY MOUTH ONCE DAILY IN THE MORNING  3     CRANBERRY EXTRACT PO Take by mouth every morning       cyanocobalamin (CYANOCOBALAMIN) 1000 MCG/ML injection Inject 1,000 mcg into the muscle every 30 days       DULoxetine (CYMBALTA) 60 MG EC capsule TAKE ONE CAPSULE BY MOUTH AT BEDTIME  1     gabapentin (NEURONTIN) 600 MG tablet Take 1 tablet (600 mg) by mouth 3 times daily 90 tablet      HUMALOG MARY KWIKPEN 100  UNIT/ML injection INJECT 1 UNIT PER 10GRAMS OF CARBOHYDRATES WITH CORRECTION 0.5 UNITS PER 50 ABOVE 150 ( UP TO 30 UNITS PER DAY)  6     insulin degludec (TRESIBA) 200 UNIT/ML pen Inject 16 Units Subcutaneous every 24 hours       levothyroxine (SYNTHROID/LEVOTHROID) 75 MCG tablet 75 mcg   0     losartan (COZAAR) 100 MG tablet Take 100 mg by mouth At Bedtime        melatonin 1 MG TABS tablet Take 1 mg by mouth nightly as needed for sleep       omeprazole (PRILOSEC) 20 MG DR capsule TAKE ONE CAPSULE BY MOUTH ONCE DAILY  1     oxyCODONE-acetaminophen (PERCOCET) 5-325 MG tablet Take 1 tablet by mouth 3 times daily        pravastatin (PRAVACHOL) 80 MG tablet Take 80 mg by mouth At Bedtime        ONETOUCH VERIO IQ test strip USE TO TEST BLOOD GLUCOSE 6-8 TIMES PER DAY, BEFORE MEALS, BEDTIME TO DOSE INSULIN, HIGH OR LOW BLOOD GLUCOSE WITH RECHECK  3         Allergies:        Allergies   Allergen Reactions     Nitrofurantoin      Burning around her mouth, pt advised by neurologist to not take macrobid         Azithromycin Diarrhea     Buprenorphine Nausea and Vomiting     Severe vomiting     Clonazepam      Furosemide      Low sodium     Hydrochlorothiazide      Low Sodium     Lantus [Insulin Glargine]      Low blood sugar     Lisinopril Cough     Morphine Nausea and Vomiting     Pregabalin      Dizziness; Worse, numbness/tingling/burning pain whole body, hospitalized     Sulfamethoxazole Hives        Social History:     Social History     Tobacco Use     Smoking status: Former Smoker     Packs/day: 0.25     Years: 15.00     Pack years: 3.75     Smokeless tobacco: Never Used   Substance Use Topics     Alcohol use: No     Frequency: Never       History   Drug Use No           Physical Exam:     BP (!) 157/77   Pulse 74   Temp 98.4  F (36.9  C) (Oral)   Resp 16   Wt 69.9 kg (154 lb)   SpO2 97%   BMI 28.16 kg/m    Body mass index is 28.16 kg/m .    General Appearance:  healthy and alert, no  distress     Musculoskeletal:         extremities non tender and without edema     Neurological:   normal gait, no gross defects     Psychiatric:      appropriate mood and affect              ABDOMEN:  Soft, nontender, nondistended.  Well-healing midline incision.  No organomegaly.     ABDOMEN:  Soft, nontender, nondistended.  There is about a 4 cm hernia on the upper one-third of the incision.  It is nontender.                   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      A total of 40 minutes was spent with the patient, 30 minutes of which were spent in counseling the patient and/or treatment planning.        1.  Recurrent platinum resistant high grade serous ovarian cancer.     2.  Status post neoadjuvant chemotherapy.   3.  Status post R0 interval debulking.   4.  Renal clear cell carcinoma.     5.  Asymptomatic incisional hernia  6.  Mild bilateral hydronephrosis    7.  Neuropathy.   8.  Insulin-dependent diabetes.        I reviewed with the patient as well as her family the recent findings on a recent CT scan as well as CA-125 increased to 66.  She does have recurrent disease in the pelvis and some mild bilateral hydronephroses.  We discussed by definition it would be platinum-resistant ovarian cancer with the caveat that she has not received her full adjuvant treatment postoperatively of 3 cycles.  We have discussed options for treatment of platinum-resistant ovarian cancer.  The patient does not want to have any additional chemotherapy if at all possible given her severe side effects that she had, including neuropathy and fall.  We discussed options to treat with olaparib 300 mg b.i.d. as an oral option.  We discussed side effects and risks of that.  We will consider to do 3 cycles of that and repeat a scan CT chest, abdomen and pelvis after 3 cycles of 3 months of treatment.  She will do that with a local  oncologist.  Then we will see her back in 3 months.  If she gets anymore symptoms from her cancer or signs of progression, we should see her earlier if needed.  We also discussed that she has some mild hydronephroses bilaterally and possible bilateral ureteral stents, especially if her creatinine increases.  She will follow up locally with Urology.  The patient as well as her family agree with this plan.  They are very appreciative of her care.  All questions were answered.     We will also screen her for the NanoOvary study and see whether her primary tumor is an immunoreactive subtype..  Of note, she does have a clear cell cancer of the kidney, which has not been treated yet.  It has been stable.  Cryoablation would be a definitive treatment option and might be necessary if she would qualify for the NanoOvary study.                      Bakari Galeas MD, MS    Department of Obstetrics and Gynecology   Division of Gynecologic Oncology   [facility]  Phone: [phone]    CC  Patient Care Team:  Sonja Hathaway MD as PCP - General (Family Practice)  SELF, REFERRED

## 2020-01-03 NOTE — TELEPHONE ENCOUNTER
PA Initiation    Medication: LYNPARZA - PA INITIATED  Insurance Company: [employer] - Phone [phone] Fax [phone]  Pharmacy Filling the Rx:  [facility]  Start Date: 1/3/2020

## 2020-01-03 NOTE — LETTER
1/3/2020      RE: Afshan Cardona  [address]  [facility] [address]                   Follow Up Notes on Referred Patient    Date: 1/3/2020       Dr. Willian Lucero MD  No address on file       RE: Afshan Cardona  : 1945  SRIKANTH: 1/3/2020    Dear Dr. 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   The patient presents today for followup.  She has received 1 cycle of adjuvant chemotherapy after her R0 interval cytoreduction.  She had a recent  in August which was elevated.  Waqar was 22 and now has increased to 66. No nausea, vomiting, fever or chills.  Normal bowel function.  Does have some issues with urinary incontinence.  Just had her right knee replacement 3 weeks ago and has started rehab.  No vaginal bleeding or discharge.  No B symptoms.     Past Medical History:    Past Medical History:   Diagnosis Date     Vivas's esophagus      Cardiomyopathy (H)     [place]     Colon polyps      Diabetes (H)      Fibromyalgia      Former smoker      History of DVT (deep vein thrombosis)      HLD (hyperlipidemia)      HTN (hypertension)      Hypothyroid      TIA (transient ischemic attack)          Past Surgical History:    Past Surgical History:   Procedure Laterality Date     AS ESOPHAGOSCOPY, DIAGNOSTIC       BACK SURGERY       bladder lift       HYSTERECTOMY       HYSTERECTOMY TOTAL ABD, TRINY SALPINGO-OOPHORECTOMY, NODE DISSECTION, TUMOR DEBULKING, COMBINED N/A 2018    Procedure: COMBINED HYSTERECTOMY TOTAL ABDOMINAL, SALPINGO-OOPHORECTOMY, NODE DISSECTION, TUMOR DEBULKING;;  Surgeon: Bakari Galeas MD;  Location: [facility] OR     LAPAROSCOPY DIAGNOSTIC (GYN) N/A 2018    Procedure: LAPAROSCOPY DIAGNOSTIC (GYN);  Diagnostic Laparoscopy, Biopsies;  Surgeon: Bakari Galeas MD;  Location: [facility] OR     LAPAROTOMY EXPLORATORY N/A 2018    Procedure: LAPAROTOMY EXPLORATORY;   Exploratory Laparotomy, lysis of adhesions, jejunal mesentary biopsy and sigmoid epiploic biopsy. ;  Surgeon: Bakari Galeas MD;  Location: [facility] OR     LAPAROTOMY, TUMOR DEBULKING, COMBINED N/A 2/22/2019    Procedure: Exploratory Laparotomy, tumor debulking, omentectomy, lysis of adhesions x 60 minutes, pelvic paritonectomy;  Surgeon: Bakari Galeas MD;  Location: [facility] OR     PROCTOSCOPY N/A 2/22/2019    Procedure: Proctoscopy;  Surgeon: Bakari Galeas MD;  Location: [facility]     SALPINGO-OOPHORECTOMY BILATERAL Bilateral 2/22/2019    Procedure: Bilateral Salpingo Oophorectomy;  Surgeon: Bakari Galeas MD;  Location: [facility] OR         Health Maintenance Due   Topic Date Due     DEXA  1945     HF ACTION PLAN  1945     MICROALBUMIN  1945     DIABETIC FOOT EXAM  1945     URINE DRUG SCREEN  1945     DEPRESSION ACTION PLAN  1945     EYE EXAM  1945     PHQ-9  1945     MEDICARE ANNUAL WELLNESS VISIT  05/19/2009     ZOSTER IMMUNIZATION (2 of 3) 02/15/2016     A1C  05/08/2019     LIPID  07/10/2019     INFLUENZA VACCINE (1) 09/01/2019     MAMMO SCREENING  10/01/2019       Current Medications:     Current Outpatient Medications   Medication Sig Dispense Refill     aspirin (ASA) 325 MG EC tablet Take 325 mg by mouth daily       carvedilol (COREG) 25 MG tablet Take 1 tablet (25 mg) by mouth 2 times daily (with meals) (Patient taking differently: Take 12.5 mg by mouth 2 times daily (with meals) ) 60 tablet      cephalexin (KEFLEX) 250 MG capsule TAKE ONE CAPSULE BY MOUTH ONCE DAILY AT BEDTIME  3     Cholecalciferol (VITAMIN D3) 2000 units CAPS TAKE ONE CAPSULE BY MOUTH ONCE DAILY IN THE MORNING  3     CRANBERRY EXTRACT PO Take by mouth every morning       cyanocobalamin (CYANOCOBALAMIN) 1000 MCG/ML injection Inject 1,000 mcg into the muscle every 30 days       DULoxetine (CYMBALTA) 60 MG EC capsule TAKE ONE CAPSULE BY MOUTH AT BEDTIME  1     gabapentin (NEURONTIN) 600 MG  tablet Take 1 tablet (600 mg) by mouth 3 times daily 90 tablet      HUMALOG MARY KWIKPEN 100 UNIT/ML injection INJECT 1 UNIT PER 10GRAMS OF CARBOHYDRATES WITH CORRECTION 0.5 UNITS PER 50 ABOVE 150 ( UP TO 30 UNITS PER DAY)  6     insulin degludec (TRESIBA) 200 UNIT/ML pen Inject 16 Units Subcutaneous every 24 hours       levothyroxine (SYNTHROID/LEVOTHROID) 75 MCG tablet 75 mcg   0     losartan (COZAAR) 100 MG tablet Take 100 mg by mouth At Bedtime        melatonin 1 MG TABS tablet Take 1 mg by mouth nightly as needed for sleep       omeprazole (PRILOSEC) 20 MG DR capsule TAKE ONE CAPSULE BY MOUTH ONCE DAILY  1     oxyCODONE-acetaminophen (PERCOCET) 5-325 MG tablet Take 1 tablet by mouth 3 times daily        pravastatin (PRAVACHOL) 80 MG tablet Take 80 mg by mouth At Bedtime        ONETOUCH VERIO IQ test strip USE TO TEST BLOOD GLUCOSE 6-8 TIMES PER DAY, BEFORE MEALS, BEDTIME TO DOSE INSULIN, HIGH OR LOW BLOOD GLUCOSE WITH RECHECK  3         Allergies:        Allergies   Allergen Reactions     Nitrofurantoin      Burning around her mouth, pt advised by neurologist to not take macrobid         Azithromycin Diarrhea     Buprenorphine Nausea and Vomiting     Severe vomiting     Clonazepam      Furosemide      Low sodium     Hydrochlorothiazide      Low Sodium     Lantus [Insulin Glargine]      Low blood sugar     Lisinopril Cough     Morphine Nausea and Vomiting     Pregabalin      Dizziness; Worse, numbness/tingling/burning pain whole body, hospitalized     Sulfamethoxazole Hives        Social History:     Social History     Tobacco Use     Smoking status: Former Smoker     Packs/day: 0.25     Years: 15.00     Pack years: 3.75     Smokeless tobacco: Never Used   Substance Use Topics     Alcohol use: No     Frequency: Never       History   Drug Use No           Physical Exam:     BP (!) 157/77   Pulse 74   Temp 98.4  F (36.9  C) (Oral)   Resp 16   Wt 69.9 kg (154 lb)   SpO2 97%   BMI 28.16 kg/m     Body mass  index is 28.16 kg/m .    General Appearance:  healthy and alert, no distress     Musculoskeletal:         extremities non tender and without edema     Neurological:   normal gait, no gross defects     Psychiatric:      appropriate mood and affect              ABDOMEN:  Soft, nontender, nondistended.  Well-healing midline incision.  No organomegaly.     ABDOMEN:  Soft, nontender, nondistended.  There is about a 4 cm hernia on the upper one-third of the incision.  It is nontender.                   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      A total of 40 minutes was spent with the patient, 30 minutes of which were spent in counseling the patient and/or treatment planning.        1.  Recurrent platinum resistant high grade serous ovarian cancer.     2.  Status post neoadjuvant chemotherapy.   3.  Status post R0 interval debulking.   4.  Renal clear cell carcinoma.     5.  Asymptomatic incisional hernia  6.  Mild bilateral hydronephrosis    7.  Neuropathy.   8.  Insulin-dependent diabetes.        I reviewed with the patient as well as her family the recent findings on a recent CT scan as well as CA-125 increased to 66.  She does have recurrent disease in the pelvis and some mild bilateral hydronephroses.  We discussed by definition it would be platinum-resistant ovarian cancer with the caveat that she has not received her full adjuvant treatment postoperatively of 3 cycles.  We have discussed options for treatment of platinum-resistant ovarian cancer.  The patient does not want to have any additional chemotherapy if at all possible given her severe side effects that she had, including neuropathy and fall.  We discussed options to treat with olaparib 300 mg b.i.d. as an oral option.  We discussed side effects and risks of that.  We will consider to do 3 cycles of that and repeat a scan CT chest, abdomen and pelvis after 3  cycles of 3 months of treatment.  She will do that with a local oncologist.  Then we will see her back in 3 months.  If she gets anymore symptoms from her cancer or signs of progression, we should see her earlier if needed.  We also discussed that she has some mild hydronephroses bilaterally and possible bilateral ureteral stents, especially if her creatinine increases.  She will follow up locally with Urology.  The patient as well as her family agree with this plan.  They are very appreciative of her care.  All questions were answered.     We will also screen her for the NanoOvary study and see whether her primary tumor is an immunoreactive subtype..  Of note, she does have a clear cell cancer of the kidney, which has not been treated yet.  It has been stable.  Cryoablation would be a definitive treatment option and might be necessary if she would qualify for the NanoOvary study.                      Bakari Galeas MD, MS    Department of Obstetrics and Gynecology   Division of Gynecologic Oncology   [facility]  Phone: [phone]    CC  Patient Care Team:  Sonja Hathaway MD as PCP - General (Family Practice)

## 2020-01-03 NOTE — PROGRESS NOTES
Care Coordinator Note  Patient has decided to be seen at [facility] for management of Lynparza.  Patient received teaching today by Oral Chemotherapy Program pharmacist.  Plan that patient will return for follow up clinic visit one month after start of Lynparza.    Gladys Osman MSN, RN  Care Coordinator  Gynecologic Cancer   Office:  [phone]  Pager: [phone] #[phone]

## 2020-01-03 NOTE — PATIENT INSTRUCTIONS
Breast Cancer Screening: During our visit today, we discussed that it is recommended you receive breast cancer screening. Please call or make an appointment with your primary care provider to discuss this with them. You may also call the [facility] scheduling line ([phone]) to set up a mammography appointment at the Breast Center within the [facility].

## 2020-01-03 NOTE — LETTER
"1/3/2020       RE: Afshan Cardona  [address]  [facility] [address]     Dear Colleague,    Thank you for referring your patient, Afshan Cardona, to the [facility] CANCER [facility]. Please see a copy of my visit note below.    Oral Chemotherapy Monitoring Program    Primary Oncologist: Dr Bakari Galeas  Primary Oncology Clinic: [facility]  Cancer Diagnosis: Ovarian cancer    Drug: Lynparza: dose to be determined  Start Date: as soon as available  Dose is appropriate for patients: Normal dosing will be appropriate as patient has normal renal/hepatic function.   Expected duration of therapy: Until disease progression or unacceptable toxicity    Drug Interaction Assessment: There were no significant drug interactions identified upon review of medication list.    Lab Monitoring Plan: CBC monthly, CMP monthly    Subjective/Objective:  Afshan Cardona is a 74 year old female seen in clinic for an initial visit for oral chemotherapy education.      ORAL CHEMOTHERAPY 1/3/2020   Drug Name Lynparza (Olaparib)   Current Dosage 300mg   Current Schedule BID   Cycle Details Continuous       Vitals:  BP:   BP Readings from Last 1 Encounters:   01/03/20 (!) 157/77     Wt Readings from Last 1 Encounters:   01/03/20 69.9 kg (154 lb)     Estimated body surface area is 1.75 meters squared as calculated from the following:    Height as of 3/13/19: 1.575 m (5' 2.01\").    Weight as of an earlier encounter on 1/3/20: 69.9 kg (154 lb).      Labs:  _  Result Component Current Result Ref Range   Sodium 134 (12/23/2019) 133 - 144 mmol/L     _  Result Component Current Result Ref Range   Potassium 4.0 (12/23/2019) 3.4 - 5.3 mmol/L     _  Result Component Current Result Ref Range   Calcium 9.7 (12/23/2019) 8.5 - 10.1 mg/dL     No results found for Mag within last 30 days.     No results found for Phos within last 30 days.     _  Result Component Current Result Ref Range   Albumin 3.6 (12/23/2019) 3.4 - 5.0 " g/dL     _  Result Component Current Result Ref Range   Urea Nitrogen 12 (12/23/2019) 7 - 30 mg/dL     _  Result Component Current Result Ref Range   Creatinine 0.56 (12/23/2019) 0.52 - 1.04 mg/dL       _  Result Component Current Result Ref Range   AST 10 (12/23/2019) 0 - 45 U/L     _  Result Component Current Result Ref Range   ALT 18 (12/23/2019) 0 - 50 U/L     _  Result Component Current Result Ref Range   Bilirubin Total 0.4 (12/23/2019) 0.2 - 1.3 mg/dL       _  Result Component Current Result Ref Range   WBC 6.7 (12/23/2019) 4.0 - 11.0 10e9/L     _  Result Component Current Result Ref Range   Hemoglobin 11.5 (L) (12/23/2019) 11.7 - 15.7 g/dL     _  Result Component Current Result Ref Range   Platelet Count 344 (12/23/2019) 150 - 450 10e9/L     No results found for ANC within last 30 days.     Assessment:  Patient is appropriate to start therapy.    Plan:  Basic chemotherapy teaching was reviewed with the patient and the patient's family including indication, start date of therapy, dose, administration, adverse effects, missed doses, food and drug interactions, monitoring, side effect management, office contact information, and safe handling. Written materials were provided and all questions answered.    Follow-Up:  Will call patient about one week after starting Lynparza.    Minerva Peraza, PharmD, BCPS, BCOP  Oncology Clinical Pharmacy Specialist  [facility]/ [facility]  [phone]

## 2020-01-03 NOTE — NURSING NOTE
"Oncology Rooming Note    January 3, 2020 11:00 AM   Afshan Cardona is a 74 year old female who presents for:    Chief Complaint   Patient presents with     Oncology Clinic Visit     Return Ovarian Ca     Initial Vitals: BP (!) 157/77   Pulse 74   Temp 98.4  F (36.9  C) (Oral)   Resp 16   Wt 69.9 kg (154 lb)   SpO2 97%   BMI 28.16 kg/m   Estimated body mass index is 28.16 kg/m  as calculated from the following:    Height as of 3/13/19: 1.575 m (5' 2.01\").    Weight as of this encounter: 69.9 kg (154 lb). Body surface area is 1.75 meters squared.  No Pain (1) Comment: Data Unavailable   No LMP recorded. Patient has had a hysterectomy.  Allergies reviewed: Yes  Medications reviewed: Yes    Medications: Medication refills not needed today.  Pharmacy name entered into EPIC: [facility] - [place], [place] - [address]    Clinical concerns: lab and CT scan results       Hillary Lyman CMA              "

## 2020-01-03 NOTE — PROGRESS NOTES
"Oral Chemotherapy Monitoring Program    Primary Oncologist: Dr Bakari Galeas  Primary Oncology Clinic: [facility]  Cancer Diagnosis: Ovarian cancer    Drug: Lynparza: dose to be determined  Start Date: as soon as available  Dose is appropriate for patients: Normal dosing will be appropriate as patient has normal renal/hepatic function.   Expected duration of therapy: Until disease progression or unacceptable toxicity    Drug Interaction Assessment: There were no significant drug interactions identified upon review of medication list.    Lab Monitoring Plan: CBC monthly, CMP monthly    Subjective/Objective:  Afshan Cardona is a 74 year old female seen in clinic for an initial visit for oral chemotherapy education.      ORAL CHEMOTHERAPY 1/3/2020   Drug Name Lynparza (Olaparib)   Current Dosage 300mg   Current Schedule BID   Cycle Details Continuous       Vitals:  BP:   BP Readings from Last 1 Encounters:   01/03/20 (!) 157/77     Wt Readings from Last 1 Encounters:   01/03/20 69.9 kg (154 lb)     Estimated body surface area is 1.75 meters squared as calculated from the following:    Height as of 3/13/19: 1.575 m (5' 2.01\").    Weight as of an earlier encounter on 1/3/20: 69.9 kg (154 lb).      Labs:  _  Result Component Current Result Ref Range   Sodium 134 (12/23/2019) 133 - 144 mmol/L     _  Result Component Current Result Ref Range   Potassium 4.0 (12/23/2019) 3.4 - 5.3 mmol/L     _  Result Component Current Result Ref Range   Calcium 9.7 (12/23/2019) 8.5 - 10.1 mg/dL     No results found for Mag within last 30 days.     No results found for Phos within last 30 days.     _  Result Component Current Result Ref Range   Albumin 3.6 (12/23/2019) 3.4 - 5.0 g/dL     _  Result Component Current Result Ref Range   Urea Nitrogen 12 (12/23/2019) 7 - 30 mg/dL     _  Result Component Current Result Ref Range   Creatinine 0.56 (12/23/2019) 0.52 - 1.04 mg/dL       _  Result Component Current Result Ref Range "   AST 10 (12/23/2019) 0 - 45 U/L     _  Result Component Current Result Ref Range   ALT 18 (12/23/2019) 0 - 50 U/L     _  Result Component Current Result Ref Range   Bilirubin Total 0.4 (12/23/2019) 0.2 - 1.3 mg/dL       _  Result Component Current Result Ref Range   WBC 6.7 (12/23/2019) 4.0 - 11.0 10e9/L     _  Result Component Current Result Ref Range   Hemoglobin 11.5 (L) (12/23/2019) 11.7 - 15.7 g/dL     _  Result Component Current Result Ref Range   Platelet Count 344 (12/23/2019) 150 - 450 10e9/L     No results found for ANC within last 30 days.     Assessment:  Patient is appropriate to start therapy.    Plan:  Basic chemotherapy teaching was reviewed with the patient and the patient's family including indication, start date of therapy, dose, administration, adverse effects, missed doses, food and drug interactions, monitoring, side effect management, office contact information, and safe handling. Written materials were provided and all questions answered.    Follow-Up:  Will call patient about one week after starting Lynparza.    Minerva Peraza, PharmD, BCPS, BCOP  Oncology Clinical Pharmacy Specialist  [facility]/ [facility]  [phone]

## 2020-01-03 NOTE — Clinical Note
1/3/2020       RE: Afshan Cardona  [address]  [facility] [address]     Dear Colleague,    Thank you for referring your patient, Afshan Cardona, to the [facility] CANCER CLINIC. Please see a copy of my visit note below.    No notes on file    Again, thank you for allowing me to participate in the care of your patient.      Sincerely,    Bakari Galeas MD

## 2020-01-06 NOTE — TELEPHONE ENCOUNTER
Prior Authorization Approval    Authorization Effective Date: 1/1/2020  Authorization Expiration Date: 1/30/2021  Medication: LYNPARZA - [place] APPROVED  Approved Dose/Quantity: 120/30  Reference #: [id number]   Insurance Company: [employer] - Phone [phone] Fax [phone]

## 2020-01-10 ENCOUNTER — TELEPHONE (OUTPATIENT)
Dept: ONCOLOGY | Facility: CLINIC | Age: 75
End: 2020-01-10

## 2020-01-10 DIAGNOSIS — C56.9 MALIGNANT NEOPLASM OF OVARY, UNSPECIFIED LATERALITY (H): Primary | ICD-10-CM

## 2020-01-10 RX ORDER — PROCHLORPERAZINE MALEATE 5 MG
5 TABLET ORAL EVERY 6 HOURS PRN
Qty: 30 TABLET | Refills: 2 | Status: SHIPPED | OUTPATIENT
Start: 2020-01-10

## 2020-01-10 NOTE — TELEPHONE ENCOUNTER
Lynparza PAF copay card approved - $4k - eligible 01/21/20-01/01/21.    [facility]   Oncology Pharmacy Liaison  [email or web address]   [phone] (phone)   [phone] (fax)

## 2020-01-15 ENCOUNTER — TELEPHONE (OUTPATIENT)
Dept: ONCOLOGY | Facility: CLINIC | Age: 75
End: 2020-01-15

## 2020-01-15 NOTE — TELEPHONE ENCOUNTER
[employer]  [facility]  Oncology Pharmacy Liaison   [email or web address]  [phone] (phone  [phone] (fax

## 2020-01-17 ENCOUNTER — TELEPHONE (OUTPATIENT)
Dept: ONCOLOGY | Facility: CLINIC | Age: 75
End: 2020-01-17

## 2020-01-17 NOTE — TELEPHONE ENCOUNTER
Oral Chemotherapy Monitoring Program     Placed call to patient in follow up of Lynparza oral chemotherapy.     Unable to leave a message requesting call back due to voicemail not being set up.       Lona Prieto, PharmD  Oral Chemotherapy Monitoring Program  [facility]  [phone]  January 17, 2020

## 2020-01-20 ENCOUNTER — TELEPHONE (OUTPATIENT)
Dept: ONCOLOGY | Facility: CLINIC | Age: 75
End: 2020-01-20

## 2020-01-20 NOTE — ORAL ONC MGMT
Oral Chemotherapy Monitoring Program    Placed call to patient in follow up of Lynparza therapy. Afshan stated she had not started or received the Lynparza yet. She said she is going to [place] to visit her daughter until February 9th. She said she does not want to start the Lynparza until she is back from her trip.     Mya Puente  Pharmacy Intern  [facility]  [phone]

## 2020-01-24 ENCOUNTER — PATIENT OUTREACH (OUTPATIENT)
Dept: ONCOLOGY | Facility: CLINIC | Age: 75
End: 2020-01-24

## 2020-01-24 NOTE — PROGRESS NOTES
Care Coordinator Note  Patient states she is visiting her daughter 2/3/20-2/9/20 and plans to start Lynparza when she returns.  We discussed that she will have a lab draw here on 2/10/20 and then a return visit with Dr. Galeas in March, care coordinator will arrange appointments.  Message also sent to [facility] Pharmacist.  Patient states she will arrange an appointment with her local urologist to follow up on need for stents.      Patient verbalized back understanding of the above information discussed.     Gladys Osman MSN, RN  Care Coordinator  Gynecologic Cancer   Office:  [phone]  Pager: [phone] #[phone]

## 2020-01-29 NOTE — PROGRESS NOTES
[id number]: A phase II Study of Pembrolizumab Monotherapy in Recurrent Ovarian cancer of the Immunoreactive Subtype determined by NanoString Gene Expression Profiling    Pre-Screening Consent    The consent form, including purpose, risks and benefits, was reviewed with Afshan Cardona, and all questions were answered before she signed the consent form. The patient understands that the study involves a pre-screening phase as well as a treatment phase. The treatment consent can only be signed if the patient's Aby String testing shows that she is the immuno-reactive subtype. The patient's original tumor will be requested from pathology and sent for screening    Present during the discussion was the patient's daughter. A copy of the signed form was provided to the patient. No procedures specific to this study were performed prior to the patient signing the consent form.    Consent Version Date: June 6, 2019  Consent obtained by: Matt Stephens    Date: 1/3/2020  HIPAA authorization signed?: yes    Matt Stephens    :  Bakari Galeas MD, MS  Phone: [phone]  Primary Clinical Research Nurse  [facility]  Phone: [phone]  Pager: [phone]

## 2020-01-31 ENCOUNTER — RESEARCH ENCOUNTER (OUTPATIENT)
Dept: ONCOLOGY | Facility: CLINIC | Age: 75
End: 2020-01-31

## 2020-01-31 NOTE — PROGRESS NOTES
[id number]: A phase II Study of Pembrolizumab Monotherapy in Recurrent Ovarian cancer of the Immunoreactive Subtype determined by NanoString Gene Expression Profiling    Pre-Screening Results  Afshan Cardona was pre-screened for the Pembrolizumab Aby-Ovary Study ([id number]). Her subtype is NOT the immunoreactive subtype. She is NOT eligible to participate in the formal study. She has been notified.     JOSE D Estrella     :  aBkari Galeas MD, MS  Phone: [phone]  Primary Clinical Research Nurse  [facility]  Phone: [phone]  Pager: [phone]

## 2020-02-07 ENCOUNTER — TELEPHONE (OUTPATIENT)
Dept: ONCOLOGY | Facility: CLINIC | Age: 75
End: 2020-02-07

## 2020-02-07 NOTE — ORAL ONC MGMT
"Oral Chemotherapy Monitoring Program    Primary Oncologist: Dr. Galeas  Primary Oncology Clinic: [facility]  Cancer Diagnosis: Ovarian    Therapy History:  Lynparza (olaparib) 300mg BID  Take two tablets ([id number]) by mouth two times daily  C1D1= 02/07/2020    Patient called the oral chemotherapy monitoring line because she has been experiencing diarrhea, light headedness, and vomiting since taking her first dose of Lynparza this morning. Afshan stated that she has vomited 5 times today and \"cannot keep anything down\". I attempted to transfer her call to triage, but she hung up before triage answered. I instructed triage to call her back at her home phone number. She did not answer the phone call.     I called her back about 30 minutes later and gave her the nurse triage number. She said she would immediately give them a call. She was making toast when I gave her a call back. She was concerned with the vomiting since she is diabetic and needs to keep her blood sugar within normal range. Her daughter went to the pharmacy to  compazine. After our call, she got a hold of triage for further recommendations.    The Oral Chemotherapy Monitoring team will be giving her a call on Monday (02/10/2020) to check in on her side effects over the weekend, if she continues medication therapy per triage recommendation.    Maisha Mendieta  Pharmacy Intern  [facility]  [phone]    "

## 2020-02-07 NOTE — TELEPHONE ENCOUNTER
"Received call from oral chemo asking me to speak with Pt on the line at her home #. Stated Pt had experienced emesis. When they tried to transfer her they discovered that the Pt had disconnected the call. Called Pt at requested #. No answer, no authorization to leave VM in demographic notes.    Pt returned call. Pt began oral chemo today around 10 AM, Pt had 2 \"terrible\" episodes of diarrhea, Pt had 1 episode of emesis about 2 pm. Was able to drink juice, but said could not take solids. Pt said she just now had cornbread and water which is staying down. Pt took compazine 1/2 hour ago. Discussed compazine usage q 6 h.    Wants to know if she should continue lynparza.    Discussed with Gladys Osman. It would be very unusual to have these Sx after 1 dose. Pt should hold lynparza over weekend and see if Sx persist. Should restart when feeling well and call to alert oral chemo and care team. Called Pt to relay information. Pt verbalized understanding, will call back Monday to update after labs.  "

## 2020-02-10 ENCOUNTER — TELEPHONE (OUTPATIENT)
Dept: ONCOLOGY | Facility: CLINIC | Age: 75
End: 2020-02-10

## 2020-02-10 DIAGNOSIS — C56.9 MALIGNANT NEOPLASM OF OVARY, UNSPECIFIED LATERALITY (H): ICD-10-CM

## 2020-02-10 LAB
ALBUMIN SERPL-MCNC: 3.9 G/DL (ref 3.4–5)
ALP SERPL-CCNC: 94 U/L (ref 40–150)
ALT SERPL W P-5'-P-CCNC: 14 U/L (ref 0–50)
ANION GAP SERPL CALCULATED.3IONS-SCNC: 5 MMOL/L (ref 3–14)
AST SERPL W P-5'-P-CCNC: 6 U/L (ref 0–45)
BASOPHILS # BLD AUTO: 0 10E9/L (ref 0–0.2)
BASOPHILS NFR BLD AUTO: 0.4 %
BILIRUB SERPL-MCNC: 0.6 MG/DL (ref 0.2–1.3)
BUN SERPL-MCNC: 12 MG/DL (ref 7–30)
CALCIUM SERPL-MCNC: 9.1 MG/DL (ref 8.5–10.1)
CHLORIDE SERPL-SCNC: 102 MMOL/L (ref 94–109)
CO2 SERPL-SCNC: 28 MMOL/L (ref 20–32)
CREAT SERPL-MCNC: 0.59 MG/DL (ref 0.52–1.04)
DIFFERENTIAL METHOD BLD: ABNORMAL
EOSINOPHIL # BLD AUTO: 0.1 10E9/L (ref 0–0.7)
EOSINOPHIL NFR BLD AUTO: 0.8 %
ERYTHROCYTE [DISTWIDTH] IN BLOOD BY AUTOMATED COUNT: 14.6 % (ref 10–15)
GFR SERPL CREATININE-BSD FRML MDRD: 89 ML/MIN/{1.73_M2}
GLUCOSE SERPL-MCNC: 301 MG/DL (ref 70–99)
HCT VFR BLD AUTO: 33.6 % (ref 35–47)
HGB BLD-MCNC: 11.1 G/DL (ref 11.7–15.7)
IMM GRANULOCYTES # BLD: 0 10E9/L (ref 0–0.4)
IMM GRANULOCYTES NFR BLD: 0.5 %
LYMPHOCYTES # BLD AUTO: 1.3 10E9/L (ref 0.8–5.3)
LYMPHOCYTES NFR BLD AUTO: 15.8 %
MCH RBC QN AUTO: 29.1 PG (ref 26.5–33)
MCHC RBC AUTO-ENTMCNC: 33 G/DL (ref 31.5–36.5)
MCV RBC AUTO: 88 FL (ref 78–100)
MONOCYTES # BLD AUTO: 0.5 10E9/L (ref 0–1.3)
MONOCYTES NFR BLD AUTO: 5.5 %
NEUTROPHILS # BLD AUTO: 6.5 10E9/L (ref 1.6–8.3)
NEUTROPHILS NFR BLD AUTO: 77 %
NRBC # BLD AUTO: 0 10*3/UL
NRBC BLD AUTO-RTO: 0 /100
PLATELET # BLD AUTO: 272 10E9/L (ref 150–450)
POTASSIUM SERPL-SCNC: 4 MMOL/L (ref 3.4–5.3)
PROT SERPL-MCNC: 6.8 G/DL (ref 6.8–8.8)
RBC # BLD AUTO: 3.82 10E12/L (ref 3.8–5.2)
SODIUM SERPL-SCNC: 134 MMOL/L (ref 133–144)
WBC # BLD AUTO: 8.4 10E9/L (ref 4–11)

## 2020-02-10 PROCEDURE — 85025 COMPLETE CBC W/AUTO DIFF WBC: CPT | Performed by: OBSTETRICS & GYNECOLOGY

## 2020-02-10 PROCEDURE — 80053 COMPREHEN METABOLIC PANEL: CPT | Performed by: OBSTETRICS & GYNECOLOGY

## 2020-02-10 NOTE — NURSING NOTE
Chief Complaint   Patient presents with     Lab Only     labs drawn via vpt by rn      Blood drawn via vpt by RN in lab.   Daniella Pemberton RN

## 2020-02-10 NOTE — ORAL ONC MGMT
Oral Chemotherapy Monitoring Program     Placed call to patient in follow up of Lynparza therapy. Peyton said she took one dose of Lynparza (300mg) on Friday 2/7/2020 at 10 AM. She had some diarrhea and vomited after that dose. She did not take any more since then. She felt weak on Saturday but felt back to normal today. She plans to have urine culture on 2/11/2020. If it turns out clear she will start Lynparza. She was advised to drink 64 ounces of water per day, use nausea medication and antidiarrheals as needed. She expressed understanding and agreement with this plan and thanked me for the call. She will call with interim concerns.    Vicente Connor PharmD  [facility]  [phone]  February 10, 2020

## 2020-02-13 ENCOUNTER — TELEPHONE (OUTPATIENT)
Dept: ONCOLOGY | Facility: CLINIC | Age: 75
End: 2020-02-13

## 2020-02-13 NOTE — ORAL ONC MGMT
Oral Chemotherapy Monitoring Program    Patient placed call to OC team with update. She has only taken 1 dose of olaparib on Saturday 2/7 then stopped due to side effects. Refer to 2/10 encounter. She has not restarted.    Patient had clinic visit 2/12 for bladder infection and started levofloxacin x 7 days.     Spoke to Dr. Galeas, plan to continue olaparib hold until antibiotics completed. Plan to restart olaparib on 2/19 after infection resolved.     Placed call to Afshan. She verbalized understanding and agreement with plan.     Plan:  Call patient for assessment on 2/19 to restart olaparib.       Garret Barrow, PharmD  Hematology/Oncology Clinical Pharmacist  [facility]  [facility]

## 2020-02-19 ENCOUNTER — TELEPHONE (OUTPATIENT)
Dept: ONCOLOGY | Facility: CLINIC | Age: 75
End: 2020-02-19

## 2020-02-19 NOTE — TELEPHONE ENCOUNTER
Oral Chemotherapy Monitoring Program     Placed call to patient in follow up of Lynparza therapy.     Left message to please call back in follow-up of therapy and how she has been feeing recently. No patient or drug names were mentioned.     Val Cha, PharmD  [facility]  [phone]

## 2020-02-19 NOTE — TELEPHONE ENCOUNTER
Oral Chemotherapy Monitoring Program     Primary Oncologist: Dr Bakari Galeas  Primary Oncology Clinic: [facility]  Cancer Diagnosis: Ovarian cancer     Drug: Lynparza: 300mg PO BID (2 x 150mg tablets PO BID)  Start Date: 2/7/2020 (one dose) then 2/19/2020     Lab Monitoring Plan: CBC monthly, CMP monthly    Patient reports that she brought her urine yesterday and is now done with antibiotics and no longer has a UTI.     Per note from Garret Barrow 2/13, patient is not to restart Lynparza. I spoke with patient as she is planning to start today. She is planning on taking a dose of prochlorperazine before the Lynparza just in case. She agreed to call if she has any side effects, or nausea/vomiting after this dose.    She said that the pharmacy that sends her the medication called and they are trying to reach Dr Galeas. She said that they did not leave a number to call back and that he should have it. She said she thought it was weird.  I'm not sure what it is about but she said they told her they have been faxing too.    Oral chemo monitoring will follow up early next week to check if everything is still going ok.    Val Cha, PharmD  [facility]  [phone]

## 2020-02-26 ENCOUNTER — TELEPHONE (OUTPATIENT)
Dept: ONCOLOGY | Facility: CLINIC | Age: 75
End: 2020-02-26

## 2020-02-26 NOTE — TELEPHONE ENCOUNTER
CORRECTION of my 2/19/2020 note: Per 2/13/2020 note from Garret Barrow, patient is NOW (2/19/2020) to restart Lynparza. (typo). When I spoke with patient, she was planning on starting 2/19/2020.  Val Cha, PharmD  [facility]  [phone]

## 2020-02-26 NOTE — ORAL ONC MGMT
"Oral Chemotherapy Monitoring Program    Primary Oncologist: Dr. Galeas  Primary Oncology Clinic: [facility]  Cancer Diagnosis: Ovarian Cancer    Therapy History:  One dose taken on 2/7/2020, and then stopped for urinary tract infection. C1D2=2/20/2020    Drug Interaction Assessment: No clinically significant drug interactions found at this time.    Drugs checked include: Aspirin -Carvedilol -Cephalexin -Cholecalciferol -Cranberry -Cyanocobalamin -DULoxetine -Gabapentin -Insulin Degludec -Insulin Lispro -Levothyroxine -Losartan -Olaparib -Omeprazole -OxyCODONE -Acetaminophen -Pravastatin -Prochlorperazine    Lab Monitoring Plan  CBC and CMP monthly  Subjective/Objective:  Afshan Cardona is a 74 year old female contacted by phone for a follow-up visit for oral chemotherapy. Salima has noticed some fatigue since starting Lynparza but denied any nausea, or changes in bowel habits. She uses Senna S for regularity. She said she drinks 5 x 8 ounce glasses of water per day. She said her appetite is good. Her son is encouraging her to exercise which should help with keeping her energy up. She thanked me for the call.    ORAL CHEMOTHERAPY 1/3/2020 2/26/2020   Drug Name Lynparza (Olaparib) Lynparza (Olaparib)   Current Dosage 300mg 300mg   Current Schedule BID BID   Cycle Details Continuous Continuous   Start Date of Last Cycle - 2/20/2020   Planned next cycle start date - 3/20/2020   Doses missed in last 2 weeks - 0   Adherence Assessment - Adherent   Adverse Effects - No AE identified during assessment   Any new drug interactions? - No   Is the dose as ordered appropriate for the patient? - Yes       Vitals:  BP:   BP Readings from Last 1 Encounters:   01/03/20 (!) 157/77     Wt Readings from Last 1 Encounters:   01/03/20 69.9 kg (154 lb)     Estimated body surface area is 1.75 meters squared as calculated from the following:    Height as of 3/13/19: 1.575 m (5' 2.01\").    Weight as of 1/3/20: 69.9 kg (154 " lb).    Labs:  _  Result Component Current Result Ref Range   Sodium 134 (2/10/2020) 133 - 144 mmol/L     _  Result Component Current Result Ref Range   Potassium 4.0 (2/10/2020) 3.4 - 5.3 mmol/L     _  Result Component Current Result Ref Range   Calcium 9.1 (2/10/2020) 8.5 - 10.1 mg/dL     No results found for Mag within last 30 days.     No results found for Phos within last 30 days.     _  Result Component Current Result Ref Range   Albumin 3.9 (2/10/2020) 3.4 - 5.0 g/dL     _  Result Component Current Result Ref Range   Urea Nitrogen 12 (2/10/2020) 7 - 30 mg/dL     _  Result Component Current Result Ref Range   Creatinine 0.59 (2/10/2020) 0.52 - 1.04 mg/dL       _  Result Component Current Result Ref Range   AST 6 (2/10/2020) 0 - 45 U/L     _  Result Component Current Result Ref Range   ALT 14 (2/10/2020) 0 - 50 U/L     _  Result Component Current Result Ref Range   Bilirubin Total 0.6 (2/10/2020) 0.2 - 1.3 mg/dL       _  Result Component Current Result Ref Range   WBC 8.4 (2/10/2020) 4.0 - 11.0 10e9/L     _  Result Component Current Result Ref Range   Hemoglobin 11.1 (L) (2/10/2020) 11.7 - 15.7 g/dL     _  Result Component Current Result Ref Range   Platelet Count 272 (2/10/2020) 150 - 450 10e9/L     _  Result Component Current Result Ref Range   Absolute Neutrophil 6.5 (2/10/2020) 1.6 - 8.3 10e9/L     Assessment:  Peyton appears to be doing well on Lynparza since resuming on 2/20/2020.    Plan:  Continue Lynparza. Labs and office visit is scheduled for 3/4/2020.    Follow-Up:  In about 4 weeks pending office visit.    Refill Due:  By 3/20/2020    Vicente Connor PharmD  [facility]  [phone]  February 26, 2020

## 2020-03-04 ENCOUNTER — ONCOLOGY VISIT (OUTPATIENT)
Dept: ONCOLOGY | Facility: CLINIC | Age: 75
End: 2020-03-04
Attending: OBSTETRICS & GYNECOLOGY
Payer: MEDICARE

## 2020-03-04 ENCOUNTER — APPOINTMENT (OUTPATIENT)
Dept: LAB | Facility: CLINIC | Age: 75
End: 2020-03-04
Attending: OBSTETRICS & GYNECOLOGY
Payer: MEDICARE

## 2020-03-04 VITALS
WEIGHT: 154 LBS | RESPIRATION RATE: 18 BRPM | TEMPERATURE: 98 F | HEART RATE: 76 BPM | SYSTOLIC BLOOD PRESSURE: 133 MMHG | BODY MASS INDEX: 28.16 KG/M2 | DIASTOLIC BLOOD PRESSURE: 66 MMHG | OXYGEN SATURATION: 98 %

## 2020-03-04 DIAGNOSIS — C56.9 MALIGNANT NEOPLASM OF OVARY, UNSPECIFIED LATERALITY (H): Primary | ICD-10-CM

## 2020-03-04 LAB
ALBUMIN SERPL-MCNC: 3.6 G/DL (ref 3.4–5)
ALP SERPL-CCNC: 84 U/L (ref 40–150)
ALT SERPL W P-5'-P-CCNC: 15 U/L (ref 0–50)
ANION GAP SERPL CALCULATED.3IONS-SCNC: 3 MMOL/L (ref 3–14)
AST SERPL W P-5'-P-CCNC: 11 U/L (ref 0–45)
BASOPHILS # BLD AUTO: 0 10E9/L (ref 0–0.2)
BASOPHILS NFR BLD AUTO: 0.3 %
BILIRUB SERPL-MCNC: 0.3 MG/DL (ref 0.2–1.3)
BUN SERPL-MCNC: 14 MG/DL (ref 7–30)
CALCIUM SERPL-MCNC: 9 MG/DL (ref 8.5–10.1)
CANCER AG125 SERPL-ACNC: 175 U/ML (ref 0–30)
CHLORIDE SERPL-SCNC: 103 MMOL/L (ref 94–109)
CO2 SERPL-SCNC: 29 MMOL/L (ref 20–32)
CREAT SERPL-MCNC: 0.62 MG/DL (ref 0.52–1.04)
DIFFERENTIAL METHOD BLD: ABNORMAL
EOSINOPHIL # BLD AUTO: 0.1 10E9/L (ref 0–0.7)
EOSINOPHIL NFR BLD AUTO: 1.8 %
ERYTHROCYTE [DISTWIDTH] IN BLOOD BY AUTOMATED COUNT: 15 % (ref 10–15)
GFR SERPL CREATININE-BSD FRML MDRD: 88 ML/MIN/{1.73_M2}
GLUCOSE SERPL-MCNC: 169 MG/DL (ref 70–99)
HCT VFR BLD AUTO: 32.9 % (ref 35–47)
HGB BLD-MCNC: 10.7 G/DL (ref 11.7–15.7)
IMM GRANULOCYTES # BLD: 0 10E9/L (ref 0–0.4)
IMM GRANULOCYTES NFR BLD: 0.3 %
LYMPHOCYTES # BLD AUTO: 1.4 10E9/L (ref 0.8–5.3)
LYMPHOCYTES NFR BLD AUTO: 23 %
MCH RBC QN AUTO: 29.6 PG (ref 26.5–33)
MCHC RBC AUTO-ENTMCNC: 32.5 G/DL (ref 31.5–36.5)
MCV RBC AUTO: 91 FL (ref 78–100)
MONOCYTES # BLD AUTO: 0.3 10E9/L (ref 0–1.3)
MONOCYTES NFR BLD AUTO: 5 %
NEUTROPHILS # BLD AUTO: 4.2 10E9/L (ref 1.6–8.3)
NEUTROPHILS NFR BLD AUTO: 69.6 %
NRBC # BLD AUTO: 0 10*3/UL
NRBC BLD AUTO-RTO: 0 /100
PLATELET # BLD AUTO: 266 10E9/L (ref 150–450)
POTASSIUM SERPL-SCNC: 4.2 MMOL/L (ref 3.4–5.3)
PROT SERPL-MCNC: 6.8 G/DL (ref 6.8–8.8)
RBC # BLD AUTO: 3.62 10E12/L (ref 3.8–5.2)
SODIUM SERPL-SCNC: 136 MMOL/L (ref 133–144)
WBC # BLD AUTO: 6 10E9/L (ref 4–11)

## 2020-03-04 PROCEDURE — 99215 OFFICE O/P EST HI 40 MIN: CPT | Mod: ZP | Performed by: OBSTETRICS & GYNECOLOGY

## 2020-03-04 PROCEDURE — G0463 HOSPITAL OUTPT CLINIC VISIT: HCPCS | Mod: ZF

## 2020-03-04 PROCEDURE — 86304 IMMUNOASSAY TUMOR CA 125: CPT | Performed by: OBSTETRICS & GYNECOLOGY

## 2020-03-04 PROCEDURE — 80053 COMPREHEN METABOLIC PANEL: CPT | Performed by: OBSTETRICS & GYNECOLOGY

## 2020-03-04 PROCEDURE — 36415 COLL VENOUS BLD VENIPUNCTURE: CPT

## 2020-03-04 PROCEDURE — 85025 COMPLETE CBC W/AUTO DIFF WBC: CPT | Performed by: OBSTETRICS & GYNECOLOGY

## 2020-03-04 RX ORDER — LEVOTHYROXINE SODIUM 88 UG/1
TABLET ORAL
COMMUNITY
Start: 2020-01-04

## 2020-03-04 ASSESSMENT — PAIN SCALES - GENERAL: PAINLEVEL: NO PAIN (0)

## 2020-03-04 NOTE — NURSING NOTE
Chief Complaint   Patient presents with     Oncology Clinic Visit     Return Ovarian Ca     Blood Draw     Labs drawn via   by RN in lab. VS taken.      Harish Owen RN,

## 2020-03-04 NOTE — NURSING NOTE
"Oncology Rooming Note    March 4, 2020 10:53 AM   Afshan Cardona is a 74 year old female who presents for:    Chief Complaint   Patient presents with     Oncology Clinic Visit     Return Ovarian Ca     Blood Draw     Labs drawn via   by RN in lab. VS taken.      Initial Vitals: /66 (BP Location: Right arm, Patient Position: Sitting, Cuff Size: Adult Regular)   Pulse 76   Temp 98  F (36.7  C) (Oral)   Resp 18   Wt 69.9 kg (154 lb)   SpO2 98%   BMI 28.16 kg/m   Estimated body mass index is 28.16 kg/m  as calculated from the following:    Height as of 3/13/19: 1.575 m (5' 2.01\").    Weight as of this encounter: 69.9 kg (154 lb). Body surface area is 1.75 meters squared.  No Pain (0) Comment: Data Unavailable   No LMP recorded. Patient has had a hysterectomy.  Allergies reviewed: Yes  Medications reviewed: Yes    Medications: Medication refills not needed today.  Pharmacy name entered into EPIC: [facility] - [place], [place] - [address]    Clinical concerns: Stents; Kidney cancer doctor??; bladder cancer from the ovarian cancer??; Prognosis;      Hillary Lyman CMA              "

## 2020-03-04 NOTE — LETTER
3/4/2020       RE: Afshan Cardona  [address]  [place] [address]     Dear Colleague,    Thank you for referring your patient, Afshan Cardona, to the [facility]. Please see a copy of my visit note below.                Follow Up Notes on Referred Patient    Date: 3/4/2020       Dr. Willian Lucero MD  No address on file       RE: Afshan Cardona  : 1945  SRIKANTH: 3/4/2020    Dear Dr. 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   The patient presents today for followup.  She has received 1 cycle of adjuvant chemotherapy after her R0 interval cytoreduction. She has started olaparib 300mg twice a day. Initial nausea, that has now resolved no vomiting, fever or chills.  Normal bowel function.  Does have some issues with urinary incontinence.   No vaginal bleeding or discharge.  No B symptoms.       Past Medical History:    Past Medical History:   Diagnosis Date     Vivas's esophagus      Cardiomyopathy (H)     [place]     Colon polyps      Diabetes (H)      Fibromyalgia      Former smoker      History of DVT (deep vein thrombosis)      HLD (hyperlipidemia)      HTN (hypertension)      Hypothyroid      TIA (transient ischemic attack)          Past Surgical History:    Past Surgical History:   Procedure Laterality Date     AS ESOPHAGOSCOPY, DIAGNOSTIC       BACK SURGERY       bladder lift       HYSTERECTOMY       HYSTERECTOMY TOTAL ABD, TRINY SALPINGO-OOPHORECTOMY, NODE DISSECTION, TUMOR DEBULKING, COMBINED N/A 2018    Procedure: COMBINED HYSTERECTOMY TOTAL ABDOMINAL, SALPINGO-OOPHORECTOMY, NODE DISSECTION, TUMOR DEBULKING;;  Surgeon: Bakari Galeas MD;  Location: [facility] OR     LAPAROSCOPY DIAGNOSTIC (GYN) N/A 2018    Procedure: LAPAROSCOPY DIAGNOSTIC (GYN);  Diagnostic Laparoscopy, Biopsies;  Surgeon: Bakari Galeas MD;  Location: [facility] OR     LAPAROTOMY  EXPLORATORY N/A 7/24/2018    Procedure: LAPAROTOMY EXPLORATORY;  Exploratory Laparotomy, lysis of adhesions, jejunal mesentary biopsy and sigmoid epiploic biopsy. ;  Surgeon: Bakari Galeas MD;  Location: [facility] OR     LAPAROTOMY, TUMOR DEBULKING, COMBINED N/A 2/22/2019    Procedure: Exploratory Laparotomy, tumor debulking, omentectomy, lysis of adhesions x 60 minutes, pelvic paritonectomy;  Surgeon: Bakari Galeas MD;  Location: [facility] OR     PROCTOSCOPY N/A 2/22/2019    Procedure: Proctoscopy;  Surgeon: Bakari Galeas MD;  Location: [facility] OR     SALPINGO-OOPHORECTOMY BILATERAL Bilateral 2/22/2019    Procedure: Bilateral Salpingo Oophorectomy;  Surgeon: Bakari Galeas MD;  Location: [facility] OR         Health Maintenance Due   Topic Date Due     DEXA  1945     HF ACTION PLAN  1945     MICROALBUMIN  1945     DIABETIC FOOT EXAM  1945     URINE DRUG SCREEN  1945     DEPRESSION ACTION PLAN  1945     EYE EXAM  1945     PHQ-9  1945     MEDICARE ANNUAL WELLNESS VISIT  05/19/2009     ZOSTER IMMUNIZATION (2 of 3) 02/15/2016     A1C  05/08/2019     LIPID  07/10/2019     INFLUENZA VACCINE (1) 09/01/2019     MAMMO SCREENING  10/01/2019       Current Medications:     Current Outpatient Medications   Medication Sig Dispense Refill     aspirin (ASA) 325 MG EC tablet Take 325 mg by mouth daily       carvedilol (COREG) 25 MG tablet Take 1 tablet (25 mg) by mouth 2 times daily (with meals) (Patient taking differently: Take 12.5 mg by mouth 2 times daily (with meals) ) 60 tablet      cephalexin (KEFLEX) 250 MG capsule TAKE ONE CAPSULE BY MOUTH ONCE DAILY AT BEDTIME  3     Cholecalciferol (VITAMIN D3) 2000 units CAPS TAKE ONE CAPSULE BY MOUTH ONCE DAILY IN THE MORNING  3     CRANBERRY EXTRACT PO Take by mouth every morning       cyanocobalamin (CYANOCOBALAMIN) 1000 MCG/ML injection Inject 1,000 mcg into the muscle every 30 days       DULoxetine (CYMBALTA) 60 MG EC capsule TAKE ONE  CAPSULE BY MOUTH AT BEDTIME  1     gabapentin (NEURONTIN) 600 MG tablet Take 1 tablet (600 mg) by mouth 3 times daily 90 tablet      HUMALOG MARY KWIKPEN 100 UNIT/ML injection INJECT 1 UNIT PER 10GRAMS OF CARBOHYDRATES WITH CORRECTION 0.5 UNITS PER 50 ABOVE 150 ( UP TO 30 UNITS PER DAY)  6     insulin degludec (TRESIBA) 200 UNIT/ML pen Inject 16 Units Subcutaneous every 24 hours       iohexol (OMNIPAQUE) 140 MG/ML solution for oral use Mix entire bottle (50ml) of contast with 600ml (20 ounces) of water and drink half 2 hrs prior to CT scan and half 1 hr prior to scan 50 mL 0     levothyroxine (SYNTHROID/LEVOTHROID) 88 MCG tablet        losartan (COZAAR) 100 MG tablet Take 100 mg by mouth At Bedtime        melatonin 1 MG TABS tablet Take 1 mg by mouth nightly as needed for sleep       olaparib (LYNPARZA) 150 MG tablet Take 2 tablets (300 mg) by mouth 2 times daily 120 tablet 0     omeprazole (PRILOSEC) 20 MG DR capsule TAKE ONE CAPSULE BY MOUTH ONCE DAILY  1     oxyCODONE-acetaminophen (PERCOCET) 5-325 MG tablet Take 1 tablet by mouth 3 times daily        pravastatin (PRAVACHOL) 80 MG tablet Take 80 mg by mouth At Bedtime        prochlorperazine (COMPAZINE) 5 MG tablet Take 1 tablet (5 mg) by mouth every 6 hours as needed (Nausea/Vomiting) 30 tablet 2     ONETOUCH VERIO IQ test strip USE TO TEST BLOOD GLUCOSE 6-8 TIMES PER DAY, BEFORE MEALS, BEDTIME TO DOSE INSULIN, HIGH OR LOW BLOOD GLUCOSE WITH RECHECK  3         Allergies:        Allergies   Allergen Reactions     Nitrofurantoin      Burning around her mouth, pt advised by neurologist to not take macrobid         Azithromycin Diarrhea     Buprenorphine Nausea and Vomiting     Severe vomiting     Clonazepam      Furosemide      Low sodium     Hydrochlorothiazide      Low Sodium     Lantus [Insulin Glargine]      Low blood sugar     Lisinopril Cough     Morphine Nausea and Vomiting     Pregabalin      Dizziness; Worse, numbness/tingling/burning pain whole body,  hospitalized     Sulfamethoxazole Hives        Social History:     Social History     Tobacco Use     Smoking status: Former Smoker     Packs/day: 0.25     Years: 15.00     Pack years: 3.75     Smokeless tobacco: Never Used   Substance Use Topics     Alcohol use: No     Frequency: Never       History   Drug Use No         Physical Exam:     /66 (BP Location: Right arm, Patient Position: Sitting, Cuff Size: Adult Regular)   Pulse 76   Temp 98  F (36.7  C) (Oral)   Resp 18   Wt 69.9 kg (154 lb)   SpO2 98%   BMI 28.16 kg/m     Body mass index is 28.16 kg/m .    General Appearance:  healthy and alert, no distress     Musculoskeletal:         extremities non tender and without edema     Neurological:   normal gait, no gross defects     Psychiatric:      appropriate mood and affect              ABDOMEN:  Soft, nontender, nondistended.  Well-healing midline incision.  No organomegaly.      ABDOMEN:  Soft, nontender, nondistended.  There is about a 4 cm hernia on the upper one-third of the incision.  It is nontender.                    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      A total of 40 minutes was spent with the patient, 30 minutes of which were spent in counseling the patient and/or treatment planning.        1.  Recurrent platinum resistant high grade serous ovarian cancer.     2.  Status post neoadjuvant chemotherapy.   3.  Status post R0 interval debulking.   4.  Renal clear cell carcinoma.     5.  Asymptomatic incisional hernia  6.  Mild bilateral hydronephrosis  7.  Neuropathy.   8.  Insulin-dependent diabetes.        We discussed by definition it would be platinum-resistant ovarian cancer with the caveat that she has not received her full adjuvant treatment postoperatively of 3 cycles.   We wiwith olaparib 300 mg b.i.d. as an oral option.  We discussed side effects and risks of that.  We will consider  to do 3 cycles of that and repeat a scan CT chest, abdomen and pelvis after 3 cycles of 3 months of treatment.  She will do that with a local oncologist.  Then we will see her back in 3 months.  If she gets anymore symptoms from her cancer or signs of progression, we should see her earlier if needed.  We also discussed that she has some mild hydronephroses bilaterally and possible bilateral ureteral stents, especially if her creatinine increases.  She will follow up locally with Urology.  The patient as well as her family agree with this plan.  They are very appreciative of her care.  All questions were answered.      We will also screen her for the NanoOvary study and see whether her primary tumor is an immunoreactive subtype.  Of note, she does have a clear cell cancer of the kidney, which has not been treated yet.  It has been stable.  Cryoablation would be a definitive treatment option and might be necessary if she would qualify for the NanoOvary study.                           Bakari Galeas MD, MS    Department of Obstetrics and Gynecology   Division of Gynecologic Oncology   [facility]  Phone: [phone]       CC  Patient Care Team:  Sonja Hathaway MD as PCP - General (Family Practice)

## 2020-03-09 ENCOUNTER — TELEPHONE (OUTPATIENT)
Dept: ONCOLOGY | Facility: CLINIC | Age: 75
End: 2020-03-09

## 2020-03-09 NOTE — TELEPHONE ENCOUNTER
Reached out to Afshan to see how she is doing, overall very well just tired.  She is aware that her  has elevated but per Dr. Galeas she needs more time on the Lynparza for it to show if it will be affective.  Patient verbalizes understanding and agrees with plan.

## 2020-03-11 ENCOUNTER — HEALTH MAINTENANCE LETTER (OUTPATIENT)
Age: 75
End: 2020-03-11

## 2020-03-16 DIAGNOSIS — C56.9 MALIGNANT NEOPLASM OF OVARY, UNSPECIFIED LATERALITY (H): Primary | ICD-10-CM

## 2020-03-17 ENCOUNTER — TELEPHONE (OUTPATIENT)
Dept: ONCOLOGY | Facility: CLINIC | Age: 75
End: 2020-03-17

## 2020-03-17 DIAGNOSIS — C56.9 MALIGNANT NEOPLASM OF OVARY, UNSPECIFIED LATERALITY (H): Primary | ICD-10-CM

## 2020-03-17 NOTE — ORAL ONC MGMT
Oral Chemotherapy Monitoring Program    Primary Oncologist: Dr. Galeas  Primary Oncology Clinic: [facility]  Cancer Diagnosis: Ovarian    Therapy History:  Lynparza (olaparib) 300mg BID  Take 2 tablets ([id number]) by mouth twice daily  C1D1= 02/07/2020  Lynparza held due to nausea/vomiting symptoms from UTI  G8N7=3902/20/2020  C2D1= 03/20/2020    Drug Interaction Assessment: No new notable drug interactions were identified.  Medications assessed:   Olaparib-Iohexol-Prochlorperazine-Levothyroxine-Insulin Degludec-Oxycodone and Acetaminophen-Cyanocobalamin-Omeprazole-Carvedilol-Gabapentin-Losartan-Pravastatin-Cephalexin-DULoxetine-HumaLOG-Cholecalciferol-Aspirin-Cranberry-Melatonin    Lab Monitoring Plan:  Monthly CBC and CMP    Subjective/Objective:  Afshan Cardona is a 74 year old female contacted by phone for oral chemotherapy follow up. Afshan confirmed her dose of 2 tablets by mouth twice daily and denies missing any doses. She reports her lynparza therapy is going good and denies any side effects including nausea, vomiting, and diarrhea. She said she took an anti-nausea medication before her dose for 3 days but no longer needs it to control nausea. She had labs drawn earlier this month, but does not have another appointment scheduled until late May. She showed concern that her Urology procedure might get canceled next week.     ORAL CHEMOTHERAPY 1/3/2020 2/26/2020   Drug Name Lynparza (Olaparib) Lynparza (Olaparib)   Current Dosage 300mg 300mg   Current Schedule BID BID   Cycle Details Continuous Continuous   Start Date of Last Cycle - 2/20/2020   Planned next cycle start date - 3/20/2020   Doses missed in last 2 weeks - 0   Adherence Assessment - Adherent   Adverse Effects - No AE identified during assessment   Any new drug interactions? - No   Is the dose as ordered appropriate for the patient? - Yes       Vitals:  BP:   BP Readings from Last 1 Encounters:   03/04/20 133/66     Wt Readings from  Last 1 Encounters:   03/04/20 69.9 kg (154 lb)       Labs:  _  Result Component Current Result Ref Range   Sodium 136 (3/4/2020) 133 - 144 mmol/L     _  Result Component Current Result Ref Range   Potassium 4.2 (3/4/2020) 3.4 - 5.3 mmol/L     _  Result Component Current Result Ref Range   Calcium 9.0 (3/4/2020) 8.5 - 10.1 mg/dL     No results found for Mag within last 30 days.     No results found for Phos within last 30 days.     _  Result Component Current Result Ref Range   Albumin 3.6 (3/4/2020) 3.4 - 5.0 g/dL     _  Result Component Current Result Ref Range   Urea Nitrogen 14 (3/4/2020) 7 - 30 mg/dL     _  Result Component Current Result Ref Range   Creatinine 0.62 (3/4/2020) 0.52 - 1.04 mg/dL     _  Result Component Current Result Ref Range   AST 11 (3/4/2020) 0 - 45 U/L     _  Result Component Current Result Ref Range   ALT 15 (3/4/2020) 0 - 50 U/L     _  Result Component Current Result Ref Range   Bilirubin Total 0.3 (3/4/2020) 0.2 - 1.3 mg/dL     _  Result Component Current Result Ref Range   WBC 6.0 (3/4/2020) 4.0 - 11.0 10e9/L     _  Result Component Current Result Ref Range   Hemoglobin 10.7 (L) (3/4/2020) 11.7 - 15.7 g/dL     _  Result Component Current Result Ref Range   Platelet Count 266 (3/4/2020) 150 - 450 10e9/L     _  Result Component Current Result Ref Range   Absolute Neutrophil 4.2 (3/4/2020) 1.6 - 8.3 10e9/L       Assessment:  Afshan is tolerating the Lynparza very well now. She has no side effects to report, and has been feeling well. I recommended to her to get labs drawn at a local clinic in early-mid April. She said she will get them drawn at [facility] in [place]. Standing labs are in place and were sent to the clinic via fax to [phone]. Yaa will be making the appointment herself for mid april.    Plan:  Continue Lynparza as prescribed  Monitor for new side effects  Get labs drawn locally in mid-April    Follow-Up:  Review April labs and do a normal  assessment    Maisha Mendieta  Pharmacy Intern  [facility]  [phone]

## 2020-03-20 ENCOUNTER — TELEPHONE (OUTPATIENT)
Dept: ONCOLOGY | Facility: CLINIC | Age: 75
End: 2020-03-20

## 2020-03-20 NOTE — TELEPHONE ENCOUNTER
Patients daughter contacted RN and stated patients ureteral stent placement was cancelled today due to urgency.     RN reviewed patients most recent creatinine and the rational behind canceling surgery to keep her mother safe.     Daughter and RN discussed signs and symptoms of concern and when the stent placement would be worth the risk and would be more of an urgent procedure.     RN answered all the daughters questions to the best of her ability.     Marianela Coates RN

## 2020-03-29 DIAGNOSIS — C56.9 MALIGNANT NEOPLASM OF OVARY, UNSPECIFIED LATERALITY (H): ICD-10-CM

## 2020-04-01 DIAGNOSIS — C56.9 MALIGNANT NEOPLASM OF OVARY, UNSPECIFIED LATERALITY (H): ICD-10-CM

## 2020-04-06 DIAGNOSIS — C56.9 MALIGNANT NEOPLASM OF OVARY, UNSPECIFIED LATERALITY (H): Primary | ICD-10-CM

## 2020-04-13 ENCOUNTER — TELEPHONE (OUTPATIENT)
Dept: ONCOLOGY | Facility: CLINIC | Age: 75
End: 2020-04-13

## 2020-04-13 NOTE — TELEPHONE ENCOUNTER
Oral Chemotherapy Monitoring Program   Lab Follow-Up    Placed call to patient in follow up of when she plans to complete monthly lab work for Lynparza oral chemotherapy.     Left message requesting call back. No drug names were mentioned.       Lona Prieto, PharmD  Oral Chemotherapy Monitoring Program  [facility]  [phone]  April 13, 2020

## 2020-04-14 ENCOUNTER — TELEPHONE (OUTPATIENT)
Dept: ONCOLOGY | Facility: CLINIC | Age: 75
End: 2020-04-14

## 2020-04-14 NOTE — TELEPHONE ENCOUNTER
"Oral Chemotherapy Monitoring Program    Primary Oncologist: Dr. Galeas  Primary Oncology Clinic: [facility]  Cancer Diagnosis: Ovarian     Therapy History:  Lynparza (olaparib) 300mg BID  Take 2 tablets ([id number]) by mouth twice daily  Started 02/07/2020  Lynparza held due to nausea/vomiting symptoms from UTI  Restarted 02/20/2020    Lab plan: CBC&CMP monthly    Subjective/Objective:  Afshan Cardona is a 74 year old female contacted by phone for a follow-up visit for oral chemotherapy.  Patient says she is doing well, except for recent UTI.  She reports no side effects and no missed doses.  She denied nausea/vomiting and diarrhea.  Her CSC looks good today.    ORAL CHEMOTHERAPY 1/3/2020 2/26/2020 3/17/2020 4/14/2020   Drug Name Lynparza (Olaparib) Lynparza (Olaparib) Lynparza (Olaparib) Lynparza (Olaparib)   Current Dosage 300mg 300mg 300mg 300mg   Current Schedule BID BID BID BID   Cycle Details Continuous Continuous Continuous Continuous   Start Date of Last Cycle - 2/20/2020 3/20/2020 -   Planned next cycle start date - 3/20/2020 4/19/2020 -   Doses missed in last 2 weeks - 0 0 0   Adherence Assessment - Adherent Adherent Adherent   Adverse Effects - No AE identified during assessment No AE identified during assessment Nausea   Any new drug interactions? - No - Yes   Is the dose as ordered appropriate for the patient? - Yes - -       Vitals:  BP:   BP Readings from Last 1 Encounters:   03/04/20 133/66     Wt Readings from Last 1 Encounters:   03/04/20 69.9 kg (154 lb)     Estimated body surface area is 1.75 meters squared as calculated from the following:    Height as of 3/13/19: 1.575 m (5' 2.01\").    Weight as of 3/4/20: 69.9 kg (154 lb).    Local labs today included WBC 6.8, Hgb 9.6, , ANC 5.1.  Renal/hepatic labs OK.      Assessment:  Patient is tolerating Lynparza well.    Plan:  No change at present time.    Follow-Up:  Will call patient in about 4 weeks.    Minerva Peraza, PharmD, " BCPS, [facility]  Oncology Clinical Pharmacy Specialist  [facility]/ [facility]  [phone]

## 2020-04-15 ENCOUNTER — TELEPHONE (OUTPATIENT)
Dept: ONCOLOGY | Facility: CLINIC | Age: 75
End: 2020-04-15

## 2020-04-15 NOTE — TELEPHONE ENCOUNTER
RN was contacted and asked to reach out to patient about questions she had about her .     Patient wanted to know what her  was from 4/13. RN was unable to locate a final result for this.     Patient asked RN why this was not completed. RN encouraged patient to reach out to her local team to discuss.     Patient appreciative of the RN time and will reach back out if she needs RN to assist with anything.     Marianela Coates RN

## 2020-04-23 ENCOUNTER — TELEPHONE (OUTPATIENT)
Dept: ONCOLOGY | Facility: CLINIC | Age: 75
End: 2020-04-23

## 2020-04-23 NOTE — TELEPHONE ENCOUNTER
Patient called to enquire about her recently completed .     This was elevated at 231.1     Patient is not having symptoms of concern and will continue on the PARPi until told otherwise.     MD updated     Marianela Coates RN

## 2020-05-06 DIAGNOSIS — C56.9 MALIGNANT NEOPLASM OF OVARY, UNSPECIFIED LATERALITY (H): Primary | ICD-10-CM

## 2020-05-12 ENCOUNTER — TELEPHONE (OUTPATIENT)
Dept: ONCOLOGY | Facility: CLINIC | Age: 75
End: 2020-05-12

## 2020-05-12 NOTE — TELEPHONE ENCOUNTER
Writer called patient for permission to apply for AZ&ME Lynparza free drug. Patient agreed.    [facility]  [facility]   Oncology Pharmacy Liaison  [email or web address]   [phone] (phone)   [phone] (fax)

## 2020-05-15 NOTE — TELEPHONE ENCOUNTER
Alverto Roach  [facility]   Oncology Pharmacy Liaison  [email or web address]   [phone] (phone)   [phone] (fax)

## 2020-05-20 ENCOUNTER — TELEPHONE (OUTPATIENT)
Dept: ONCOLOGY | Facility: CLINIC | Age: 75
End: 2020-05-20

## 2020-05-20 NOTE — TELEPHONE ENCOUNTER
Oral Chemotherapy Monitoring Program    Placed call to patient in follow up of olaparib therapy.    Left message to please call back in follow up of therapy. No patient or drug names were mentioned.    Eric Suggs  Pharmacy Intern  [facility]  [phone]

## 2020-05-27 ENCOUNTER — ANCILLARY PROCEDURE (OUTPATIENT)
Dept: CT IMAGING | Facility: CLINIC | Age: 75
End: 2020-05-27
Attending: OBSTETRICS & GYNECOLOGY
Payer: MEDICARE

## 2020-05-27 ENCOUNTER — VIRTUAL VISIT (OUTPATIENT)
Dept: ONCOLOGY | Facility: CLINIC | Age: 75
End: 2020-05-27
Attending: OBSTETRICS & GYNECOLOGY
Payer: MEDICARE

## 2020-05-27 VITALS
DIASTOLIC BLOOD PRESSURE: 67 MMHG | RESPIRATION RATE: 18 BRPM | TEMPERATURE: 98.5 F | HEART RATE: 72 BPM | OXYGEN SATURATION: 96 % | WEIGHT: 153.5 LBS | BODY MASS INDEX: 28.07 KG/M2 | SYSTOLIC BLOOD PRESSURE: 147 MMHG

## 2020-05-27 DIAGNOSIS — C56.9 MALIGNANT NEOPLASM OF OVARY, UNSPECIFIED LATERALITY (H): ICD-10-CM

## 2020-05-27 DIAGNOSIS — C56.9 MALIGNANT NEOPLASM OF OVARY, UNSPECIFIED LATERALITY (H): Primary | ICD-10-CM

## 2020-05-27 LAB
ALBUMIN SERPL-MCNC: 3 G/DL (ref 3.4–5)
ALP SERPL-CCNC: 91 U/L (ref 40–150)
ALT SERPL W P-5'-P-CCNC: 13 U/L (ref 0–50)
ANION GAP SERPL CALCULATED.3IONS-SCNC: 9 MMOL/L (ref 3–14)
AST SERPL W P-5'-P-CCNC: 7 U/L (ref 0–45)
BASOPHILS # BLD AUTO: 0 10E9/L (ref 0–0.2)
BASOPHILS NFR BLD AUTO: 0.3 %
BILIRUB SERPL-MCNC: 0.2 MG/DL (ref 0.2–1.3)
BUN SERPL-MCNC: 12 MG/DL (ref 7–30)
CALCIUM SERPL-MCNC: 9.2 MG/DL (ref 8.5–10.1)
CANCER AG125 SERPL-ACNC: 207 U/ML (ref 0–30)
CHLORIDE SERPL-SCNC: 96 MMOL/L (ref 94–109)
CO2 SERPL-SCNC: 28 MMOL/L (ref 20–32)
CREAT BLD-MCNC: 0.8 MG/DL (ref 0.52–1.04)
CREAT SERPL-MCNC: 0.77 MG/DL (ref 0.52–1.04)
DIFFERENTIAL METHOD BLD: ABNORMAL
EOSINOPHIL # BLD AUTO: 0.1 10E9/L (ref 0–0.7)
EOSINOPHIL NFR BLD AUTO: 0.9 %
ERYTHROCYTE [DISTWIDTH] IN BLOOD BY AUTOMATED COUNT: 15.9 % (ref 10–15)
GFR SERPL CREATININE-BSD FRML MDRD: 70 ML/MIN/{1.73_M2}
GFR SERPL CREATININE-BSD FRML MDRD: 75 ML/MIN/{1.73_M2}
GLUCOSE SERPL-MCNC: 153 MG/DL (ref 70–99)
HCT VFR BLD AUTO: 29.7 % (ref 35–47)
HGB BLD-MCNC: 10 G/DL (ref 11.7–15.7)
IMM GRANULOCYTES # BLD: 0.1 10E9/L (ref 0–0.4)
IMM GRANULOCYTES NFR BLD: 0.9 %
LYMPHOCYTES # BLD AUTO: 1.1 10E9/L (ref 0.8–5.3)
LYMPHOCYTES NFR BLD AUTO: 12.1 %
MCH RBC QN AUTO: 33.1 PG (ref 26.5–33)
MCHC RBC AUTO-ENTMCNC: 33.7 G/DL (ref 31.5–36.5)
MCV RBC AUTO: 98 FL (ref 78–100)
MONOCYTES # BLD AUTO: 0.4 10E9/L (ref 0–1.3)
MONOCYTES NFR BLD AUTO: 4.8 %
NEUTROPHILS # BLD AUTO: 7.4 10E9/L (ref 1.6–8.3)
NEUTROPHILS NFR BLD AUTO: 81 %
NRBC # BLD AUTO: 0 10*3/UL
NRBC BLD AUTO-RTO: 0 /100
PLATELET # BLD AUTO: 353 10E9/L (ref 150–450)
POTASSIUM SERPL-SCNC: 3.7 MMOL/L (ref 3.4–5.3)
PROT SERPL-MCNC: 6.6 G/DL (ref 6.8–8.8)
RBC # BLD AUTO: 3.02 10E12/L (ref 3.8–5.2)
SODIUM SERPL-SCNC: 133 MMOL/L (ref 133–144)
WBC # BLD AUTO: 9.1 10E9/L (ref 4–11)

## 2020-05-27 PROCEDURE — 86304 IMMUNOASSAY TUMOR CA 125: CPT | Performed by: OBSTETRICS & GYNECOLOGY

## 2020-05-27 PROCEDURE — 99443 ZZC PHYSICIAN TELEPHONE EVALUATION 21-30 MIN: CPT | Performed by: OBSTETRICS & GYNECOLOGY

## 2020-05-27 PROCEDURE — 80053 COMPREHEN METABOLIC PANEL: CPT | Performed by: OBSTETRICS & GYNECOLOGY

## 2020-05-27 PROCEDURE — 85025 COMPLETE CBC W/AUTO DIFF WBC: CPT | Performed by: OBSTETRICS & GYNECOLOGY

## 2020-05-27 PROCEDURE — 36591 DRAW BLOOD OFF VENOUS DEVICE: CPT

## 2020-05-27 PROCEDURE — 40000114 ZZH STATISTIC NO CHARGE CLINIC VISIT

## 2020-05-27 RX ORDER — IOPAMIDOL 755 MG/ML
95 INJECTION, SOLUTION INTRAVASCULAR ONCE
Status: COMPLETED | OUTPATIENT
Start: 2020-05-27 | End: 2020-05-27

## 2020-05-27 RX ADMIN — IOPAMIDOL 95 ML: 755 INJECTION, SOLUTION INTRAVASCULAR at 10:11

## 2020-05-27 ASSESSMENT — PAIN SCALES - GENERAL: PAINLEVEL: WORST PAIN (10)

## 2020-05-27 NOTE — NURSING NOTE
Chief Complaint   Patient presents with     Blood Draw     Blood draw via IV and vitals     Labs drawn via IV placed by Imaging in right arm. IV flushed with saline after blood draw and pulled from arm.

## 2020-05-27 NOTE — LETTER
"    2020         RE: Afshan Cardona  [address]  [place] [address]        Dear Colleague,    Thank you for referring your patient, Afshan Cardona, to the [facility]. Please see a copy of my visit note below.    Afshan Cardona is a 75 year old female who is being evaluated via a billable telephone visit.      The patient has been notified of following:     \"This telephone visit will be conducted via a call between you and your physician/provider. We have found that certain health care needs can be provided without the need for a physical exam.  This service lets us provide the care you need with a short phone conversation.  If a prescription is necessary we can send it directly to your pharmacy.  If lab work is needed we can place an order for that and you can then stop by our lab to have the test done at a later time.    Telephone visits are billed at different rates depending on your insurance coverage. During this emergency period, for some insurers they may be billed the same as an in-person visit.  Please reach out to your insurance provider with any questions.    If during the course of the call the physician/provider feels a telephone visit is not appropriate, you will not be charged for this service.\"    Patient has given verbal consent for Telephone visit?  Yes    What phone number would you like to be contacted at? [phone]    How would you like to obtain your AVS? Mail a copy     I have reviewed and updated the patient's allergies and medication list.    Concerns: Patient has no new concerns.      Refills: None         PATT Hernández          Phone call duration: 40 minutes                Follow Up Notes on Referred Patient         Dr. 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   The patient presents today for followup.  She has received 1 cycle of adjuvant chemotherapy after her R0 interval cytoreduction. She has started olaparib 300mg twice a day. Initial nausea, that has now resolved no vomiting, fever or chills.  Normal bowel function.  Does have some issues with urinary incontinence.   No vaginal bleeding or discharge.  No B symptoms.     Oncologic History:  1. High-grade serous ovarian carcinoma stage IIIC and possibly stage IV status post 3 cycles of carboplatin and Taxol chemotherapy with severe hand-foot pain/neuropathy with underlying peripheral neuropathy due to diabetes, therapy changed to carboplatin and Taxotere with the last dose of chemotherapy given for 3 cycles December 19, 2018, followed by IDC with single-node dissection and tumor debulking with partial omentectomy February 22, 2019, followed by adjuvant chemotherapy with carboplatin and Taxotere resumed April 18, 2019, with 1 dose only and therapy was stopped short of 2 cycles due to poor tolerance with severe worsening of the peripheral neuropathy and hyperglycemia and progressive decline in performance status.   2. Progressive increase in  as of August 21, 2019, in the context of restaging CT scans showing a right middle lobe subpleural pulmonary nodule/consolidation with associated right chest wall pain (Danna 3, 2019).   3. 1.6 cm superior pole of the left kidney exophytic mass that was biopsy proven to be renal cell carcinoma according to the patient, evaluated at [facility]. No further intervention recommended.   4. Enhancing 1 cm lesion in segment 8 of the liver that did not demonstrate washout. Recommend further workup (Danna 3, 2019). Three additional lesions in segment 3 were also noted that did not wash out after contrast.   5. September 4, 2019: No suspicious liver lesions. Focal abnormal lesion in segment 8 was felt to be AV shunting/nonaggressive process.   6. CT chest with IV  contrast: Old granulomatous disease with scar in the right middle lobe was seen without any nodule or evidence of metastatic disease.    7. Disease recurrence 1/3/2020 platinum resistant: started on olaparib 300 mg b.i.d    Past Medical History:    Past Medical History:   Diagnosis Date     Vivas's esophagus      Cardiomyopathy (H)     [place]     Colon polyps      Diabetes (H)      Fibromyalgia      Former smoker      History of DVT (deep vein thrombosis)      HLD (hyperlipidemia)      HTN (hypertension)      Hypothyroid      TIA (transient ischemic attack)          Past Surgical History:    Past Surgical History:   Procedure Laterality Date     AS ESOPHAGOSCOPY, DIAGNOSTIC       BACK SURGERY       bladder lift       HYSTERECTOMY       HYSTERECTOMY TOTAL ABD, TRINY SALPINGO-OOPHORECTOMY, NODE DISSECTION, TUMOR DEBULKING, COMBINED N/A 7/24/2018    Procedure: COMBINED HYSTERECTOMY TOTAL ABDOMINAL, SALPINGO-OOPHORECTOMY, NODE DISSECTION, TUMOR DEBULKING;;  Surgeon: Bakari Galeas MD;  Location: [facility] OR     LAPAROSCOPY DIAGNOSTIC (GYN) N/A 7/6/2018    Procedure: LAPAROSCOPY DIAGNOSTIC (GYN);  Diagnostic Laparoscopy, Biopsies;  Surgeon: Bakari Galeas MD;  Location: [facility] OR     LAPAROTOMY EXPLORATORY N/A 7/24/2018    Procedure: LAPAROTOMY EXPLORATORY;  Exploratory Laparotomy, lysis of adhesions, jejunal mesentary biopsy and sigmoid epiploic biopsy. ;  Surgeon: Bakari Galeas MD;  Location: [facility] OR     LAPAROTOMY, TUMOR DEBULKING, COMBINED N/A 2/22/2019    Procedure: Exploratory Laparotomy, tumor debulking, omentectomy, lysis of adhesions x 60 minutes, pelvic paritonectomy;  Surgeon: Bakari Galeas MD;  Location: [facility] OR     PROCTOSCOPY N/A 2/22/2019    Procedure: Proctoscopy;  Surgeon: Bakari Galeas MD;  Location:  OR     SALPINGO-OOPHORECTOMY BILATERAL Bilateral 2/22/2019    Procedure: Bilateral Salpingo Oophorectomy;  Surgeon: Bakari Galeas MD;  Location:  OR         [facility]  Maintenance Due   Topic Date Due     DEXA  1945     HF ACTION PLAN  1945     MICROALBUMIN  1945     DIABETIC FOOT EXAM  1945     URINE DRUG SCREEN  1945     DEPRESSION ACTION PLAN  1945     EYE EXAM  1945     PHQ-9  1945     MEDICARE ANNUAL WELLNESS VISIT  05/19/2009     ZOSTER IMMUNIZATION (2 of 3) 02/15/2016     A1C  05/08/2019     LIPID  07/10/2019       Current Medications:     Current Outpatient Medications   Medication Sig Dispense Refill     aspirin (ASA) 325 MG EC tablet Take 325 mg by mouth daily       carvedilol (COREG) 25 MG tablet Take 1 tablet (25 mg) by mouth 2 times daily (with meals) (Patient taking differently: Take 12.5 mg by mouth 2 times daily (with meals) ) 60 tablet      cephalexin (KEFLEX) 250 MG capsule TAKE ONE CAPSULE BY MOUTH ONCE DAILY AT BEDTIME  3     Cholecalciferol (VITAMIN D3) 2000 units CAPS TAKE ONE CAPSULE BY MOUTH ONCE DAILY IN THE MORNING  3     CRANBERRY EXTRACT PO Take by mouth every morning       DULoxetine (CYMBALTA) 60 MG EC capsule TAKE ONE CAPSULE BY MOUTH AT BEDTIME  1     gabapentin (NEURONTIN) 600 MG tablet Take 1 tablet (600 mg) by mouth 3 times daily 90 tablet      HUMALOG MARY KWIKPEN 100 UNIT/ML injection INJECT 1 UNIT PER 10GRAMS OF CARBOHYDRATES WITH CORRECTION 0.5 UNITS PER 50 ABOVE 150 ( UP TO 30 UNITS PER DAY)  6     insulin degludec (TRESIBA) 200 UNIT/ML pen Inject 16 Units Subcutaneous every 24 hours       iohexol (OMNIPAQUE) 140 MG/ML solution for oral use Mix entire bottle (50ml) of contast with 600ml (20 ounces) of water and drink half 2 hrs prior to CT scan and half 1 hr prior to scan 50 mL 0     levothyroxine (SYNTHROID/LEVOTHROID) 88 MCG tablet        losartan (COZAAR) 100 MG tablet Take 100 mg by mouth At Bedtime        melatonin 1 MG TABS tablet Take 1 mg by mouth nightly as needed for sleep       omeprazole (PRILOSEC) 20 MG DR capsule TAKE ONE CAPSULE BY MOUTH ONCE DAILY  1     ONETOUCH VERIO IQ test  strip USE TO TEST BLOOD GLUCOSE 6-8 TIMES PER DAY, BEFORE MEALS, BEDTIME TO DOSE INSULIN, HIGH OR LOW BLOOD GLUCOSE WITH RECHECK  3     oxyCODONE-acetaminophen (PERCOCET) 5-325 MG tablet Take 1 tablet by mouth 3 times daily        pravastatin (PRAVACHOL) 80 MG tablet Take 80 mg by mouth At Bedtime        prochlorperazine (COMPAZINE) 5 MG tablet Take 1 tablet (5 mg) by mouth every 6 hours as needed (Nausea/Vomiting) 30 tablet 2     olaparib (LYNPARZA) 150 MG tablet Take 2 tablets (300 mg) by mouth 2 times daily 120 tablet 0         Allergies:        Allergies   Allergen Reactions     Nitrofurantoin      Burning around her mouth, pt advised by neurologist to not take macrobid         Azithromycin Diarrhea     Buprenorphine Nausea and Vomiting     Severe vomiting     Clonazepam      Furosemide      Low sodium     Hydrochlorothiazide      Low Sodium     Lantus [Insulin Glargine]      Low blood sugar     Lisinopril Cough     Morphine Nausea and Vomiting     Pregabalin      Dizziness; Worse, numbness/tingling/burning pain whole body, hospitalized     Sulfamethoxazole Hives        Social History:     Social History     Tobacco Use     Smoking status: Former Smoker     Packs/day: 0.25     Years: 15.00     Pack years: 3.75     Smokeless tobacco: Never Used   Substance Use Topics     Alcohol use: No     Frequency: Never       History   Drug Use No             Physical Exam:     There were no vitals taken for this visit.  There is no height or weight on file to calculate BMI.    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      A total of 40 minutes was spent with the patient, 30 minutes of which were spent in counseling the patient and/or treatment planning.        1.  Recurrent platinum resistant high grade serous  ovarian cancer.     2.  Status post neoadjuvant chemotherapy.   3.  Status post R0 interval debulking.   4.  Renal clear cell carcinoma.     5.  Asymptomatic incisional hernia  6.  Bilateral hydronephrosis bilateral stents in place  7.  Neuropathy.   8.  Insulin-dependent diabetes.     9.   at 207 (1753 month ago)     The patient is tolerating the PARP inhibitor well. We did review the CT scan which indicates mild disease progression, she is clinicially asymptomatic and her  has only slightly increased. Of note she does not want any additional chemotherapy and has not taken the PARPi regularly initially. We will continue olaparib 300 mg b.i.d.. We will consider to do 3 more cycles of that and repeat a scan CT if symptomatic chest, abdomen and pelvis after 3 cycles of 3 months of treatment.  Otherwise will follow monthly  If she gets anymore symptoms from her cancer or signs of progression, we should see her earlier if needed.  We also discussed that she has some mild hydronephroses bilaterally and possible bilateral ureteral stents, especially if her creatinine increases.  She will follow up locally with Urology.  The patient as well as her family agree with this plan.  They are very appreciative of her care.  All questions were answered.      We will also screen her for the NanoOvary study and see whether her primary tumor is an immunoreactive subtype.  Of note, she does have a clear cell cancer of the kidney, which has not been treated yet.  It has been stable.  Cryoablation would be a definitive treatment option and might be necessary if she would qualify for the NanoOvary study.               Bakari Galeas MD, MS    Department of Obstetrics and Gynecology   Division of Gynecologic Oncology   [facility]  Phone: [phone]        CC  Patient Care Team:  Sonja Hathaway MD as PCP - General (Family Practice)  Elsa Jones RN as Specialty Care  Coordinator (Gyn-Onc)

## 2020-05-27 NOTE — PROGRESS NOTES
"Afshan Cardona is a 75 year old female who is being evaluated via a billable telephone visit.      The patient has been notified of following:     \"This telephone visit will be conducted via a call between you and your physician/provider. We have found that certain health care needs can be provided without the need for a physical exam.  This service lets us provide the care you need with a short phone conversation.  If a prescription is necessary we can send it directly to your pharmacy.  If lab work is needed we can place an order for that and you can then stop by our lab to have the test done at a later time.    Telephone visits are billed at different rates depending on your insurance coverage. During this emergency period, for some insurers they may be billed the same as an in-person visit.  Please reach out to your insurance provider with any questions.    If during the course of the call the physician/provider feels a telephone visit is not appropriate, you will not be charged for this service.\"    Patient has given verbal consent for Telephone visit?  Yes    What phone number would you like to be contacted at? [phone]    How would you like to obtain your AVS? Mail a copy     I have reviewed and updated the patient's allergies and medication list.    Concerns: Patient has no new concerns.      Refills: None         PATT Hernández          Phone call duration: 40 minutes                Follow Up Notes on Referred Patient         Dr. 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   The patient presents today for followup.  She has received 1 cycle of adjuvant chemotherapy after her R0 interval cytoreduction. She has started olaparib 300mg twice a day. Initial nausea, that has now resolved no vomiting, fever or chills.  Normal " bowel function.  Does have some issues with urinary incontinence.   No vaginal bleeding or discharge.  No B symptoms.     Oncologic History:  1. High-grade serous ovarian carcinoma stage IIIC and possibly stage IV status post 3 cycles of carboplatin and Taxol chemotherapy with severe hand-foot pain/neuropathy with underlying peripheral neuropathy due to diabetes, therapy changed to carboplatin and Taxotere with the last dose of chemotherapy given for 3 cycles December 19, 2018, followed by IDC with single-node dissection and tumor debulking with partial omentectomy February 22, 2019, followed by adjuvant chemotherapy with carboplatin and Taxotere resumed April 18, 2019, with 1 dose only and therapy was stopped short of 2 cycles due to poor tolerance with severe worsening of the peripheral neuropathy and hyperglycemia and progressive decline in performance status.   2. Progressive increase in  as of August 21, 2019, in the context of restaging CT scans showing a right middle lobe subpleural pulmonary nodule/consolidation with associated right chest wall pain (Danna 3, 2019).   3. 1.6 cm superior pole of the left kidney exophytic mass that was biopsy proven to be renal cell carcinoma according to the patient, evaluated at [facility]. No further intervention recommended.   4. Enhancing 1 cm lesion in segment 8 of the liver that did not demonstrate washout. Recommend further workup (Danna 3, 2019). Three additional lesions in segment 3 were also noted that did not wash out after contrast.   5. September 4, 2019: No suspicious liver lesions. Focal abnormal lesion in segment 8 was felt to be AV shunting/nonaggressive process.   6. CT chest with IV contrast: Old granulomatous disease with scar in the right middle lobe was seen without any nodule or evidence of metastatic disease.    7. Disease recurrence 1/3/2020 platinum resistant: started on olaparib 300 mg b.i.d    Past Medical History:    Past Medical  History:   Diagnosis Date     Vivas's esophagus      Cardiomyopathy (H)     Taksumoto     Colon polyps      Diabetes (H)      Fibromyalgia      Former smoker      History of DVT (deep vein thrombosis)      HLD (hyperlipidemia)      HTN (hypertension)      Hypothyroid      TIA (transient ischemic attack)          Past Surgical History:    Past Surgical History:   Procedure Laterality Date     AS ESOPHAGOSCOPY, DIAGNOSTIC       BACK SURGERY       bladder lift       HYSTERECTOMY       HYSTERECTOMY TOTAL ABD, TRINY SALPINGO-OOPHORECTOMY, NODE DISSECTION, TUMOR DEBULKING, COMBINED N/A 7/24/2018    Procedure: COMBINED HYSTERECTOMY TOTAL ABDOMINAL, SALPINGO-OOPHORECTOMY, NODE DISSECTION, TUMOR DEBULKING;;  Surgeon: Bakari Galeas MD;  [facility]     LAPAROSCOPY DIAGNOSTIC (GYN) N/A 7/6/2018    Procedure: LAPAROSCOPY DIAGNOSTIC (GYN);  Diagnostic Laparoscopy, Biopsies;  Surgeon: Bakari Galeas MD;  Location[facility]     LAPAROTOMY EXPLORATORY N/A 7/24/2018    Procedure: LAPAROTOMY EXPLORATORY;  Exploratory Laparotomy, lysis of adhesions, jejunal mesentary biopsy and sigmoid epiploic biopsy. ;  Surgeon: Bakari Galeas MD;  [facility]     LAPAROTOMY, TUMOR DEBULKING, COMBINED N/A 2/22/2019    Procedure: Exploratory Laparotomy, tumor debulking, omentectomy, lysis of adhesions x 60 minutes, pelvic paritonectomy;  Surgeon: Bakari Galeas MD;  [facility]     PROCTOSCOPY N/A 2/22/2019    Procedure: Proctoscopy;  Surgeon: Bakari Galeas MD;  [facility]     SALPINGO-OOPHORECTOMY BILATERAL Bilateral 2/22/2019    Procedure: Bilateral Salpingo Oophorectomy;  Surgeon: Bakari Galeas MD;  [facility]         Health Maintenance Due   Topic Date Due     DEXA  1945     HF ACTION PLAN  1945     MICROALBUMIN  1945     DIABETIC FOOT EXAM  1945     URINE DRUG SCREEN  1945     DEPRESSION ACTION PLAN  1945     EYE EXAM  1945     PHQ-9  1945      MEDICARE ANNUAL WELLNESS VISIT  05/19/2009     ZOSTER IMMUNIZATION (2 of 3) 02/15/2016     A1C  05/08/2019     LIPID  07/10/2019       Current Medications:     Current Outpatient Medications   Medication Sig Dispense Refill     aspirin (ASA) 325 MG EC tablet Take 325 mg by mouth daily       carvedilol (COREG) 25 MG tablet Take 1 tablet (25 mg) by mouth 2 times daily (with meals) (Patient taking differently: Take 12.5 mg by mouth 2 times daily (with meals) ) 60 tablet      cephalexin (KEFLEX) 250 MG capsule TAKE ONE CAPSULE BY MOUTH ONCE DAILY AT BEDTIME  3     Cholecalciferol (VITAMIN D3) 2000 units CAPS TAKE ONE CAPSULE BY MOUTH ONCE DAILY IN THE MORNING  3     CRANBERRY EXTRACT PO Take by mouth every morning       DULoxetine (CYMBALTA) 60 MG EC capsule TAKE ONE CAPSULE BY MOUTH AT BEDTIME  1     gabapentin (NEURONTIN) 600 MG tablet Take 1 tablet (600 mg) by mouth 3 times daily 90 tablet      HUMALOG MARY KWIKPEN 100 UNIT/ML injection INJECT 1 UNIT PER 10GRAMS OF CARBOHYDRATES WITH CORRECTION 0.5 UNITS PER 50 ABOVE 150 ( UP TO 30 UNITS PER DAY)  6     insulin degludec (TRESIBA) 200 UNIT/ML pen Inject 16 Units Subcutaneous every 24 hours       iohexol (OMNIPAQUE) 140 MG/ML solution for oral use Mix entire bottle (50ml) of contast with 600ml (20 ounces) of water and drink half 2 hrs prior to CT scan and half 1 hr prior to scan 50 mL 0     levothyroxine (SYNTHROID/LEVOTHROID) 88 MCG tablet        losartan (COZAAR) 100 MG tablet Take 100 mg by mouth At Bedtime        melatonin 1 MG TABS tablet Take 1 mg by mouth nightly as needed for sleep       omeprazole (PRILOSEC) 20 MG DR capsule TAKE ONE CAPSULE BY MOUTH ONCE DAILY  1     ONETOUCH VERIO IQ test strip USE TO TEST BLOOD GLUCOSE 6-8 TIMES PER DAY, BEFORE MEALS, BEDTIME TO DOSE INSULIN, HIGH OR LOW BLOOD GLUCOSE WITH RECHECK  3     oxyCODONE-acetaminophen (PERCOCET) 5-325 MG tablet Take 1 tablet by mouth 3 times daily        pravastatin (PRAVACHOL) 80 MG tablet  Take 80 mg by mouth At Bedtime        prochlorperazine (COMPAZINE) 5 MG tablet Take 1 tablet (5 mg) by mouth every 6 hours as needed (Nausea/Vomiting) 30 tablet 2     olaparib (LYNPARZA) 150 MG tablet Take 2 tablets (300 mg) by mouth 2 times daily 120 tablet 0         Allergies:        Allergies   Allergen Reactions     Nitrofurantoin      Burning around her mouth, pt advised by neurologist to not take macrobid         Azithromycin Diarrhea     Buprenorphine Nausea and Vomiting     Severe vomiting     Clonazepam      Furosemide      Low sodium     Hydrochlorothiazide      Low Sodium     Lantus [Insulin Glargine]      Low blood sugar     Lisinopril Cough     Morphine Nausea and Vomiting     Pregabalin      Dizziness; Worse, numbness/tingling/burning pain whole body, hospitalized     Sulfamethoxazole Hives        Social History:     Social History     Tobacco Use     Smoking status: Former Smoker     Packs/day: 0.25     Years: 15.00     Pack years: 3.75     Smokeless tobacco: Never Used   Substance Use Topics     Alcohol use: No     Frequency: Never       History   Drug Use No             Physical Exam:     There were no vitals taken for this visit.  There is no height or weight on file to calculate BMI.    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      A total of 40 minutes was spent with the patient, 30 minutes of which were spent in counseling the patient and/or treatment planning.        1.  Recurrent platinum resistant high grade serous ovarian cancer.     2.  Status post neoadjuvant chemotherapy.   3.  Status post R0 interval debulking.   4.  Renal clear cell carcinoma.     5.  Asymptomatic incisional hernia  6.  Bilateral hydronephrosis bilateral stents in place  7.  Neuropathy.   8.   Insulin-dependent diabetes.     9.   at 207 (1753 month ago)     The patient is tolerating the PARP inhibitor well. We did review the CT scan which indicates mild disease progression, she is clinicially asymptomatic and her  has only slightly increased. Of note she does not want any additional chemotherapy and has not taken the PARPi regularly initially. We will continue olaparib 300 mg b.i.d.. We will consider to do 3 more cycles of that and repeat a scan CT if symptomatic chest, abdomen and pelvis after 3 cycles of 3 months of treatment.  Otherwise will follow monthly  If she gets anymore symptoms from her cancer or signs of progression, we should see her earlier if needed.  We also discussed that she has some mild hydronephroses bilaterally and possible bilateral ureteral stents, especially if her creatinine increases.  She will follow up locally with Urology.  The patient as well as her family agree with this plan.  They are very appreciative of her care.  All questions were answered.      We will also screen her for the NanoOvary study and see whether her primary tumor is an immunoreactive subtype.  Of note, she does have a clear cell cancer of the kidney, which has not been treated yet.  It has been stable.  Cryoablation would be a definitive treatment option and might be necessary if she would qualify for the NanoOvary study.               Bakari Galeas MD, MS    Department of Obstetrics and Gynecology   Division of Gynecologic Oncology   [facility]  Phone: [phone]             CC  Patient Care Team:  Sonja Hathaway MD as PCP - General (Family Practice)  Elsa Jones RN as Specialty Care Coordinator (Gyn-Onc)  Bakari Galeas MD as MD (Gyn-Onc)  SELF, REFERRED

## 2020-05-29 ENCOUNTER — DOCUMENTATION ONLY (OUTPATIENT)
Dept: ONCOLOGY | Facility: CLINIC | Age: 75
End: 2020-05-29

## 2020-05-29 NOTE — PROGRESS NOTES
Lab orders printed and faxed to [facility], [place], fax # [phone] per request of [facility].  Successful fax transmission was verified via rightfax.    Dana Felipe CMA

## 2020-06-02 DIAGNOSIS — C56.9 MALIGNANT NEOPLASM OF OVARY, UNSPECIFIED LATERALITY (H): Primary | ICD-10-CM

## 2020-06-03 DIAGNOSIS — C56.9 MALIGNANT NEOPLASM OF OVARY, UNSPECIFIED LATERALITY (H): Primary | ICD-10-CM

## 2020-06-05 ENCOUNTER — VIRTUAL VISIT (OUTPATIENT)
Dept: PALLIATIVE CARE | Facility: CLINIC | Age: 75
End: 2020-06-05
Attending: INTERNAL MEDICINE
Payer: MEDICARE

## 2020-06-05 DIAGNOSIS — C56.9 MALIGNANT NEOPLASM OF OVARY, UNSPECIFIED LATERALITY (H): Primary | ICD-10-CM

## 2020-06-05 DIAGNOSIS — M17.12 PRIMARY OSTEOARTHRITIS OF LEFT KNEE: ICD-10-CM

## 2020-06-05 DIAGNOSIS — G89.29 CHRONIC BILATERAL LOW BACK PAIN WITHOUT SCIATICA: ICD-10-CM

## 2020-06-05 DIAGNOSIS — M54.50 CHRONIC BILATERAL LOW BACK PAIN WITHOUT SCIATICA: ICD-10-CM

## 2020-06-05 PROCEDURE — 99204 OFFICE O/P NEW MOD 45 MIN: CPT | Mod: 95 | Performed by: INTERNAL MEDICINE

## 2020-06-05 PROCEDURE — 40001009 ZZH VIDEO/TELEPHONE VISIT; NO CHARGE

## 2020-06-05 NOTE — LETTER
"6/5/2020       RE: Afshan Cardona  [address]  [place] [address]     Dear Colleague,    Thank you for referring your patient, Afshan Cardona, to the [facility] at [facility]. Please see a copy of my visit note below.    Afshan Cardona is a 75 year old female who is being evaluated via a billable video visit.      The patient has been notified of following:     \"This video visit will be conducted via a call between you and your physician/provider. We have found that certain health care needs can be provided without the need for an in-person physical exam.  This service lets us provide the care you need with a video conversation.  If a prescription is necessary we can send it directly to your pharmacy.  If lab work is needed we can place an order for that and you can then stop by our lab to have the test done at a later time.    Video visits are billed at different rates depending on your insurance coverage.  Please reach out to your insurance provider with any questions.    If during the course of the call the physician/provider feels a video visit is not appropriate, you will not be charged for this service.\"    Patient has given verbal consent for Video visit? Yes    How would you like to obtain your AVS? MyChart    Patient would like the video invitation sent by: Send to e-mail at: [email or web address]    Will anyone else be joining your video visit?           Secondary Video Option (Doximity), send text message to:  [phone]    I have reviewed and updated the patient's allergies and medication list.    Concerns: Patient has left knee pain and the ortho says she needs it taken care of. She would like to talk about this.      Refills: None      Florin Conner, EMT      [facility]    (This note was transcribed using voice recognition software. While I review and edit the transcription, I may miss errors, and the " software sometimes does unexpected capitalizations and formatting that I miss. Please let me know of any serious mistranscriptions and I will addend this note.)    Patient ID:  She has recurrent, platinum resistant high-grade serous ovarian cancer status post neoadjuvant chemotherapy and debulking.  Incidental also has renal clear cell carcinoma    Started olaparib 2020. May 2020 progression on scans, continue olaparib.    No HCD on chart    History:  The visit is extremely chaotic.  The video visit quality is very poor but I do spent 10 minutes on video with her and do a video PE.  The remainder of the visit was done on the phone.  Her daughters participate in the visit the entire time and give a lot of the history.    The patient's impression was that the purpose of this visit was for me to talk about medical marijuana.  The patient says that Dr. Melia Glover recommended it.  The patient's daughter tells me that Dr. Melia Glover did not recommend it but the patient has been asking for it.  The daughter tells me that the patient has a long history of overusing medications.  Off and on for years she is been prescribed opiates for arthritis pain in her back and her knees and the daughter says frequently they find the patient intoxicated and impaired.  The patient has a history of back surgery years ago.  They are not sure how many pills she takes a day but she often will sleep for many hours and not respond to phone calls and they find her disoriented and confused.  They are not sure if the patient is taking her olaparib.  The patient lives alone and manages her own medications.  The family is thinking about getting assisted living for her and is wanting guidance about whether it is time for assisted living.  The patient does not have any timmy history of substance use disorder, but the daughters do describe that after her second  , the patient did begin using prescription pain medicine frequently, and  they have had persistent concerns about overuse and misuse.    The patient tells me she takes 3 oxycodone a day.  The family thinks she may be taking a today.  The patient says it does not do a great job of treating her pain and she wants more.  I can tell on the chart that she asked her primary care doctor for more and her primary care doctor declined to just prescribe more.  I think she prescribes her up to 3 a day.  The patient is interested in medical cannabis and we talked how it worked including that it can intoxicating and cause dizziness and falls and there would be no way that it could at all be safe for her in her current condition, especially taking opiates.    Outside records: ortho visit 5/15, L knee med meniscus tear, did cortisone injection. R TKA 6 months ago.     She saw her oncologist about 10 days ago and his note is not finished.  I asked the patient what she heard from that visit and she said that he told her that her pain was from her cancer.  She cannot really tell me anything else about the visit.  Her daughter tells me that they heard that the cancer was growing but the recommendation was to continue the olaparib.  Rachel her daughter says that she understands that the patient's cancer is incurable and terminal and there are emotionally ready for that.    SH: As above.      ROS: Comp rOS o/w negative    PE: There were no vitals taken for this visit.   Wt Readings from Last 3 Encounters:   05/27/20 69.6 kg (153 lb 8 oz)   03/04/20 69.9 kg (154 lb)   01/03/20 69.9 kg (154 lb)     Alert woman in no acute distress.  Head NCAT.  Face symmetric.  Speech is fluent, speaks in full sentences, no dysarthria.  Sensorium appears clear.  Thought processes are somewhat tangential.  Memory and insight to recent medical events seems limited.  She is able to stand up on her own and she walks with a unsteady gait with a four-wheel walker.  Moves all 4 equally.    Data reviewed:  I reviewed recent labs and  imaging, my comments:  Creatinine 0.77.  Albumin 3    CT in May showed progression, increased size in the pelvic masses and necrotic lymph nodes and mass abutting the posterior bladder wall and distal ureters.  Bilateral hydronephrosis and ureteral stents noted.  Images personally reviewed today.    Impression & Recommendations:  75-year-old woman with recurrent ovarian cancer on palliative olaparib.  Course complicated by severe chronic pain related to back and knee arthritis.  Chronic opioid use, concern for opioid overuse/unsafe use.  As above, it was a pretty chaotic virtual visit.    Given the the family's report of the patient's persistent use of opiates with obvious dangerous toxicities, however with the patient continuing use and wanting to use them more, it is likely that the patient has an opioid use disorder, at least mild.  Her primary care doctor I believe is prescribing her opiates, if she has a similar assessment I would consider medication assisted therapy for opiate use disorder as a possibility such as low-dose Suboxone.    The patient's main question for me was about medical cannabis and I explained that I absolutely did not think medical cannabis was safe for her.  I think 1 could consider it if she was off opiates entirely but it would be very dangerous for her to use both of them given she is already using opioids dangerously.  She seems to accept that and thanked me for my honesty.  I also pointed out to her that she is taking opiates and reporting severe uncontrolled pain, and her family is reporting dangerous side effects, and I suggested to her that opiates are failed her and I would not recommend continuing them anymore.  I think procedural and nonopiate interventions for her knee pain are indicated.  The patient I believe try to hang up with me when I said that.  She stopped talking to me and I did have a good chat with her daughter Rachel for a while after this as well.     Her family is  clearly struggling with caregiving challenges and grief with the patient's apparent unsafe living situation.  Rachel wanted to know if I thought assisted living was reasonable and I failure said I very much thought it was reasonable and quite indicated.  They are at a point in which they are not even sure if she is taking the chemotherapy regularly, for instance.  Rachel does seem to be well-informed, loving, and trying to do the right thing for her mother. It is unclear to me what the patient understands about her condition.    F/u prn      Chart data reviewed today:  Advance care planning:     No family history on file.  Past Medical History:   Diagnosis Date     Vivas's esophagus      Cardiomyopathy (H)     Taksumoto     Colon polyps      Diabetes (H)      Fibromyalgia      Former smoker      History of DVT (deep vein thrombosis)      HLD (hyperlipidemia)      HTN (hypertension)      Hypothyroid      TIA (transient ischemic attack)      Past Surgical History:   Procedure Laterality Date     AS ESOPHAGOSCOPY, DIAGNOSTIC       BACK SURGERY       bladder lift       HYSTERECTOMY       HYSTERECTOMY TOTAL ABD, TRINY SALPINGO-OOPHORECTOMY, NODE DISSECTION, TUMOR DEBULKING, COMBINED N/A 7/24/2018    Procedure: COMBINED HYSTERECTOMY TOTAL ABDOMINAL, SALPINGO-OOPHORECTOMY, NODE DISSECTION, TUMOR DEBULKING;;  Surgeon: Bakari Galeas MD;  [facility]     LAPAROSCOPY DIAGNOSTIC (GYN) N/A 7/6/2018    Procedure: LAPAROSCOPY DIAGNOSTIC (GYN);  Diagnostic Laparoscopy, Biopsies;  Surgeon: Bakari Galeas MD;  [facility]     LAPAROTOMY EXPLORATORY N/A 7/24/2018    Procedure: LAPAROTOMY EXPLORATORY;  Exploratory Laparotomy, lysis of adhesions, jejunal mesentary biopsy and sigmoid epiploic biopsy. ;  Surgeon: Bakari Galeas MD;  [facility]     LAPAROTOMY, TUMOR DEBULKING, COMBINED N/A 2/22/2019    Procedure: Exploratory Laparotomy, tumor debulking, omentectomy, lysis of adhesions x 60 minutes, pelvic  paritonectomy;  Surgeon: Bakari Galeas MD;  [facility]     PROCTOSCOPY N/A 2/22/2019    Procedure: Proctoscopy;  Surgeon: Bakari Galeas MD;  [facility]     SALPINGO-OOPHORECTOMY BILATERAL Bilateral 2/22/2019    Procedure: Bilateral Salpingo Oophorectomy;  Surgeon: Bakari Galeas MD;  [facility]     Allergies   Allergen Reactions     Nitrofurantoin      Burning around her mouth, pt advised by neurologist to not take macrobid         Azithromycin Diarrhea     Buprenorphine Nausea and Vomiting     Severe vomiting     Clonazepam      Furosemide      Low sodium     Hydrochlorothiazide      Low Sodium     Lantus [Insulin Glargine]      Low blood sugar     Lisinopril Cough     Morphine Nausea and Vomiting     Pregabalin      Dizziness; Worse, numbness/tingling/burning pain whole body, hospitalized     Sulfamethoxazole Hives     Medications: I have reviewed the patient's medication profile.     Thank you for involving us in the patient's care.   Chad Atkinson MD / Palliative Medicine / Text me via [facility] - search for 'Demetrio' - then click the pager icon / My [place] is in the [facility]: [phone] (scheduling); [phone] (triage). Palliative care [facility] outpatient care coordinator is Nery King RN: [phone]. Palliative After-Hours Answering Service: [phone].             Video-Visit Details    Type of service:  Video Visit    Video Start Time: 0925  Video End Time: 0935  Phone call time [phone].    Originating Location (pt. Location): Home    Distant Location (provider location):  home    Platform used for Video Visit: [email or web address]              Again, thank you for allowing me to participate in the care of your patient.      Sincerely,    Chad Atkinson MD

## 2020-06-05 NOTE — PROGRESS NOTES
"Afshan Cardona is a 75 year old female who is being evaluated via a billable video visit.      The patient has been notified of following:     \"This video visit will be conducted via a call between you and your physician/provider. We have found that certain health care needs can be provided without the need for an in-person physical exam.  This service lets us provide the care you need with a video conversation.  If a prescription is necessary we can send it directly to your pharmacy.  If lab work is needed we can place an order for that and you can then stop by our lab to have the test done at a later time.    Video visits are billed at different rates depending on your insurance coverage.  Please reach out to your insurance provider with any questions.    If during the course of the call the physician/provider feels a video visit is not appropriate, you will not be charged for this service.\"    Patient has given verbal consent for Video visit? Yes    How would you like to obtain your AVS? Maverickharjanis    Patient would like the video invitation sent by: Send to e-mail at: [email or web address]    Will anyone else be joining your video visit?           Secondary Video Option (Doximity), send text message to:  [phone]    I have reviewed and updated the patient's allergies and medication list.    Concerns: Patient has left knee pain and the ortho says she needs it taken care of. She would like to talk about this.      Refills: None      Florin Conner, PATT      [facility]    (This note was transcribed using voice recognition software. While I review and edit the transcription, I may miss errors, and the software sometimes does unexpected capitalizations and formatting that I miss. Please let me know of any serious mistranscriptions and I will addend this note.)    Patient ID:  She has recurrent, platinum resistant high-grade serous ovarian cancer status post neoadjuvant chemotherapy and debulking.  " Incidental also has renal clear cell carcinoma    Started olaparib 2020. May 2020 progression on scans, continue olaparib.    No HCD on chart    History:  The visit is extremely chaotic.  The video visit quality is very poor but I do spent 10 minutes on video with her and do a video PE.  The remainder of the visit was done on the phone.  Her daughters participate in the visit the entire time and give a lot of the history.    The patient's impression was that the purpose of this visit was for me to talk about medical marijuana.  The patient says that Dr. Melia Glover recommended it.  The patient's daughter tells me that Dr. Melia Glover did not recommend it but the patient has been asking for it.  The daughter tells me that the patient has a long history of overusing medications.  Off and on for years she is been prescribed opiates for arthritis pain in her back and her knees and the daughter says frequently they find the patient intoxicated and impaired.  The patient has a history of back surgery years ago.  They are not sure how many pills she takes a day but she often will sleep for many hours and not respond to phone calls and they find her disoriented and confused.  They are not sure if the patient is taking her olaparib.  The patient lives alone and manages her own medications.  The family is thinking about getting assisted living for her and is wanting guidance about whether it is time for assisted living.  The patient does not have any timmy history of substance use disorder, but the daughters do describe that after her second  , the patient did begin using prescription pain medicine frequently, and they have had persistent concerns about overuse and misuse.    The patient tells me she takes 3 oxycodone a day.  The family thinks she may be taking a today.  The patient says it does not do a great job of treating her pain and she wants more.  I can tell on the chart that she asked her primary  care doctor for more and her primary care doctor declined to just prescribe more.  I think she prescribes her up to 3 a day.  The patient is interested in medical cannabis and we talked how it worked including that it can intoxicating and cause dizziness and falls and there would be no way that it could at all be safe for her in her current condition, especially taking opiates.    Outside records: ortho visit 5/15, L knee med meniscus tear, did cortisone injection. R TKA 6 months ago.     She saw her oncologist about 10 days ago and his note is not finished.  I asked the patient what she heard from that visit and she said that he told her that her pain was from her cancer.  She cannot really tell me anything else about the visit.  Her daughter tells me that they heard that the cancer was growing but the recommendation was to continue the olaparib.  Rachel her daughter says that she understands that the patient's cancer is incurable and terminal and there are emotionally ready for that.    SH: As above.      ROS: Comp rOS o/w negative    PE: There were no vitals taken for this visit.   Wt Readings from Last 3 Encounters:   05/27/20 69.6 kg (153 lb 8 oz)   03/04/20 69.9 kg (154 lb)   01/03/20 69.9 kg (154 lb)     Alert woman in no acute distress.  Head NCAT.  Face symmetric.  Speech is fluent, speaks in full sentences, no dysarthria.  Sensorium appears clear.  Thought processes are somewhat tangential.  Memory and insight to recent medical events seems limited.  She is able to stand up on her own and she walks with a unsteady gait with a four-wheel walker.  Moves all 4 equally.    Data reviewed:  I reviewed recent labs and imaging, my comments:  Creatinine 0.77.  Albumin 3    CT in May showed progression, increased size in the pelvic masses and necrotic lymph nodes and mass abutting the posterior bladder wall and distal ureters.  Bilateral hydronephrosis and ureteral stents noted.  Images personally reviewed  today.    Impression & Recommendations:  75-year-old woman with recurrent ovarian cancer on palliative olaparib.  Course complicated by severe chronic pain related to back and knee arthritis.  Chronic opioid use, concern for opioid overuse/unsafe use.  As above, it was a pretty chaotic virtual visit.    Given the the family's report of the patient's persistent use of opiates with obvious dangerous toxicities, however with the patient continuing use and wanting to use them more, it is likely that the patient has an opioid use disorder, at least mild.  Her primary care doctor I believe is prescribing her opiates, if she has a similar assessment I would consider medication assisted therapy for opiate use disorder as a possibility such as low-dose Suboxone.    The patient's main question for me was about medical cannabis and I explained that I absolutely did not think medical cannabis was safe for her.  I think 1 could consider it if she was off opiates entirely but it would be very dangerous for her to use both of them given she is already using opioids dangerously.  She seems to accept that and thanked me for my honesty.  I also pointed out to her that she is taking opiates and reporting severe uncontrolled pain, and her family is reporting dangerous side effects, and I suggested to her that opiates are failed her and I would not recommend continuing them anymore.  I think procedural and nonopiate interventions for her knee pain are indicated.  The patient I believe try to hang up with me when I said that.  She stopped talking to me and I did have a good chat with her daughter Rachel for a while after this as well.     Her family is clearly struggling with caregiving challenges and grief with the patient's apparent unsafe living situation.  Rachel wanted to know if I thought assisted living was reasonable and I failure said I very much thought it was reasonable and quite indicated.  They are at a point in which they are not  even sure if she is taking the chemotherapy regularly, for instance.  Rachel does seem to be well-informed, loving, and trying to do the right thing for her mother. It is unclear to me what the patient understands about her condition.    F/u prn      Chart data reviewed today:  Advance care planning:     No family history on file.  Past Medical History:   Diagnosis Date     Vivas's esophagus      Cardiomyopathy (H)     [place]     Colon polyps      Diabetes (H)      Fibromyalgia      Former smoker      History of DVT (deep vein thrombosis)      HLD (hyperlipidemia)      HTN (hypertension)      Hypothyroid      TIA (transient ischemic attack)      Past Surgical History:   Procedure Laterality Date     AS ESOPHAGOSCOPY, DIAGNOSTIC       BACK SURGERY       bladder lift       HYSTERECTOMY       HYSTERECTOMY TOTAL ABD, TRINY SALPINGO-OOPHORECTOMY, NODE DISSECTION, TUMOR DEBULKING, COMBINED N/A 7/24/2018    Procedure: COMBINED HYSTERECTOMY TOTAL ABDOMINAL, SALPINGO-OOPHORECTOMY, NODE DISSECTION, TUMOR DEBULKING;;  Surgeon: Bakari Galeas MD;  Location: [facility] OR     LAPAROSCOPY DIAGNOSTIC (GYN) N/A 7/6/2018    Procedure: LAPAROSCOPY DIAGNOSTIC (GYN);  Diagnostic Laparoscopy, Biopsies;  Surgeon: Bakari Galeas MD;  Location: [facility] OR     LAPAROTOMY EXPLORATORY N/A 7/24/2018    Procedure: LAPAROTOMY EXPLORATORY;  Exploratory Laparotomy, lysis of adhesions, jejunal mesentary biopsy and sigmoid epiploic biopsy. ;  Surgeon: Bakari Galeas MD;  Location: [facility] OR     LAPAROTOMY, TUMOR DEBULKING, COMBINED N/A 2/22/2019    Procedure: Exploratory Laparotomy, tumor debulking, omentectomy, lysis of adhesions x 60 minutes, pelvic paritonectomy;  Surgeon: Bakari Galeas MD;  Location: [facility] OR     PROCTOSCOPY N/A 2/22/2019    Procedure: Proctoscopy;  Surgeon: Bakari Galeas MD;  Location: [facility] OR     SALPINGO-OOPHORECTOMY BILATERAL Bilateral 2/22/2019    Procedure: Bilateral Salpingo Oophorectomy;  Surgeon: Gudelia  Bakari ELKINS MD;  Location: [facility] OR     Allergies   Allergen Reactions     Nitrofurantoin      Burning around her mouth, pt advised by neurologist to not take macrobid         Azithromycin Diarrhea     Buprenorphine Nausea and Vomiting     Severe vomiting     Clonazepam      Furosemide      Low sodium     Hydrochlorothiazide      Low Sodium     Lantus [Insulin Glargine]      Low blood sugar     Lisinopril Cough     Morphine Nausea and Vomiting     Pregabalin      Dizziness; Worse, numbness/tingling/burning pain whole body, hospitalized     Sulfamethoxazole Hives     Medications: I have reviewed the patient's medication profile.     Thank you for involving us in the patient's care.   Chad Atkinson MD / Palliative Medicine / Zulma brown via [facility] - search for 'Demetrio' - then click the pager icon / My clinic is in the [facility] [place]: [phone] (scheduling); [phone] (triage). Palliative care [facility] [place] outpatient care coordinator is Nery King RN: [phone]. Palliative After-Hours Answering Service: [phone].             Video-Visit Details    Type of service:  Video Visit    Video Start Time: 0925  Video End Time: 0935  Phone call time [phone].    Originating Location (pt. Location): Home    Distant Location (provider location):  home    Platform used for Video Visit: [email or web address]

## 2020-06-10 ENCOUNTER — TELEPHONE (OUTPATIENT)
Dept: ONCOLOGY | Facility: CLINIC | Age: 75
End: 2020-06-10

## 2020-06-10 NOTE — ORAL ONC MGMT
Oral Chemotherapy Monitoring Program    Primary Oncologist: Dr. Galeas  Primary Oncology Clinic: [facility]   Cancer Diagnosis: Ovarian cancer     Therapy History:  C1D1: 2/7/2020  Lynparza 300 mg (2 X 150 mg tab) by mouth twice daily     Drug Interaction Assessment: No new known drug interactions     Lab Monitoring Plan  CBC and CMP monthly  Subjective/Objective:  Afshan Cardona is a 75 year old female contacted by phone for a follow-up visit for oral chemotherapy. Afshan verified she is taking the Lynparza correctly and that she has missed no doses in the last two weeks. She said she is doing great on Lynparza, and she hopes she can stay on it. She doesn't want to do any infusion chemo like she did in the past. She denied side effects or the Lynparza giving her any trouble. She said she has lab appointments set up at the [facility] on June 24th and July 29th.     ORAL CHEMOTHERAPY 1/3/2020 2/26/2020 3/17/2020 4/14/2020 6/10/2020   Drug Name Lynparza (Olaparib) Lynparza (Olaparib) Lynparza (Olaparib) Lynparza (Olaparib) Lynparza (Olaparib)   Current Dosage 300mg 300mg 300mg 300mg 300mg   Current Schedule BID BID BID BID BID   Cycle Details Continuous Continuous Continuous Continuous Continuous   Start Date of Last Cycle - 2/20/2020 3/20/2020 - -   Planned next cycle start date - 3/20/2020 4/19/2020 - -   Doses missed in last 2 weeks - 0 0 0 0   Adherence Assessment - Adherent Adherent Adherent Adherent   Adverse Effects - No AE identified during assessment No AE identified during assessment Nausea No AE identified during assessment   Any new drug interactions? - No - Yes No   Is the dose as ordered appropriate for the patient? - Yes - - Yes     Vitals:  BP:   BP Readings from Last 1 Encounters:   05/27/20 (!) 147/67     Wt Readings from Last 1 Encounters:   05/27/20 69.6 kg (153 lb 8 oz)     Estimated body surface area is 1.74 meters squared as calculated from the following:    Height  "as of 3/13/19: 1.575 m (5' 2.01\").    Weight as of 5/27/20: 69.6 kg (153 lb 8 oz).    Labs:  _  Result Component Current Result Ref Range   Sodium 133 (5/27/2020) 133 - 144 mmol/L     _  Result Component Current Result Ref Range   Potassium 3.7 (5/27/2020) 3.4 - 5.3 mmol/L     _  Result Component Current Result Ref Range   Calcium 9.2 (5/27/2020) 8.5 - 10.1 mg/dL     No results found for Mag within last 30 days.     No results found for Phos within last 30 days.     _  Result Component Current Result Ref Range   Albumin 3.0 (L) (5/27/2020) 3.4 - 5.0 g/dL     _  Result Component Current Result Ref Range   Urea Nitrogen 12 (5/27/2020) 7 - 30 mg/dL     _  Result Component Current Result Ref Range   Creatinine 0.77 (5/27/2020) 0.52 - 1.04 mg/dL       _  Result Component Current Result Ref Range   AST 7 (5/27/2020) 0 - 45 U/L     _  Result Component Current Result Ref Range   ALT 13 (5/27/2020) 0 - 50 U/L     _  Result Component Current Result Ref Range   Bilirubin Total 0.2 (5/27/2020) 0.2 - 1.3 mg/dL       _  Result Component Current Result Ref Range   WBC 9.1 (5/27/2020) 4.0 - 11.0 10e9/L     _  Result Component Current Result Ref Range   Hemoglobin 10.0 (L) (5/27/2020) 11.7 - 15.7 g/dL     _  Result Component Current Result Ref Range   Platelet Count 353 (5/27/2020) 150 - 450 10e9/L     _  Result Component Current Result Ref Range   Absolute Neutrophil 7.4 (5/27/2020) 1.6 - 8.3 10e9/L       Assessment:  Afshan is tolerating the Lynparza well with no noted side effects    Plan:  -Continue Lynparza 300 mg by mouth twice daily    Follow-Up:  Review her labs on 6/24    Mya Puente  Pharmacy Intern  [facility]  [phone]      "

## 2020-06-24 ENCOUNTER — TELEPHONE (OUTPATIENT)
Dept: ONCOLOGY | Facility: CLINIC | Age: 75
End: 2020-06-24

## 2020-06-24 NOTE — ORAL ONC MGMT
Oral Chemotherapy Monitoring Program     Placed call to patient in follow up of oral chemotherapy. Left message requesting call back. No drug names were mentioned. Will update when response received.     Val Flowers, PharmD, BCOP  Hematology/Oncology Clinical Pharmacist  [facility]  [facility]

## 2020-06-25 ENCOUNTER — TELEPHONE (OUTPATIENT)
Dept: ONCOLOGY | Facility: CLINIC | Age: 75
End: 2020-06-25

## 2020-06-25 ENCOUNTER — MYC MEDICAL ADVICE (OUTPATIENT)
Dept: ONCOLOGY | Facility: CLINIC | Age: 75
End: 2020-06-25

## 2020-06-25 NOTE — TELEPHONE ENCOUNTER
Oral Chemotherapy Monitoring Program  Lab Follow-Up     Placed call to patient in follow up of lab work completed for Lynparza oral chemotherapy.     Labs:          Assessment/Plan:   I detailed to Afshan that her lab work is generally unremarkable. We did discuss the drop in her Hgb as well as her slight increase in Scr. She details that she doesn't feel she drinks as much water as she should and has noticed being a little more tired lately.     She will continue with monthly labs again next month. All questions were answered to her stated satisfaction. She thanked me for the call and care.       Lona Prieto, PharmD  Oral Chemotherapy Monitoring Program  [facility]  [phone]  June 25, 2020

## 2020-07-02 DIAGNOSIS — C56.9 MALIGNANT NEOPLASM OF OVARY, UNSPECIFIED LATERALITY (H): Primary | ICD-10-CM

## 2020-07-09 ENCOUNTER — PATIENT OUTREACH (OUTPATIENT)
Dept: ONCOLOGY | Facility: CLINIC | Age: 75
End: 2020-07-09

## 2020-07-15 ENCOUNTER — VIRTUAL VISIT (OUTPATIENT)
Dept: ONCOLOGY | Facility: CLINIC | Age: 75
End: 2020-07-15
Attending: FAMILY MEDICINE
Payer: MEDICARE

## 2020-07-15 DIAGNOSIS — C56.9 OVARIAN CANCER, UNSPECIFIED LATERALITY (H): Primary | ICD-10-CM

## 2020-07-15 PROCEDURE — 40001009 ZZH VIDEO/TELEPHONE VISIT; NO CHARGE

## 2020-07-15 PROCEDURE — 99443 ZZC PHYSICIAN TELEPHONE EVALUATION 21-30 MIN: CPT | Performed by: OBSTETRICS & GYNECOLOGY

## 2020-07-15 RX ORDER — TAMSULOSIN HYDROCHLORIDE 0.4 MG/1
0.4 CAPSULE ORAL
COMMUNITY
Start: 2020-06-26

## 2020-07-15 NOTE — LETTER
"    7/15/2020         RE: Afshan Cardona  [address]  [facility] [address]        Dear Colleague,    Thank you for referring your patient, Afshan Cardona, to the [facility]. Please see a copy of my visit note below.    Afshan Cardona is a 75 year old female who is being evaluated via a billable telephone visit.      Vitals - Patient Reported  Weight (Patient Reported): 66.7 kg (147 lb)  Height (Patient Reported): 157.5 cm (5' 2\")  BMI (Based on Pt Reported Ht/Wt): 26.89  Pain Score: No Pain (0)    Patient stopped taking Lynpraza about two weeks ago. Made patient severely sick- throwing up - since stopping patient feels much better. Wants to know if she should still be taking Flomax once she runs out?    Carroll Cullen LPN    Phone call duration: 25 minutes                Follow Up Notes on Referred Patient    Date: 7/15/2020       Dr. 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   The patient presents today for followup.  She has received 1 cycle of adjuvant chemotherapy after her R0 interval cytoreduction. She has started olaparib 300mg twice a day. Initial nausea, that has worsened again, no fever or chills. Had UTI 6 weeks finished antibiotics. Normal bowel function.  Does have some issues with urinary incontinence.   No vaginal bleeding or discharge.  No B symptoms.      Oncologic History:  1. High-grade serous ovarian carcinoma stage IIIC and possibly stage IV status post 3 cycles of carboplatin and Taxol chemotherapy with severe hand-foot pain/neuropathy with underlying peripheral neuropathy due to diabetes, therapy changed to carboplatin and Taxotere with the last dose of chemotherapy given for 3 cycles 2018, followed by IDC with single-node " dissection and tumor debulking with partial omentectomy February 22, 2019, followed by adjuvant chemotherapy with carboplatin and Taxotere resumed April 18, 2019, with 1 dose only and therapy was stopped short of 2 cycles due to poor tolerance with severe worsening of the peripheral neuropathy and hyperglycemia and progressive decline in performance status.   2. Progressive increase in  as of August 21, 2019, in the context of restaging CT scans showing a right middle lobe subpleural pulmonary nodule/consolidation with associated right chest wall pain (Danna 3, 2019).   3. 1.6 cm superior pole of the left kidney exophytic mass that was biopsy proven to be renal cell carcinoma according to the patient, evaluated at [facility]. No further intervention recommended.   4. Enhancing 1 cm lesion in segment 8 of the liver that did not demonstrate washout. Recommend further workup (Danna 3, 2019). Three additional lesions in segment 3 were also noted that did not wash out after contrast.   5. September 4, 2019: No suspicious liver lesions. Focal abnormal lesion in segment 8 was felt to be AV shunting/nonaggressive process.   6. CT chest with IV contrast: Old granulomatous disease with scar in the right middle lobe was seen without any nodule or evidence of metastatic disease.    7. Disease recurrence 1/3/2020 platinum resistant: started on olaparib 300 mg b.i.d    Past Medical History:    Past Medical History:   Diagnosis Date     Vivas's esophagus      Cardiomyopathy (H)     Taksumoto     Colon polyps      Diabetes (H)      Fibromyalgia      Former smoker      History of DVT (deep vein thrombosis)      HLD (hyperlipidemia)      HTN (hypertension)      Hypothyroid      TIA (transient ischemic attack)          Past Surgical History:    Past Surgical History:   Procedure Laterality Date     AS ESOPHAGOSCOPY, DIAGNOSTIC       BACK SURGERY       bladder lift       HYSTERECTOMY       HYSTERECTOMY TOTAL ABD, TRINY  SALPINGO-OOPHORECTOMY, NODE DISSECTION, TUMOR DEBULKING, COMBINED N/A 7/24/2018    Procedure: COMBINED HYSTERECTOMY TOTAL ABDOMINAL, SALPINGO-OOPHORECTOMY, NODE DISSECTION, TUMOR DEBULKING;;  Surgeon: Bakari Galeas MD;  Location: [facility]     LAPAROSCOPY DIAGNOSTIC (GYN) N/A 7/6/2018    Procedure: LAPAROSCOPY DIAGNOSTIC (GYN);  Diagnostic Laparoscopy, Biopsies;  Surgeon: Bakari Galeas MD;  Location[facility]     LAPAROTOMY EXPLORATORY N/A 7/24/2018    Procedure: LAPAROTOMY EXPLORATORY;  Exploratory Laparotomy, lysis of adhesions, jejunal mesentary biopsy and sigmoid epiploic biopsy. ;  Surgeon: Bakari Galeas MD;  [facility]     LAPAROTOMY, TUMOR DEBULKING, COMBINED N/A 2/22/2019    Procedure: Exploratory Laparotomy, tumor debulking, omentectomy, lysis of adhesions x 60 minutes, pelvic paritonectomy;  Surgeon: Bakari Galeas MD;  [facility]     PROCTOSCOPY N/A 2/22/2019    Procedure: Proctoscopy;  Surgeon: Bakari Galeas MD;  [facility]     SALPINGO-OOPHORECTOMY BILATERAL Bilateral 2/22/2019    Procedure: Bilateral Salpingo Oophorectomy;  Surgeon: Bakari Galeas MD;  Location[facility]         Health Maintenance Due   Topic Date Due     DEXA  1945     HF ACTION PLAN  1945     MICROALBUMIN  1944     DIABETIC FOOT EXAM  1944     URINE DRUG SCREEN  1944     DEPRESSION ACTION PLAN  1944     EYE EXAM  1945     PHQ-9  1945     HEPATITIS B IMMUNIZATION (1 of 3 - Risk 3-dose series) 05/19/1963     MEDICARE ANNUAL WELLNESS VISIT  05/19/2009     ZOSTER IMMUNIZATION (2 of 3) 02/15/2016     A1C  05/08/2019     LIPID  07/10/2019       Current Medications:     Current Outpatient Medications   Medication Sig Dispense Refill     aspirin (ASA) 325 MG EC tablet Take 325 mg by mouth daily       carvedilol (COREG) 25 MG tablet Take 1 tablet (25 mg) by mouth 2 times daily (with meals) (Patient taking differently: Take 12.5 mg by mouth 2 times  daily (with meals) ) 60 tablet      cephalexin (KEFLEX) 250 MG capsule TAKE ONE CAPSULE BY MOUTH ONCE DAILY AT BEDTIME  3     Cholecalciferol (VITAMIN D3) 2000 units CAPS TAKE ONE CAPSULE BY MOUTH ONCE DAILY IN THE MORNING  3     CRANBERRY EXTRACT PO Take by mouth every morning       cyanocobalamin (CYANOCOBALAMIN) 1000 MCG/ML injection Inject 1,000 mcg into the muscle every 30 days       DULoxetine (CYMBALTA) 60 MG EC capsule TAKE ONE CAPSULE BY MOUTH AT BEDTIME  1     gabapentin (NEURONTIN) 600 MG tablet Take 1 tablet (600 mg) by mouth 3 times daily 90 tablet      HUMALOG MARY KWIKPEN 100 UNIT/ML injection INJECT 1 UNIT PER 10GRAMS OF CARBOHYDRATES WITH CORRECTION 0.5 UNITS PER 50 ABOVE 150 ( UP TO 30 UNITS PER DAY)  6     insulin degludec (TRESIBA) 200 UNIT/ML pen Inject 16 Units Subcutaneous every 24 hours       levothyroxine (SYNTHROID/LEVOTHROID) 88 MCG tablet        losartan (COZAAR) 100 MG tablet Take 100 mg by mouth At Bedtime        melatonin 1 MG TABS tablet Take 1 mg by mouth nightly as needed for sleep       omeprazole (PRILOSEC) 20 MG DR capsule as needed   1     ONETOUCH VERIO IQ test strip USE TO TEST BLOOD GLUCOSE 6-8 TIMES PER DAY, BEFORE MEALS, BEDTIME TO DOSE INSULIN, HIGH OR LOW BLOOD GLUCOSE WITH RECHECK  3     oxyCODONE-acetaminophen (PERCOCET) 5-325 MG tablet Take 1 tablet by mouth 3 times daily        pravastatin (PRAVACHOL) 80 MG tablet Take 80 mg by mouth At Bedtime        prochlorperazine (COMPAZINE) 5 MG tablet Take 1 tablet (5 mg) by mouth every 6 hours as needed (Nausea/Vomiting) 30 tablet 2     tamsulosin (FLOMAX) 0.4 MG capsule Take 0.4 mg by mouth       iohexol (OMNIPAQUE) 140 MG/ML solution for oral use Mix entire bottle (50ml) of contast with 600ml (20 ounces) of water and drink half 2 hrs prior to CT scan and half 1 hr prior to scan (Patient not taking: Reported on 7/15/2020) 50 mL 0     olaparib (LYNPARZA) 150 MG tablet Take 2 tablets (300 mg) by mouth 2 times daily (Patient  not taking: Reported on 7/15/2020) 120 tablet 0         Allergies:        Allergies   Allergen Reactions     Nitrofurantoin      Burning around her mouth, pt advised by neurologist to not take macrobid         Azithromycin Diarrhea     Buprenorphine Nausea and Vomiting     Severe vomiting     Clonazepam      Furosemide      Low sodium     Hydrochlorothiazide      Low Sodium     Lantus [Insulin Glargine]      Low blood sugar     Lisinopril Cough     Morphine Nausea and Vomiting     Pregabalin      Dizziness; Worse, numbness/tingling/burning pain whole body, hospitalized     Sulfamethoxazole Hives        Social History:     Social History     Tobacco Use     Smoking status: Former Smoker     Packs/day: 0.25     Years: 15.00     Pack years: 3.75     Smokeless tobacco: Never Used   Substance Use Topics     Alcohol use: No     Frequency: Never       History   Drug Use No     General Appearance:  healthy and alert, no distress     Psychiatric:      appropriate mood and affect                       Assessment:     Afshan Cardona is a 74 year old woman with a diagnosis 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      A total of 40 minutes was spent with the patient, 30 minutes of which were spent in counseling the patient and/or treatment planning.        1.  Recurrent platinum resistant high grade serous ovarian cancer.     2.  Status post neoadjuvant chemotherapy.   3.  Status post R0 interval debulking.   4.  Renal clear cell carcinoma.     5.  Asymptomatic incisional hernia  6.  Bilateral hydronephrosis bilateral stents in place  7.  Neuropathy.   8.  Insulin-dependent diabetes.     9.   at 213     The patient had some nausea most likely from her UTI. Of note she does not want any additional chemotherapy and has not taken the PARPi regularly initially. We will restart on her olaparib 300 mg b.i.d. and can dose reduce if she has continues nausea. We will complete the 3 more cycles of that and  repeat a scan CT if symptomatic chest, abdomen and pelvis after 3 cycles of 3 months of treatment.  Otherwise will follow monthly  If she gets anymore symptoms from her cancer or signs of progression, we should see her earlier if needed. She will follow up locally with Urology regarding stent exchange or removal. They are planning to place a duffy cathter and we discussed to consider suppressive antibiotic treatment. The patient as well as her family agree with this plan.  They are very appreciative of her care.  All questions were answered.      We will also screen her for the NanoOvary study and see whether her primary tumor is an immunoreactive subtype.  Of note, she does have a clear cell cancer of the kidney, which has not been treated yet.  It has been stable.  Cryoablation would be a definitive treatment option and might be necessary if she would qualify for the NanoOvary study.               Bakari Galeas MD, MS    Department of Obstetrics and Gynecology   Division of Gynecologic Oncology   [facility]  Phone: [phone]    CC  Patient Care Team:  Sonja Hathaway MD as PCP - General (Family Practice)  Elsa Jones RN as Specialty Care Coordinator (Gyn-Onc)

## 2020-07-15 NOTE — PROGRESS NOTES
"Afshan Cardona is a 75 year old female who is being evaluated via a billable telephone visit.      The patient has been notified of following:     \"This telephone visit will be conducted via a call between you and your physician/provider. We have found that certain health care needs can be provided without the need for a physical exam.  This service lets us provide the care you need with a short phone conversation.  If a prescription is necessary we can send it directly to your pharmacy.  If lab work is needed we can place an order for that and you can then stop by our lab to have the test done at a later time.    Telephone visits are billed at different rates depending on your insurance coverage. During this emergency period, for some insurers they may be billed the same as an in-person visit.  Please reach out to your insurance provider with any questions.    If during the course of the call the physician/provider feels a telephone visit is not appropriate, you will not be charged for this service.\"    Patient has given verbal consent for Telephone visit?  Yes    What phone number would you like to be contacted at? [phone]    How would you like to obtain your AVS? Mail a copy     Vitals - Patient Reported  Weight (Patient Reported): 66.7 kg (147 lb)  Height (Patient Reported): 157.5 cm (5' 2\")  BMI (Based on Pt Reported Ht/Wt): 26.89  Pain Score: No Pain (0)    Patient stopped taking Lynpraza about two weeks ago. Made patient severely sick- throwing up - since stopping patient feels much better. Wants to know if she should still be taking Flomax once she runs out?    Carroll Cullen LPN    Phone call duration: 25 minutes                Follow Up Notes on Referred Patient    Date: 7/15/2020       Dr. Bakari Galeas MD  [address] [place] [id number]       RE: Afshan Cardona  : 1945  SRIKANTH: 7/15/2020    Afshan Cardona is a 74 year old woman with a diagnosis of recurrent platinum " resistant high grade serous ovarian cancer.   The patient presents today for followup.  She has received 1 cycle of adjuvant chemotherapy after her R0 interval cytoreduction. She has started olaparib 300mg twice a day. Initial nausea, that has worsened again, no fever or chills. Had UTI 6 weeks finished antibiotics. Normal bowel function.  Does have some issues with urinary incontinence.   No vaginal bleeding or discharge.  No B symptoms.      Oncologic History:  1. High-grade serous ovarian carcinoma stage IIIC and possibly stage IV status post 3 cycles of carboplatin and Taxol chemotherapy with severe hand-foot pain/neuropathy with underlying peripheral neuropathy due to diabetes, therapy changed to carboplatin and Taxotere with the last dose of chemotherapy given for 3 cycles December 19, 2018, followed by IDC with single-node dissection and tumor debulking with partial omentectomy February 22, 2019, followed by adjuvant chemotherapy with carboplatin and Taxotere resumed April 18, 2019, with 1 dose only and therapy was stopped short of 2 cycles due to poor tolerance with severe worsening of the peripheral neuropathy and hyperglycemia and progressive decline in performance status.   2. Progressive increase in  as of August 21, 2019, in the context of restaging CT scans showing a right middle lobe subpleural pulmonary nodule/consolidation with associated right chest wall pain (Danna 3, 2019).   3. 1.6 cm superior pole of the left kidney exophytic mass that was biopsy proven to be renal cell carcinoma according to the patient, evaluated at [facility]. No further intervention recommended.   4. Enhancing 1 cm lesion in segment 8 of the liver that did not demonstrate washout. Recommend further workup (Danna 3, 2019). Three additional lesions in segment 3 were also noted that did not wash out after contrast.   5. September 4, 2019: No suspicious liver lesions. Focal abnormal lesion in segment 8 was felt  to be AV shunting/nonaggressive process.   6. CT chest with IV contrast: Old granulomatous disease with scar in the right middle lobe was seen without any nodule or evidence of metastatic disease.    7. Disease recurrence 1/3/2020 platinum resistant: started on olaparib 300 mg b.i.d    Past Medical History:    Past Medical History:   Diagnosis Date     Vivas's esophagus      Cardiomyopathy (H)     Taksumoto     Colon polyps      Diabetes (H)      Fibromyalgia      Former smoker      History of DVT (deep vein thrombosis)      HLD (hyperlipidemia)      HTN (hypertension)      Hypothyroid      TIA (transient ischemic attack)          Past Surgical History:    Past Surgical History:   Procedure Laterality Date     AS ESOPHAGOSCOPY, DIAGNOSTIC       BACK SURGERY       bladder lift       HYSTERECTOMY       HYSTERECTOMY TOTAL ABD, TRINY SALPINGO-OOPHORECTOMY, NODE DISSECTION, TUMOR DEBULKING, COMBINED N/A 7/24/2018    Procedure: COMBINED HYSTERECTOMY TOTAL ABDOMINAL, SALPINGO-OOPHORECTOMY, NODE DISSECTION, TUMOR DEBULKING;;  Surgeon: Bakari Galeas MD;  Location: [facility]     LAPAROSCOPY DIAGNOSTIC (GYN) N/A 7/6/2018    Procedure: LAPAROSCOPY DIAGNOSTIC (GYN);  Diagnostic Laparoscopy, Biopsies;  Surgeon: Bakari Galeas MD;  [facility]     LAPAROTOMY EXPLORATORY N/A 7/24/2018    Procedure: LAPAROTOMY EXPLORATORY;  Exploratory Laparotomy, lysis of adhesions, jejunal mesentary biopsy and sigmoid epiploic biopsy. ;  Surgeon: Bakari Galeas MD;  [facility]     LAPAROTOMY, TUMOR DEBULKING, COMBINED N/A 2/22/2019    Procedure: Exploratory Laparotomy, tumor debulking, omentectomy, lysis of adhesions x 60 minutes, pelvic paritonectomy;  Surgeon: Bakari Galeas MD;  [facility]     PROCTOSCOPY N/A 2/22/2019    Procedure: Proctoscopy;  Surgeon: Bakari Galeas MD;  [facility]     SALPINGO-OOPHORECTOMY BILATERAL Bilateral 2/22/2019    Procedure: Bilateral Salpingo Oophorectomy;  Surgeon:  Bakari Galeas MD;  Location: [facility]         Health Maintenance Due   Topic Date Due     DEXA  1945     HF ACTION PLAN  1945     MICROALBUMIN  1945     DIABETIC FOOT EXAM  1944     URINE DRUG SCREEN  1945     DEPRESSION ACTION PLAN  1944     EYE EXAM  1945     PHQ-9  1945     HEPATITIS B IMMUNIZATION (1 of 3 - Risk 3-dose series) 05/19/1963     MEDICARE ANNUAL WELLNESS VISIT  05/19/2009     ZOSTER IMMUNIZATION (2 of 3) 02/15/2016     A1C  05/08/2019     LIPID  07/10/2019       Current Medications:     Current Outpatient Medications   Medication Sig Dispense Refill     aspirin (ASA) 325 MG EC tablet Take 325 mg by mouth daily       carvedilol (COREG) 25 MG tablet Take 1 tablet (25 mg) by mouth 2 times daily (with meals) (Patient taking differently: Take 12.5 mg by mouth 2 times daily (with meals) ) 60 tablet      cephalexin (KEFLEX) 250 MG capsule TAKE ONE CAPSULE BY MOUTH ONCE DAILY AT BEDTIME  3     Cholecalciferol (VITAMIN D3) 2000 units CAPS TAKE ONE CAPSULE BY MOUTH ONCE DAILY IN THE MORNING  3     CRANBERRY EXTRACT PO Take by mouth every morning       cyanocobalamin (CYANOCOBALAMIN) 1000 MCG/ML injection Inject 1,000 mcg into the muscle every 30 days       DULoxetine (CYMBALTA) 60 MG EC capsule TAKE ONE CAPSULE BY MOUTH AT BEDTIME  1     gabapentin (NEURONTIN) 600 MG tablet Take 1 tablet (600 mg) by mouth 3 times daily 90 tablet      HUMALOG MARY KWIKPEN 100 UNIT/ML injection INJECT 1 UNIT PER 10GRAMS OF CARBOHYDRATES WITH CORRECTION 0.5 UNITS PER 50 ABOVE 150 ( UP TO 30 UNITS PER DAY)  6     insulin degludec (TRESIBA) 200 UNIT/ML pen Inject 16 Units Subcutaneous every 24 hours       levothyroxine (SYNTHROID/LEVOTHROID) 88 MCG tablet        losartan (COZAAR) 100 MG tablet Take 100 mg by mouth At Bedtime        melatonin 1 MG TABS tablet Take 1 mg by mouth nightly as needed for sleep       omeprazole (PRILOSEC) 20 MG DR capsule as needed   1     ONETOUCH  VERIO IQ test strip USE TO TEST BLOOD GLUCOSE 6-8 TIMES PER DAY, BEFORE MEALS, BEDTIME TO DOSE INSULIN, HIGH OR LOW BLOOD GLUCOSE WITH RECHECK  3     oxyCODONE-acetaminophen (PERCOCET) 5-325 MG tablet Take 1 tablet by mouth 3 times daily        pravastatin (PRAVACHOL) 80 MG tablet Take 80 mg by mouth At Bedtime        prochlorperazine (COMPAZINE) 5 MG tablet Take 1 tablet (5 mg) by mouth every 6 hours as needed (Nausea/Vomiting) 30 tablet 2     tamsulosin (FLOMAX) 0.4 MG capsule Take 0.4 mg by mouth       iohexol (OMNIPAQUE) 140 MG/ML solution for oral use Mix entire bottle (50ml) of contast with 600ml (20 ounces) of water and drink half 2 hrs prior to CT scan and half 1 hr prior to scan (Patient not taking: Reported on 7/15/2020) 50 mL 0     olaparib (LYNPARZA) 150 MG tablet Take 2 tablets (300 mg) by mouth 2 times daily (Patient not taking: Reported on 7/15/2020) 120 tablet 0         Allergies:        Allergies   Allergen Reactions     Nitrofurantoin      Burning around her mouth, pt advised by neurologist to not take macrobid         Azithromycin Diarrhea     Buprenorphine Nausea and Vomiting     Severe vomiting     Clonazepam      Furosemide      Low sodium     Hydrochlorothiazide      Low Sodium     Lantus [Insulin Glargine]      Low blood sugar     Lisinopril Cough     Morphine Nausea and Vomiting     Pregabalin      Dizziness; Worse, numbness/tingling/burning pain whole body, hospitalized     Sulfamethoxazole Hives        Social History:     Social History     Tobacco Use     Smoking status: Former Smoker     Packs/day: 0.25     Years: 15.00     Pack years: 3.75     Smokeless tobacco: Never Used   Substance Use Topics     Alcohol use: No     Frequency: Never       History   Drug Use No     General Appearance:  healthy and alert, no distress     Psychiatric:      appropriate mood and affect                       Assessment:     Afshan Cardona is a 74 year old woman with a diagnosis recurrent platinum  resistant high grade serous ovarian cancer.      A total of 40 minutes was spent with the patient, 30 minutes of which were spent in counseling the patient and/or treatment planning.        1.  Recurrent platinum resistant high grade serous ovarian cancer.     2.  Status post neoadjuvant chemotherapy.   3.  Status post R0 interval debulking.   4.  Renal clear cell carcinoma.     5.  Asymptomatic incisional hernia  6.  Bilateral hydronephrosis bilateral stents in place  7.  Neuropathy.   8.  Insulin-dependent diabetes.     9.   at 213     The patient had some nausea most likely from her UTI. Of note she does not want any additional chemotherapy and has not taken the PARPi regularly initially. We will restart on her olaparib 300 mg b.i.d. and can dose reduce if she has continues nausea. We will complete the 3 more cycles of that and repeat a scan CT if symptomatic chest, abdomen and pelvis after 3 cycles of 3 months of treatment.  Otherwise will follow monthly  If she gets anymore symptoms from her cancer or signs of progression, we should see her earlier if needed. She will follow up locally with Urology regarding stent exchange or removal. They are planning to place a duffy cathter and we discussed to consider suppressive antibiotic treatment. The patient as well as her family agree with this plan.  They are very appreciative of her care.  All questions were answered.      We will also screen her for the NanoOvary study and see whether her primary tumor is an immunoreactive subtype.  Of note, she does have a clear cell cancer of the kidney, which has not been treated yet.  It has been stable.  Cryoablation would be a definitive treatment option and might be necessary if she would qualify for the NanoOvary study.               Bakari Galeas MD, MS    Department of Obstetrics and Gynecology   Division of Gynecologic Oncology   [facility]  Phone:  [phone]      Patient Care Team:  Sonja Hathaway MD as PCP - General (Family Practice)  Elsa Jones, RN as Specialty Care Coordinator (Gyn-Onc)  Jeannie Condon MD as MD (Gyn-Onc)  JEANNIE CONDON

## 2020-07-29 ENCOUNTER — TELEPHONE (OUTPATIENT)
Dept: ONCOLOGY | Facility: CLINIC | Age: 75
End: 2020-07-29

## 2020-07-29 NOTE — TELEPHONE ENCOUNTER
Oral Chemotherapy Monitoring Program    Primary Oncologist: Dr. Galeas  Primary Oncology Clinic: [facility]   Cancer Diagnosis: Ovarian cancer      Therapy History:  C1D1: 2/7/2020  Lynparza 300 mg (2 X 150 mg tab) by mouth twice daily      Drug Interaction Assessment: No new known drug interactions      Lab Monitoring Plan  CBC and CMP monthly    Subjective/Objective:  Afshan Cardona is a 75 year old female contacted by phone for a follow-up visit for oral chemotherapy.  Afshan is not tolerating therapy well at this point due to nausea and fatigue. Patient stopped taking Lynparza on 7/27. Based on the last assessment note, she was due for labs today but did not receive any reminder from the lab so I asked her to reschedule a lab draw some time next week.     ORAL CHEMOTHERAPY 1/3/2020 2/26/2020 3/17/2020 4/14/2020 6/10/2020 7/29/2020   Drug Name Lynparza (Olaparib) Lynparza (Olaparib) Lynparza (Olaparib) Lynparza (Olaparib) Lynparza (Olaparib) Lynparza (Olaparib)   Current Dosage 300mg 300mg 300mg 300mg 300mg 300mg   Current Schedule BID BID BID BID BID BID   Cycle Details Continuous Continuous Continuous Continuous Continuous Continuous   Start Date of Last Cycle - 2/20/2020 3/20/2020 - - -   Planned next cycle start date - 3/20/2020 4/19/2020 - - -   Doses missed in last 2 weeks - 0 0 0 0 2   Adherence Assessment - Adherent Adherent Adherent Adherent Non-adherent   Reason for Non-adherence - - - - - Wanted to avoid side-effects   Adherence Intervention Recommended - - - - - Other   Adverse Effects - No AE identified during assessment No AE identified during assessment Nausea No AE identified during assessment Nausea;Fatigue   Any new drug interactions? - No - Yes No -   Is the dose as ordered appropriate for the patient? - Yes - - Yes -       Vitals:  BP:   BP Readings from Last 1 Encounters:   05/27/20 (!) 147/67     Wt Readings from Last 1 Encounters:   05/27/20 69.6 kg (153 lb 8 oz)  "    Estimated body surface area is 1.74 meters squared as calculated from the following:    Height as of 3/13/19: 1.575 m (5' 2.01\").    Weight as of 5/27/20: 69.6 kg (153 lb 8 oz).    Labs:  No results found for NA within last 30 days.     No results found for K within last 30 days.     No results found for CA within last 30 days.     No results found for Mag within last 30 days.     No results found for Phos within last 30 days.     No results found for ALBUMIN within last 30 days.     No results found for BUN within last 30 days.     No results found for CR within last 30 days.       No results found for AST within last 30 days.     No results found for ALT within last 30 days.     No results found for BILITOTAL within last 30 days.       No results found for WBC within last 30 days.     No results found for HGB within last 30 days.     No results found for PLT within last 30 days.     No results found for ANC within last 30 days.       Assessment:  Afshan is not tolerating therapy well due to side effects.    Plan:  Message care team about patient stopping therapy on her own.    Follow-Up:  8/19 Gudelia steent. Appointment date may be moved sooner, depending on what the care team has to say about her current situation.    Eric Suggs  Pharmacy Intern  [facility]  [phone]         "

## 2020-08-19 ENCOUNTER — VIRTUAL VISIT (OUTPATIENT)
Dept: ONCOLOGY | Facility: CLINIC | Age: 75
End: 2020-08-19
Attending: OBSTETRICS & GYNECOLOGY
Payer: MEDICARE

## 2020-08-19 DIAGNOSIS — C56.9 OVARIAN CANCER, UNSPECIFIED LATERALITY (H): Primary | ICD-10-CM

## 2020-08-19 PROCEDURE — 99443 ZZC PHYSICIAN TELEPHONE EVALUATION 21-30 MIN: CPT | Mod: GW | Performed by: OBSTETRICS & GYNECOLOGY

## 2020-08-19 PROCEDURE — 40001009 ZZH VIDEO/TELEPHONE VISIT; NO CHARGE

## 2020-08-19 RX ORDER — MORPHINE SULFATE 20 MG/ML
5 SOLUTION ORAL
COMMUNITY
Start: 2020-08-17

## 2020-08-19 RX ORDER — MORPHINE SULFATE 15 MG/1
15 TABLET, FILM COATED, EXTENDED RELEASE ORAL
COMMUNITY
Start: 2020-08-18

## 2020-08-19 NOTE — LETTER
2020         RE: Afshan Cardona  [address]  [facility] [address]        Dear Colleague,    Thank you for referring your patient, Afshan Cardona, to the [facility]. Please see a copy of my visit note below.    Afshan Cardona is a 75 year old female who is being evaluated via a billable telephone visit.          Phone call duration: 25 minutes                Follow Up Notes on Referred Patient    Date: 2020       Dr. 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   The patient has started hospice care and is very comfortable.    Oncologic History:  1. High-grade serous ovarian carcinoma stage IIIC and possibly stage IV status post 3 cycles of carboplatin and Taxol chemotherapy with severe hand-foot pain/neuropathy with underlying peripheral neuropathy due to diabetes, therapy changed to carboplatin and Taxotere with the last dose of chemotherapy given for 3 cycles 2018, followed by IDC with single-node dissection and tumor debulking with partial omentectomy 2019, followed by adjuvant chemotherapy with carboplatin and Taxotere resumed 2019, with 1 dose only and therapy was stopped short of 2 cycles due to poor tolerance with severe worsening of the peripheral neuropathy and hyperglycemia and progressive decline in performance status.   2. Progressive increase in  as of 2019, in the context of restaging CT scans showing a right middle lobe subpleural pulmonary nodule/consolidation with associated right chest wall pain (Danna 3, 2019).   3. 1.6 cm superior pole of the left kidney exophytic mass that was biopsy proven to be renal cell carcinoma according to the patient, evaluated at [facility]. No  further intervention recommended.   4. Enhancing 1 cm lesion in segment 8 of the liver that did not demonstrate washout. Recommend further workup (Danna 3, 2019). Three additional lesions in segment 3 were also noted that did not wash out after contrast.   5. September 4, 2019: No suspicious liver lesions. Focal abnormal lesion in segment 8 was felt to be AV shunting/nonaggressive process.   6. CT chest with IV contrast: Old granulomatous disease with scar in the right middle lobe was seen without any nodule or evidence of metastatic disease.    7. Disease recurrence 1/3/2020 platinum resistant: started on olaparib 300 mg b.i.d    Past Medical History:    Past Medical History:   Diagnosis Date     Vivas's esophagus      Cardiomyopathy (H)     Taksumoto     Colon polyps      Diabetes (H)      Fibromyalgia      Former smoker      History of DVT (deep vein thrombosis)      HLD (hyperlipidemia)      HTN (hypertension)      Hypothyroid      TIA (transient ischemic attack)          Past Surgical History:    Past Surgical History:   Procedure Laterality Date     AS ESOPHAGOSCOPY, DIAGNOSTIC       BACK SURGERY       bladder lift       HYSTERECTOMY       HYSTERECTOMY TOTAL ABD, TRINY SALPINGO-OOPHORECTOMY, NODE DISSECTION, TUMOR DEBULKING, COMBINED N/A 7/24/2018    Procedure: COMBINED HYSTERECTOMY TOTAL ABDOMINAL, SALPINGO-OOPHORECTOMY, NODE DISSECTION, TUMOR DEBULKING;;  Surgeon: Bakari Galeas MD;  Location: [facility] OR     LAPAROSCOPY DIAGNOSTIC (GYN) N/A 7/6/2018    Procedure: LAPAROSCOPY DIAGNOSTIC (GYN);  Diagnostic Laparoscopy, Biopsies;  Surgeon: Bakari Galeas MD;  Location: [facility] OR     LAPAROTOMY EXPLORATORY N/A 7/24/2018    Procedure: LAPAROTOMY EXPLORATORY;  Exploratory Laparotomy, lysis of adhesions, jejunal mesentary biopsy and sigmoid epiploic biopsy. ;  Surgeon: Bakari Galeas MD;  Location: [facility] OR     LAPAROTOMY, TUMOR DEBULKING, COMBINED N/A 2/22/2019    Procedure: Exploratory Laparotomy, tumor  debulking, omentectomy, lysis of adhesions x 60 minutes, pelvic paritonectomy;  Surgeon: Bakari Galeas MD;  Location[facility]     PROCTOSCOPY N/A 2/22/2019    Procedure: Proctoscopy;  Surgeon: Bakari Galeas MD;  Location[facility]     SALPINGO-OOPHORECTOMY BILATERAL Bilateral 2/22/2019    Procedure: Bilateral Salpingo Oophorectomy;  Surgeon: Bakari Galeas MD;  Location: [facility] OR         Health Maintenance Due   Topic Date Due     DEXA  1945     HF ACTION PLAN  1945     MICROALBUMIN  1945     DIABETIC FOOT EXAM  1944     URINE DRUG SCREEN  1945     DEPRESSION ACTION PLAN  1944     EYE EXAM  1945     PHQ-9  1945     HEPATITIS B IMMUNIZATION (1 of 3 - Risk 3-dose series) 05/19/1963     MEDICARE ANNUAL WELLNESS VISIT  05/19/2009     ZOSTER IMMUNIZATION (2 of 3) 02/15/2016     A1C  05/08/2019     LIPID  07/10/2019       Current Medications:     Current Outpatient Medications   Medication Sig Dispense Refill     gabapentin (NEURONTIN) 600 MG tablet Take 1 tablet (600 mg) by mouth 3 times daily 90 tablet      HUMALOG MARY KWIKPEN 100 UNIT/ML injection INJECT 1 UNIT PER 10GRAMS OF CARBOHYDRATES WITH CORRECTION 0.5 UNITS PER 50 ABOVE 150 ( UP TO 30 UNITS PER DAY)  6     insulin degludec (TRESIBA) 200 UNIT/ML pen Inject 16 Units Subcutaneous every 24 hours       levothyroxine (SYNTHROID/LEVOTHROID) 88 MCG tablet        morphine (MS CONTIN) 15 MG CR tablet Take 15 mg by mouth       morphine sulfate HIGH CONCENTRATE (ROXANOL) 20 mg/mL (HIGH CONC) solution Take 5 mg by mouth       ONETOUCH VERIO IQ test strip USE TO TEST BLOOD GLUCOSE 6-8 TIMES PER DAY, BEFORE MEALS, BEDTIME TO DOSE INSULIN, HIGH OR LOW BLOOD GLUCOSE WITH RECHECK  3     aspirin (ASA) 325 MG EC tablet Take 325 mg by mouth daily       carvedilol (COREG) 25 MG tablet Take 1 tablet (25 mg) by mouth 2 times daily (with meals) (Patient not taking: Reported on 8/19/2020) 60 tablet      cephalexin (KEFLEX)  250 MG capsule TAKE ONE CAPSULE BY MOUTH ONCE DAILY AT BEDTIME  3     Cholecalciferol (VITAMIN D3) 2000 units CAPS TAKE ONE CAPSULE BY MOUTH ONCE DAILY IN THE MORNING  3     CRANBERRY EXTRACT PO Take by mouth every morning       DULoxetine (CYMBALTA) 60 MG EC capsule TAKE ONE CAPSULE BY MOUTH AT BEDTIME  1     iohexol (OMNIPAQUE) 140 MG/ML solution for oral use Mix entire bottle (50ml) of contast with 600ml (20 ounces) of water and drink half 2 hrs prior to CT scan and half 1 hr prior to scan (Patient not taking: Reported on 7/15/2020) 50 mL 0     losartan (COZAAR) 100 MG tablet Take 100 mg by mouth At Bedtime        melatonin 1 MG TABS tablet Take 1 mg by mouth nightly as needed for sleep       naloxone (NARCAN) 4 MG/0.1ML nasal spray Spray 1 spray in nostril       omeprazole (PRILOSEC) 20 MG DR capsule as needed   1     pravastatin (PRAVACHOL) 80 MG tablet Take 80 mg by mouth At Bedtime        prochlorperazine (COMPAZINE) 5 MG tablet Take 1 tablet (5 mg) by mouth every 6 hours as needed (Nausea/Vomiting) (Patient not taking: Reported on 8/19/2020) 30 tablet 2     tamsulosin (FLOMAX) 0.4 MG capsule Take 0.4 mg by mouth           Allergies:        Allergies   Allergen Reactions     Nitrofurantoin      Burning around her mouth, pt advised by neurologist to not take macrobid         Azithromycin Diarrhea     Buprenorphine Nausea and Vomiting     Severe vomiting     Clonazepam      Furosemide      Low sodium     Hydrochlorothiazide      Low Sodium     Lantus [Insulin Glargine]      Low blood sugar     Lisinopril Cough     Morphine Nausea and Vomiting     Pregabalin      Dizziness; Worse, numbness/tingling/burning pain whole body, hospitalized     Sulfamethoxazole Hives        Social History:     Social History     Tobacco Use     Smoking status: Former Smoker     Packs/day: 0.25     Years: 15.00     Pack years: 3.75     Smokeless tobacco: Never Used   Substance Use Topics     Alcohol use: No     Frequency: Never        History   Drug Use No           Physical Exam:     There were no vitals taken for this visit.  There is no height or weight on file to calculate BMI.    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      A total of 25 minutes was spent with the patient, 20 minutes of which were spent in counseling the patient and/or treatment planning.        1.  Recurrent platinum resistant high grade serous ovarian cancer.     2.  Status post neoadjuvant chemotherapy.   3.  Status post R0 interval debulking.   4.  Renal clear cell carcinoma.     5.  Asymptomatic incisional hernia  6.  Bilateral hydronephrosis bilateral stents in place  7.  Neuropathy.   8.  Insulin-dependent diabetes.       The patient has started hospice care and is very comfortable.   The patient as well as her family agree with this plan.  They are very appreciative of her care.  All questions were answered.                    Bakari Galeas MD, MS    Department of Obstetrics and Gynecology   Division of Gynecologic Oncology   [facility]  Phone: [phone]    CC  Patient Care Team:  Sonja Hathaway MD as PCP - General (Family Practice)  Elsa Jones RN as Specialty Care Coordinator (Gyn-Onc)  Bakari Galeas MD as MD (Gyn-Onc)

## 2020-08-19 NOTE — PROGRESS NOTES
"Afshan Cardona is a 75 year old female who is being evaluated via a billable telephone visit.      The patient has been notified of following:     \"This telephone visit will be conducted via a call between you and your physician/provider. We have found that certain health care needs can be provided without the need for a physical exam.  This service lets us provide the care you need with a short phone conversation.  If a prescription is necessary we can send it directly to your pharmacy.  If lab work is needed we can place an order for that and you can then stop by our lab to have the test done at a later time.    Telephone visits are billed at different rates depending on your insurance coverage. During this emergency period, for some insurers they may be billed the same as an in-person visit.  Please reach out to your insurance provider with any questions.    If during the course of the call the physician/provider feels a telephone visit is not appropriate, you will not be charged for this service.\"    Patient has given verbal consent for Telephone visit?  Yes    What phone number would you like to be contacted at? [phone]    How would you like to obtain your AVS? MyChart     Just placed on hospice yesterday. Only on pain medications and insulin. Wanted to have one last phone visit with provider.    Carroll Cullen LPN    Phone call duration: 25 minutes                Follow Up Notes on Referred Patient    Date: 2020       Dr. 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   The patient has started hospice care and is very comfortable.    Oncologic History:  1. High-grade serous ovarian carcinoma stage IIIC and possibly stage IV status post 3 cycles of carboplatin and Taxol chemotherapy with severe " hand-foot pain/neuropathy with underlying peripheral neuropathy due to diabetes, therapy changed to carboplatin and Taxotere with the last dose of chemotherapy given for 3 cycles December 19, 2018, followed by IDC with single-node dissection and tumor debulking with partial omentectomy February 22, 2019, followed by adjuvant chemotherapy with carboplatin and Taxotere resumed April 18, 2019, with 1 dose only and therapy was stopped short of 2 cycles due to poor tolerance with severe worsening of the peripheral neuropathy and hyperglycemia and progressive decline in performance status.   2. Progressive increase in  as of August 21, 2019, in the context of restaging CT scans showing a right middle lobe subpleural pulmonary nodule/consolidation with associated right chest wall pain (Danna 3, 2019).   3. 1.6 cm superior pole of the left kidney exophytic mass that was biopsy proven to be renal cell carcinoma according to the patient, evaluated at [facility]. No further intervention recommended.   4. Enhancing 1 cm lesion in segment 8 of the liver that did not demonstrate washout. Recommend further workup (Danna 3, 2019). Three additional lesions in segment 3 were also noted that did not wash out after contrast.   5. September 4, 2019: No suspicious liver lesions. Focal abnormal lesion in segment 8 was felt to be AV shunting/nonaggressive process.   6. CT chest with IV contrast: Old granulomatous disease with scar in the right middle lobe was seen without any nodule or evidence of metastatic disease.    7. Disease recurrence 1/3/2020 platinum resistant: started on olaparib 300 mg b.i.d    Past Medical History:    Past Medical History:   Diagnosis Date     Vivas's esophagus      Cardiomyopathy (H)     Taksumoto     Colon polyps      Diabetes (H)      Fibromyalgia      Former smoker      History of DVT (deep vein thrombosis)      HLD (hyperlipidemia)      HTN (hypertension)      Hypothyroid      TIA  (transient ischemic attack)          Past Surgical History:    Past Surgical History:   Procedure Laterality Date     AS ESOPHAGOSCOPY, DIAGNOSTIC       BACK SURGERY       bladder lift       HYSTERECTOMY       HYSTERECTOMY TOTAL ABD, TRINY SALPINGO-OOPHORECTOMY, NODE DISSECTION, TUMOR DEBULKING, COMBINED N/A 7/24/2018    Procedure: COMBINED HYSTERECTOMY TOTAL ABDOMINAL, SALPINGO-OOPHORECTOMY, NODE DISSECTION, TUMOR DEBULKING;;  Surgeon: Bakari Galeas MD;  Location[facility]     LAPAROSCOPY DIAGNOSTIC (GYN) N/A 7/6/2018    Procedure: LAPAROSCOPY DIAGNOSTIC (GYN);  Diagnostic Laparoscopy, Biopsies;  Surgeon: Bakari Galeas MD;  Location[facility]     LAPAROTOMY EXPLORATORY N/A 7/24/2018    Procedure: LAPAROTOMY EXPLORATORY;  Exploratory Laparotomy, lysis of adhesions, jejunal mesentary biopsy and sigmoid epiploic biopsy. ;  Surgeon: Bakari Galeas MD;  Location[facility]     LAPAROTOMY, TUMOR DEBULKING, COMBINED N/A 2/22/2019    Procedure: Exploratory Laparotomy, tumor debulking, omentectomy, lysis of adhesions x 60 minutes, pelvic paritonectomy;  Surgeon: Bakari Galeas MD;  Location[facility]     PROCTOSCOPY N/A 2/22/2019    Procedure: Proctoscopy;  Surgeon: Bakari Galeas MD;  [facility]     SALPINGO-OOPHORECTOMY BILATERAL Bilateral 2/22/2019    Procedure: Bilateral Salpingo Oophorectomy;  Surgeon: Bakari Galeas MD;  Location: [facility]         Health Maintenance Due   Topic Date Due     DEXA  1945     HF ACTION PLAN  1945     MICROALBUMIN  1944     DIABETIC FOOT EXAM  1944     URINE DRUG SCREEN  1944     DEPRESSION ACTION PLAN  1944     EYE EXAM  1945     PHQ-9  1945     HEPATITIS B IMMUNIZATION (1 of 3 - Risk 3-dose series) 05/19/1963     MEDICARE ANNUAL WELLNESS VISIT  05/19/2009     ZOSTER IMMUNIZATION (2 of 3) 02/15/2016     A1C  05/08/2019     LIPID  07/10/2019       Current Medications:     Current Outpatient Medications   Medication  Sig Dispense Refill     gabapentin (NEURONTIN) 600 MG tablet Take 1 tablet (600 mg) by mouth 3 times daily 90 tablet      HUMALOG MARY KWIKPEN 100 UNIT/ML injection INJECT 1 UNIT PER 10GRAMS OF CARBOHYDRATES WITH CORRECTION 0.5 UNITS PER 50 ABOVE 150 ( UP TO 30 UNITS PER DAY)  6     insulin degludec (TRESIBA) 200 UNIT/ML pen Inject 16 Units Subcutaneous every 24 hours       levothyroxine (SYNTHROID/LEVOTHROID) 88 MCG tablet        morphine (MS CONTIN) 15 MG CR tablet Take 15 mg by mouth       morphine sulfate HIGH CONCENTRATE (ROXANOL) 20 mg/mL (HIGH CONC) solution Take 5 mg by mouth       ONETOUCH VERIO IQ test strip USE TO TEST BLOOD GLUCOSE 6-8 TIMES PER DAY, BEFORE MEALS, BEDTIME TO DOSE INSULIN, HIGH OR LOW BLOOD GLUCOSE WITH RECHECK  3     aspirin (ASA) 325 MG EC tablet Take 325 mg by mouth daily       carvedilol (COREG) 25 MG tablet Take 1 tablet (25 mg) by mouth 2 times daily (with meals) (Patient not taking: Reported on 8/19/2020) 60 tablet      cephalexin (KEFLEX) 250 MG capsule TAKE ONE CAPSULE BY MOUTH ONCE DAILY AT BEDTIME  3     Cholecalciferol (VITAMIN D3) 2000 units CAPS TAKE ONE CAPSULE BY MOUTH ONCE DAILY IN THE MORNING  3     CRANBERRY EXTRACT PO Take by mouth every morning       DULoxetine (CYMBALTA) 60 MG EC capsule TAKE ONE CAPSULE BY MOUTH AT BEDTIME  1     iohexol (OMNIPAQUE) 140 MG/ML solution for oral use Mix entire bottle (50ml) of contast with 600ml (20 ounces) of water and drink half 2 hrs prior to CT scan and half 1 hr prior to scan (Patient not taking: Reported on 7/15/2020) 50 mL 0     losartan (COZAAR) 100 MG tablet Take 100 mg by mouth At Bedtime        melatonin 1 MG TABS tablet Take 1 mg by mouth nightly as needed for sleep       naloxone (NARCAN) 4 MG/0.1ML nasal spray Spray 1 spray in nostril       omeprazole (PRILOSEC) 20 MG DR capsule as needed   1     pravastatin (PRAVACHOL) 80 MG tablet Take 80 mg by mouth At Bedtime        prochlorperazine (COMPAZINE) 5 MG tablet Take 1  tablet (5 mg) by mouth every 6 hours as needed (Nausea/Vomiting) (Patient not taking: Reported on 8/19/2020) 30 tablet 2     tamsulosin (FLOMAX) 0.4 MG capsule Take 0.4 mg by mouth           Allergies:        Allergies   Allergen Reactions     Nitrofurantoin      Burning around her mouth, pt advised by neurologist to not take macrobid         Azithromycin Diarrhea     Buprenorphine Nausea and Vomiting     Severe vomiting     Clonazepam      Furosemide      Low sodium     Hydrochlorothiazide      Low Sodium     Lantus [Insulin Glargine]      Low blood sugar     Lisinopril Cough     Morphine Nausea and Vomiting     Pregabalin      Dizziness; Worse, numbness/tingling/burning pain whole body, hospitalized     Sulfamethoxazole Hives        Social History:     Social History     Tobacco Use     Smoking status: Former Smoker     Packs/day: 0.25     Years: 15.00     Pack years: 3.75     Smokeless tobacco: Never Used   Substance Use Topics     Alcohol use: No     Frequency: Never       History   Drug Use No           Physical Exam:     There were no vitals taken for this visit.  There is no height or weight on file to calculate BMI.    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      A total of 25 minutes was spent with the patient, 20 minutes of which were spent in counseling the patient and/or treatment planning.        1.  Recurrent platinum resistant high grade serous ovarian cancer.     2.  Status post neoadjuvant chemotherapy.   3.  Status post R0 interval debulking.   4.  Renal clear cell carcinoma.     5.  Asymptomatic incisional hernia  6.  Bilateral hydronephrosis bilateral stents in place  7.  Neuropathy.   8.  Insulin-dependent diabetes.       The patient has started hospice care and is very  comfortable.   The patient as well as her family agree with this plan.  They are very appreciative of her care.  All questions were answered.                    Jeannie Condon MD, MS    Department of Obstetrics and Gynecology   Division of Gynecologic Oncology   [facility]  Phone: [phone]    CC  Patient Care Team:  Sonja Hathaway MD as PCP - General (Family Practice)  Elsa Jones RN as Specialty Care Coordinator (Gyn-Onc)  Jeannie Condon MD as MD (Gyn-Onc)  JEANNIE CONDON

## 2020-09-05 ENCOUNTER — DOCUMENTATION ONLY (OUTPATIENT)
Dept: PHARMACY | Facility: CLINIC | Age: 75
End: 2020-09-05

## 2020-09-05 NOTE — PROGRESS NOTES
ORAL CHEMOTHERAPY DISCONTINUATION       Primary Oncologist:  Dr. Galeas  Primary Oncology Clinic: [facility]  Cancer Diagnosis:  Ovarian Cancer  Therapy History:  C1D1: 2/7/2020  Lynparza 300 mg (2 X 150 mg tab) by mouth twice daily      Therapy Ended On:  8/18/2020  Reason For Discontinuation: patient enrolled into hospice    Additional Notes:  Thanks you for the opportunity to be a part of this patient's oral chemotherapy. The oncology pharmacy will no longer be following this patient for oral chemotherapy. If there are any questions or the plan changes, feel free to contact us.    Garfield Carey Pharmacy Intern  Oral Chemotherapy Monitoring Program  [phone]

## 2021-01-03 ENCOUNTER — HEALTH MAINTENANCE LETTER (OUTPATIENT)
Age: 76
End: 2021-01-03

## 2021-04-25 ENCOUNTER — HEALTH MAINTENANCE LETTER (OUTPATIENT)
Age: 76
End: 2021-04-25

## 2021-08-14 ENCOUNTER — HEALTH MAINTENANCE LETTER (OUTPATIENT)
Age: 76
End: 2021-08-14

## 2021-10-10 ENCOUNTER — HEALTH MAINTENANCE LETTER (OUTPATIENT)
Age: 76
End: 2021-10-10

## 2021-12-04 ENCOUNTER — HEALTH MAINTENANCE LETTER (OUTPATIENT)
Age: 76
End: 2021-12-04

## 2022-03-26 ENCOUNTER — HEALTH MAINTENANCE LETTER (OUTPATIENT)
Age: 77
End: 2022-03-26

## 2022-05-21 ENCOUNTER — HEALTH MAINTENANCE LETTER (OUTPATIENT)
Age: 77
End: 2022-05-21

## 2022-07-16 ENCOUNTER — HEALTH MAINTENANCE LETTER (OUTPATIENT)
Age: 77
End: 2022-07-16

## 2022-09-18 ENCOUNTER — HEALTH MAINTENANCE LETTER (OUTPATIENT)
Age: 77
End: 2022-09-18

## 2022-12-11 NOTE — OR NURSING
As tolerated. I called [facility] records and requested they urgently fax results of pt's stress test done today to [facility] and [facility]. Then I faxed them the request per their direction.

## 2023-01-28 ENCOUNTER — HEALTH MAINTENANCE LETTER (OUTPATIENT)
Age: 78
End: 2023-01-28

## 2023-05-06 ENCOUNTER — HEALTH MAINTENANCE LETTER (OUTPATIENT)
Age: 78
End: 2023-05-06

## 2023-06-04 ENCOUNTER — HEALTH MAINTENANCE LETTER (OUTPATIENT)
Age: 78
End: 2023-06-04

## 2023-06-09 NOTE — OR NURSING
Call placed to [facility] asking them to fax results of pt's visit with dr Bill Atkins ([facility]) today to PAN and 3 C.   Yes

## 2023-08-31 NOTE — PROGRESS NOTES
Patient local  care called and stated that a local MD placed orders. However, the were concerned as patient was thinking she was getting Hospice. Unfortunately, there [facility] does not offer this service.     Patient will discuss hospice with MD at upcoming appointment.     HHospice options:  [facility]     Marianela Coates RN     Hemostasis: [place]

## 2023-10-08 ENCOUNTER — HEALTH MAINTENANCE LETTER (OUTPATIENT)
Age: 78
End: 2023-10-08

## 2024-02-25 ENCOUNTER — HEALTH MAINTENANCE LETTER (OUTPATIENT)
Age: 79
End: 2024-02-25

## 2024-03-14 NOTE — PROGRESS NOTES
Follow Up Notes on Referred Patient    Date: 3/4/2020       Dr. Willian Lucero MD  No address on file       RE: Afshan Cardona  : 1945  SRIKANTH: 3/4/2020    Dear Dr. 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   The patient presents today for followup.  She has received 1 cycle of adjuvant chemotherapy after her R0 interval cytoreduction. She has started olaparib 300mg twice a day. Initial nausea, that has now resolved no vomiting, fever or chills.  Normal bowel function.  Does have some issues with urinary incontinence.   No vaginal bleeding or discharge.  No B symptoms.       Past Medical History:    Past Medical History:   Diagnosis Date     Vivas's esophagus      Cardiomyopathy (H)     [place]     Colon polyps      Diabetes (H)      Fibromyalgia      Former smoker      History of DVT (deep vein thrombosis)      HLD (hyperlipidemia)      HTN (hypertension)      Hypothyroid      TIA (transient ischemic attack)          Past Surgical History:    Past Surgical History:   Procedure Laterality Date     AS ESOPHAGOSCOPY, DIAGNOSTIC       BACK SURGERY       bladder lift       HYSTERECTOMY       HYSTERECTOMY TOTAL ABD, TRINY SALPINGO-OOPHORECTOMY, NODE DISSECTION, TUMOR DEBULKING, COMBINED N/A 2018    Procedure: COMBINED HYSTERECTOMY TOTAL ABDOMINAL, SALPINGO-OOPHORECTOMY, NODE DISSECTION, TUMOR DEBULKING;;  Surgeon: Bakari Galeas MD;  Location: [facility] OR     LAPAROSCOPY DIAGNOSTIC (GYN) N/A 2018    Procedure: LAPAROSCOPY DIAGNOSTIC (GYN);  Diagnostic Laparoscopy, Biopsies;  Surgeon: Bakari Galeas MD;  Location: [facility] OR     LAPAROTOMY EXPLORATORY N/A 2018    Procedure: LAPAROTOMY EXPLORATORY;  Exploratory Laparotomy, lysis of adhesions, jejunal mesentary biopsy and sigmoid epiploic biopsy. ;  Surgeon: Bakari Galeas MD;  Location: [facility] OR     LAPAROTOMY, TUMOR DEBULKING, COMBINED  N/A 2/22/2019    Procedure: Exploratory Laparotomy, tumor debulking, omentectomy, lysis of adhesions x 60 minutes, pelvic paritonectomy;  Surgeon: Bakari Galeas MD;  Location: [facility] OR     PROCTOSCOPY N/A 2/22/2019    Procedure: Proctoscopy;  Surgeon: Bakari Galeas MD;  Location: [facility] OR     SALPINGO-OOPHORECTOMY BILATERAL Bilateral 2/22/2019    Procedure: Bilateral Salpingo Oophorectomy;  Surgeon: Bakari Galeas MD;  Location: [facility] OR         Health Maintenance Due   Topic Date Due     DEXA  1945     HF ACTION PLAN  1945     MICROALBUMIN  1945     DIABETIC FOOT EXAM  1945     URINE DRUG SCREEN  1945     DEPRESSION ACTION PLAN  1945     EYE EXAM  1945     PHQ-9  1945     MEDICARE ANNUAL WELLNESS VISIT  05/19/2009     ZOSTER IMMUNIZATION (2 of 3) 02/15/2016     A1C  05/08/2019     LIPID  07/10/2019     INFLUENZA VACCINE (1) 09/01/2019     MAMMO SCREENING  10/01/2019       Current Medications:     Current Outpatient Medications   Medication Sig Dispense Refill     aspirin (ASA) 325 MG EC tablet Take 325 mg by mouth daily       carvedilol (COREG) 25 MG tablet Take 1 tablet (25 mg) by mouth 2 times daily (with meals) (Patient taking differently: Take 12.5 mg by mouth 2 times daily (with meals) ) 60 tablet      cephalexin (KEFLEX) 250 MG capsule TAKE ONE CAPSULE BY MOUTH ONCE DAILY AT BEDTIME  3     Cholecalciferol (VITAMIN D3) 2000 units CAPS TAKE ONE CAPSULE BY MOUTH ONCE DAILY IN THE MORNING  3     CRANBERRY EXTRACT PO Take by mouth every morning       cyanocobalamin (CYANOCOBALAMIN) 1000 MCG/ML injection Inject 1,000 mcg into the muscle every 30 days       DULoxetine (CYMBALTA) 60 MG EC capsule TAKE ONE CAPSULE BY MOUTH AT BEDTIME  1     gabapentin (NEURONTIN) 600 MG tablet Take 1 tablet (600 mg) by mouth 3 times daily 90 tablet      HUMALOG MARY KWIKPEN 100 UNIT/ML injection INJECT 1 UNIT PER 10GRAMS OF CARBOHYDRATES WITH CORRECTION 0.5 UNITS PER 50  ABOVE 150 ( UP TO 30 UNITS PER DAY)  6     insulin degludec (TRESIBA) 200 UNIT/ML pen Inject 16 Units Subcutaneous every 24 hours       iohexol (OMNIPAQUE) 140 MG/ML solution for oral use Mix entire bottle (50ml) of contast with 600ml (20 ounces) of water and drink half 2 hrs prior to CT scan and half 1 hr prior to scan 50 mL 0     levothyroxine (SYNTHROID/LEVOTHROID) 88 MCG tablet        losartan (COZAAR) 100 MG tablet Take 100 mg by mouth At Bedtime        melatonin 1 MG TABS tablet Take 1 mg by mouth nightly as needed for sleep       olaparib (LYNPARZA) 150 MG tablet Take 2 tablets (300 mg) by mouth 2 times daily 120 tablet 0     omeprazole (PRILOSEC) 20 MG DR capsule TAKE ONE CAPSULE BY MOUTH ONCE DAILY  1     oxyCODONE-acetaminophen (PERCOCET) 5-325 MG tablet Take 1 tablet by mouth 3 times daily        pravastatin (PRAVACHOL) 80 MG tablet Take 80 mg by mouth At Bedtime        prochlorperazine (COMPAZINE) 5 MG tablet Take 1 tablet (5 mg) by mouth every 6 hours as needed (Nausea/Vomiting) 30 tablet 2     ONETOUCH VERIO IQ test strip USE TO TEST BLOOD GLUCOSE 6-8 TIMES PER DAY, BEFORE MEALS, BEDTIME TO DOSE INSULIN, HIGH OR LOW BLOOD GLUCOSE WITH RECHECK  3         Allergies:        Allergies   Allergen Reactions     Nitrofurantoin      Burning around her mouth, pt advised by neurologist to not take macrobid         Azithromycin Diarrhea     Buprenorphine Nausea and Vomiting     Severe vomiting     Clonazepam      Furosemide      Low sodium     Hydrochlorothiazide      Low Sodium     Lantus [Insulin Glargine]      Low blood sugar     Lisinopril Cough     Morphine Nausea and Vomiting     Pregabalin      Dizziness; Worse, numbness/tingling/burning pain whole body, hospitalized     Sulfamethoxazole Hives        Social History:     Social History     Tobacco Use     Smoking status: Former Smoker     Packs/day: 0.25     Years: 15.00     Pack years: 3.75     Smokeless tobacco: Never Used   Substance Use Topics      Alcohol use: No     Frequency: Never       History   Drug Use No         Physical Exam:     /66 (BP Location: Right arm, Patient Position: Sitting, Cuff Size: Adult Regular)   Pulse 76   Temp 98  F (36.7  C) (Oral)   Resp 18   Wt 69.9 kg (154 lb)   SpO2 98%   BMI 28.16 kg/m    Body mass index is 28.16 kg/m .    General Appearance:  healthy and alert, no distress     Musculoskeletal:         extremities non tender and without edema     Neurological:   normal gait, no gross defects     Psychiatric:      appropriate mood and affect              ABDOMEN:  Soft, nontender, nondistended.  Well-healing midline incision.  No organomegaly.      ABDOMEN:  Soft, nontender, nondistended.  There is about a 4 cm hernia on the upper one-third of the incision.  It is nontender.                    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      A total of 40 minutes was spent with the patient, 30 minutes of which were spent in counseling the patient and/or treatment planning.        1.  Recurrent platinum resistant high grade serous ovarian cancer.     2.  Status post neoadjuvant chemotherapy.   3.  Status post R0 interval debulking.   4.  Renal clear cell carcinoma.     5.  Asymptomatic incisional hernia  6.  Mild bilateral hydronephrosis  7.  Neuropathy.   8.  Insulin-dependent diabetes.        We discussed by definition it would be platinum-resistant ovarian cancer with the caveat that she has not received her full adjuvant treatment postoperatively of 3 cycles.  We wiwith olaparib 300 mg b.i.d. as an oral option.  We discussed side effects and risks of that.  We will consider to do 3 cycles of that and repeat a scan CT chest, abdomen and pelvis after 3 cycles of 3 months of treatment.  She will do that with a local oncologist.  Then we will see her back in 3 months.  If she gets anymore symptoms from her cancer or signs of  progression, we should see her earlier if needed.  We also discussed that she has some mild hydronephroses bilaterally and possible bilateral ureteral stents, especially if her creatinine increases.  She will follow up locally with Urology.  The patient as well as her family agree with this plan.  They are very appreciative of her care.  All questions were answered.      We will also screen her for the NanoOvary study and see whether her primary tumor is an immunoreactive subtype.  Of note, she does have a clear cell cancer of the kidney, which has not been treated yet.  It has been stable.  Cryoablation would be a definitive treatment option and might be necessary if she would qualify for the NanoOvary study.                           Bakari Galeas MD, MS    Department of Obstetrics and Gynecology   Division of Gynecologic Oncology   [facility]  Phone: [phone]         CC  Patient Care Team:  Sonja Hathaway MD as PCP - General (Family Practice)  SELF, REFERRED   [General Appearance - Alert] : alert [General Appearance - In No Acute Distress] : in no acute distress [Oriented To Time, Place, And Person] : oriented to person, place, and time [Affect] : the affect was normal [Impaired Insight] : insight and judgment were intact [Sclera] : the sclera and conjunctiva were normal [PERRL With Normal Accommodation] : pupils were equal in size, round, reactive to light, with normal accommodation [Extraocular Movements] : extraocular movements were intact [Outer Ear] : the ears and nose were normal in appearance [Oropharynx] : the oropharynx was normal [Neck Appearance] : the appearance of the neck was normal [Neck Cervical Mass (___cm)] : no neck mass was observed [Jugular Venous Distention Increased] : there was no jugular-venous distention [Thyroid Nodule] : there were no palpable thyroid nodules [Thyroid Diffuse Enlargement] : the thyroid was not enlarged [Auscultation Breath Sounds / Voice Sounds] : lungs were clear to auscultation bilaterally [Heart Rate And Rhythm] : heart rate was normal and rhythm regular [Heart Sounds] : normal S1 and S2 [Heart Sounds Gallop] : no gallops [Heart Sounds Pericardial Friction Rub] : no pericardial rub [Edema] : there was no peripheral edema [Veins - Varicosity Changes] : there were no varicosital changes [No CVA Tenderness] : no ~M costovertebral angle tenderness [No Spinal Tenderness] : no spinal tenderness [Musculoskeletal - Swelling] : no joint swelling seen [Nail Clubbing] : no clubbing  or cyanosis of the fingernails [Motor Tone] : muscle strength and tone were normal [Skin Color & Pigmentation] : normal skin color and pigmentation [Skin Turgor] : normal skin turgor [] : no rash [FreeTextEntry1] : systolic murmur at apex

## 2024-07-16 NOTE — H&P
Pre-Operative H & P     CC:  Preoperative exam to assess for increased cardiopulmonary risk while undergoing surgery and anesthesia.    Date of Encounter: 2/8/2019  Primary Care Physician:  Sonja Hathaway    ARAM Cardona is a 73 year old female who presents for pre-operative H & P in preparation for exploratory laparotomy, tumor debulking, omentectomy, possible stoma, BSO, in treatment high grade ovarian cancer, with Dr. Galeas, on 2/22/19,  at [facility]. She has high grade ovarian cancer and  is S/P 6 cycles of chemotherapy-completed 12/2018.   This surgery was attempted in July 2018 but during exploratory Dr. Galeas felt due to size of tumor and an episode of hypotension that he would defer resection at that time. She has had a slow recovery from that admission with post-op confusion and weakness and need for TCU care. She is back home but not back to baseline. SHe now needs walker for ambulation and continues with some persistent fatigue since chemotherapy.     History is obtained from the patient.     Past Medical History  Past Medical History:   Diagnosis Date     Vivas's esophagus      Cardiomyopathy (H)     Taksumoto     Colon polyps      Diabetes (H)      Fibromyalgia      Former smoker      History of DVT (deep vein thrombosis)      HLD (hyperlipidemia)      HTN (hypertension)      Hypothyroid      TIA (transient ischemic attack)        Past Surgical History  Past Surgical History:   Procedure Laterality Date     AS ESOPHAGOSCOPY, DIAGNOSTIC       BACK SURGERY       bladder lift       HYSTERECTOMY       HYSTERECTOMY TOTAL ABD, TRINY SALPINGO-OOPHORECTOMY, NODE DISSECTION, TUMOR DEBULKING, COMBINED N/A 7/24/2018    Procedure: COMBINED HYSTERECTOMY TOTAL ABDOMINAL, SALPINGO-OOPHORECTOMY, NODE DISSECTION, TUMOR DEBULKING;;  Surgeon: Bakari Galeas MD;  Location: [facility] OR     LAPAROSCOPY DIAGNOSTIC (GYN) N/A 7/6/2018    Procedure: LAPAROSCOPY  "Brigitte Herrera presents to [facility] with complaint of  Hypertension (6 month chronic follow up. Medication refills. Pt is not sure who prescribes her crestor, metformin )    SUBJECTIVE  History of Present Illness    Patient is here for chronic illness follow-up of anxiety and depression, type 2 diabetes, hyperlipidemia, hypertension, coronary artery disease status post stenting and osteoporosis.    She is due for cardiology follow-up in December.  She is on DAPT, cardiology note was reviewed and she will likely be transition to monotherapy at her follow-up.  Her blood pressure is well-controlled with metoprolol 50 mg daily and losartan 100 mg daily.    Her diabetes, she is on metformin 500 mg twice per day.  She is due for A1c check but she is also due for other annual labs next month.    Patient feels that her Celexa 20 mg daily is no longer effective for her anxiety and depression.  She says that her anxiety is more prominent than the depression.  She has been on Celexa for several years.  She tried increasing to 40 mg last year but was not able to tolerate as it made her too drowsy.  She even tried taking the medication at bedtime which was still here too drowsy.  She is asking if she would benefit from seeing a therapist.    The following portions of the patient's history were reviewed and updated as appropriate: allergies, current medications, past family history, past medical history, past social history, past surgical history and problem list.  Patient will be due for mammogram in November.  Other health screenings are up-to-date.    OBJECTIVE  Vital Signs:   /86 (BP Location: Left arm, Patient Position: Sitting)   Pulse 67   Ht 162.6 cm (64\")   Wt 55.8 kg (123 lb)   BMI 21.11 kg/m²       Physical Exam  Vitals reviewed.   Constitutional:       General: She is not in acute distress.     Appearance: Normal appearance. She is not ill-appearing.   HENT:      Head: " DIAGNOSTIC (GYN);  Diagnostic Laparoscopy, Biopsies;  Surgeon: Bakari Galeas MD;  Location: [facility] OR     LAPAROTOMY EXPLORATORY N/A 7/24/2018    Procedure: LAPAROTOMY EXPLORATORY;  Exploratory Laparotomy, lysis of adhesions, jejunal mesentary biopsy and sigmoid epiploic biopsy. ;  Surgeon: Bakari Galeas MD;  Location: [facility] OR       Hx of Blood transfusions/reactions: yes, no reactions     Hx of abnormal bleeding or anti-platelet use: been off ASA since biopsy    Menstrual history: No LMP recorded. Patient has had a hysterectomy.    Steroid use in the last year: no    Personal or FH with difficulty with Anesthesia:  Yes, hypotension and post-op urinary retention, confusion, delayed discharge    Prior to Admission Medications  Current Outpatient Medications   Medication Sig Dispense Refill     aspirin (ASA) 325 MG EC tablet Take 325 mg by mouth daily       carvedilol (COREG) 25 MG tablet Take 1 tablet (25 mg) by mouth 2 times daily (with meals) 60 tablet      cephalexin (KEFLEX) 250 MG capsule TAKE ONE CAPSULE BY MOUTH ONCE DAILY AT BEDTIME  3     Cholecalciferol (VITAMIN D3) 2000 units CAPS TAKE ONE CAPSULE BY MOUTH ONCE DAILY IN THE MORNING  3     CRANBERRY EXTRACT PO Take by mouth every morning       DULoxetine (CYMBALTA) 60 MG EC capsule TAKE ONE CAPSULE BY MOUTH AT BEDTIME  1     gabapentin (NEURONTIN) 600 MG tablet Take 1 tablet (600 mg) by mouth 3 times daily 90 tablet      HUMALOG MARY KWIKPEN 100 UNIT/ML injection INJECT 1 UNIT PER 10GRAMS OF CARBOHYDRATES WITH CORRECTION 0.5 UNITS PER 50 ABOVE 150 ( UP TO 30 UNITS PER DAY)  6     insulin degludec (TRESIBA) 100 UNIT/ML pen Inject 8 Units Subcutaneous At Bedtime (Patient taking differently: Inject 14 Units Subcutaneous At Bedtime )       levothyroxine (SYNTHROID/LEVOTHROID) 75 MCG tablet TAKE ONE TABLET BY MOUTH ONCE DAILY  IN THE MORNING     (PLEASE CALL [phone] FOR AN OFFICE VISIT BEFORE NEXT REFILL.)  0     losartan (COZAAR) 100 MG tablet  Normocephalic and atraumatic.      Nose: Nose normal.      Mouth/Throat:      Mouth: Mucous membranes are moist.      Pharynx: Oropharynx is clear.   Cardiovascular:      Rate and Rhythm: Normal rate and regular rhythm.      Pulses: Normal pulses.      Heart sounds: Normal heart sounds.   Pulmonary:      Effort: Pulmonary effort is normal.      Breath sounds: Normal breath sounds.   Musculoskeletal:      Cervical back: Neck supple.   Skin:     General: Skin is warm and dry.   Neurological:      General: No focal deficit present.      Mental Status: She is alert and oriented to person, place, and time. Mental status is at baseline.   Psychiatric:         Mood and Affect: Mood normal.         Behavior: Behavior normal.         Judgment: Judgment normal.          Results Review:  The following data was reviewed by Shannon López, CARMEN [unfilled] 13:41 EDT.  POC Glycosylated Hemoglobin (Hb A1C) (01/11/2024 13:52)  MicroAlbumin, Urine, Random - Urine, Clean Catch (08/21/2023 11:28)  Hemoglobin A1c (08/21/2023 11:28)  Lipid Panel (08/21/2023 11:28)  Vitamin B12 (08/21/2023 11:28)  TSH Rfx On Abnormal To Free T4 (08/21/2023 11:28)  Comprehensive Metabolic Panel (08/21/2023 11:28)  CBC & Differential (08/21/2023 11:28)    ASSESSMENT AND PLAN:  Diagnoses and all orders for this visit:    1. Type 2 diabetes mellitus without complication, without long-term current use of insulin (Primary)  Assessment & Plan:  She is due for A1c but since she is due for all of her labs next month, she will wait to have A1c checked in August.  Continue metformin 500 mg twice a day for now.  She is on ARB and statin.    Orders:  -     Cancel: POC Glycosylated Hemoglobin (Hb A1C)  -     Comprehensive Metabolic Panel; Future  -     Hemoglobin A1c; Future  -     MicroAlbumin, Urine, Random - Urine, Clean Catch; Future  -     metFORMIN (GLUCOPHAGE) 500 MG tablet; Take 1 tablet by mouth 2 (Two) Times a Day With Meals.  Dispense: 180 tablet; Refill: 1    2.  SHOLA (generalized anxiety disorder)  Assessment & Plan:  Brianna is not currently well-controlled.  Patient admits to being an excessive worrier.  Discussed options of tapering off Celexa and trying different medication versus adding medication on such as Wellbutrin or as needed BuSpar.  Patient says her sister is on Wellbutrin and would like to try this medication.  Occasional medication side effects.  Follow-up if not seeing improvement over the next 6 weeks.  She was also given a list of local counseling offices to call and make appointment.    Orders:  -     citalopram (CeleXA) 20 MG tablet; Take 1 tablet by mouth Daily.  Dispense: 90 tablet; Refill: 1  -     buPROPion XL (Wellbutrin XL) 150 MG 24 hr tablet; Take 1 tablet by mouth Daily.  Dispense: 90 tablet; Refill: 0    3. Age-related osteoporosis without current pathological fracture  Assessment & Plan:  Due for DEXA in 2025.  She was on Fosamax and is on drug holiday.  Currently on calcium and vitamin D supplement.  Regular weightbearing exercise also encouraged.      4. Primary hypertension  Assessment & Plan:  Blood pressure is at goal.  Continue losartan 100 mg daily and metoprolol 50 mg daily       Orders:  -     losartan (COZAAR) 100 MG tablet; Take 1 tablet by mouth Daily.  Dispense: 90 tablet; Refill: 1  -     metoprolol succinate XL (TOPROL-XL) 50 MG 24 hr tablet; Take 1 tablet by mouth Daily.  Dispense: 90 tablet; Refill: 1    5. Mixed hyperlipidemia  Assessment & Plan:  Currently on rosuvastatin 40 mg nightly, checking lipid panel    Orders:  -     Lipid Panel; Future  -     rosuvastatin (CRESTOR) 40 MG tablet; Take 1 tablet by mouth Every Night.  Dispense: 90 tablet; Refill: 1    6. Coronary artery disease involving native heart without angina pectoris, unspecified vessel or lesion type  Assessment & Plan:  Followed by cardiology, she was encouraged to call and make a yearly follow-up visit for December with her cardiology provider.  She is on  Take 100 mg by mouth At Bedtime        melatonin 1 MG TABS tablet Take 1 mg by mouth nightly as needed for sleep       ONETOUCH VERIO IQ test strip USE TO TEST BLOOD GLUCOSE 6-8 TIMES PER DAY, BEFORE MEALS, BEDTIME TO DOSE INSULIN, HIGH OR LOW BLOOD GLUCOSE WITH RECHECK  3     oxyCODONE IR (ROXICODONE) 5 MG tablet Take 0.5-1 tablets (2.5-5 mg) by mouth every 6 hours as needed for severe pain 25 tablet 0     pravastatin (PRAVACHOL) 80 MG tablet Take 80 mg by mouth At Bedtime        senna-docusate (SENOKOT-S;PERICOLACE) 8.6-50 MG per tablet Take 1-2 tablets by mouth 2 times daily Take while on oral narcotics to prevent or treat constipation. 30 tablet 0       Allergies  Allergies   Allergen Reactions     Nitrofurantoin Other (See Comments)     Burning around her mouth, pt advised by neurologist to not take macrobid         Azithromycin Diarrhea     Buprenorphine Nausea and Vomiting     Severe vomiting     Clonazepam      Furosemide      Low sodium     Hydrochlorothiazide Other (See Comments)     Low Sodium     Lantus [Insulin Glargine]      Low blood sugar     Lisinopril Cough     Pregabalin Other (See Comments)     Dizziness; Worse, numbness/tingling/burning pain whole body, hospitalized     Sulfamethoxazole Hives       Social History  Social History     Socioeconomic History     Marital status:      Spouse name: Not on file     Number of children: 6     Years of education: Not on file     Highest education level: Not on file   Social Needs     Financial resource strain: Not on file     Food insecurity - worry: Not on file     Food insecurity - inability: Not on file     Transportation needs - medical: Not on file     Transportation needs - non-medical: Not on file   Occupational History     Not on file   Tobacco Use     Smoking status: Former Smoker     Packs/day: 0.25     Years: 15.00     Pack years: 3.75     Smokeless tobacco: Never Used   Substance and Sexual Activity     Alcohol use: No     Frequency:  "Never     Drug use: No     Sexual activity: No   Other Topics Concern     Parent/sibling w/ CABG, MI or angioplasty before 65F 55M? Not Asked   Social History Narrative     Not on file       Family History  No family history on file.        Anesthesia Evaluation     . Pt has had prior anesthetic. Type: General and MAC    History of anesthetic complications    Significant hypotension after 1 hour induction;  cancelled surgery for this and for unexpected size of tumor; also needed to give narcan x 2 and had persistent  neuro sx post-op in hospital  (hallucinations and loss of orientation to place as well as prolo      ROS/MED HX    ENT/Pulmonary:     (+)SINCERE risk factors snores loudly, hypertension, tobacco use, Past use quit 30 years ago packs/day  asthma , recent URI resolved  pneumonia fall 2018 - hospitalized: Hx chronic bronchitis: . .    Neurologic:     (+)neuropathy CVA without deficitsTIA date: 19 years ago     Cardiovascular: Comment: TTE 4/2017 (outside records)  CONCLUSION:  1. Normal left ventricular size with preserved systolic function.  Ejection fraction is 60%.     2. Stage I diastolic dysfunction.     3. Mild to moderate mitral regurgitation.     4. Mild aortic and tricuspid regurgitation.     (+) Dyslipidemia, hypertension--CAD, --. Taking blood thinners : . CHF etiology: Taksumoto syndrome Last EF: 60% date: 4/18/17 . . :. . Previous cardiac testing Echodate:7/2018results:Notes form cardiologist at [facility]: ...\"Today with exercise we have inducible wall motion abnormality involving the lateral wall.\"...date: results: date: results:Cath date: results:          METS/Exercise Tolerance:  3 - Able to walk 1-2 blocks without stopping   Hematologic: Comments: Several clots RLE -one associated with pregnancy    (+) History of blood clots pt is not anticoagulated, History of Transfusion no previous transfusion reaction -      Musculoskeletal:   (+) arthritis, , , other musculoskeletal- hardware in right " statin, aspirin and Plavix, ARB, and beta-blocker.  due for lipid panel.    Orders:  -     losartan (COZAAR) 100 MG tablet; Take 1 tablet by mouth Daily.  Dispense: 90 tablet; Refill: 1  -     metoprolol succinate XL (TOPROL-XL) 50 MG 24 hr tablet; Take 1 tablet by mouth Daily.  Dispense: 90 tablet; Refill: 1    7. Screening for thyroid disorder  -     TSH Rfx On Abnormal To Free T4; Future    8. Routine adult health maintenance  -     CBC & Differential; Future  -     Comprehensive Metabolic Panel; Future    9. Encounter for screening mammogram for malignant neoplasm of breast  Comments:  Due in November  Orders:  -     Cancel: Mammo Screening Digital Tomosynthesis Bilateral With CAD; Future  -     Mammo Screening Digital Tomosynthesis Bilateral With CAD; Future    10. Dysthymia  Assessment & Plan:  Per anxiety plan    Orders:  -     buPROPion XL (Wellbutrin XL) 150 MG 24 hr tablet; Take 1 tablet by mouth Daily.  Dispense: 90 tablet; Refill: 0    11. Irritable bowel syndrome with constipation  Assessment & Plan:  Controlled with Linzess    Orders:  -     linaclotide (LINZESS) 145 MCG capsule capsule; Take 1 capsule by mouth Daily.  Dispense: 90 capsule; Refill: 1          Follow Up   Return in about 6 months (around 1/16/2025) for Medicare Wellness. Patient to notify office with any acute concerns or issues.  Patient verbalizes understanding, agrees with plan of care and has no further questions upon discharge.     Patient was given instructions and counseling regarding her condition or for health maintenance advice. Please see specific information pulled into the AVS if appropriate.     Discussed the importance of following up with any needed screening tests/labs/specialist appointments and any requested follow-up recommended by me today. Importance of maintaining follow-up discussed and patient accepts that missed appointments can delay diagnosis and potentially lead to worsening of conditions.    Part of this  "patella      GI/Hepatic:     (+) GERD Other,       Renal/Genitourinary:  - ROS Renal section negative   (+) chronic renal disease, type: ARF, Other Renal/ Genitourinary (renal biopsy showed cancer),       Endo:     (+) type I DM, Using insulin - not using insulin pump thyroid problem hypothyroidism, .      Psychiatric:     (+) psychiatric history anxiety and depression      Infectious Disease:  - neg infectious disease ROS       Malignancy:      - no malignancy   Other:    (+) No chance of pregnancy H/O Chronic Pain,H/O chronic opiod use , no other significant disability                The complete review of systems is negative other than noted in the HPI or here.   Temp: 98.3  F (36.8  C) Temp src: Oral BP: 102/65 Pulse: 84   Resp: 16 SpO2: 95 %         155 lbs 11.2 oz  5' 2\"   Body mass index is 28.48 kg/m .       Physical Exam  Constitutional: Awake, alert, cooperative, no apparent distress, and appears stated age.  Eyes: Pupils equal, round and reactive to light, extra ocular muscles intact, sclera clear, conjunctiva normal.  HENT: Normocephalic, oral pharynx with moist mucus membranes, partial upper denture. No goiter appreciated.   Respiratory: Clear to auscultation bilaterally, no crackles or wheezing.  Cardiovascular: Regular rate and rhythm, normal S1 and S2, and no murmur noted.  Carotids +2, no bruits. No LE edema. Palpable pulses to radial arteries.   GI: Normal bowel sounds, soft, non-distended, non-tender, no masses palpated, no hepatosplenomegaly.  Central healed abdominal scar.  Lymph/Hematologic: No cervical lymphadenopathy and no supraclavicular lymphadenopathy.  Genitourinary:  deferred  Skin: Warm and dry.  No rashes at anticipated surgical site.   Musculoskeletal: Full ROM of neck. There is no redness, warmth, or swelling of the joints. Gross motor strength is normal.    Neurologic: Awake, alert, oriented to name, place and time. Cranial nerves II-XII are grossly intact. Gait is with walker, " note may be an electronic transcription/translation of spoken language to printed text using the Dragon Dictation System.       slow but steady  Neuropsychiatric: Calm, cooperative. Normal affect.     Labs: (personally reviewed)  Lab Results   Component Value Date    WBC 7.6 02/08/2019     Lab Results   Component Value Date    RBC 3.20 02/08/2019     Lab Results   Component Value Date    HGB 9.8 02/08/2019     Lab Results   Component Value Date    HCT 31.7 02/08/2019     Lab Results   Component Value Date    MCV 99 02/08/2019     Lab Results   Component Value Date    MCH 30.6 02/08/2019     Lab Results   Component Value Date    MCHC 30.9 02/08/2019     Lab Results   Component Value Date    RDW 14.0 02/08/2019     Lab Results   Component Value Date     02/08/2019     Last Comprehensive Metabolic Panel:  Sodium   Date Value Ref Range Status   02/08/2019 136 133 - 144 mmol/L Final     Potassium   Date Value Ref Range Status   02/08/2019 4.5 3.4 - 5.3 mmol/L Final     Chloride   Date Value Ref Range Status   02/08/2019 100 94 - 109 mmol/L Final     Carbon Dioxide   Date Value Ref Range Status   02/08/2019 29 20 - 32 mmol/L Final     Anion Gap   Date Value Ref Range Status   02/08/2019 7 3 - 14 mmol/L Final     Glucose   Date Value Ref Range Status   02/08/2019 155 (H) 70 - 99 mg/dL Final     Urea Nitrogen   Date Value Ref Range Status   02/08/2019 19 7 - 30 mg/dL Final     Creatinine   Date Value Ref Range Status   02/08/2019 0.75 0.52 - 1.04 mg/dL Final     GFR Estimate   Date Value Ref Range Status   02/08/2019 78 >60 mL/min/[1.73_m2] Final     Comment:     Non  GFR Calc  Starting 12/18/2018, serum creatinine based estimated GFR (eGFR) will be   calculated using the Chronic Kidney Disease Epidemiology Collaboration   (CKD-EPI) equation.       Calcium   Date Value Ref Range Status   02/08/2019 8.5 8.5 - 10.1 mg/dL Final       ASSESSMENT and PLAN  Afshan Cardona is a 73 year old female scheduled to undergo exploratory laparotomy, tumor debulking, omentectomy, possible stoma, BSO, in treatment high grade ovarian cancer.  "She is S/P 6 cycles of chemotherapy-completed 10/2018.      Pre-operative considerations include:  1.) CV: HTN (losartan, carvedilol), HLD (statin), Takutsubo cardiomyopathy 2015 -EF 25-30% recovered to 60%. 2017 echo EF 60% with no RWMA. Positive stress test 2018, Ischemic heart ds documented by catheterization 7/10/2018, (left main nl; 50-60% in RCA and LAD). She did follow with cardiologist (Dr. Bill Atkins) 2018 prior to her laparoscopy: note reflects....\" Exercise echo done at visit and revealed inducible lateral wall motion abnormality. Assessment was that CAD was stable and asymptomatic and recommendation was to proceed to cancer surgery with an acceptable cardiac risk. He did not recommend any catheter based intervention until exact nature of cancer was known.  Since July procedure patient's functional status partially dependent with slow post-op recovery, weakness / need for walker, decreased exercise tolerance. Now <  4 METS.   RCRI = 11% risk of major adverse cardiac event  Repeat echo and follow up with Dr. Atkins in cardiology due to change in exercise status. Daughter will schedule these pre-op and I will follow for results.  Hold ASA pre-op, hold ACEI 2 days due to past hypotensive episode and continue B-blocker    2.) Pulmonary: Distant smoking history Quit . No pulmonary dx, sx or meds.   SINCERE risks of HTN and age = 2 or low risk    3.)  Heme: Chronic anemia. HGB 9. DVT RLE x 2, both provoked (one after ) -last one ~4 years ago. No anticoagulation. Hx blood transfusion.  Elevated risk for DVT due to previous clot and cancer dx.  CBC, type and screen, order 2 units RBCs to hold    4.) Renal: pre-renal MARIAH 2018 in post-op setting. Chronic urinary retention -July surgery required prolonged post-op duffy. NO elevated creatinines noted in EMR.  BMP    5.) Endo: Insulin dependent diabetes since age 29. Fluctuates with poor control (elevated HGBa1c) and episodes of hypoglycemia " requiring ER visits (glucose 30). On short and long acting insulin. No oral agents. Hypothyroid on synthroid, which she will take DOS.  Adjust Insulin dosing of long term as per nursing note.  HOLD short acting insulin DOS    6.) GI: GERD, well managed  PONV score = 2 (2 or > antiemetic prophylaxis recommended.     7.) Neuro: Fibromyalgia. BLE neuropathy.      Patient was discussed with Dr Montelongo. Patient to have ECHO and cardiology consult pre-op.      ADDENDUM: 2/12/19: HGBa1c 9.2%, bnp 261 and HGB 9.8. She had echo 2/11/19 at [facility] - awaiting results to view in Care Everywhere.  Will report labs to surgery and to patient and recommend f/u with PMD to optimize glucose control    SALOME Goss  Preoperative Assessment Center  [facility]  [facility]  Phone: [phone]  Fax: [phone]

## (undated) DEVICE — DRSG STERI STRIP 1/2X4" R1547

## (undated) DEVICE — SOL NACL 0.9% INJ 1000ML BAG 07983-09

## (undated) DEVICE — ENDO TROCAR FIRST ENTRY KII FIOS ADV FIX 05X100MM CFF03

## (undated) DEVICE — SPONGE RAY-TEC 4X8" 7318

## (undated) DEVICE — SOL NACL 0.9% IRRIG 1000ML BOTTLE 2F7124

## (undated) DEVICE — ENDO SHEARS 5MM 176643

## (undated) DEVICE — SU VICRYL 3-0 SH 27" J316H

## (undated) DEVICE — LINEN TOWEL PACK X6 WHITE 5487

## (undated) DEVICE — ESU LIGASURE LAPAROSCOPIC BLUNT TIP SEALER 5MMX37CM LF1837

## (undated) DEVICE — SU MONOCRYL 3-0 PS-1 27" Y936H

## (undated) DEVICE — KIT PATIENT POSITIONING PIGAZZI LATEX FREE 40580

## (undated) DEVICE — SU VICRYL 0 TIE 54" J608H

## (undated) DEVICE — WIPES FOLEY CARE SURESTEP PROVON DFC100

## (undated) DEVICE — LINEN TOWEL PACK X30 5481

## (undated) DEVICE — SOL WATER IRRIG 1000ML BOTTLE 2F7114

## (undated) DEVICE — PROTECTOR ARM ONE-STEP TRENDELENBURG 40418

## (undated) DEVICE — DRSG PRIMAPORE 02X3" 7133

## (undated) DEVICE — SU SILK 3-0 SH CR 8X18" C013D

## (undated) DEVICE — WIPE PREMOIST CLEANSING WASHCLOTHS 7988

## (undated) DEVICE — PREP CHLORAPREP 26ML TINTED ORANGE  260815

## (undated) DEVICE — PANTIES MESH LG/XLG 2PK 706M2

## (undated) DEVICE — ESU LIGASURE IMPACT OPEN SEALER/DVDR CVD LG JAW LF4418

## (undated) DEVICE — DRAPE LEGGINGS CLEAR 8430

## (undated) DEVICE — SU VICRYL 0 CT-1 27" UND J260H

## (undated) DEVICE — BLADE CLIPPER SGL USE 9680

## (undated) DEVICE — PACK AB HYST II

## (undated) DEVICE — BNDG ABDOMINAL BINDER 9X45-62" 79-89071

## (undated) DEVICE — PREP POVIDONE IODINE SCRUB 7.5% 4OZ APL82212

## (undated) DEVICE — SUCTION MANIFOLD DORNOCH ULTRA CART UL-CL500

## (undated) DEVICE — ESU GROUND PAD ADULT W/CORD E7507

## (undated) DEVICE — DRSG GAUZE 4X4" TRAY 6939

## (undated) DEVICE — ENDO TROCAR FIRST ENTRY KII FIOS ADV FIX 12X100MM CFF73

## (undated) DEVICE — GLOVE PROTEXIS MICRO 7.5  2D73PM75

## (undated) DEVICE — CLIP HORIZON LG ORANGE 004200

## (undated) DEVICE — SU PDS II 0 TP-1 60" Z991G

## (undated) DEVICE — SPONGE LAP 18X36" 422

## (undated) DEVICE — SOL ADH LIQUID BENZOIN SWAB 0.6ML C1544

## (undated) DEVICE — SU VICRYL 2-0 SH 27" UND J417H

## (undated) DEVICE — PREP TECHNI-CARE CHLOROXYLENOL 3% 4OZ BOTTLE C222-4ZWO

## (undated) DEVICE — SU VICRYL 0 CT-1 36" J346H

## (undated) DEVICE — Device

## (undated) DEVICE — SPECIMEN TRAP MUCOUS 40ML LUKI C30200A

## (undated) DEVICE — SU VICRYL 0 CT-1 CR 8X27" UND JJ41G

## (undated) DEVICE — GLOVE PROTEXIS BLUE W/NEU-THERA 8.0  2D73EB80

## (undated) DEVICE — DRSG TEGADERM 4X4 3/4" 1626W

## (undated) DEVICE — PAD PERI INDIV WRAP 11" 2022A

## (undated) DEVICE — SU VICRYL 0 UR-6 27" J603H

## (undated) DEVICE — SOL BENZOIN 0.5OZ

## (undated) DEVICE — SPONGE LAP 18X18" X8435

## (undated) DEVICE — PITCHER STERILE 1000ML  SSK9004A

## (undated) DEVICE — JELLY LUBRICATING SURGILUBE 2OZ TUBE

## (undated) RX ORDER — ONDANSETRON 2 MG/ML
INJECTION INTRAMUSCULAR; INTRAVENOUS
Status: DISPENSED
Start: 2018-07-06

## (undated) RX ORDER — FENTANYL CITRATE 50 UG/ML
INJECTION, SOLUTION INTRAMUSCULAR; INTRAVENOUS
Status: DISPENSED
Start: 2019-01-23

## (undated) RX ORDER — ESMOLOL HYDROCHLORIDE 10 MG/ML
INJECTION INTRAVENOUS
Status: DISPENSED
Start: 2018-07-06

## (undated) RX ORDER — PROPOFOL 10 MG/ML
INJECTION, EMULSION INTRAVENOUS
Status: DISPENSED
Start: 2018-07-06

## (undated) RX ORDER — FENTANYL CITRATE 50 UG/ML
INJECTION, SOLUTION INTRAMUSCULAR; INTRAVENOUS
Status: DISPENSED
Start: 2018-07-24

## (undated) RX ORDER — SODIUM CHLORIDE 9 MG/ML
INJECTION, SOLUTION INTRAVENOUS
Status: DISPENSED
Start: 2018-07-24

## (undated) RX ORDER — LABETALOL HYDROCHLORIDE 5 MG/ML
INJECTION, SOLUTION INTRAVENOUS
Status: DISPENSED
Start: 2018-07-06

## (undated) RX ORDER — ACETAMINOPHEN 325 MG/1
TABLET ORAL
Status: DISPENSED
Start: 2018-07-24

## (undated) RX ORDER — FENTANYL CITRATE 50 UG/ML
INJECTION, SOLUTION INTRAMUSCULAR; INTRAVENOUS
Status: DISPENSED
Start: 2018-07-06

## (undated) RX ORDER — DIPHENHYDRAMINE HYDROCHLORIDE 50 MG/ML
INJECTION INTRAMUSCULAR; INTRAVENOUS
Status: DISPENSED
Start: 2019-01-23

## (undated) RX ORDER — HYDROMORPHONE HYDROCHLORIDE 1 MG/ML
INJECTION, SOLUTION INTRAMUSCULAR; INTRAVENOUS; SUBCUTANEOUS
Status: DISPENSED
Start: 2018-07-24

## (undated) RX ORDER — FENTANYL CITRATE 50 UG/ML
INJECTION, SOLUTION INTRAMUSCULAR; INTRAVENOUS
Status: DISPENSED
Start: 2019-02-22

## (undated) RX ORDER — PROPOFOL 10 MG/ML
INJECTION, EMULSION INTRAVENOUS
Status: DISPENSED
Start: 2018-07-24

## (undated) RX ORDER — METOPROLOL TARTRATE 1 MG/ML
INJECTION, SOLUTION INTRAVENOUS
Status: DISPENSED
Start: 2018-07-06

## (undated) RX ORDER — HYDROMORPHONE HYDROCHLORIDE 1 MG/ML
INJECTION, SOLUTION INTRAMUSCULAR; INTRAVENOUS; SUBCUTANEOUS
Status: DISPENSED
Start: 2018-07-06

## (undated) RX ORDER — DEXTROSE MONOHYDRATE 25 G/50ML
INJECTION, SOLUTION INTRAVENOUS
Status: DISPENSED
Start: 2018-07-24

## (undated) RX ORDER — SODIUM CHLORIDE 9 MG/ML
INJECTION, SOLUTION INTRAVENOUS
Status: DISPENSED
Start: 2019-01-23

## (undated) RX ORDER — CEFAZOLIN SODIUM 1 G/3ML
INJECTION, POWDER, FOR SOLUTION INTRAMUSCULAR; INTRAVENOUS
Status: DISPENSED
Start: 2018-07-24

## (undated) RX ORDER — OXYCODONE HYDROCHLORIDE 5 MG/1
TABLET ORAL
Status: DISPENSED
Start: 2018-07-24

## (undated) RX ORDER — OXYCODONE HYDROCHLORIDE 5 MG/1
TABLET ORAL
Status: DISPENSED
Start: 2018-07-06

## (undated) RX ORDER — LIDOCAINE HYDROCHLORIDE 10 MG/ML
INJECTION, SOLUTION EPIDURAL; INFILTRATION; INTRACAUDAL; PERINEURAL
Status: DISPENSED
Start: 2019-01-23

## (undated) RX ORDER — PHENYLEPHRINE HCL IN 0.9% NACL 1 MG/10 ML
SYRINGE (ML) INTRAVENOUS
Status: DISPENSED
Start: 2018-07-06

## (undated) RX ORDER — HEPARIN SODIUM 5000 [USP'U]/.5ML
INJECTION, SOLUTION INTRAVENOUS; SUBCUTANEOUS
Status: DISPENSED
Start: 2018-07-06

## (undated) RX ORDER — CEFAZOLIN SODIUM 2 G/100ML
INJECTION, SOLUTION INTRAVENOUS
Status: DISPENSED
Start: 2019-02-22

## (undated) RX ORDER — PHENAZOPYRIDINE HYDROCHLORIDE 200 MG/1
TABLET, FILM COATED ORAL
Status: DISPENSED
Start: 2018-07-06

## (undated) RX ORDER — CEFAZOLIN SODIUM 2 G/100ML
INJECTION, SOLUTION INTRAVENOUS
Status: DISPENSED
Start: 2018-07-24

## (undated) RX ORDER — HYDROMORPHONE HYDROCHLORIDE 1 MG/ML
INJECTION, SOLUTION INTRAMUSCULAR; INTRAVENOUS; SUBCUTANEOUS
Status: DISPENSED
Start: 2019-02-22

## (undated) RX ORDER — ACETAMINOPHEN 325 MG/1
TABLET ORAL
Status: DISPENSED
Start: 2018-07-06

## (undated) RX ORDER — CEFAZOLIN SODIUM 2 G/100ML
INJECTION, SOLUTION INTRAVENOUS
Status: DISPENSED
Start: 2018-07-06